# Patient Record
Sex: FEMALE | Race: WHITE | NOT HISPANIC OR LATINO | Employment: OTHER | URBAN - METROPOLITAN AREA
[De-identification: names, ages, dates, MRNs, and addresses within clinical notes are randomized per-mention and may not be internally consistent; named-entity substitution may affect disease eponyms.]

---

## 2024-01-01 ENCOUNTER — APPOINTMENT (OUTPATIENT)
Dept: CARDIOLOGY | Facility: HOSPITAL | Age: 69
End: 2024-01-01
Payer: MEDICARE

## 2024-01-01 ENCOUNTER — APPOINTMENT (OUTPATIENT)
Dept: RADIOLOGY | Facility: HOSPITAL | Age: 69
End: 2024-01-01
Payer: MEDICARE

## 2024-01-01 ENCOUNTER — POST MORTEM DOCUMENTATION (OUTPATIENT)
Dept: CARDIOLOGY | Facility: HOSPITAL | Age: 69
End: 2024-01-01
Payer: MEDICARE

## 2024-01-01 ENCOUNTER — TELEPHONE (OUTPATIENT)
Dept: CARDIOLOGY | Facility: HOSPITAL | Age: 69
End: 2024-01-01

## 2024-01-01 ENCOUNTER — APPOINTMENT (OUTPATIENT)
Dept: NEUROLOGY | Facility: HOSPITAL | Age: 69
End: 2024-01-01
Payer: MEDICARE

## 2024-01-01 ENCOUNTER — TELEPHONE (OUTPATIENT)
Dept: CARDIOLOGY | Facility: CLINIC | Age: 69
End: 2024-01-01
Payer: MEDICARE

## 2024-01-01 PROCEDURE — 71045 X-RAY EXAM CHEST 1 VIEW: CPT

## 2024-01-01 PROCEDURE — 93308 TTE F-UP OR LMTD: CPT

## 2024-01-01 PROCEDURE — 70450 CT HEAD/BRAIN W/O DYE: CPT

## 2024-01-01 PROCEDURE — 93308 TTE F-UP OR LMTD: CPT | Performed by: INTERNAL MEDICINE

## 2024-01-01 PROCEDURE — 95822 EEG COMA OR SLEEP ONLY: CPT

## 2024-01-01 PROCEDURE — 74018 RADEX ABDOMEN 1 VIEW: CPT

## 2024-01-01 PROCEDURE — 95822 EEG COMA OR SLEEP ONLY: CPT | Performed by: PSYCHIATRY & NEUROLOGY

## 2024-01-01 PROCEDURE — 93005 ELECTROCARDIOGRAM TRACING: CPT

## 2024-09-09 ENCOUNTER — OFFICE VISIT (OUTPATIENT)
Dept: CARDIOLOGY | Facility: CLINIC | Age: 69
End: 2024-09-09
Payer: MEDICARE

## 2024-09-09 VITALS
WEIGHT: 145.4 LBS | DIASTOLIC BLOOD PRESSURE: 70 MMHG | BODY MASS INDEX: 29.31 KG/M2 | TEMPERATURE: 97.4 F | HEIGHT: 59 IN | HEART RATE: 86 BPM | SYSTOLIC BLOOD PRESSURE: 115 MMHG

## 2024-09-09 DIAGNOSIS — I50.21 ACUTE SYSTOLIC HEART FAILURE (MULTI): ICD-10-CM

## 2024-09-09 DIAGNOSIS — I42.9 CARDIOMYOPATHY, UNSPECIFIED TYPE (MULTI): ICD-10-CM

## 2024-09-09 DIAGNOSIS — I48.19 PERSISTENT ATRIAL FIBRILLATION (MULTI): Primary | ICD-10-CM

## 2024-09-09 PROCEDURE — 36415 COLL VENOUS BLD VENIPUNCTURE: CPT | Mod: PORLAB | Performed by: INTERNAL MEDICINE

## 2024-09-09 PROCEDURE — 99215 OFFICE O/P EST HI 40 MIN: CPT | Performed by: INTERNAL MEDICINE

## 2024-09-09 PROCEDURE — 1159F MED LIST DOCD IN RCRD: CPT | Performed by: INTERNAL MEDICINE

## 2024-09-09 PROCEDURE — 99205 OFFICE O/P NEW HI 60 MIN: CPT | Performed by: INTERNAL MEDICINE

## 2024-09-09 PROCEDURE — 3008F BODY MASS INDEX DOCD: CPT | Performed by: INTERNAL MEDICINE

## 2024-09-09 PROCEDURE — 85025 COMPLETE CBC W/AUTO DIFF WBC: CPT | Mod: PORLAB | Performed by: INTERNAL MEDICINE

## 2024-09-09 PROCEDURE — 1160F RVW MEDS BY RX/DR IN RCRD: CPT | Performed by: INTERNAL MEDICINE

## 2024-09-09 PROCEDURE — 1036F TOBACCO NON-USER: CPT | Performed by: INTERNAL MEDICINE

## 2024-09-09 PROCEDURE — 82374 ASSAY BLOOD CARBON DIOXIDE: CPT | Mod: PORLAB | Performed by: INTERNAL MEDICINE

## 2024-09-09 RX ORDER — FUROSEMIDE 40 MG/1
40 TABLET ORAL
COMMUNITY
Start: 2024-09-02 | End: 2025-09-02

## 2024-09-09 RX ORDER — MULTIVITAMIN
TABLET ORAL
COMMUNITY
Start: 2012-03-05

## 2024-09-09 RX ORDER — METOPROLOL TARTRATE 50 MG/1
50 TABLET ORAL 2 TIMES DAILY
COMMUNITY
Start: 2024-09-01 | End: 2025-09-01

## 2024-09-09 NOTE — PROGRESS NOTES
"Chief Complaint:   Atrial Fibrillation     History of Present Illness     Lolis Auguste is a 68 y.o. female presenting with for evaluation  of persistent atrial fibrillation dx 8/30.24 with dyspnea x last 2 weeks, EF found 22%.  The patient has been asymptomatic.  The patient has been rate-controlled in atrial fibrillation on medical treatment which they are tolerating well and are compliant.  The current treatment strategy is rate control.  The CHADS2-VASC2 score is 4  and the ACC/AHA guidelines recommend anticoagulation for stroke prophylaxis.  The patient is being treated with Eliquis and has tolerated treatment with no bleeding and is compliant.  Denies CP.  No edema.  RO FC II. Better than on 8/30/24.    Review of Systems  All pertinent systems have been reviewed and are negative except for what is stated in the history of present illness.    All other systems have been reviewed and are negative and noncontributory to this patient's current ailments.  .       Previous History     Past Medical History:  She has a past medical history of Acute systolic heart failure (Multi) (09/09/2024), Cardiomyopathy (Multi) (09/09/2024), and Persistent atrial fibrillation (Multi) (09/09/2024).    Past Surgical History:  She has no past surgical history on file.      Social History:  She reports that she has never smoked. She has never used smokeless tobacco. She reports current alcohol use. She reports that she does not use drugs.    Family History:  No family history on file.     Allergies:  Patient has no known allergies.    Outpatient Medications:  Current Outpatient Medications   Medication Instructions    apixaban (ELIQUIS) 5 mg, oral, 2 times daily    furosemide (LASIX) 40 mg, oral, Daily RT    metoprolol tartrate (LOPRESSOR) 50 mg, oral, 2 times daily    multivitamin tablet oral       Physical Examination   Vitals:  Visit Vitals  /70   Pulse 86   Temp 36.3 °C (97.4 °F)   Ht 1.499 m (4' 11\")   Wt 66 kg (145 lb " "6.4 oz)   BMI 29.37 kg/m²   Smoking Status Never   BSA 1.66 m²    Physical Exam  Vitals reviewed.   Constitutional:       General: She is not in acute distress.     Appearance: Normal appearance.   HENT:      Head: Normocephalic and atraumatic.      Nose: Nose normal.   Eyes:      Conjunctiva/sclera: Conjunctivae normal.   Cardiovascular:      Rate and Rhythm: Normal rate. Rhythm irregularly irregular.      Pulses: Normal pulses.      Heart sounds: No murmur heard.  Pulmonary:      Effort: Pulmonary effort is normal. No respiratory distress.      Breath sounds: Normal breath sounds. No wheezing, rhonchi or rales.   Abdominal:      General: Bowel sounds are normal. There is no distension.      Palpations: Abdomen is soft.      Tenderness: There is no abdominal tenderness.   Musculoskeletal:         General: No swelling.      Right lower leg: No edema.      Left lower leg: No edema.   Skin:     General: Skin is warm and dry.      Capillary Refill: Capillary refill takes less than 2 seconds.   Neurological:      General: No focal deficit present.      Mental Status: She is alert.   Psychiatric:         Mood and Affect: Mood normal.             Labs/Imaging/Cardiac Studies     Last Labs:  CBC -  No results found for: \"WBC\", \"HGB\", \"HCT\", \"MCV\", \"PLT\"    CMP -  No results found for: \"CALCIUM\", \"PHOS\", \"PROT\", \"ALBUMIN\", \"AST\", \"ALT\", \"ALKPHOS\", \"BILITOT\"    LIPID PANEL -   No results found for: \"CHOL\", \"HDL\", \"CHHDL\", \"LDL\", \"VLDL\", \"TRIG\", \"NHDL\"    RENAL FUNCTION PANEL -   No results found for: \"GLU\", \"K\", \"CHLOR\", \"PHOS\"    Lab Results   Component Value Date    HGBA1C 5.6 08/30/2024       ECG:    Echo:  No echocardiogram results found for the past 12 months       Assessment and Recommendations     Assessment/Plan       1. Persistent atrial fibrillation (Multi)  The patient has been clinically stable, asymptomatic with persistent, rate-controlled atrial fibrillation as detailed in the HPI.  They will continue treatment " with rate-controlling medications and anticoagulation for stroke prophylaxis based upon their present [id number] score, the risks and benefits of which were discussed with the patient/family/caregiver.   MIKE/DCC then amiodarone.    2. Acute systolic heart failure (Multi)  Stabilized. Euvolemic FC II now Lasix.  Will add Entresto after NSR.  Add spironolactone.  Check BMP.    3. Cardiomyopathy, unspecified type (Multi)  Likely due to AF with RVR (tachy myopathy).         Quan Garcia MD    Exclusive of any other services or procedures performed, I, Quan Garcia MD , spent 30 minutes in duration for this visit today.  This time consisted of chart review, obtaining history, and/or performing the exam as documented above as well as documenting the clinical information for the encounter in the electronic record, discussing treatment options, plans, and/or goals with patient, family, and/or caregiver, refilling medications, updating the electronic record, ordering medicines, lab work, imaging, referrals, and/or procedures as documented above and communicating [facility]. I have discussed the results of laboratory, radiology, and cardiology studies with the patient and their family/caregiver.

## 2024-09-10 ENCOUNTER — TELEPHONE (OUTPATIENT)
Dept: CARDIOLOGY | Facility: CLINIC | Age: 69
End: 2024-09-10
Payer: MEDICARE

## 2024-09-10 DIAGNOSIS — I48.19 PERSISTENT ATRIAL FIBRILLATION (MULTI): Primary | ICD-10-CM

## 2024-09-10 DIAGNOSIS — I42.9 CARDIOMYOPATHY, UNSPECIFIED TYPE (MULTI): Primary | ICD-10-CM

## 2024-09-10 LAB
ANION GAP SERPL CALC-SCNC: 16 MMOL/L (ref 10–20)
BASOPHILS # BLD AUTO: 0.06 X10*3/UL (ref 0–0.1)
BASOPHILS NFR BLD AUTO: 0.9 %
BUN SERPL-MCNC: 19 MG/DL (ref 6–23)
CALCIUM SERPL-MCNC: 9 MG/DL (ref 8.6–10.6)
CHLORIDE SERPL-SCNC: 106 MMOL/L (ref 98–107)
CO2 SERPL-SCNC: 22 MMOL/L (ref 21–32)
CREAT SERPL-MCNC: 0.82 MG/DL (ref 0.5–1.05)
EGFRCR SERPLBLD CKD-EPI 2021: 77 ML/MIN/1.73M*2
EOSINOPHIL # BLD AUTO: 0.14 X10*3/UL (ref 0–0.7)
EOSINOPHIL NFR BLD AUTO: 2.1 %
ERYTHROCYTE [DISTWIDTH] IN BLOOD BY AUTOMATED COUNT: 13.4 % (ref 11.5–14.5)
GLUCOSE SERPL-MCNC: 117 MG/DL (ref 74–99)
HCT VFR BLD AUTO: 41.7 % (ref 36–46)
HGB BLD-MCNC: 13.7 G/DL (ref 12–16)
IMM GRANULOCYTES # BLD AUTO: 0.02 X10*3/UL (ref 0–0.7)
IMM GRANULOCYTES NFR BLD AUTO: 0.3 % (ref 0–0.9)
LYMPHOCYTES # BLD AUTO: 1.77 X10*3/UL (ref 1.2–4.8)
LYMPHOCYTES NFR BLD AUTO: 27 %
MCH RBC QN AUTO: 29.5 PG (ref 26–34)
MCHC RBC AUTO-ENTMCNC: 32.9 G/DL (ref 32–36)
MCV RBC AUTO: 90 FL (ref 80–100)
MONOCYTES # BLD AUTO: 0.59 X10*3/UL (ref 0.1–1)
MONOCYTES NFR BLD AUTO: 9 %
NEUTROPHILS # BLD AUTO: 3.97 X10*3/UL (ref 1.2–7.7)
NEUTROPHILS NFR BLD AUTO: 60.7 %
NRBC BLD-RTO: 0 /100 WBCS (ref 0–0)
PLATELET # BLD AUTO: 313 X10*3/UL (ref 150–450)
POTASSIUM SERPL-SCNC: 3.6 MMOL/L (ref 3.5–5.3)
RBC # BLD AUTO: 4.64 X10*6/UL (ref 4–5.2)
SODIUM SERPL-SCNC: 140 MMOL/L (ref 136–145)
WBC # BLD AUTO: 6.6 X10*3/UL (ref 4.4–11.3)

## 2024-09-10 RX ORDER — SPIRONOLACTONE 25 MG/1
12.5 TABLET ORAL DAILY
Qty: 15 TABLET | Refills: 1 | Status: SHIPPED | OUTPATIENT
Start: 2024-09-10

## 2024-09-10 NOTE — TELEPHONE ENCOUNTER
----- Message from Quan Garcia sent at 9/10/2024  7:54 AM EDT -----  Start spironolactone 12.5 mg every day and stop any KCL if taking.  BMP 1 week  ----- Message -----  From: Lab, Background User  Sent: 9/10/2024   5:06 AM EDT  To: Quan Garcia MD

## 2024-09-19 NOTE — H&P
History Of Present Illness  Lolis Auguste is a 68 y.o. female presenting with atrial fibrillation, here for MIKE with possible DCCV. PMH includes CHF, cardiomyopathy.    Past Medical History:  Past Medical History:   Diagnosis Date    Acute systolic heart failure (Multi) 09/09/2024    Cardiomyopathy (Multi) 09/09/2024    Persistent atrial fibrillation (Multi) 09/09/2024        Past Surgical History:  No past surgical history on file.       Social History:   reports that she has never smoked. She has never used smokeless tobacco. She reports current alcohol use. She reports that she does not use drugs.     Family History:  No family history on file.     Allergies:  No Known Allergies     Home Medications:  Current Outpatient Medications   Medication Instructions    apixaban (ELIQUIS) 5 mg, oral, 2 times daily    furosemide (LASIX) 40 mg, oral, Daily RT    metoprolol tartrate (LOPRESSOR) 50 mg, oral, 2 times daily    multivitamin tablet oral    spironolactone (ALDACTONE) 12.5 mg, oral, Daily       Inpatient Medications:            Review of Systems   Constitutional: Negative.    HENT: Negative.     Eyes: Negative.    Respiratory: Negative.     Cardiovascular: Negative.    Gastrointestinal: Negative.    Endocrine: Negative.    Genitourinary: Negative.    Musculoskeletal: Negative.    Skin: Negative.    Allergic/Immunologic: Negative.    Neurological: Negative.    Hematological: Negative.    Psychiatric/Behavioral: Negative.            Physical Exam  Constitutional:       General: She is awake. She is not in acute distress.     Appearance: She is not ill-appearing.   Cardiovascular:      Rate and Rhythm: Tachycardia present. Rhythm irregularly irregular.      Pulses: Normal pulses.           Radial pulses are 2+ on the right side and 2+ on the left side.        Dorsalis pedis pulses are 2+ on the right side and 2+ on the left side.      Heart sounds: Normal heart sounds. No murmur heard.  Pulmonary:      Effort:  "Pulmonary effort is normal.      Breath sounds: Normal breath sounds and air entry.   Abdominal:      General: Bowel sounds are normal.      Palpations: Abdomen is soft.      Tenderness: There is no abdominal tenderness.   Musculoskeletal:      Right lower leg: No edema.      Left lower leg: No edema.   Skin:     General: Skin is warm and dry.   Neurological:      General: No focal deficit present.      Mental Status: She is alert and oriented to person, place, and time.      GCS: GCS eye subscore is 4. GCS verbal subscore is 5. GCS motor subscore is 6.   Psychiatric:         Mood and Affect: Mood normal.         Behavior: Behavior is cooperative.        Sedation Plan    ASA 3     Mallampati class: II.           NPO since last night around 2200    Last Recorded Vitals  Blood pressure 108/78, pulse (!) 111, resp. rate 18, height 1.499 m (4' 11\"), weight 63.5 kg (140 lb), SpO2 93%.         Vitals from the Past 24 Hours  Heart Rate:  [111]   Resp:  [18]   BP: (108)/(78)   Height:  [149.9 cm (4' 11\")]   Weight:  [63.5 kg (140 lb)]   SpO2:  [93 %]          Relevant Results    Labs    CBC:   Recent Labs     09/09/24  1458   WBC 6.6   HGB 13.7   HCT 41.7      MCV 90     BMP/CMP:   Recent Labs     09/09/24  1458      K 3.6      BUN 19   CREATININE 0.82   CO2 22   CALCIUM 9.0   GLUCOSE 117*      Lipid Panel: No results for input(s): \"CHOL\", \"HDL\", \"CHHDL\", \"LDL\", \"VLDL\", \"TRIG\", \"NHDL\" in the last 96153 hours.  Cardiac       No lab exists for component: \"CK\", \"CKMBP\"   Hemoglobin A1C:   Recent Labs     08/30/24  1141   HGBA1C 5.6     TSH/ Free T4: No results for input(s): \"TSH\", \"FREET4\" in the last 37703 hours.  Iron: No results for input(s): \"FERRITIN\", \"TIBC\", \"IRONSAT\", \"BNP\" in the last 07106 hours.  Coag:     ABO: No results found for: \"ABO\"    Past Cardiology Tests (Last 3 Years):    EKG:  No results for input(s): \"ATRRATE\", \"VENTRATE\", \"PRINT\", \"QRSDUR\", \"QTCFRED\", \"QTCCALCB\" in the last 08543 " "hours.No results found for this or any previous visit (from the past 4464 hour(s)).  Echo:  Echocardiogram: No results found for this or any previous visit from the past 1800 days.    Ejection Fractions:  No results found for: \"EF\"  Cath:  Coronary Angiography: No results found for this or any previous visit from the past 1800 days.    Right Heart Cath: No results found for this or any previous visit from the past 1800 days.    Stress Test:  Nuclear:No results found for this or any previous visit from the past 1800 days.    Metabolic Stress: No results found for this or any previous visit from the past 1800 days.    Cardiac Imaging:  Cardiac Scoring: No results found for this or any previous visit from the past 1800 days.    Cardiac MRI: No results found for this or any previous visit from the past 1800 days.         Assessment/Plan  Assessment/Plan   Active Problems:  There are no active Hospital Problems.      atrial fibrillation  -MIKE with possible DCCV with Dr. Garcia on 9/20/24       NP discussed with Dr. Garcia regarding plan of care/ discharge plan      I spent 30 minutes in the professional and overall care of this patient.      Lasha Jonas, APRN-CNP, DNP    "

## 2024-09-20 ENCOUNTER — HOSPITAL ENCOUNTER (OUTPATIENT)
Dept: CARDIOLOGY | Facility: HOSPITAL | Age: 69
Discharge: HOME | End: 2024-09-20
Payer: MEDICARE

## 2024-09-20 ENCOUNTER — ANESTHESIA EVENT (OUTPATIENT)
Dept: CARDIOLOGY | Facility: HOSPITAL | Age: 69
End: 2024-09-20
Payer: MEDICARE

## 2024-09-20 ENCOUNTER — ANESTHESIA (OUTPATIENT)
Dept: CARDIOLOGY | Facility: HOSPITAL | Age: 69
End: 2024-09-20
Payer: MEDICARE

## 2024-09-20 VITALS
RESPIRATION RATE: 16 BRPM | HEART RATE: 114 BPM | WEIGHT: 140 LBS | TEMPERATURE: 95.4 F | HEIGHT: 59 IN | BODY MASS INDEX: 28.22 KG/M2 | DIASTOLIC BLOOD PRESSURE: 93 MMHG | OXYGEN SATURATION: 94 % | SYSTOLIC BLOOD PRESSURE: 111 MMHG

## 2024-09-20 DIAGNOSIS — I48.19 PERSISTENT ATRIAL FIBRILLATION (MULTI): ICD-10-CM

## 2024-09-20 PROBLEM — I50.9 CHF (CONGESTIVE HEART FAILURE): Status: ACTIVE | Noted: 2024-09-20

## 2024-09-20 PROBLEM — J18.9 PNEUMONIA: Status: ACTIVE | Noted: 2024-09-20

## 2024-09-20 LAB — BODY SURFACE AREA: 1.63 M2

## 2024-09-20 PROCEDURE — 2500000005 HC RX 250 GENERAL PHARMACY W/O HCPCS: Performed by: INTERNAL MEDICINE

## 2024-09-20 PROCEDURE — 93320 DOPPLER ECHO COMPLETE: CPT | Performed by: INTERNAL MEDICINE

## 2024-09-20 PROCEDURE — 2500000001 HC RX 250 WO HCPCS SELF ADMINISTERED DRUGS (ALT 637 FOR MEDICARE OP): Performed by: INTERNAL MEDICINE

## 2024-09-20 PROCEDURE — 93325 DOPPLER ECHO COLOR FLOW MAPG: CPT | Performed by: INTERNAL MEDICINE

## 2024-09-20 PROCEDURE — 99223 1ST HOSP IP/OBS HIGH 75: CPT | Performed by: NURSE PRACTITIONER

## 2024-09-20 PROCEDURE — 3700000001 HC GENERAL ANESTHESIA TIME - INITIAL BASE CHARGE

## 2024-09-20 PROCEDURE — 3700000002 HC GENERAL ANESTHESIA TIME - EACH INCREMENTAL 1 MINUTE

## 2024-09-20 PROCEDURE — 2500000004 HC RX 250 GENERAL PHARMACY W/ HCPCS (ALT 636 FOR OP/ED): Performed by: ANESTHESIOLOGIST ASSISTANT

## 2024-09-20 PROCEDURE — 93320 DOPPLER ECHO COMPLETE: CPT

## 2024-09-20 PROCEDURE — 93312 ECHO TRANSESOPHAGEAL: CPT | Performed by: INTERNAL MEDICINE

## 2024-09-20 RX ORDER — PROPOFOL 10 MG/ML
INJECTION, EMULSION INTRAVENOUS AS NEEDED
Status: DISCONTINUED | OUTPATIENT
Start: 2024-09-20 | End: 2024-09-20

## 2024-09-20 RX ORDER — SODIUM CHLORIDE 9 MG/ML
100 INJECTION, SOLUTION INTRAVENOUS CONTINUOUS
Status: DISCONTINUED | OUTPATIENT
Start: 2024-09-20 | End: 2024-09-21 | Stop reason: HOSPADM

## 2024-09-20 RX ORDER — LIDOCAINE HYDROCHLORIDE 20 MG/ML
SOLUTION OROPHARYNGEAL AS NEEDED
Status: DISCONTINUED | OUTPATIENT
Start: 2024-09-20 | End: 2024-09-20 | Stop reason: HOSPADM

## 2024-09-20 ASSESSMENT — PAIN - FUNCTIONAL ASSESSMENT
PAIN_FUNCTIONAL_ASSESSMENT: 0-10

## 2024-09-20 ASSESSMENT — ENCOUNTER SYMPTOMS
NEUROLOGICAL NEGATIVE: 1
ENDOCRINE NEGATIVE: 1
EYES NEGATIVE: 1
MUSCULOSKELETAL NEGATIVE: 1
ALLERGIC/IMMUNOLOGIC NEGATIVE: 1
CARDIOVASCULAR NEGATIVE: 1
RESPIRATORY NEGATIVE: 1
GASTROINTESTINAL NEGATIVE: 1
CONSTITUTIONAL NEGATIVE: 1
HEMATOLOGIC/LYMPHATIC NEGATIVE: 1
PSYCHIATRIC NEGATIVE: 1

## 2024-09-20 ASSESSMENT — COLUMBIA-SUICIDE SEVERITY RATING SCALE - C-SSRS
6. HAVE YOU EVER DONE ANYTHING, STARTED TO DO ANYTHING, OR PREPARED TO DO ANYTHING TO END YOUR LIFE?: NO
2. HAVE YOU ACTUALLY HAD ANY THOUGHTS OF KILLING YOURSELF?: NO
1. IN THE PAST MONTH, HAVE YOU WISHED YOU WERE DEAD OR WISHED YOU COULD GO TO SLEEP AND NOT WAKE UP?: NO

## 2024-09-20 ASSESSMENT — PAIN SCALES - GENERAL
PAINLEVEL_OUTOF10: 0 - NO PAIN
PAIN_LEVEL: 0
PAINLEVEL_OUTOF10: 0 - NO PAIN

## 2024-09-20 NOTE — DISCHARGE INSTRUCTIONS
Please do not miss any doses of your anticoagulant (Eliquis). This is very important to prevent the risk of stroke.                Cardioversion     Cardioversion is a non-surgical way to restore your heart's normal rhythm. Medication may be tried first to correct an irregular heartbeat, but if medicines do not work, electrical cardioversion may be needed. The electrical current should make your heartbeat normally again.      After the procedure-    Your heart rate, blood pressure and respirations will be checked frequently by the nurse until you are fully alert.      When the patches are removed form your chest, you may notice redness, irritation, or itching similar to a mild sunburn. You may You may use over the counter hydrocortisone 1% cream and apply up to three times a day for any irritation to your skin on your chest.      Discharge information-   Have an adult with you to get you home from the hospital; you will not be allowed to drive for 24 hours after the procedure.      You may resume your usual routine after discharge.      Make sure you and your family understands how you are to take your medications. The doctor will tell you what to do with your cardiac medicines and your anti-coagulation medicines.      There is a small chance your irregular heartbeat may return. If you feel skipped beats, rapid heart rate, or chest tightness, call your doctor.                Transesophageal Echocardiogram:  Post-Procedure and Homegoing Instructions    Please follow the instructions below after your transesophageal echocardiogram:  1) Do not drive or operate machinery for 24 hours after your procedure.  2) Do not eat or drink anything for two hours after your procedure, until ____11am________.  Start with a little bit of water to be sure you can swallow without coughing.  3) Avoid alcohol for at least 24 hours after the test.  5) You may have a mild sore throat for a day or two. Cough drops may help after the  two  hour wait. Tylenol may also be taken once you can swallow.  6) Although the symptoms below are rare, call your doctor at once, or seek emergency  care if you have:  - Bright red blood in your sputum - Sustained chest pain  - Severe shortness of breath - Severe abdominal pain  - Allergy symptoms (hives, rash, nausea, vomiting, difficulty breathing  7) If you are an out-patient, a nurse will have placed an IV during the study for sedation  and contrast injection. The nurse will remove the IV before she sends you home. Leave  the band-aid on for 24 hours and then check the IV site for signs of infection, such as:  ? Increasing soreness  ? Redness  ? Drainage from the puncture site    If you notice any of these conditions, you should contact your doctor immediately.

## 2024-09-20 NOTE — ANESTHESIA PREPROCEDURE EVALUATION
Patient: Lolis Auguste    Procedure Information       Date/Time: 09/20/24 0800    Scheduled providers: Quan Garcia MD; Man Lundy MD; MIMI Shah    Procedure: TRANSESOPHAGEAL ECHO (MIKE) W/ POSSIBLE CARDIOVERSION    [facility] [facility] [place]; [facility] [place]            Relevant Problems   Anesthesia (within normal limits)      Cardiac   (+) CHF (congestive heart failure) (Multi)   (+) Persistent atrial fibrillation (Multi)      Pulmonary  Env allergies   (+) Pneumonia (remote)      Neuro (within normal limits)      GI (within normal limits)      /Renal (within normal limits)      Liver (within normal limits)      Endocrine (within normal limits)      Hematology (within normal limits)       Clinical information reviewed:                   NPO Detail:  NPO/Void Status  Date of Last Liquid: 09/20/24  Time of Last Liquid: 0000  Date of Last Solid: 09/19/24  Time of Last Solid: 2200         Physical Exam    Airway  Mallampati: III     Cardiovascular    Dental    Pulmonary    Abdominal            Anesthesia Plan    History of general anesthesia?: yes  History of complications of general anesthesia?: no    ASA 3     general     intravenous induction   Anesthetic plan and risks discussed with patient.

## 2024-09-20 NOTE — ANESTHESIA POSTPROCEDURE EVALUATION
Patient: Lolis Auguste    Procedure Summary       Date: 09/20/24 Room / [place] [facility] [place] [facility] [place]    Anesthesia Start: 0750 Anesthesia Stop: 0817    Procedure: TRANSESOPHAGEAL ECHO (MIKE) W/ POSSIBLE CARDIOVERSION Diagnosis:       Persistent atrial fibrillation (Multi)      (AF)    Scheduled Providers: Quan Garcia MD; Man Lundy MD; MIMI Shah Responsible Provider: Man Lundy MD    Anesthesia Type: general ASA Status: 3            Anesthesia Type: general    Vitals Value Taken Time   /68 09/20/24 0826   Temp 35.2 °C (95.4 °F) 09/20/24 0826   Pulse 115 09/20/24 0826   Resp 16 09/20/24 0826   SpO2 95 % 09/20/24 0826       Anesthesia Post Evaluation    Patient location during evaluation: bedside  Patient participation: complete - patient cannot participate  Level of consciousness: awake  Pain score: 0  Pain management: adequate  Airway patency: patent  Cardiovascular status: acceptable and hemodynamically stable  Respiratory status: acceptable and nonlabored ventilation  Hydration status: acceptable  Postoperative Nausea and Vomiting: none        No notable events documented.

## 2024-09-20 NOTE — NURSING NOTE
Home going instructions specific to procedure given. Easily arousable; responding appropriately. Vs +/- 20% of pre procedure status. Significant complications are absent. Ambulates without dizziness/age appropriate activity, pulse ox above or equal to 92% on room air/ordered oxygen treatment. Care Plan Complete. Discharge to home accompanied by Eddie,    Discharged via wheelchair to front without issue. IV removed.

## 2024-09-23 LAB — EJECTION FRACTION: 25 %

## 2024-09-30 ENCOUNTER — OFFICE VISIT (OUTPATIENT)
Dept: CARDIOLOGY | Facility: CLINIC | Age: 69
End: 2024-09-30
Payer: MEDICARE

## 2024-09-30 VITALS
DIASTOLIC BLOOD PRESSURE: 74 MMHG | SYSTOLIC BLOOD PRESSURE: 104 MMHG | BODY MASS INDEX: 29.69 KG/M2 | OXYGEN SATURATION: 97 % | HEART RATE: 96 BPM | WEIGHT: 147 LBS

## 2024-09-30 DIAGNOSIS — I51.3 THROMBUS OF LEFT ATRIAL APPENDAGE: Primary | ICD-10-CM

## 2024-09-30 DIAGNOSIS — I50.21 ACUTE SYSTOLIC HEART FAILURE: ICD-10-CM

## 2024-09-30 DIAGNOSIS — I48.19 PERSISTENT ATRIAL FIBRILLATION (MULTI): ICD-10-CM

## 2024-09-30 DIAGNOSIS — I42.9 CARDIOMYOPATHY, UNSPECIFIED TYPE (MULTI): ICD-10-CM

## 2024-09-30 PROCEDURE — 99215 OFFICE O/P EST HI 40 MIN: CPT | Performed by: INTERNAL MEDICINE

## 2024-09-30 PROCEDURE — 93005 ELECTROCARDIOGRAM TRACING: CPT | Performed by: INTERNAL MEDICINE

## 2024-09-30 PROCEDURE — 1159F MED LIST DOCD IN RCRD: CPT | Performed by: INTERNAL MEDICINE

## 2024-09-30 PROCEDURE — 1160F RVW MEDS BY RX/DR IN RCRD: CPT | Performed by: INTERNAL MEDICINE

## 2024-09-30 PROCEDURE — 1036F TOBACCO NON-USER: CPT | Performed by: INTERNAL MEDICINE

## 2024-09-30 RX ORDER — METOPROLOL TARTRATE 25 MG/1
25 TABLET, FILM COATED ORAL 3 TIMES DAILY
Qty: 90 TABLET | Refills: 11 | Status: SHIPPED | OUTPATIENT
Start: 2024-09-30 | End: 2025-09-30

## 2024-09-30 NOTE — PROGRESS NOTES
Chief Complaint:   Atrial Fibrillation     History of Present Illness     Lolis Auguste is a 68 y.o. female presenting with for routine follow-up of persistent atrial fibrillation.  The patient has been asymptomatic.  The patient has been rate-controlled in atrial fibrillation on medical treatment which they are tolerating well and are compliant.  The current treatment strategy is rate control.  The CHADS2-VASC2 score is 3  and the ACC/AHA guidelines recommend anticoagulation for stroke prophylaxis.  The patient is being treated with Eliquis and has tolerated treatment with no bleeding and is compliant. MIKE 9/20/24 SOLOMON thrombus so no DCCV.  Still with dyspnea FC III. No edema.     Review of Systems  All pertinent systems have been reviewed and are negative except for what is stated in the history of present illness.    All other systems have been reviewed and are negative and noncontributory to this patient's current ailments.  .       Previous History     Past Medical History:  She has a past medical history of Acute systolic heart failure (Multi) (09/09/2024), Cardiomyopathy (Multi) (09/09/2024), Persistent atrial fibrillation (Multi) (09/09/2024), and Thrombus of left atrial appendage (09/30/2024).    Past Surgical History:  She has no past surgical history on file.      Social History:  She reports that she has never smoked. She has never used smokeless tobacco. She reports current alcohol use. She reports that she does not use drugs.    Family History:  No family history on file.     Allergies:  Patient has no known allergies.    Outpatient Medications:  Current Outpatient Medications   Medication Instructions    apixaban (ELIQUIS) 5 mg, oral, 2 times daily    furosemide (LASIX) 40 mg, oral, Daily RT    metoprolol tartrate (LOPRESSOR) 25 mg, oral, 3 times daily    multivitamin tablet oral       Physical Examination   Vitals:  Visit Vitals  /74 (BP Location: Right arm, Patient Position: Sitting, BP Cuff  "Size: Adult)   Pulse 96   Wt 66.7 kg (147 lb)   SpO2 97%   BMI 29.69 kg/m²   Smoking Status Never   BSA 1.67 m²    Physical Exam  Vitals reviewed.   Constitutional:       General: She is not in acute distress.     Appearance: Normal appearance.   HENT:      Head: Normocephalic and atraumatic.      Nose: Nose normal.   Eyes:      Conjunctiva/sclera: Conjunctivae normal.   Cardiovascular:      Rate and Rhythm: Normal rate. Rhythm irregularly irregular.      Pulses: Normal pulses.      Heart sounds: No murmur heard.  Pulmonary:      Effort: Pulmonary effort is normal. No respiratory distress.      Breath sounds: Normal breath sounds. No wheezing, rhonchi or rales.   Abdominal:      General: Bowel sounds are normal. There is no distension.      Palpations: Abdomen is soft.      Tenderness: There is no abdominal tenderness.   Musculoskeletal:         General: No swelling.      Right lower leg: No edema.      Left lower leg: No edema.   Skin:     General: Skin is warm and dry.      Capillary Refill: Capillary refill takes less than 2 seconds.   Neurological:      General: No focal deficit present.      Mental Status: She is alert.   Psychiatric:         Mood and Affect: Mood normal.             Labs/Imaging/Cardiac Studies     Last Labs:  CBC -  Lab Results   Component Value Date    WBC 6.6 09/09/2024    HGB 13.7 09/09/2024    HCT 41.7 09/09/2024    MCV 90 09/09/2024     09/09/2024       CMP -  Lab Results   Component Value Date    CALCIUM 9.0 09/09/2024       LIPID PANEL -   No results found for: \"CHOL\", \"HDL\", \"CHHDL\", \"LDL\", \"VLDL\", \"TRIG\", \"NHDL\"    RENAL FUNCTION PANEL -   Lab Results   Component Value Date    K 3.6 09/09/2024       Lab Results   Component Value Date    HGBA1C 5.6 08/30/2024       ECG:    Echo:  Transesophageal Echo (MIKE) With Possible Cardioversion    Result Date: 9/23/2024   [facility], [address] [place] [address]              Tel [phone] and Fax [phone] " TRANSESOPHAGEAL ECHOCARDIOGRAM REPORT  Patient Name:      ADOLPH TIMMONS      Reading Physician:    [id number] Karlie Garcia MD Study Date:        9/20/2024           Ordering Provider:    [id number] KARLIE GARCIA MRN/PID:           [id number]            Fellow: Accession#:        [id number]        Nurse:                Jeison Kay RN Date of Birth/Age: 1955 / 68     Sonographer:          Alonso Jim RDCS                    years Gender:            F                   Additional Staff:     Chris LIVINGSTON Height:            149.86 cm           Admit Date:           9/20/2024 Weight:            65.77 kg            Admission Status:     Outpatient BSA / BMI:         1.61 m2 / 29.29     Encounter#:           [id number]                    kg/m2 Blood Pressure:    108/78 mmHg         Department Location:  [facility]                                                              Invasive Study Type:    TRANSESOPHAGEAL ECHO (MIKE) W/ POSSIBLE CARDIOVERSION Diagnosis/ICD: Other persistent AFib-I48.19 Indication:    Persistent A-fib CPT Code:      MIKE Complete-12699; Doppler Limited-64851; Color Doppler-91946 Patient History: Drug Allergies:    None Smoker:            Never. Diabetes:          No Pertinent History: Dyspnea, A-Fib and CHF. 68 y.o. female presents for MIKE/CVN                    for perisistent A-fib. Study Detail: The following Echo studies were performed: 2D and Doppler.               Patient's heart rhythm is atrial fibrillation. A bubble study was               not performed. The patient was sedated.  PHYSICIAN INTERPRETATION: MIKE Details: The MIKE probe used was [id number]. Technically adequate omniplane transesophageal echocardiogram performed. Color flow Doppler echo was performed to assess for the presence of a patent foramen ovale. MIKE Medication: The pharynx was anesthetized  with Cetacaine spray and viscous lidocaine. The patient was sedated by Anesthesia; please refer to anesthesia flow sheet for medications used. MIKE Procedure: The probe was passed without difficulty. Complications encountered during procedure: Patient tolerated the procedure well without any apparent complications. Left Ventricle: The left ventricular systolic function is severely decreased, with a visually estimated ejection fraction of 25%. There are no regional left ventricular wall motion abnormalities. The left ventricular cavity size is normal. Left ventricular diastolic filling was indeterminate. Left Atrium: The left atrium is moderately dilated. There is no evidence of a patent foramen ovale. There is no shunt detected. A bubble study using agitated saline was not performed. There is a thrombus in the left atrial appendage. There is a severe degree of spontaneous echo contrast in the left atrium. SOLOMON thrombus identified. DCCV aborted. Right Ventricle: The right ventricle is normal in size. There is normal right ventricular global systolic function. Right Atrium: The right atrium is normal in size. Aortic Valve: The aortic valve is trileaflet. There is no evidence of aortic valve regurgitation. Mitral Valve: The mitral valve is mildly thickened. There is moderate mitral valve regurgitation. Tricuspid Valve: The tricuspid valve is structurally normal. There is mild tricuspid regurgitation. Pulmonic Valve: The pulmonic valve is structurally normal. There is no indication of pulmonic valve regurgitation. Pericardium: There is no pericardial effusion noted. Aorta: The aortic root is normal.  CONCLUSIONS:  1. The left ventricular systolic function is severely decreased, with a visually estimated ejection fraction of 25%.  2. Left ventricular diastolic filling was indeterminate.  3. There is normal right ventricular global systolic function.  4. The left atrium is moderately dilated.  5. SOLOMON thrombus identified.  DCCV aborted.  6. Moderate mitral valve regurgitation.  7. There is no evidence of a patent foramen ovale.  8. There is no shunt detected. QUANTITATIVE DATA SUMMARY:  LV SYSTOLIC FUNCTION BY 2D PLANIMETRY (MOD):                      Normal Ranges: EF-Visual:      25 % LV EF Reported: 25 %  [id number] Quan Garcia MD Electronically signed on 9/23/2024 at 11:16:53 AM  ** Final **         Assessment and Recommendations     Assessment/Plan       1. Persistent atrial fibrillation (Multi)  The patient has been clinically stable, with persistent, rapid atrial fibrillation as detailed in the HPI.  They will continue treatment with rate-controlling medications and anticoagulation for stroke prophylaxis based upon their present [id number] score, the risks and benefits of which were discussed with the patient/family/caregiver. Added metoprolol for rate control.  She did not tolerate 50 mg metoprolol due to dizziness.  - ECG 12 lead (Clinic Performed)  - Transesophageal Echo (MIKE) With Possible Cardioversion; Future  - metoprolol tartrate (Lopressor) 25 mg tablet; Take 1 tablet (25 mg) by mouth 3 times a day.  Dispense: 90 tablet; Refill: 11    2. Thrombus of left atrial appendage  Re-MIKE +/- DCCV in 3 weeks.  - Transesophageal Echo (MIKE) With Possible Cardioversion; Future  - metoprolol tartrate (Lopressor) 25 mg tablet; Take 1 tablet (25 mg) by mouth 3 times a day.  Dispense: 90 tablet; Refill: 11    3. 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  BP will not support GDMT.  - Transesophageal Echo (MIKE) With Possible Cardioversion; Future  - metoprolol tartrate (Lopressor) 25 mg tablet; Take 1 tablet (25 mg) by mouth 3 times a day.  Dispense: 90 tablet; Refill: 11    4. Acute systolic heart failure (Multi)  Unchanged and euvolemic.  - Transesophageal Echo (MIKE) With Possible Cardioversion; Future  - metoprolol tartrate (Lopressor) 25 mg tablet; Take 1 tablet (25 mg) by mouth 3 times a day.  Dispense: 90 tablet;  Refill: 11         Quan Garcia MD    Exclusive of any other services or procedures performed, I, Quan Garcia MD , spent 30 minutes in duration for this visit today.  This time consisted of chart review, obtaining history, and/or performing the exam as documented above as well as documenting the clinical information for the encounter in the electronic record, discussing treatment options, plans, and/or goals with patient, family, and/or caregiver, refilling medications, updating the electronic record, ordering medicines, lab work, imaging, referrals, and/or procedures as documented above and communicating with other [facility] professionals. I have discussed the results of laboratory, radiology, and cardiology studies with the patient and their family/caregiver.

## 2024-10-18 ENCOUNTER — ANESTHESIA (OUTPATIENT)
Dept: CARDIOLOGY | Facility: HOSPITAL | Age: 69
End: 2024-10-18
Payer: MEDICARE

## 2024-10-18 ENCOUNTER — HOSPITAL ENCOUNTER (OUTPATIENT)
Dept: CARDIOLOGY | Facility: HOSPITAL | Age: 69
Discharge: HOME | End: 2024-10-18
Payer: MEDICARE

## 2024-10-18 ENCOUNTER — ANESTHESIA EVENT (OUTPATIENT)
Dept: CARDIOLOGY | Facility: HOSPITAL | Age: 69
End: 2024-10-18
Payer: MEDICARE

## 2024-10-18 VITALS
SYSTOLIC BLOOD PRESSURE: 102 MMHG | OXYGEN SATURATION: 93 % | TEMPERATURE: 97.7 F | DIASTOLIC BLOOD PRESSURE: 72 MMHG | HEART RATE: 118 BPM | RESPIRATION RATE: 14 BRPM

## 2024-10-18 DIAGNOSIS — I48.19 PERSISTENT ATRIAL FIBRILLATION (MULTI): ICD-10-CM

## 2024-10-18 DIAGNOSIS — I51.3 THROMBUS OF LEFT ATRIAL APPENDAGE: ICD-10-CM

## 2024-10-18 DIAGNOSIS — I50.21 ACUTE SYSTOLIC HEART FAILURE: ICD-10-CM

## 2024-10-18 DIAGNOSIS — I42.9 CARDIOMYOPATHY, UNSPECIFIED TYPE (MULTI): ICD-10-CM

## 2024-10-18 PROCEDURE — 3700000001 HC GENERAL ANESTHESIA TIME - INITIAL BASE CHARGE

## 2024-10-18 PROCEDURE — 2500000001 HC RX 250 WO HCPCS SELF ADMINISTERED DRUGS (ALT 637 FOR MEDICARE OP): Performed by: INTERNAL MEDICINE

## 2024-10-18 PROCEDURE — 99222 1ST HOSP IP/OBS MODERATE 55: CPT | Performed by: NURSE PRACTITIONER

## 2024-10-18 PROCEDURE — 2500000004 HC RX 250 GENERAL PHARMACY W/ HCPCS (ALT 636 FOR OP/ED): Performed by: NURSE ANESTHETIST, CERTIFIED REGISTERED

## 2024-10-18 PROCEDURE — 2500000005 HC RX 250 GENERAL PHARMACY W/O HCPCS: Performed by: INTERNAL MEDICINE

## 2024-10-18 PROCEDURE — 3700000002 HC GENERAL ANESTHESIA TIME - EACH INCREMENTAL 1 MINUTE

## 2024-10-18 PROCEDURE — 92960 CARDIOVERSION ELECTRIC EXT: CPT | Performed by: INTERNAL MEDICINE

## 2024-10-18 PROCEDURE — 92960 CARDIOVERSION ELECTRIC EXT: CPT

## 2024-10-18 PROCEDURE — 93005 ELECTROCARDIOGRAM TRACING: CPT

## 2024-10-18 PROCEDURE — 93010 ELECTROCARDIOGRAM REPORT: CPT | Performed by: INTERNAL MEDICINE

## 2024-10-18 PROCEDURE — 93312 ECHO TRANSESOPHAGEAL: CPT | Performed by: INTERNAL MEDICINE

## 2024-10-18 RX ORDER — LIDOCAINE HYDROCHLORIDE 20 MG/ML
INJECTION, SOLUTION EPIDURAL; INFILTRATION; INTRACAUDAL; PERINEURAL AS NEEDED
Status: DISCONTINUED | OUTPATIENT
Start: 2024-10-18 | End: 2024-10-18

## 2024-10-18 RX ORDER — AMIODARONE HYDROCHLORIDE 400 MG/1
400 TABLET ORAL 2 TIMES DAILY
Qty: 60 TABLET | Refills: 0 | Status: SHIPPED | OUTPATIENT
Start: 2024-10-18

## 2024-10-18 RX ORDER — LIDOCAINE HYDROCHLORIDE 20 MG/ML
SOLUTION OROPHARYNGEAL AS NEEDED
Status: DISCONTINUED | OUTPATIENT
Start: 2024-10-18 | End: 2024-10-18 | Stop reason: HOSPADM

## 2024-10-18 RX ORDER — MIDAZOLAM HYDROCHLORIDE 1 MG/ML
INJECTION INTRAMUSCULAR; INTRAVENOUS AS NEEDED
Status: DISCONTINUED | OUTPATIENT
Start: 2024-10-18 | End: 2024-10-18

## 2024-10-18 RX ORDER — PROPOFOL 10 MG/ML
INJECTION, EMULSION INTRAVENOUS AS NEEDED
Status: DISCONTINUED | OUTPATIENT
Start: 2024-10-18 | End: 2024-10-18

## 2024-10-18 SDOH — HEALTH STABILITY: MENTAL HEALTH: CURRENT SMOKER: 0

## 2024-10-18 ASSESSMENT — PAIN SCALES - GENERAL
PAINLEVEL_OUTOF10: 0 - NO PAIN

## 2024-10-18 ASSESSMENT — PAIN - FUNCTIONAL ASSESSMENT
PAIN_FUNCTIONAL_ASSESSMENT: 0-10

## 2024-10-18 ASSESSMENT — ENCOUNTER SYMPTOMS
ALLERGIC/IMMUNOLOGIC NEGATIVE: 1
SHORTNESS OF BREATH: 1
GASTROINTESTINAL NEGATIVE: 1
ENDOCRINE NEGATIVE: 1
FATIGUE: 1
CARDIOVASCULAR NEGATIVE: 1
EYES NEGATIVE: 1
NEUROLOGICAL NEGATIVE: 1
PSYCHIATRIC NEGATIVE: 1
HEMATOLOGIC/LYMPHATIC NEGATIVE: 1
MUSCULOSKELETAL NEGATIVE: 1

## 2024-10-18 NOTE — DISCHARGE INSTRUCTIONS
Start Amiodarone 400 mg twice daily for your irregular heart rhythm.    Follow up with Dr. Garcia in 1 week. Appointment requested. Please call the office if you do not hear anything in 3 business days         Transesophageal Echocardiogram:  Post-Procedure and Homegoing Instructions    Please follow the instructions below after your transesophageal echocardiogram:  1) Do not drive or operate machinery for 24 hours after your procedure.  2) Do not eat or drink anything for two hours after your procedure, until __as directed by nurse_.  Start with a little bit of water to be sure you can swallow without coughing.  3) Avoid alcohol for at least 24 hours after the test.  5) You may have a mild sore throat for a day or two. Cough drops may help after the  two hour wait. Tylenol may also be taken once you can swallow.  6) Although the symptoms below are rare, call your doctor at once, or seek emergency  care if you have:  - Bright red blood in your sputum - Sustained chest pain  - Severe shortness of breath - Severe abdominal pain  - Allergy symptoms (hives, rash, nausea, vomiting, difficulty breathing  7) If you are an out-patient, a nurse will have placed an IV during the study for sedation  and contrast injection. The nurse will remove the IV before she sends you home. Leave  the band-aid on for 24 hours and then check the IV site for signs of infection, such as:  ? Increasing soreness  ? Redness  ? Drainage from the puncture site    If you notice any of these conditions, you should contact your doctor immediately.               Cardioversion     Cardioversion is a non-surgical way to restore your heart's normal rhythm. Medication may be tried first to correct an irregular heartbeat, but if medicines do not work, electrical cardioversion may be needed. The electrical current should make your heartbeat normally again.      After the procedure-    Your heart rate, blood pressure and respirations will be checked  frequently by the nurse until you are fully alert.      When the patches are removed form your chest, you may notice redness, irritation, or itching similar to a mild sunburn. You may You may use over the counter hydrocortisone 1% cream and apply up to three times a day for any irritation to your skin on your chest.      Discharge information-   Have an adult with you to get you home from the hospital; you will not be allowed to drive for 24 hours after the procedure.      You may resume your usual routine after discharge.      Make sure you and your family understands how you are to take your medications. The doctor will tell you what to do with your cardiac medicines and your anti-coagulation medicines.      There is a small chance your irregular heartbeat may return. If you feel skipped beats, rapid heart rate, or chest tightness, call your doctor.

## 2024-10-18 NOTE — ANESTHESIA POSTPROCEDURE EVALUATION
Patient: Lolis Auguste    Procedure Summary       Date: 10/18/24 [facility] / [facility]    Anesthesia Start: 0722 Anesthesia Stop: 0749    Procedure: TRANSESOPHAGEAL ECHO (MIKE) W/ POSSIBLE CARDIOVERSION Diagnosis:       Persistent atrial fibrillation (Multi)      Thrombus of left atrial appendage      Cardiomyopathy, unspecified type (Multi)      Acute systolic heart failure      (AF)    Scheduled Providers: Quan Garcia MD; Mark Dumont MD; KRAIG Hernandez-CRNA Responsible Provider: Mark Dumont MD    Anesthesia Type: MAC ASA Status: 2            Anesthesia Type: MAC    Vitals Value Taken Time   /72 10/18/24 0830   Temp 36.5 °C (97.7 °F) 10/18/24 0807   Pulse 118 10/18/24 0830   Resp 14 10/18/24 0830   SpO2 93 % 10/18/24 0830       Anesthesia Post Evaluation    Patient location during evaluation: PACU  Patient participation: complete - patient participated  Level of consciousness: awake  Pain management: adequate  Airway patency: patent  Cardiovascular status: acceptable  Respiratory status: acceptable  Hydration status: acceptable  Postoperative Nausea and Vomiting: none        No notable events documented.

## 2024-10-18 NOTE — ANESTHESIA PREPROCEDURE EVALUATION
"Patient: Lolis Auguste    Procedure Information       Date/Time: 10/18/24 0730    Scheduled providers: Quan Garcia MD; Mark Dumont MD; MARISA Hernandez    Procedure: TRANSESOPHAGEAL ECHO (MIKE) W/ POSSIBLE CARDIOVERSION    [facility]            Relevant Problems   Cardiac   (+) CHF (congestive heart failure)   (+) Persistent atrial fibrillation (Multi)      Pulmonary   (+) Pneumonia      ID   (+) Pneumonia       Clinical information reviewed:                    Past Medical History:   Diagnosis Date    Acute systolic heart failure (Multi) 09/09/2024    Cardiomyopathy (Multi) 09/09/2024    Persistent atrial fibrillation (Multi) 09/09/2024    Thrombus of left atrial appendage 09/30/2024    MIKE 9/20/24        No past surgical history on file.  Social History     Tobacco Use    Smoking status: Never    Smokeless tobacco: Never   Substance Use Topics    Alcohol use: Yes     Comment: occassionally    Drug use: Never      Current Outpatient Medications   Medication Instructions    apixaban (ELIQUIS) 5 mg, oral, 2 times daily    furosemide (LASIX) 40 mg, oral, Daily RT    metoprolol tartrate (LOPRESSOR) 25 mg, oral, 3 times daily    multivitamin tablet oral      No Known Allergies     Chemistry    Lab Results   Component Value Date/Time     09/09/2024 1458    K 3.6 09/09/2024 1458     09/09/2024 1458    CO2 22 09/09/2024 1458    BUN 19 09/09/2024 1458    CREATININE 0.82 09/09/2024 1458    Lab Results   Component Value Date/Time    CALCIUM 9.0 09/09/2024 1458          Lab Results   Component Value Date    HGBA1C 5.6 08/30/2024     Lab Results   Component Value Date/Time    WBC 6.6 09/09/2024 1458    HGB 13.7 09/09/2024 1458    HCT 41.7 09/09/2024 1458     09/09/2024 1458     No results found for: \"PROTIME\", \"PTT\", \"INR\"  No results found for: \"ABORH\"  No results found for this or any previous visit (from the past 4464 hours).  No results found " for this or any previous visit from the past 1095 days.       Visit Vitals  Smoking Status Never     No data recorded    Physical Exam    Airway  Mallampati: II  TM distance: >3 FB  Neck ROM: full     Cardiovascular - normal exam     Dental - normal exam     Pulmonary - normal exam     Abdominal - normal exam             Anesthesia Plan    History of general anesthesia?: no  History of complications of general anesthesia?: no    ASA 2     MAC     The patient is not a current smoker.    intravenous induction   Anesthetic plan and risks discussed with patient.    Plan discussed with CRNA.

## 2024-10-18 NOTE — H&P
History Of Present Illness  Lolis Auguste is a 68 y.o. female presenting with presenting with atrial fibrillation, here for MIKE with possible DCCV. PMH includes HFrEF (LVEF 25% on 9/2024 MIKE),  cardiomyopathy, SOLOMON thrombus 9/20/24 on Eliquis, Afib.     She denies any missed doses of Eliquis over the past 30 days.     Past Medical History:  Past Medical History:   Diagnosis Date    Acute systolic heart failure (Multi) 09/09/2024    Cardiomyopathy (Multi) 09/09/2024    Persistent atrial fibrillation (Multi) 09/09/2024    Thrombus of left atrial appendage 09/30/2024    MIKE 9/20/24          Past Surgical History:  No past surgical history on file.       Social History:   reports that she has never smoked. She has never used smokeless tobacco. She reports current alcohol use. She reports that she does not use drugs.     Family History:  No family history on file.     Allergies:  No Known Allergies     Home Medications:  Current Outpatient Medications   Medication Instructions    apixaban (ELIQUIS) 5 mg, 2 times daily    furosemide (LASIX) 40 mg, Daily RT    metoprolol tartrate (LOPRESSOR) 25 mg, oral, 3 times daily    multivitamin tablet Take by mouth.       Inpatient Medications:  Scheduled medications   Medication Dose Route Frequency     PRN medications   Medication    butamben-tetracaine-benzocaine    lidocaine     Continuous Medications   Medication Dose Last Rate         Review of Systems   Constitutional:  Positive for fatigue.   HENT: Negative.     Eyes: Negative.    Respiratory:  Positive for shortness of breath.    Cardiovascular: Negative.    Gastrointestinal: Negative.    Endocrine: Negative.    Genitourinary: Negative.    Musculoskeletal: Negative.    Skin: Negative.    Allergic/Immunologic: Negative.    Neurological: Negative.    Hematological: Negative.    Psychiatric/Behavioral: Negative.            Physical Exam   Sedation Preparation     NPO since 000    Last Recorded Vitals  Blood pressure 108/71,  "pulse (!) 121, temperature 36.2 °C (97.2 °F), temperature source Tympanic, resp. rate 16, SpO2 92%.         Vitals from the Past 24 Hours  Heart Rate:  [121]   Temp:  [36.2 °C (97.2 °F)]   Resp:  [16]   BP: (108)/(71)   SpO2:  [92 %]          Relevant Results    Labs    CBC:   Recent Labs     09/09/24  1458   WBC 6.6   HGB 13.7   HCT 41.7      MCV 90     BMP/CMP:   Recent Labs     09/09/24  1458      K 3.6      BUN 19   CREATININE 0.82   CO2 22   CALCIUM 9.0   GLUCOSE 117*      Lipid Panel: No results for input(s): \"CHOL\", \"HDL\", \"CHHDL\", \"LDL\", \"VLDL\", \"TRIG\", \"NHDL\" in the last 09461 hours.  Cardiac       No lab exists for component: \"CK\", \"CKMBP\"   Hemoglobin A1C:   Recent Labs     08/30/24  1141   HGBA1C 5.6     TSH/ Free T4: No results for input(s): \"TSH\", \"FREET4\" in the last 37216 hours.  Iron: No results for input(s): \"FERRITIN\", \"TIBC\", \"IRONSAT\", \"BNP\" in the last 23095 hours.  Coag:     ABO: No results found for: \"ABO\"    Past Cardiology Tests (Last 3 Years):    EKG:  No results for input(s): \"ATRRATE\", \"VENTRATE\", \"PRINT\", \"QRSDUR\", \"QTCFRED\", \"QTCCALCB\" in the last 36006 hours.No results found for this or any previous visit (from the past 4464 hours).  Echo:  Echocardiogram: No results found for this or any previous visit from the past 1800 days.    Ejection Fractions:  LV EF   Date/Time Value Ref Range Status   09/20/2024 08:21 AM 25 %      Cath:  Coronary Angiography: No results found for this or any previous visit from the past 1800 days.    Right Heart Cath: No results found for this or any previous visit from the past 1800 days.    Stress Test:  Nuclear:No results found for this or any previous visit from the past 1800 days.    Metabolic Stress: No results found for this or any previous visit from the past 1800 days.    Cardiac Imaging:  Cardiac Scoring: No results found for this or any previous visit from the past 1800 days.    Cardiac MRI: No results found for this or any " previous visit from the past 1800 days.         Assessment/Plan  Assessment/Plan   Active Problems:  There are no active Hospital Problems.        #Atrial Fibrillation  #Hx SOLOMON thrombus  -MIKE +/- cardioversion with Dr. Garcia 10/18/24    NP discussed with Dr. Garcia regarding plan of care/ discharge plan      I spent 30 minutes in the professional and overall care of this patient.      Claudia Ly, APRN-CNP

## 2024-10-21 ENCOUNTER — TELEPHONE (OUTPATIENT)
Dept: CARDIOLOGY | Facility: CLINIC | Age: 69
End: 2024-10-21
Payer: MEDICARE

## 2024-10-21 LAB — EJECTION FRACTION: 25 %

## 2024-10-21 NOTE — TELEPHONE ENCOUNTER
Pt contacted, Will increase lasix per order and has appointment scheduled 10/23 at 9:30. Will have EKG done at that time.

## 2024-10-23 ENCOUNTER — APPOINTMENT (OUTPATIENT)
Dept: CARDIOLOGY | Facility: HOSPITAL | Age: 69
DRG: 308 | End: 2024-10-23
Payer: MEDICARE

## 2024-10-23 ENCOUNTER — APPOINTMENT (OUTPATIENT)
Dept: RADIOLOGY | Facility: HOSPITAL | Age: 69
DRG: 308 | End: 2024-10-23
Payer: MEDICARE

## 2024-10-23 ENCOUNTER — OFFICE VISIT (OUTPATIENT)
Dept: CARDIOLOGY | Facility: CLINIC | Age: 69
End: 2024-10-23
Payer: MEDICARE

## 2024-10-23 ENCOUNTER — HOSPITAL ENCOUNTER (INPATIENT)
Facility: HOSPITAL | Age: 69
DRG: 308 | End: 2024-10-23
Attending: EMERGENCY MEDICINE | Admitting: INTERNAL MEDICINE
Payer: MEDICARE

## 2024-10-23 VITALS
BODY MASS INDEX: 28.88 KG/M2 | WEIGHT: 143 LBS | SYSTOLIC BLOOD PRESSURE: 105 MMHG | HEART RATE: 109 BPM | DIASTOLIC BLOOD PRESSURE: 69 MMHG

## 2024-10-23 DIAGNOSIS — I42.8 OTHER CARDIOMYOPATHY: Primary | ICD-10-CM

## 2024-10-23 DIAGNOSIS — I48.91 ATRIAL FIBRILLATION WITH RAPID VENTRICULAR RESPONSE (MULTI): ICD-10-CM

## 2024-10-23 DIAGNOSIS — I48.19 PERSISTENT ATRIAL FIBRILLATION (MULTI): Primary | ICD-10-CM

## 2024-10-23 DIAGNOSIS — I50.21 ACUTE SYSTOLIC HEART FAILURE: ICD-10-CM

## 2024-10-23 DIAGNOSIS — R06.02 SHORTNESS OF BREATH: ICD-10-CM

## 2024-10-23 DIAGNOSIS — I42.9 CARDIOMYOPATHY, UNSPECIFIED TYPE (MULTI): ICD-10-CM

## 2024-10-23 DIAGNOSIS — I48.19 PERSISTENT ATRIAL FIBRILLATION (MULTI): ICD-10-CM

## 2024-10-23 DIAGNOSIS — I51.3 THROMBUS OF LEFT ATRIAL APPENDAGE: ICD-10-CM

## 2024-10-23 LAB
ALBUMIN SERPL BCP-MCNC: 4.2 G/DL (ref 3.4–5)
ALP SERPL-CCNC: 73 U/L (ref 33–136)
ALT SERPL W P-5'-P-CCNC: 73 U/L (ref 7–45)
ANION GAP SERPL CALC-SCNC: 14 MMOL/L (ref 10–20)
APPEARANCE UR: CLEAR
AST SERPL W P-5'-P-CCNC: 35 U/L (ref 9–39)
BASOPHILS # BLD AUTO: 0.06 X10*3/UL (ref 0–0.1)
BASOPHILS NFR BLD AUTO: 0.9 %
BILIRUB SERPL-MCNC: 0.6 MG/DL (ref 0–1.2)
BILIRUB UR STRIP.AUTO-MCNC: NEGATIVE MG/DL
BNP SERPL-MCNC: 422 PG/ML (ref 0–99)
BUN SERPL-MCNC: 16 MG/DL (ref 6–23)
CALCIUM SERPL-MCNC: 8.6 MG/DL (ref 8.6–10.3)
CARDIAC TROPONIN I PNL SERPL HS: 12 NG/L (ref 0–13)
CARDIAC TROPONIN I PNL SERPL HS: 14 NG/L (ref 0–13)
CHLORIDE SERPL-SCNC: 103 MMOL/L (ref 98–107)
CO2 SERPL-SCNC: 25 MMOL/L (ref 21–32)
COLOR UR: NORMAL
CREAT SERPL-MCNC: 0.89 MG/DL (ref 0.5–1.05)
EGFRCR SERPLBLD CKD-EPI 2021: 69 ML/MIN/1.73M*2
EOSINOPHIL # BLD AUTO: 0.09 X10*3/UL (ref 0–0.4)
EOSINOPHIL NFR BLD AUTO: 1.3 %
ERYTHROCYTE [DISTWIDTH] IN BLOOD BY AUTOMATED COUNT: 13.4 % (ref 11.5–14.5)
GLUCOSE SERPL-MCNC: 132 MG/DL (ref 74–99)
GLUCOSE UR STRIP.AUTO-MCNC: NORMAL MG/DL
HCT VFR BLD AUTO: 43 % (ref 36–46)
HGB BLD-MCNC: 14.6 G/DL (ref 12–16)
IMM GRANULOCYTES # BLD AUTO: 0.03 X10*3/UL (ref 0–0.5)
IMM GRANULOCYTES NFR BLD AUTO: 0.4 % (ref 0–0.9)
KETONES UR STRIP.AUTO-MCNC: NEGATIVE MG/DL
LEUKOCYTE ESTERASE UR QL STRIP.AUTO: NEGATIVE
LYMPHOCYTES # BLD AUTO: 1.24 X10*3/UL (ref 0.8–3)
LYMPHOCYTES NFR BLD AUTO: 17.9 %
MAGNESIUM SERPL-MCNC: 2 MG/DL (ref 1.6–2.4)
MCH RBC QN AUTO: 30.2 PG (ref 26–34)
MCHC RBC AUTO-ENTMCNC: 34 G/DL (ref 32–36)
MCV RBC AUTO: 89 FL (ref 80–100)
MONOCYTES # BLD AUTO: 0.66 X10*3/UL (ref 0.05–0.8)
MONOCYTES NFR BLD AUTO: 9.6 %
NEUTROPHILS # BLD AUTO: 4.83 X10*3/UL (ref 1.6–5.5)
NEUTROPHILS NFR BLD AUTO: 69.9 %
NITRITE UR QL STRIP.AUTO: NEGATIVE
NRBC BLD-RTO: 0 /100 WBCS (ref 0–0)
PH UR STRIP.AUTO: 6.5 [PH]
PLATELET # BLD AUTO: 321 X10*3/UL (ref 150–450)
POTASSIUM SERPL-SCNC: 3.4 MMOL/L (ref 3.5–5.3)
PROT SERPL-MCNC: 6.8 G/DL (ref 6.4–8.2)
PROT UR STRIP.AUTO-MCNC: NEGATIVE MG/DL
RBC # BLD AUTO: 4.83 X10*6/UL (ref 4–5.2)
RBC # UR STRIP.AUTO: NEGATIVE /UL
SARS-COV-2 RNA RESP QL NAA+PROBE: NOT DETECTED
SODIUM SERPL-SCNC: 139 MMOL/L (ref 136–145)
SP GR UR STRIP.AUTO: 1.01
UROBILINOGEN UR STRIP.AUTO-MCNC: NORMAL MG/DL
WBC # BLD AUTO: 6.9 X10*3/UL (ref 4.4–11.3)

## 2024-10-23 PROCEDURE — 93005 ELECTROCARDIOGRAM TRACING: CPT

## 2024-10-23 PROCEDURE — 1036F TOBACCO NON-USER: CPT | Performed by: INTERNAL MEDICINE

## 2024-10-23 PROCEDURE — 2500000001 HC RX 250 WO HCPCS SELF ADMINISTERED DRUGS (ALT 637 FOR MEDICARE OP): Performed by: NURSE PRACTITIONER

## 2024-10-23 PROCEDURE — 96374 THER/PROPH/DIAG INJ IV PUSH: CPT | Mod: 59

## 2024-10-23 PROCEDURE — 85025 COMPLETE CBC W/AUTO DIFF WBC: CPT | Performed by: PHYSICIAN ASSISTANT

## 2024-10-23 PROCEDURE — 99285 EMERGENCY DEPT VISIT HI MDM: CPT

## 2024-10-23 PROCEDURE — 84484 ASSAY OF TROPONIN QUANT: CPT | Performed by: PHYSICIAN ASSISTANT

## 2024-10-23 PROCEDURE — 83735 ASSAY OF MAGNESIUM: CPT | Performed by: PHYSICIAN ASSISTANT

## 2024-10-23 PROCEDURE — 2500000001 HC RX 250 WO HCPCS SELF ADMINISTERED DRUGS (ALT 637 FOR MEDICARE OP): Performed by: EMERGENCY MEDICINE

## 2024-10-23 PROCEDURE — 99223 1ST HOSP IP/OBS HIGH 75: CPT | Performed by: INTERNAL MEDICINE

## 2024-10-23 PROCEDURE — 80053 COMPREHEN METABOLIC PANEL: CPT | Performed by: PHYSICIAN ASSISTANT

## 2024-10-23 PROCEDURE — 93010 ELECTROCARDIOGRAM REPORT: CPT | Performed by: INTERNAL MEDICINE

## 2024-10-23 PROCEDURE — 99291 CRITICAL CARE FIRST HOUR: CPT | Performed by: EMERGENCY MEDICINE

## 2024-10-23 PROCEDURE — 2500000002 HC RX 250 W HCPCS SELF ADMINISTERED DRUGS (ALT 637 FOR MEDICARE OP, ALT 636 FOR OP/ED): Performed by: EMERGENCY MEDICINE

## 2024-10-23 PROCEDURE — 1200000002 HC GENERAL ROOM WITH TELEMETRY DAILY

## 2024-10-23 PROCEDURE — 71045 X-RAY EXAM CHEST 1 VIEW: CPT

## 2024-10-23 PROCEDURE — 1159F MED LIST DOCD IN RCRD: CPT | Performed by: INTERNAL MEDICINE

## 2024-10-23 PROCEDURE — 36415 COLL VENOUS BLD VENIPUNCTURE: CPT | Performed by: PHYSICIAN ASSISTANT

## 2024-10-23 PROCEDURE — 71045 X-RAY EXAM CHEST 1 VIEW: CPT | Performed by: RADIOLOGY

## 2024-10-23 PROCEDURE — 83880 ASSAY OF NATRIURETIC PEPTIDE: CPT | Performed by: PHYSICIAN ASSISTANT

## 2024-10-23 PROCEDURE — 2500000004 HC RX 250 GENERAL PHARMACY W/ HCPCS (ALT 636 FOR OP/ED): Performed by: NURSE PRACTITIONER

## 2024-10-23 PROCEDURE — 99215 OFFICE O/P EST HI 40 MIN: CPT | Performed by: INTERNAL MEDICINE

## 2024-10-23 PROCEDURE — 81003 URINALYSIS AUTO W/O SCOPE: CPT | Performed by: PHYSICIAN ASSISTANT

## 2024-10-23 PROCEDURE — 2500000004 HC RX 250 GENERAL PHARMACY W/ HCPCS (ALT 636 FOR OP/ED): Performed by: EMERGENCY MEDICINE

## 2024-10-23 PROCEDURE — 1160F RVW MEDS BY RX/DR IN RCRD: CPT | Performed by: INTERNAL MEDICINE

## 2024-10-23 PROCEDURE — 99223 1ST HOSP IP/OBS HIGH 75: CPT | Performed by: NURSE PRACTITIONER

## 2024-10-23 PROCEDURE — 87635 SARS-COV-2 COVID-19 AMP PRB: CPT | Performed by: PHYSICIAN ASSISTANT

## 2024-10-23 PROCEDURE — 2500000002 HC RX 250 W HCPCS SELF ADMINISTERED DRUGS (ALT 637 FOR MEDICARE OP, ALT 636 FOR OP/ED): Performed by: NURSE PRACTITIONER

## 2024-10-23 RX ORDER — POTASSIUM CHLORIDE 20 MEQ/1
40 TABLET, EXTENDED RELEASE ORAL ONCE
Status: COMPLETED | OUTPATIENT
Start: 2024-10-23 | End: 2024-10-23

## 2024-10-23 RX ORDER — DOFETILIDE 0.25 MG/1
250 CAPSULE ORAL ONCE
Status: COMPLETED | OUTPATIENT
Start: 2024-10-23 | End: 2024-10-23

## 2024-10-23 RX ORDER — METOPROLOL TARTRATE 25 MG/1
25 TABLET, FILM COATED ORAL 2 TIMES DAILY
Status: DISCONTINUED | OUTPATIENT
Start: 2024-10-23 | End: 2024-10-26

## 2024-10-23 RX ORDER — ACETAMINOPHEN 325 MG/1
975 TABLET ORAL ONCE
Status: COMPLETED | OUTPATIENT
Start: 2024-10-23 | End: 2024-10-23

## 2024-10-23 RX ORDER — METOPROLOL TARTRATE 25 MG/1
25 TABLET, FILM COATED ORAL ONCE
Status: COMPLETED | OUTPATIENT
Start: 2024-10-23 | End: 2024-10-23

## 2024-10-23 RX ORDER — METOPROLOL TARTRATE 25 MG/1
25 TABLET, FILM COATED ORAL 3 TIMES DAILY
Status: DISCONTINUED | OUTPATIENT
Start: 2024-10-23 | End: 2024-10-23

## 2024-10-23 RX ORDER — FUROSEMIDE 10 MG/ML
40 INJECTION INTRAMUSCULAR; INTRAVENOUS 2 TIMES DAILY
Status: DISCONTINUED | OUTPATIENT
Start: 2024-10-23 | End: 2024-10-27

## 2024-10-23 RX ORDER — DOFETILIDE 0.12 MG/1
250 CAPSULE ORAL EVERY 12 HOURS
Status: DISCONTINUED | OUTPATIENT
Start: 2024-10-24 | End: 2024-10-30 | Stop reason: HOSPADM

## 2024-10-23 RX ORDER — ACETAMINOPHEN 160 MG/5ML
650 SUSPENSION ORAL EVERY 4 HOURS PRN
Status: DISCONTINUED | OUTPATIENT
Start: 2024-10-23 | End: 2024-10-30 | Stop reason: HOSPADM

## 2024-10-23 RX ORDER — ACETAMINOPHEN 650 MG/1
650 SUPPOSITORY RECTAL EVERY 4 HOURS PRN
Status: DISCONTINUED | OUTPATIENT
Start: 2024-10-23 | End: 2024-10-30 | Stop reason: HOSPADM

## 2024-10-23 RX ORDER — METOPROLOL TARTRATE 1 MG/ML
5 INJECTION, SOLUTION INTRAVENOUS ONCE
Status: COMPLETED | OUTPATIENT
Start: 2024-10-23 | End: 2024-10-23

## 2024-10-23 RX ORDER — ACETAMINOPHEN 325 MG/1
650 TABLET ORAL EVERY 4 HOURS PRN
Status: DISCONTINUED | OUTPATIENT
Start: 2024-10-23 | End: 2024-10-30 | Stop reason: HOSPADM

## 2024-10-23 ASSESSMENT — PAIN SCALES - GENERAL
PAINLEVEL_OUTOF10: 7
PAINLEVEL_OUTOF10: 10 - WORST POSSIBLE PAIN
PAINLEVEL_OUTOF10: 2
PAINLEVEL_OUTOF10: 0 - NO PAIN

## 2024-10-23 ASSESSMENT — PAIN - FUNCTIONAL ASSESSMENT: PAIN_FUNCTIONAL_ASSESSMENT: 0-10

## 2024-10-23 ASSESSMENT — COLUMBIA-SUICIDE SEVERITY RATING SCALE - C-SSRS
1. IN THE PAST MONTH, HAVE YOU WISHED YOU WERE DEAD OR WISHED YOU COULD GO TO SLEEP AND NOT WAKE UP?: NO
6. HAVE YOU EVER DONE ANYTHING, STARTED TO DO ANYTHING, OR PREPARED TO DO ANYTHING TO END YOUR LIFE?: NO
2. HAVE YOU ACTUALLY HAD ANY THOUGHTS OF KILLING YOURSELF?: NO

## 2024-10-23 ASSESSMENT — PAIN SCALES - WONG BAKER: WONGBAKER_NUMERICALRESPONSE: HURTS LITTLE BIT

## 2024-10-23 ASSESSMENT — PAIN DESCRIPTION - LOCATION: LOCATION: HEAD

## 2024-10-23 NOTE — PROGRESS NOTES
Chief Complaint:   Atrial Fibrillation     History of Present Illness     Lolis Auguste is a 69 y.o. female presenting with for routine follow-up of persistent atrial fibrillation.  The patient has been symptomatic with dyspnea.  The patient has not been rate-controlled in atrial fibrillation on medical treatment s/p DCCV 10/18/24.  The current treatment strategy is rhythm control.  The CHADS2-VASC2 score is  3 and the ACC/AHA guidelines recommend anticoagulation for stroke prophylaxis.  The patient is being treated with Eliquis and has tolerated treatment with no bleeding and is compliant.  She was cardioverted from AFIB to flutter with RVR on 10/18/24 and discharged on Amio load and could not tolerate due to extreme fatigue.  Had dyspnea at rest and needed increased Lasix x 2 days.  Now RO FC III.    Review of Systems  All pertinent systems have been reviewed and are negative except for what is stated in the history of present illness.    All other systems have been reviewed and are negative and noncontributory to this patient's current ailments.  .       Previous History     Past Medical History:  She has a past medical history of Acute systolic heart failure (09/09/2024), Cardiomyopathy (09/09/2024), Persistent atrial fibrillation (Multi) (09/09/2024), and Thrombus of left atrial appendage (09/30/2024).    Past Surgical History:  She has no past surgical history on file.      Social History:  She reports that she has never smoked. She has never used smokeless tobacco. She reports current alcohol use. She reports that she does not use drugs.    Family History:  No family history on file.     Allergies:  Patient has no known allergies.    Outpatient Medications:  Current Outpatient Medications   Medication Instructions    amiodarone (PACERONE) 400 mg, oral, 2 times daily    apixaban (ELIQUIS) 5 mg, 2 times daily    furosemide (LASIX) 40 mg, Daily RT    metoprolol tartrate (LOPRESSOR) 25 mg, oral, 3 times daily  "   multivitamin tablet Take by mouth.       Physical Examination   Vitals:  Visit Vitals  /69 (BP Location: Right arm, Patient Position: Sitting, BP Cuff Size: Adult)   Pulse 109   Wt 64.9 kg (143 lb)   BMI 28.88 kg/m²   Smoking Status Never   BSA 1.64 m²    Physical Exam  Vitals reviewed.   Constitutional:       General: She is not in acute distress.     Appearance: Normal appearance.   HENT:      Head: Normocephalic and atraumatic.      Nose: Nose normal.   Eyes:      Conjunctiva/sclera: Conjunctivae normal.   Cardiovascular:      Rate and Rhythm: Normal rate. Rhythm irregularly irregular.      Pulses: Normal pulses.      Heart sounds: No murmur heard.  Pulmonary:      Effort: Pulmonary effort is normal. No respiratory distress.      Breath sounds: Normal breath sounds. No wheezing, rhonchi or rales.   Abdominal:      General: Bowel sounds are normal. There is no distension.      Palpations: Abdomen is soft.      Tenderness: There is no abdominal tenderness.   Musculoskeletal:         General: No swelling.      Right lower leg: No edema.      Left lower leg: No edema.   Skin:     General: Skin is warm and dry.      Capillary Refill: Capillary refill takes less than 2 seconds.   Neurological:      General: No focal deficit present.      Mental Status: She is alert.   Psychiatric:         Mood and Affect: Mood normal.             Labs/Imaging/Cardiac Studies     Last Labs:  CBC -  Lab Results   Component Value Date    WBC 6.6 09/09/2024    HGB 13.7 09/09/2024    HCT 41.7 09/09/2024    MCV 90 09/09/2024     09/09/2024       CMP -  Lab Results   Component Value Date    CALCIUM 9.0 09/09/2024       LIPID PANEL -   No results found for: \"CHOL\", \"HDL\", \"CHHDL\", \"LDL\", \"VLDL\", \"TRIG\", \"NHDL\"    RENAL FUNCTION PANEL -   Lab Results   Component Value Date    K 3.6 09/09/2024       Lab Results   Component Value Date    HGBA1C 5.6 08/30/2024       ECG:    Echo:  Transesophageal Echo (MIKE) With Possible " Cardioversion    Result Date: 10/21/2024   [facility], [address] [place] [address]              Tel [phone] and Fax [phone] TRANSESOPHAGEAL ECHOCARDIOGRAM REPORT WITH DIRECT CURRENT CARDIOVERSION  Patient Name:      ADOLPH TIMMONS      Reading Physician:    [id number] Garcia MD Study Date:        10/18/2024          Ordering Provider:    [id number] KARLIE GARCIA MRN/PID:           [id number]            Fellow: Accession#:        [id number]        Nurse:                Jeison Kay RN Date of Birth/Age: 1955 / 68     Sonographer:          Alonso Jim RDCS                    years Gender:            F                   Additional Staff:     Tani Ribeiro CRNA Height:            149.86 cm           Admit Date:           10/18/2024 Weight:            66.68 kg            Admission Status:     Outpatient BSA / BMI:         1.62 m2 / 29.69     Encounter#:           [id number]                    kg/m2 Blood Pressure:    108/71 mmHg         Department Location:  [facility] Non                                                              Invasive Study Type:    TRANSESOPHAGEAL ECHO (MIKE) W/ POSSIBLE CARDIOVERSION Diagnosis/ICD: Other persistent AFib-I48.19; Intracardiac thrombosis, not                elsewhere classified-I51.3; Cardiomyopathy, unspecified-I42.9;                Acute systolic (congestive) heart failure (CHF)-I50.21 Indication:    Persistent atrial fibrillation, Thrombus of left atrial                appendage, Cardiomyopathy, and Acute systolic heart failure CPT Code:      MIKE Complete-30636; Doppler Limited-99449; Color Doppler-21472 Patient History: Drug Allergies:    None Smoker:            Never. Diabetes:          No Pertinent History: A-Fib, CHF and Cardiomyopathy. 68 y.o. female presents for                    MIKE/CVN for persistent atrial  fibrillation, thrombus of left                    atrial appendage, cardiomyopathy, and acute systolic heart                    failure. Study Detail: The following Echo studies were performed: 2D and Doppler.               Patient's heart rhythm is atrial fibrillation. A bubble study was               not performed. The patient was sedated.  PHYSICIAN INTERPRETATION: MIKE Details: The MIKE probe used was [id number]. Technically adequate omniplane transesophageal echocardiogram performed. Color flow Doppler echo was performed to assess for the presence of a patent foramen ovale. MIKE Medication: The pharynx was anesthetized with Cetacaine spray and viscous lidocaine. The patient was sedated by Anesthesia; please refer to anesthesia flow sheet for medications used. MIKE Procedure: The probe was passed without difficulty. Complications encountered during procedure: Patient tolerated the procedure well without any apparent complications. MIKE Cardioversion Procedure: The patient was placed in a supine position and anterior-posterior defibrillation patches were applied. A biphasic Zoll defibrillator was attached to the patient and set to synchronous mode. A total of 3 synchronized biphasic external shocks were delivered at 200 Joules in attempt to cardiovert the patient from atrial fibrillation. The procedure was unsuccessful, resulting in atrial tachycardia. The patient recovered uneventfully from the effects on conscious and deep sedation. The procedure was well tolerated. There were no complications. The patient was discharged from the Cardiac Cath Lab hemodynamically stable and with no neurological deficits. Left Ventricle: The left ventricular systolic function is severely decreased, with a visually estimated ejection fraction of 25%. There are no regional left ventricular wall motion abnormalities. The left ventricular cavity size is normal. Left ventricular diastolic filling was indeterminate. Left Atrium: The left atrium  is mildly dilated. There is no shunt detected. A bubble study using agitated saline was not performed. There is no thrombus visualized in the left atrial appendage. Right Ventricle: The right ventricle is normal in size. There is moderately reduced right ventricular systolic function. Right Atrium: The right atrium is normal in size. Aortic Valve: The aortic valve is trileaflet. There is mild aortic valve thickening. There is no evidence of aortic valve regurgitation. Mitral Valve: The mitral valve is normal in structure. There is moderate to severe mitral valve regurgitation which is centrally directed. Apical tethering. Tricuspid Valve: The tricuspid valve is structurally normal. There is mild tricuspid regurgitation. Pulmonic Valve: The pulmonic valve is structurally normal. There is physiologic pulmonic valve regurgitation. Pericardium: There is no pericardial effusion noted. Aorta: The aortic root is normal.  CONCLUSIONS:  1. The left ventricular systolic function is severely decreased, with a visually estimated ejection fraction of 25%.  2. Left ventricular diastolic filling was indeterminate.  3. Unsuccessful cardioversion for atrial fibrillation resulting in atrial tachycardia.  4. There is moderately reduced right ventricular systolic function.  5. The left atrium is mildly dilated.  6. Moderate to severe mitral valve regurgitation.  7. There is no shunt detected. QUANTITATIVE DATA SUMMARY:  LV SYSTOLIC FUNCTION BY 2D PLANIMETRY (MOD):                      Normal Ranges: EF-Visual:      25 % LV EF Reported: 25 %  [id number] Quan Garcia MD Electronically signed on 10/21/2024 at 7:31:17 AM  ** Final **     Transesophageal Echo (MIKE) With Possible Cardioversion    Result Date: 9/23/2024   [facility], [address] [place] [address]              Tel [phone] and Fax [phone] TRANSESOPHAGEAL ECHOCARDIOGRAM REPORT  Patient Name:      ADOLPH TIMMONS      Reading Physician:    [id number] Garcia                                                               MD Study Date:        9/20/2024           Ordering Provider:    [id number] KARLIE SUAREZ MRN/PID:           [id number]            Fellow: Accession#:        [id number]        Nurse:                Jeison Kay RN Date of Birth/Age: 1955 / 68     Sonographer:          Alonso Jim RDCS                    years Gender:            F                   Additional Staff:     Chris LIVINGSTON Height:            149.86 cm           Admit Date:           9/20/2024 Weight:            65.77 kg            Admission Status:     Outpatient BSA / BMI:         1.61 m2 / 29.29     Encounter#:           [id number]                    kg/m2 Blood Pressure:    108/78 mmHg         Department Location:  [facility] Non                                                              Invasive Study Type:    TRANSESOPHAGEAL ECHO (MIKE) W/ POSSIBLE CARDIOVERSION Diagnosis/ICD: Other persistent AFib-I48.19 Indication:    Persistent A-fib CPT Code:      MIKE Complete-73545; Doppler Limited-45034; Color Doppler-09994 Patient History: Drug Allergies:    None Smoker:            Never. Diabetes:          No Pertinent History: Dyspnea, A-Fib and CHF. 68 y.o. female presents for MIKE/CVN                    for perisistent A-fib. Study Detail: The following Echo studies were performed: 2D and Doppler.               Patient's heart rhythm is atrial fibrillation. A bubble study was               not performed. The patient was sedated.  PHYSICIAN INTERPRETATION: MIKE Details: The MIKE probe used was [id number]. Technically adequate omniplane transesophageal echocardiogram performed. Color flow Doppler echo was performed to assess for the presence of a patent foramen ovale. MIKE Medication: The pharynx was anesthetized with Cetacaine spray and viscous lidocaine. The patient was sedated by Anesthesia; please refer to anesthesia flow  sheet for medications used. MIKE Procedure: The probe was passed without difficulty. Complications encountered during procedure: Patient tolerated the procedure well without any apparent complications. Left Ventricle: The left ventricular systolic function is severely decreased, with a visually estimated ejection fraction of 25%. There are no regional left ventricular wall motion abnormalities. The left ventricular cavity size is normal. Left ventricular diastolic filling was indeterminate. Left Atrium: The left atrium is moderately dilated. There is no evidence of a patent foramen ovale. There is no shunt detected. A bubble study using agitated saline was not performed. There is a thrombus in the left atrial appendage. There is a severe degree of spontaneous echo contrast in the left atrium. SOLOMON thrombus identified. DCCV aborted. Right Ventricle: The right ventricle is normal in size. There is normal right ventricular global systolic function. Right Atrium: The right atrium is normal in size. Aortic Valve: The aortic valve is trileaflet. There is no evidence of aortic valve regurgitation. Mitral Valve: The mitral valve is mildly thickened. There is moderate mitral valve regurgitation. Tricuspid Valve: The tricuspid valve is structurally normal. There is mild tricuspid regurgitation. Pulmonic Valve: The pulmonic valve is structurally normal. There is no indication of pulmonic valve regurgitation. Pericardium: There is no pericardial effusion noted. Aorta: The aortic root is normal.  CONCLUSIONS:  1. The left ventricular systolic function is severely decreased, with a visually estimated ejection fraction of 25%.  2. Left ventricular diastolic filling was indeterminate.  3. There is normal right ventricular global systolic function.  4. The left atrium is moderately dilated.  5. SOLOMON thrombus identified. DCCV aborted.  6. Moderate mitral valve regurgitation.  7. There is no evidence of a patent foramen ovale.  8. There  is no shunt detected. QUANTITATIVE DATA SUMMARY:  LV SYSTOLIC FUNCTION BY 2D PLANIMETRY (MOD):                      Normal Ranges: EF-Visual:      25 % LV EF Reported: 25 %  [id number] Quan Garcia MD Electronically signed on 9/23/2024 at 11:16:53 AM  ** Final **         Assessment and Recommendations     Assessment/Plan       1. Persistent atrial fibrillation (Multi) (Primary)  She unfortunately has rapid AF and oculd not tolerate amiodarone and needs to be in SR to improve her HR and has no BP room for HF optimization.  Admit for Tikosyn loading to [facility] as bridge to ablation.  Disucssed with Dr. Ward this AM.  - ECG 12 lead (Clinic Performed)    2. Acute systolic heart failure  Stable FC III. No room for GDMT on BP    3. 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  Need to restore SR.         Quan Garcia MD    Exclusive of any other services or procedures performed, I, Quan Garcia MD , spent 30 minutes in duration for this visit today.  This time consisted of chart review, obtaining history, and/or performing the exam as documented above as well as documenting the clinical information for the encounter in the electronic record, discussing treatment options, plans, and/or goals with patient, family, and/or caregiver, refilling medications, updating the electronic record, ordering medicines, lab work, imaging, referrals, and/or procedures as documented above and communicating with other [facility] professionals. I have discussed the results of laboratory, radiology, and cardiology studies with the patient and their family/caregiver.

## 2024-10-23 NOTE — ED TRIAGE NOTES
Pt arrives via triage with complaint of SOB, fluid retention, and fatigue. Pt st hx of CHF and afib. Pt st symptoms onset of two months ago. Pt st was cardioverted one week ago but was unsuccessful. Pt denies any CP. Pt arrives ambulatory and a/o x4.

## 2024-10-23 NOTE — PROGRESS NOTES
Pharmacy Medication History Review    Per patient.     Lolis Auguste is a 69 y.o. female admitted for No Principal Problem: There is no principal problem currently on the Problem List. Please update the Problem List and refresh.. Pharmacy reviewed the patient's [id number] medications and allergies for accuracy.    The list below reflectives the updated PTA list. Please review each medication in order reconciliation for additional clarification and justification.       The list below reflectives the updated allergy list. Please review each documented allergy for additional clarification and justification.  Allergies  Reviewed by Tariq Velasco RN on 10/23/2024   No Known Allergies         Below are additional concerns with the patient's PTA list.  Prior to Admission Medications   Prescriptions Last Dose Informant   apixaban (Eliquis) 5 mg tablet 10/23/2024 Morning    Sig: Take 1 tablet (5 mg) by mouth twice a day.   furosemide (Lasix) 40 mg tablet 10/23/2024 Morning    Sig: Take 1 tablet (40 mg) by mouth 2 times daily (morning and late afternoon).   metoprolol tartrate (Lopressor) 25 mg tablet 10/22/2024 Evening    Sig: Take 1 tablet (25 mg) by mouth 3 times a day.   multivitamin tablet 10/23/2024 Morning    Sig: Take 1 tablet by mouth once daily.      Facility-Administered Medications: None        Lulu Mcneal

## 2024-10-23 NOTE — ED PROVIDER NOTES
History/Exam limitations: none.   Additional history was obtained from patient and past medical records.          HPI:    Lolis Auguste is a 69 y.o. female PMH A-fib on Eliquis status post attempted DCCV presenting for evaluation of worsening shortness of breath and for initiation of Tikosyn.  Patient was having increased volume overload over the last few weeks, is now on Lasix 40 mg twice daily and her swelling has improved.  She still feels quite short of breath.  No chest pain.  Reports orthopnea.  No leg swelling.  States that she was previously on amiodarone however did not tolerate.  Discussed other medications and she is compliant with her Eliquis but has not been taking her metoprolol over the last week.  No fevers.          Physical Exam:  ED Triage Vitals   Temperature Heart Rate Respirations BP   10/23/24 1222 10/23/24 1226 10/23/24 1222 10/23/24 1222   36.5 °C (97.7 °F) (!) 136 18 95/69      Pulse Ox Temp src Heart Rate Source Patient Position   10/23/24 1222 -- -- --   97 %         BP Location FiO2 (%)     -- --              GEN:      Alert, NAD  Eyes:       PERRL, EOMI  HENT:      NC/AT, OP clear, airway patent, MM  CV:      Tachycardic rate, IR, no MRG, no LE edema, 2+ radial and pedal pulses  PULM:     CTAB, no w/r/r, easy WOB, symmetric chest rise  ABD:      Soft, NT, ND, no masses, BS +  :       No CVA TTP  NEURO:   A&Ox3, no focal deficits    MSK:      FROM, no joint deformities or swelling, no e/o trauma  SKIN:       Warm and dry  PSYCH:    Appropriate mood and affect         MDM/ED Course:   Lolis Auguste is a 69 y.o. female PMH A-fib on Eliquis status post attempted DCCV presenting for evaluation of worsening shortness of breath and for initiation of Tikosyn.  Triage vitals markable for tachycardia to 130s, blood pressure 90s over 60s.  Exam as documented above.  IV placed and labs drawn.  Patient reports a baseline low blood pressure, 100s over 70s on my initial assessment.  Satting well  on room air.  Heart rate elevated to 120s.  Differential includes symptoms related to A-fib RVR and a component of volume overload.  Differential includes ACS.  Unlikely PE given no pleuritic symptoms, chest pain, compliance with anticoagulation.  Patient may have increased rate secondary to not taking metoprolol recently.  Labs obtained and CBC with no leukocytosis or significant anemia.  Magnesium reassuring.  Chemistry with mildly low potassium at 3.4, p.o. potassium repletion was given.  BNP elevated at 422 with no prior for comparison.  Troponin 12 with repeat 14, stable, less likely ACS.  COVID-negative.  Urinalysis reassuring.  Reached out to patient's electrophysiologist and confirmed plan for Tikosyn load and hospitalization.  Okay to give metoprolol.  Patient was given her home dose of p.o. metoprolol 25 mg and a single dose of IV metoprolol 5 mg given.  Patient did have a transient decrease in blood pressure to 80s over 60s however improved to 110s over 80s subsequently.  chest x-ray with cardiomegaly and signs of CHF per radiology.. Cardiology consulting.  Recommend diuresis.  Heart rate 110s.  Continue to monitor per cardiology.  Patient care signed out to admitting team for continued management stable condition.    EKG reviewed by me: 12:26 PM atrial fibrillation with RVR, rate 136, right axis, normal QRS and QTc intervals, no STEMI, similar to prior EKGs.    EKG reviewed by me: 3:44 PM atrial fibrillation with RVR, rate 112, right axis, normal QRS interval, QTc measured at 603 however appears normal suspect measuring correctly due to atrial fibrillation baseline, similar to prior EKG with improved rate.     Diagnoses as of 10/29/24 0904   Atrial fibrillation with rapid ventricular response (Multi)         Chronic medical conditions affecting care listed in MDM. I independently reviewed imaging studies and agreed with radiology reads. I reviewed recent and relevant outside records including PCP notes,  Prior discharge summaries, and prior radiology reports.    Procedure  Critical Care    Performed by: Gabino Ames MD  Authorized by: Gabino Ames MD    Critical care provider statement:     Critical care time (minutes):  32    Critical care time was exclusive of:  Separately billable procedures and treating other patients    Critical care was necessary to treat or prevent imminent or life-threatening deterioration of the following conditions: Atrial fibrillation with RVR.    Critical care was time spent personally by me on the following activities:  Development of treatment plan with patient or surrogate, discussions with consultants, evaluation of patient's response to treatment, examination of patient, obtaining history from patient or surrogate, ordering and performing treatments and interventions, ordering and review of laboratory studies, ordering and review of radiographic studies, pulse oximetry, review of old charts and re-evaluation of patient's condition    Care discussed with: admitting provider        Diagnosis:   1.  Atrial fibrillation with rapid ventricular response    Dispo:  Hospitalized in stable condition      Disclaimer: Portions of this note were dictated by speech recognition. An attempt at proof reading was made to minimize errors. Minor errors in transcription may be present.  Please call if questions.     Gabino Ames MD  10/29/24 0905

## 2024-10-23 NOTE — H&P
History Of Present Illness  Lolis Auguste is a 69 y.o. female with a past medical history of CHF, persistent A-fib status post cardioversion on 10/18/2024, thrombus in left atrial appendage, cardiomyopathy presenting with dyspnea on exertion.  Patient was seen by her cardiologist today and was instructed to come to the emergency department due to continued symptomatic A-fib.  On arrival patient had a rapid rate at 136.  She was given IV metoprolol as well as p.o. metoprolol.  Patient reports that metoprolol makes her feel dizzy.  Patient was also not able to tolerate amiodarone outpatient as it cause fatigue.  Patient was seen by cardiology with plans to initiate Tikosyn.  Patient will be admitted.     Past Medical History  She has a past medical history of Acute systolic heart failure (09/09/2024), Cardiomyopathy (09/09/2024), Persistent atrial fibrillation (Multi) (09/09/2024), and Thrombus of left atrial appendage (09/30/2024).    Surgical History  She has no past surgical history on file.     Social History  She reports that she has never smoked. She has never used smokeless tobacco. She reports current alcohol use. She reports that she does not use drugs.    Family History  No family history on file.     Allergies  Patient has no known allergies.    Review of Systems   HENT: Negative.     Respiratory: Negative.     Cardiovascular:         Intermittent chest tightness, improved from prior   Gastrointestinal: Negative.    Genitourinary: Negative.    Musculoskeletal: Negative.    Neurological: Negative.    Psychiatric/Behavioral: Negative.          Physical Exam  HENT:      Head: Normocephalic.      Mouth/Throat:      Mouth: Mucous membranes are moist.   Cardiovascular:      Rate and Rhythm: Tachycardia present. Rhythm irregular.   Pulmonary:      Effort: Pulmonary effort is normal.   Abdominal:      General: Bowel sounds are normal.   Musculoskeletal:         General: Normal range of motion.      Cervical back:  Neck supple.   Skin:     General: Skin is warm.   Neurological:      Mental Status: She is alert and oriented to person, place, and time.   Psychiatric:         Mood and Affect: Mood normal.          Last Recorded Vitals  BP (!) 117/104   Pulse (!) 121   Temp 36.5 °C (97.7 °F)   Resp 18   SpO2 94%     Relevant Results        Results for orders placed or performed during the hospital encounter of 10/23/24 (from the past 24 hours)   ECG 12 lead   Result Value Ref Range    Ventricular Rate 136 BPM    QRS Duration 98 ms    QT Interval 270 ms    QTC Calculation(Bazett) 406 ms    R Axis 108 degrees    T Axis -16 degrees    QRS Count 23 beats    Q Onset 222 ms    T Offset 357 ms    QTC Fredericia 354 ms   CBC and Auto Differential   Result Value Ref Range    WBC 6.9 4.4 - 11.3 x10*3/uL    nRBC 0.0 0.0 - 0.0 /100 WBCs    RBC 4.83 4.00 - 5.20 x10*6/uL    Hemoglobin 14.6 12.0 - 16.0 g/dL    Hematocrit 43.0 36.0 - 46.0 %    MCV 89 80 - 100 fL    MCH 30.2 26.0 - 34.0 pg    MCHC 34.0 32.0 - 36.0 g/dL    RDW 13.4 11.5 - 14.5 %    Platelets 321 150 - 450 x10*3/uL    Neutrophils % 69.9 40.0 - 80.0 %    Immature Granulocytes %, Automated 0.4 0.0 - 0.9 %    Lymphocytes % 17.9 13.0 - 44.0 %    Monocytes % 9.6 2.0 - 10.0 %    Eosinophils % 1.3 0.0 - 6.0 %    Basophils % 0.9 0.0 - 2.0 %    Neutrophils Absolute 4.83 1.60 - 5.50 x10*3/uL    Immature Granulocytes Absolute, Automated 0.03 0.00 - 0.50 x10*3/uL    Lymphocytes Absolute 1.24 0.80 - 3.00 x10*3/uL    Monocytes Absolute 0.66 0.05 - 0.80 x10*3/uL    Eosinophils Absolute 0.09 0.00 - 0.40 x10*3/uL    Basophils Absolute 0.06 0.00 - 0.10 x10*3/uL   Magnesium   Result Value Ref Range    Magnesium 2.00 1.60 - 2.40 mg/dL   Comprehensive Metabolic Panel   Result Value Ref Range    Glucose 132 (H) 74 - 99 mg/dL    Sodium 139 136 - 145 mmol/L    Potassium 3.4 (L) 3.5 - 5.3 mmol/L    Chloride 103 98 - 107 mmol/L    Bicarbonate 25 21 - 32 mmol/L    Anion Gap 14 10 - 20 mmol/L    Urea  Nitrogen 16 6 - 23 mg/dL    Creatinine 0.89 0.50 - 1.05 mg/dL    eGFR 69 >60 mL/min/1.73m*2    Calcium 8.6 8.6 - 10.3 mg/dL    Albumin 4.2 3.4 - 5.0 g/dL    Alkaline Phosphatase 73 33 - 136 U/L    Total Protein 6.8 6.4 - 8.2 g/dL    AST 35 9 - 39 U/L    Bilirubin, Total 0.6 0.0 - 1.2 mg/dL    ALT 73 (H) 7 - 45 U/L   B-type natriuretic peptide   Result Value Ref Range     (H) 0 - 99 pg/mL   Troponin I, High Sensitivity, Initial   Result Value Ref Range    Troponin I, High Sensitivity 12 0 - 13 ng/L   Sars-CoV-2 PCR   Result Value Ref Range    Coronavirus 2019, PCR Not Detected Not Detected   Troponin, High Sensitivity, 1 Hour   Result Value Ref Range    Troponin I, High Sensitivity 14 (H) 0 - 13 ng/L   ECG 12 lead   Result Value Ref Range    Ventricular Rate 112 BPM    QRS Duration 88 ms    QT Interval 442 ms    QTC Calculation(Bazett) 603 ms    R Axis 121 degrees    T Axis 102 degrees    QRS Count 18 beats    Q Onset 226 ms    T Offset 447 ms    QTC Fredericia 544 ms   Urinalysis with Reflex Culture and Microscopic   Result Value Ref Range    Color, Urine Light-Yellow Light-Yellow, Yellow, Dark-Yellow    Appearance, Urine Clear Clear    Specific Gravity, Urine 1.013 1.005 - 1.035    pH, Urine 6.5 5.0, 5.5, 6.0, 6.5, 7.0, 7.5, 8.0    Protein, Urine NEGATIVE NEGATIVE, 10 (TRACE), 20 (TRACE) mg/dL    Glucose, Urine Normal Normal mg/dL    Blood, Urine NEGATIVE NEGATIVE    Ketones, Urine NEGATIVE NEGATIVE mg/dL    Bilirubin, Urine NEGATIVE NEGATIVE    Urobilinogen, Urine Normal Normal mg/dL    Nitrite, Urine NEGATIVE NEGATIVE    Leukocyte Esterase, Urine NEGATIVE NEGATIVE          Assessment/Plan   Assessment & Plan  Other cardiomyopathy  A-fib RVR  Dyspnea on exertion  Tikosyn initiation  CHF  Plan:  Cardiology consult  Tikosyn ordered per cardiology  Diuresis per cardiology  Telemetry  Check potassium and magnesium in a.m.  Continue p.o. metoprolol    DVT prophylaxis-patient on Jose Rodriguez,  APRN-CNP

## 2024-10-23 NOTE — CONSULTS
"Inpatient consult to Cardiology  Consult performed by: Dayami Iglesias, APRN-CNP  Consult ordered by: Gabino Ames MD  Reason for consult: afib RVR, potential Tikosyn load        HPI:  Lolis Auguste is a 69 y.o. female, with a PMH of persistent AF on Eliquis s/p failed DCCV 10/18/24 and acute systolic HF [potentially tachy-mediated], who presented to [facility] on 10/23/2024 for AF RVR. Patient was seen by Dr. Hernández today for routine follow-up, noted to be in persistent AF RVR. She was recently cardioverted 10/18 and discharged on Amiodarone but was unable to tolerated d/t extreme fatigue. Patient reports she has also not be She now has developed dyspnea at rest and leg edema, needing to increase her home Lasix x2 days, which improved her symptoms. Unfortunately, her BP is suboptimal for HF optimization d/t her arrhythmia. Dr. Hernández discussed patient with Dr. Ward and plan was for patient to be admitted for Tikosyn loading as bridge to ablation.    ED course notable for AF RVR with HR in the 130s, labs w/ mild hypokalemia, renal fxn at baseline, troponin negative, and . CXR with \"mild congestive heart failure\"    Cardiology is consulted for \"afib RVR, potential Tikosyn load\".     Home CV Medications: Lasix 40mg oral BID, Eliquis 5mg BID, Metoprolol Tartrate 25mg TID      Patient History   Past Medical History:  She has a past medical history of Acute systolic heart failure (09/09/2024), Cardiomyopathy (09/09/2024), Persistent atrial fibrillation (Multi) (09/09/2024), and Thrombus of left atrial appendage (09/30/2024).  Past Surgical History:  She has no past surgical history on file.  Social History:  She reports that she has never smoked. She has never used smokeless tobacco. She reports current alcohol use. She reports that she does not use drugs.  Family History: family history is not on file.     Allergies: Patient has no known allergies.    ROS: 10-point review of systems was " performed and is otherwise negative except as noted in HPI.     Outpatient Medications:  Current Outpatient Medications   Medication Instructions    apixaban (ELIQUIS) 5 mg, 2 times daily    furosemide (LASIX) 40 mg, Daily RT    metoprolol tartrate (LOPRESSOR) 25 mg, oral, 3 times daily    multivitamin tablet Take by mouth.       Cardiovascular Testing:  EKG:   ECG 12 Lead 10/23/24      Encounter Date: 10/23/24   ECG 12 lead   Result Value    Ventricular Rate 136    QRS Duration 98    QT Interval 270    QTC Calculation(Bazett) 406    R Axis 108    T Axis -16    QRS Count 23    Q Onset 222    T Offset 357    QTC Fredericia 354    Narrative    Atrial fibrillation with rapid ventricular response with premature ventricular or aberrantly conducted complexes  Rightward axis  Septal infarct (cited on or before 23-OCT-2024)  Abnormal ECG  When compared with ECG of 23-OCT-2024 09:41, (unconfirmed)  No significant change was found     ECG Lead 10/18/24  Atrial fibrillation with rapid ventricular response  Rightward axis  Nonspecific T wave abnormality, probably digitalis effect    Echo:  MIKE 10/18/24   1. The left ventricular systolic function is severely decreased, with a visually estimated ejection fraction of 25%.   2. Left ventricular diastolic filling was indeterminate.   3. Unsuccessful cardioversion for atrial fibrillation resulting in atrial tachycardia.   4. There is moderately reduced right ventricular systolic function.   5. The left atrium is mildly dilated.   6. Moderate to severe mitral valve regurgitation.   7. There is no shunt detected.    MIKE 9/20/24   1. The left ventricular systolic function is severely decreased, with a visually estimated ejection fraction of 25%.   2. Left ventricular diastolic filling was indeterminate.   3. There is normal right ventricular global systolic function.   4. The left atrium is moderately dilated.   5. SOLOMON thrombus identified. DCCV aborted.   6. Moderate mitral valve  regurgitation.   7. There is no evidence of a patent foramen ovale.   8. There is no shunt detected.    Diagnostic Results   Labs  CBC- 10/23/2024: 12:54 PM  6.9 14.6 321    43.0      BMP- 10/23/2024: 12:54 PM  139 16 103 132   3.4 0.89 25    Estimated Creatinine Clearance: 45.2 mL/min (by C-G formula based on SCr of 0.89 mg/dL).     CA: 8.6 PROTIEN: 6.8 ALT: 73 Total Bili: 0.6 M.00   PHOS: _ ALBUMIN: 4.2 AST: 35   Alk Phos: 73      COAGS- No results in last year.  _   _ _     CV Labs  Troponin I, High Sensitivity   Date/Time Value Ref Range Status   10/23/2024 02:52 PM 14 (H) 0 - 13 ng/L Final   10/23/2024 12:54 PM 12 0 - 13 ng/L Final     BNP   Date/Time Value Ref Range Status   10/23/2024 12:54  (H) 0 - 99 pg/mL Final     Hemoglobin A1C   Date/Time Value Ref Range Status   2024 11:41 AM 5.6 <5.7 % Final     Comment:     Normal less than 5.7%  Prediabetes 5.7% to 6.4%  Diabetes 6.5% or higher      --HgbA1C levels may not be accurate in patients who have renal disease, received recent blood transfusions, are anemic, or who have dyshemoglobinemia.     Pertinent Imaging  XR chest 1 view 10/23/2024  Allowing for the aforementioned limitation, cardiomegaly and mild congestive heart failure.         Last Recorded Vitals:  Vitals:    10/23/24 1430 10/23/24 1500 10/23/24 1515 10/23/24 1530   BP: (!) 131/112 98/81 96/69 89/63   Pulse: (!) 117 (!) 119 (!) 106 (!) 111   Resp:  17 18    Temp:       SpO2: 95% 97% 97% 97%     Temperature:  [36.5 °C (97.7 °F)] 36.5 °C (97.7 °F)  Heart Rate:  [106-136] 111  Respirations:  [17-21] 18  BP: ()/() 89/63     Last I/O:  No intake/output data recorded.    Physical Exam:     Vitals and nursing notes reviewed.  BP 89/63   Pulse (!) 111   Temp 36.5 °C (97.7 °F)   Resp 18   SpO2 97%   GENERAL: Alert and awake, cooperative; in no acute distress  SKIN: Warm and dry, cap refill <2  HEENT: Normocephalic, PEERL, mucous membranes pink and moist  NECK: No JVD or  "hepatojugular reflex  CARDIAC: Regular rate and rhythm, S1S2, no murmurs or abnormal heart sounds  CHEST: Normal respiratory effort, no abnormal breath sounds  ABDOMEN: soft, non-distended, non-tender with palpation  EXTREMITIES: No lower extremity edema, normal pulses all 4 extremities  NEURO: Alert and oriented, mental status at baseline, no focal deficits  PSYCH: Behavior and affect as expected     Tele: AF -130s  Code Status: Full Code    Assessment/Plan   Lolis Auguste is a 69 y.o. female, with a PMH of persistent AF on Eliquis s/p failed DCCV 10/18/24 and acute systolic HF [potentially tachy-mediated], who presented to [facility] on 10/23/2024 for AF RVR. Patient was seen by Dr. Hernández today for routine follow-up, noted to be in persistent AF RVR. She was recently cardioverted 10/18 and discharged on Amiodarone but was unable to tolerated d/t extreme fatigue. Patient reports she has also not be She now has developed dyspnea at rest and leg edema, needing to increase her home Lasix x2 days, which improved her symptoms. Unfortunately, her BP is suboptimal for HF optimization d/t her arrhythmia. Dr. Hernández discussed patient with Dr. Ward and plan was for patient to be admitted for Tikosyn loading as bridge to ablation. ED course notable for AF RVR with HR in the 130s, labs w/ mild hypokalemia, renal fxn at baseline, troponin negative, and . CXR with \"mild congestive heart failure.\" Cardiology is consulted for \"afib RVR, potential Tikosyn load\".     Home CV Medications: Lasix 40mg BID, Eliquis 5mg BID, Metoprolol Tartrate 25mg TID    ASSESSMENT:  #Persistent AF with RVR- Failed DCCV, unable to tolerate Amiodarone  -[id number] score for Atrial Fibrillation Stroke Risk is 3, moderate-high for thromboembolic event  -c/w Eliquis 5mg BID - (Confirmed she has not missed any doses)  -Metoprolol 25mg BID ( she has not consistently taking at home as it makes her feel like her head is " "spinning\"  -Plan to Tikosyn load and keep in house for 5-6 doses    #Acute Systolic HF- Recent MIKE show an EF of 25% but GDMT and optimization currently limited by hypotension, may be tachy-mediated in the setting of uncontrolled AF  -, CXR with mild pulmonary edema, mildly volume up on exam  -Home diuretic: Lasix 40mg daily, recently increased to BID for volume overload.      RECOMMENDATIONS:  -Plan to initiate Tikosyn load tonight, ECG 2-3 hours after each dose with close monitoring of QTc > Dose to be determined by Dr. Moss.   -Keep magnesium greater than 2 and potassium greater than 4  -c/w Eliquis 5mg BID for anticoagulation  -Continue BB at 25mg BID   -Diuresis with furosemide 40mg IV BID.  Strict I/O.  -Will consider starting GDMT if BP allows  -Continuous telemetry monitoring  -Monitor electrolytes, replete for K <4 and Mg <2    -Patient to FU with Dr. Hernández and Dr. Ward as outpatient post-discharge for plans for ablation      KRAIG Pang-CNP  Advanced Practice Provider   Cardiology  [facility]  10/23/24 3:45 PM     ============================================  Attending note   ============================================  Both the NARAYAN and I have had a face to face encounter with the patient today. I have examined the patient and edited the documented physical examination as necessary.  I personally reviewed the patient's recent labs, medications, orders, EKGs, and pertinent cardiac imaging.  I have reviewed the NARAYAN's encounter note, approve the NARAYAN's documentation and have edited the note to reflect the diagnostic and therapeutic plan.    Lolis Auguste is a 69 y.o. female, with a PMH of persistent AF on Eliquis s/p failed DCCV 10/18/24 and acute systolic HF [potentially tachy-mediated], who presented to [facility] on 10/23/2024 for AF RVR. Patient was seen by Dr. Hernández today for routine follow-up, noted to be in persistent AF RVR. She was recently " "cardioverted 10/18 and discharged on Amiodarone but was unable to tolerated d/t extreme fatigue. Patient reports she has also not be She now has developed dyspnea at rest and leg edema, needing to increase her home Lasix x2 days, which improved her symptoms. Unfortunately, her BP is suboptimal for HF optimization d/t her arrhythmia. Dr. Hernández discussed patient with Dr. Ward and plan was for patient to be admitted for Tikosyn loading as bridge to ablation.    ED course notable for AF RVR with HR in the 130s, labs w/ mild hypokalemia, renal fxn at baseline, troponin negative, and . CXR with \"mild congestive heart failure\"    Cardiology is consulted for \"afib RVR, potential Tikosyn load\".       No exacerbating or relieving factors.  Patient denies chest pain and angina.  Pt reports shortness of breath, dyspnea on exertion, orthopnea, and paroxysmal nocturnal dyspnea.  Pt denies worsening lower extremity edema but admits to abdominal distention and tightness .  Pt denies palpitations or syncope.  No recent falls.  No fever or chills.  No cough.  No change in bowel or bladder habits.  No sick contacts.  No recent travel.     12 point review of systems including (Constitutional, Eyes, ENMT, Respiratory, Cardiac, Gastrointestinal, Neurological, Psychiatric, and Hematologic) was performed and is otherwise negative.    Past medical history:  As above.    Medications were reviewed.    Allergies were reviewed.    Social history:  Patient denies smoking, alcohol abuse, or illicit drug use.    Family history:  No sudden cardiac death or premature coronary artery disease.     Patient seen and examined in ER-1.  General:  Patient is awake, alert, and oriented.  Patient is in no acute distress. She is anxious   HEENT:  Pupils equal and reactive.  Normocephalic.  Moist mucosa.    Neck:  No thyromegaly.  Normal Jugular Venous Pressure.  Cardiovascular: Tachycardic.  Irregular rate with a regular rhythm  Pulmonary:  Clear to " "auscultation bilaterally.  Abdomen:  Soft. Non-tender.   Non-distended.  Positive bowel sounds.  Lower Extremities:  2+ pedal pulses. No LE edema.  Neurologic:  Cranial nerves intact.  No focal deficit.   Skin: Skin warm and dry, normal skin turgor.   Psychiatric: Normal affect.    Vital signs, telemetry, medications, labs, and imaging were reviewed as well.    Lolis Auguste is a 69 y.o. female, with a PMH of persistent AF on Eliquis s/p failed DCCV 10/18/24 and acute systolic HF [potentially tachy-mediated], who presented to [facility] on 10/23/2024 for AF RVR. Patient was seen by Dr. Hernández today for routine follow-up, noted to be in persistent AF RVR. She was recently cardioverted 10/18 and discharged on Amiodarone but was unable to tolerated d/t extreme fatigue. Patient reports she has also not be She now has developed dyspnea at rest and leg edema, needing to increase her home Lasix x2 days, which improved her symptoms. Unfortunately, her BP is suboptimal for HF optimization d/t her arrhythmia. Dr. Hernández discussed patient with Dr. Ward and plan was for patient to be admitted for Tikosyn loading as bridge to ablation. ED course notable for AF RVR with HR in the 130s, labs w/ mild hypokalemia, renal fxn at baseline, troponin negative, and . CXR with \"mild congestive heart failure.\" Cardiology is consulted for \"afib RVR, potential Tikosyn load\".     Home CV Medications: Lasix 40mg BID, Eliquis 5mg BID, Metoprolol Tartrate 25mg TID    ASSESSMENT:  #Persistent AF with RVR- Failed DCCV, unable to tolerate Amiodarone  -[id number] score for Atrial Fibrillation Stroke Risk is 3, moderate-high for thromboembolic event  -c/w Eliquis 5mg BID - (Confirmed she has not missed any doses)  -Metoprolol 25mg BID ( she has not consistently taking at home as it makes her feel like her head is spinning\"  -Plan to Tikosyn load and keep in house for 5-6 doses    #Acute Systolic HF- Recent MIKE show an EF of 25% but " GDMT and optimization currently limited by hypotension, may be tachy-mediated in the setting of uncontrolled AF  -, CXR with mild pulmonary edema, mildly volume up on exam  -Home diuretic: Lasix 40mg daily, recently increased to BID for volume overload.      RECOMMENDATIONS:  -Plan to initiate Tikosyn load tonight, ECG 2-3 hours after each dose with close monitoring of QTc > Dose to be determined by Dr. Moss.   -Keep magnesium greater than 2 and potassium greater than 4  -c/w Eliquis 5mg BID for anticoagulation  -Continue BB at 25mg BID --> She does not like her metoprolol. May need to us eBisoprolol or Carvedilol to get GDMT Beta Blocker  -Diuresis with furosemide 40mg IV BID.  Strict I/O.  -Will consider starting GDMT if BP allows  -Continuous telemetry monitoring  -Monitor electrolytes, replete for K <4 and Mg <2    -Patient to FU with Dr. Hernández and Dr. Ward as outpatient post-discharge for plans for ablation        Lyndon Moss DO   Division of Cardiovascular Medicine  [facility] [place]

## 2024-10-24 ENCOUNTER — APPOINTMENT (OUTPATIENT)
Dept: CARDIOLOGY | Facility: HOSPITAL | Age: 69
DRG: 308 | End: 2024-10-24
Payer: MEDICARE

## 2024-10-24 LAB
ALBUMIN SERPL BCP-MCNC: 4.2 G/DL (ref 3.4–5)
ALP SERPL-CCNC: 72 U/L (ref 33–136)
ALT SERPL W P-5'-P-CCNC: 70 U/L (ref 7–45)
ANION GAP SERPL CALC-SCNC: 13 MMOL/L (ref 10–20)
ANION GAP SERPL CALC-SCNC: 14 MMOL/L (ref 10–20)
AORTIC VALVE MEAN GRADIENT: 4.4 MMHG
AORTIC VALVE PEAK VELOCITY: 1.37 M/S
AST SERPL W P-5'-P-CCNC: 29 U/L (ref 9–39)
ATRIAL RATE: 136 BPM
AV PEAK GRADIENT: 7.6 MMHG
AVA (PEAK VEL): 1.47 CM2
AVA (VTI): 1.38 CM2
BASOPHILS # BLD AUTO: 0.04 X10*3/UL (ref 0–0.1)
BASOPHILS NFR BLD AUTO: 0.3 %
BILIRUB SERPL-MCNC: 0.6 MG/DL (ref 0–1.2)
BUN SERPL-MCNC: 16 MG/DL (ref 6–23)
BUN SERPL-MCNC: 16 MG/DL (ref 6–23)
CALCIUM SERPL-MCNC: 9 MG/DL (ref 8.6–10.3)
CALCIUM SERPL-MCNC: 9.1 MG/DL (ref 8.6–10.3)
CHLORIDE SERPL-SCNC: 104 MMOL/L (ref 98–107)
CHLORIDE SERPL-SCNC: 105 MMOL/L (ref 98–107)
CO2 SERPL-SCNC: 21 MMOL/L (ref 21–32)
CO2 SERPL-SCNC: 23 MMOL/L (ref 21–32)
CREAT SERPL-MCNC: 0.78 MG/DL (ref 0.5–1.05)
CREAT SERPL-MCNC: 0.78 MG/DL (ref 0.5–1.05)
EGFRCR SERPLBLD CKD-EPI 2021: 81 ML/MIN/1.73M*2
EGFRCR SERPLBLD CKD-EPI 2021: 81 ML/MIN/1.73M*2
EJECTION FRACTION APICAL 4 CHAMBER: 30.7
EJECTION FRACTION: 25 %
EOSINOPHIL # BLD AUTO: 0 X10*3/UL (ref 0–0.4)
EOSINOPHIL NFR BLD AUTO: 0 %
ERYTHROCYTE [DISTWIDTH] IN BLOOD BY AUTOMATED COUNT: 13.6 % (ref 11.5–14.5)
GLUCOSE SERPL-MCNC: 144 MG/DL (ref 74–99)
GLUCOSE SERPL-MCNC: 148 MG/DL (ref 74–99)
HCT VFR BLD AUTO: 44.3 % (ref 36–46)
HGB BLD-MCNC: 14.8 G/DL (ref 12–16)
HOLD SPECIMEN: NORMAL
IMM GRANULOCYTES # BLD AUTO: 0.02 X10*3/UL (ref 0–0.5)
IMM GRANULOCYTES NFR BLD AUTO: 0.2 % (ref 0–0.9)
LEFT ATRIUM VOLUME AREA LENGTH INDEX BSA: 77.7 ML/M2
LEFT VENTRICLE INTERNAL DIMENSION DIASTOLE: 5.48 CM (ref 3.5–6)
LEFT VENTRICULAR OUTFLOW TRACT DIAMETER: 1.93 CM
LYMPHOCYTES # BLD AUTO: 1.08 X10*3/UL (ref 0.8–3)
LYMPHOCYTES NFR BLD AUTO: 9.1 %
MAGNESIUM SERPL-MCNC: 2.2 MG/DL (ref 1.6–2.4)
MCH RBC QN AUTO: 30 PG (ref 26–34)
MCHC RBC AUTO-ENTMCNC: 33.4 G/DL (ref 32–36)
MCV RBC AUTO: 90 FL (ref 80–100)
MONOCYTES # BLD AUTO: 0.88 X10*3/UL (ref 0.05–0.8)
MONOCYTES NFR BLD AUTO: 7.4 %
NEUTROPHILS # BLD AUTO: 9.9 X10*3/UL (ref 1.6–5.5)
NEUTROPHILS NFR BLD AUTO: 83 %
NRBC BLD-RTO: 0 /100 WBCS (ref 0–0)
PLATELET # BLD AUTO: 312 X10*3/UL (ref 150–450)
POTASSIUM SERPL-SCNC: 4.2 MMOL/L (ref 3.5–5.3)
POTASSIUM SERPL-SCNC: 4.2 MMOL/L (ref 3.5–5.3)
PROT SERPL-MCNC: 6.9 G/DL (ref 6.4–8.2)
Q ONSET: 208 MS
Q ONSET: 226 MS
QRS COUNT: 18 BEATS
QRS COUNT: 22 BEATS
QRS DURATION: 88 MS
QRS DURATION: 98 MS
QT INTERVAL: 388 MS
QT INTERVAL: 442 MS
QTC CALCULATION(BAZETT): 572 MS
QTC CALCULATION(BAZETT): 603 MS
QTC FREDERICIA: 503 MS
QTC FREDERICIA: 544 MS
R AXIS: 121 DEGREES
R AXIS: 127 DEGREES
RBC # BLD AUTO: 4.93 X10*6/UL (ref 4–5.2)
RIGHT VENTRICLE PEAK SYSTOLIC PRESSURE: 38.8 MMHG
SODIUM SERPL-SCNC: 136 MMOL/L (ref 136–145)
SODIUM SERPL-SCNC: 136 MMOL/L (ref 136–145)
T AXIS: -5 DEGREES
T AXIS: 102 DEGREES
T OFFSET: 402 MS
T OFFSET: 447 MS
TRICUSPID ANNULAR PLANE SYSTOLIC EXCURSION: 1.4 CM
VENTRICULAR RATE: 112 BPM
VENTRICULAR RATE: 131 BPM
WBC # BLD AUTO: 11.9 X10*3/UL (ref 4.4–11.3)

## 2024-10-24 PROCEDURE — 2060000001 HC INTERMEDIATE ICU ROOM DAILY

## 2024-10-24 PROCEDURE — 93306 TTE W/DOPPLER COMPLETE: CPT | Performed by: INTERNAL MEDICINE

## 2024-10-24 PROCEDURE — 93005 ELECTROCARDIOGRAM TRACING: CPT

## 2024-10-24 PROCEDURE — 36415 COLL VENOUS BLD VENIPUNCTURE: CPT | Performed by: INTERNAL MEDICINE

## 2024-10-24 PROCEDURE — 2500000001 HC RX 250 WO HCPCS SELF ADMINISTERED DRUGS (ALT 637 FOR MEDICARE OP): Performed by: INTERNAL MEDICINE

## 2024-10-24 PROCEDURE — 93010 ELECTROCARDIOGRAM REPORT: CPT | Performed by: INTERNAL MEDICINE

## 2024-10-24 PROCEDURE — 99233 SBSQ HOSP IP/OBS HIGH 50: CPT | Performed by: INTERNAL MEDICINE

## 2024-10-24 PROCEDURE — 82374 ASSAY BLOOD CARBON DIOXIDE: CPT | Performed by: INTERNAL MEDICINE

## 2024-10-24 PROCEDURE — 2500000004 HC RX 250 GENERAL PHARMACY W/ HCPCS (ALT 636 FOR OP/ED)

## 2024-10-24 PROCEDURE — 80048 BASIC METABOLIC PNL TOTAL CA: CPT | Mod: CCI | Performed by: INTERNAL MEDICINE

## 2024-10-24 PROCEDURE — 2500000004 HC RX 250 GENERAL PHARMACY W/ HCPCS (ALT 636 FOR OP/ED): Performed by: NURSE PRACTITIONER

## 2024-10-24 PROCEDURE — 93306 TTE W/DOPPLER COMPLETE: CPT

## 2024-10-24 PROCEDURE — 2500000001 HC RX 250 WO HCPCS SELF ADMINISTERED DRUGS (ALT 637 FOR MEDICARE OP): Performed by: NURSE PRACTITIONER

## 2024-10-24 PROCEDURE — 83735 ASSAY OF MAGNESIUM: CPT | Performed by: INTERNAL MEDICINE

## 2024-10-24 PROCEDURE — 85025 COMPLETE CBC W/AUTO DIFF WBC: CPT | Performed by: INTERNAL MEDICINE

## 2024-10-24 SDOH — ECONOMIC STABILITY: HOUSING INSECURITY: AT ANY TIME IN THE PAST 12 MONTHS, WERE YOU HOMELESS OR LIVING IN A SHELTER (INCLUDING NOW)?: NO

## 2024-10-24 SDOH — SOCIAL STABILITY: SOCIAL INSECURITY: WITHIN THE LAST YEAR, HAVE YOU BEEN HUMILIATED OR EMOTIONALLY ABUSED IN OTHER WAYS BY YOUR PARTNER OR EX-PARTNER?: NO

## 2024-10-24 SDOH — ECONOMIC STABILITY: INCOME INSECURITY: IN THE PAST 12 MONTHS HAS THE ELECTRIC, GAS, OIL, OR WATER COMPANY THREATENED TO SHUT OFF SERVICES IN YOUR HOME?: NO

## 2024-10-24 SDOH — ECONOMIC STABILITY: HOUSING INSECURITY: IN THE PAST 12 MONTHS, HOW MANY TIMES HAVE YOU MOVED WHERE YOU WERE LIVING?: 1

## 2024-10-24 SDOH — SOCIAL STABILITY: SOCIAL INSECURITY: WITHIN THE LAST YEAR, HAVE YOU BEEN AFRAID OF YOUR PARTNER OR EX-PARTNER?: NO

## 2024-10-24 SDOH — HEALTH STABILITY: MENTAL HEALTH: HOW MANY DRINKS CONTAINING ALCOHOL DO YOU HAVE ON A TYPICAL DAY WHEN YOU ARE DRINKING?: PATIENT DOES NOT DRINK

## 2024-10-24 SDOH — ECONOMIC STABILITY: FOOD INSECURITY: WITHIN THE PAST 12 MONTHS, YOU WORRIED THAT YOUR FOOD WOULD RUN OUT BEFORE YOU GOT THE MONEY TO BUY MORE.: NEVER TRUE

## 2024-10-24 SDOH — SOCIAL STABILITY: SOCIAL INSECURITY
WITHIN THE LAST YEAR, HAVE YOU BEEN RAPED OR FORCED TO HAVE ANY KIND OF SEXUAL ACTIVITY BY YOUR PARTNER OR EX-PARTNER?: NO

## 2024-10-24 SDOH — SOCIAL STABILITY: SOCIAL INSECURITY: DO YOU FEEL UNSAFE GOING BACK TO THE PLACE WHERE YOU ARE LIVING?: UNABLE TO ASSESS

## 2024-10-24 SDOH — SOCIAL STABILITY: SOCIAL INSECURITY
WITHIN THE LAST YEAR, HAVE YOU BEEN KICKED, HIT, SLAPPED, OR OTHERWISE PHYSICALLY HURT BY YOUR PARTNER OR EX-PARTNER?: NO

## 2024-10-24 SDOH — HEALTH STABILITY: MENTAL HEALTH: HOW OFTEN DO YOU HAVE A DRINK CONTAINING ALCOHOL?: NEVER

## 2024-10-24 SDOH — HEALTH STABILITY: MENTAL HEALTH: HOW OFTEN DO YOU HAVE SIX OR MORE DRINKS ON ONE OCCASION?: NEVER

## 2024-10-24 SDOH — SOCIAL STABILITY: SOCIAL INSECURITY: HAVE YOU HAD ANY THOUGHTS OF HARMING ANYONE ELSE?: UNABLE TO ASSESS

## 2024-10-24 SDOH — SOCIAL STABILITY: SOCIAL INSECURITY: WERE YOU ABLE TO COMPLETE ALL THE BEHAVIORAL HEALTH SCREENINGS?: NO

## 2024-10-24 SDOH — SOCIAL STABILITY: SOCIAL INSECURITY: ARE YOU OR HAVE YOU BEEN THREATENED OR ABUSED PHYSICALLY, EMOTIONALLY, OR SEXUALLY BY ANYONE?: UNABLE TO ASSESS

## 2024-10-24 SDOH — ECONOMIC STABILITY: FOOD INSECURITY: WITHIN THE PAST 12 MONTHS, THE FOOD YOU BOUGHT JUST DIDN'T LAST AND YOU DIDN'T HAVE MONEY TO GET MORE.: NEVER TRUE

## 2024-10-24 SDOH — ECONOMIC STABILITY: FOOD INSECURITY: HOW HARD IS IT FOR YOU TO PAY FOR THE VERY BASICS LIKE FOOD, HOUSING, MEDICAL CARE, AND HEATING?: NOT HARD AT ALL

## 2024-10-24 SDOH — ECONOMIC STABILITY: HOUSING INSECURITY: IN THE LAST 12 MONTHS, WAS THERE A TIME WHEN YOU WERE NOT ABLE TO PAY THE MORTGAGE OR RENT ON TIME?: NO

## 2024-10-24 SDOH — ECONOMIC STABILITY: TRANSPORTATION INSECURITY: IN THE PAST 12 MONTHS, HAS LACK OF TRANSPORTATION KEPT YOU FROM MEDICAL APPOINTMENTS OR FROM GETTING MEDICATIONS?: NO

## 2024-10-24 SDOH — SOCIAL STABILITY: SOCIAL INSECURITY: DOES ANYONE TRY TO KEEP YOU FROM HAVING/CONTACTING OTHER FRIENDS OR DOING THINGS OUTSIDE YOUR HOME?: UNABLE TO ASSESS

## 2024-10-24 SDOH — SOCIAL STABILITY: SOCIAL INSECURITY: ARE THERE ANY APPARENT SIGNS OF INJURIES/BEHAVIORS THAT COULD BE RELATED TO ABUSE/NEGLECT?: UNABLE TO ASSESS

## 2024-10-24 SDOH — SOCIAL STABILITY: SOCIAL INSECURITY: HAVE YOU HAD THOUGHTS OF HARMING ANYONE ELSE?: UNABLE TO ASSESS

## 2024-10-24 SDOH — SOCIAL STABILITY: SOCIAL INSECURITY: ABUSE: ADULT

## 2024-10-24 SDOH — SOCIAL STABILITY: SOCIAL INSECURITY: HAS ANYONE EVER THREATENED TO HURT YOUR FAMILY OR YOUR PETS?: UNABLE TO ASSESS

## 2024-10-24 SDOH — SOCIAL STABILITY: SOCIAL INSECURITY: DO YOU FEEL ANYONE HAS EXPLOITED OR TAKEN ADVANTAGE OF YOU FINANCIALLY OR OF YOUR PERSONAL PROPERTY?: UNABLE TO ASSESS

## 2024-10-24 ASSESSMENT — ACTIVITIES OF DAILY LIVING (ADL)
JUDGMENT_ADEQUATE_SAFELY_COMPLETE_DAILY_ACTIVITIES: YES
HEARING - LEFT EAR: FUNCTIONAL
ADEQUATE_TO_COMPLETE_ADL: YES
LACK_OF_TRANSPORTATION: NO
GROOMING: INDEPENDENT
BATHING: INDEPENDENT
PATIENT'S MEMORY ADEQUATE TO SAFELY COMPLETE DAILY ACTIVITIES?: YES
LACK_OF_TRANSPORTATION: NO
DRESSING YOURSELF: INDEPENDENT
LACK_OF_TRANSPORTATION: NO
HEARING - RIGHT EAR: FUNCTIONAL
WALKS IN HOME: INDEPENDENT
TOILETING: INDEPENDENT
FEEDING YOURSELF: INDEPENDENT

## 2024-10-24 ASSESSMENT — LIFESTYLE VARIABLES
SKIP TO QUESTIONS 9-10: 1
AUDIT-C TOTAL SCORE: -1
HOW OFTEN DO YOU HAVE 6 OR MORE DRINKS ON ONE OCCASION: PATIENT DECLINED
HOW OFTEN DO YOU HAVE A DRINK CONTAINING ALCOHOL: PATIENT DECLINED
AUDIT-C TOTAL SCORE: -1
SKIP TO QUESTIONS 9-10: 0
AUDIT-C TOTAL SCORE: 0
HOW MANY STANDARD DRINKS CONTAINING ALCOHOL DO YOU HAVE ON A TYPICAL DAY: PATIENT DECLINED

## 2024-10-24 ASSESSMENT — PAIN SCALES - GENERAL
PAINLEVEL_OUTOF10: 0 - NO PAIN
PAINLEVEL_OUTOF10: 0 - NO PAIN
PAINLEVEL_OUTOF10: 7

## 2024-10-24 ASSESSMENT — COGNITIVE AND FUNCTIONAL STATUS - GENERAL
DAILY ACTIVITIY SCORE: 24
PATIENT BASELINE BEDBOUND: NO
MOBILITY SCORE: 24

## 2024-10-24 ASSESSMENT — PAIN - FUNCTIONAL ASSESSMENT
PAIN_FUNCTIONAL_ASSESSMENT: 0-10
PAIN_FUNCTIONAL_ASSESSMENT: 0-10

## 2024-10-24 ASSESSMENT — PATIENT HEALTH QUESTIONNAIRE - PHQ9
1. LITTLE INTEREST OR PLEASURE IN DOING THINGS: NOT AT ALL
SUM OF ALL RESPONSES TO PHQ9 QUESTIONS 1 & 2: 0
2. FEELING DOWN, DEPRESSED OR HOPELESS: NOT AT ALL

## 2024-10-24 ASSESSMENT — PAIN DESCRIPTION - LOCATION: LOCATION: HEAD

## 2024-10-24 ASSESSMENT — ENCOUNTER SYMPTOMS
MUSCULOSKELETAL NEGATIVE: 1
NEUROLOGICAL NEGATIVE: 1
GASTROINTESTINAL NEGATIVE: 1
PSYCHIATRIC NEGATIVE: 1
RESPIRATORY NEGATIVE: 1

## 2024-10-24 NOTE — TREATMENT PLAN
Tikosyn Dosing and EKG Monitoring     Patient due for Dose 2 of Dofetilide 250 mcg   Her EKG Dated 10/23/2024 @ 22:59 was reviewed   -- Manually calculated Qtc by Bazette correction is 464 msec  -- Continue Current dose of Dofetilide and continue to obtain EKG 2-3 hours after dose for monitoring   -- AM Blood work is Not available, This MUST be obtained for electrolyte correction    Full Progress note to follow      Lyndon Moss DO   Division of Cardiovascular Medicine  [facility] [place]

## 2024-10-24 NOTE — PROGRESS NOTES
Subjective Data:  Patient seen and examined, chart reviewed   -- Transferred from [facility] to ICU as SDU Overflow.   -- She has had 2 doses of Dofetilide with stable Qtc  -- She remains in atrial firbilation   -- Rather anxious.      Overnight Events:    Rapid Response of Atrial Fibrillation with RVR ( Known iussue)      Objective Data:  Last Recorded Vitals:  Vitals:    10/24/24 1100 10/24/24 1200 10/24/24 1210 10/24/24 1300   BP: (!) 111/97  102/89    BP Location:       Patient Position:       Pulse: (!) 129 (!) 130 (!) 125 (!) 136   Resp: (!) 31 (!) 27 (!) 36 (!) 30   Temp:   36.6 °C (97.9 °F)    TempSrc:   Temporal    SpO2: 93% 94% 95% 94%   Weight:       Height:           Last Labs:  Results from last 7 days   Lab Units 10/24/24  0909 10/23/24  1254   WBC AUTO x10*3/uL 11.9* 6.9   HEMOGLOBIN g/dL 14.8 14.6   HEMATOCRIT % 44.3 43.0   PLATELETS AUTO x10*3/uL 312 321     Results from last 7 days   Lab Units 10/24/24  0909 10/24/24  0907 10/23/24  1254   SODIUM mmol/L 136 136 139   POTASSIUM mmol/L 4.2 4.2 3.4*   CHLORIDE mmol/L 105 104 103   CO2 mmol/L 21 23 25   BUN mg/dL 16 16 16   CREATININE mg/dL 0.78 0.78 0.89   CALCIUM mg/dL 9.0 9.1 8.6   PROTEIN TOTAL g/dL  --  6.9 6.8   BILIRUBIN TOTAL mg/dL  --  0.6 0.6   ALK PHOS U/L  --  72 73   ALT U/L  --  70* 73*   AST U/L  --  29 35   GLUCOSE mg/dL 148* 144* 132*             TROPHS   Date/Time Value Ref Range Status   10/23/2024 02:52 PM 14 0 - 13 ng/L Final   10/23/2024 12:54 PM 12 0 - 13 ng/L Final     BNP   Date/Time Value Ref Range Status   10/23/2024 12:54  0 - 99 pg/mL Final     HGBA1C   Date/Time Value Ref Range Status   08/30/2024 11:41 AM 5.6 <5.7 % Final     Comment:     Normal less than 5.7%  Prediabetes 5.7% to 6.4%  Diabetes 6.5% or higher      --HgbA1C levels may not be accurate in patients who have renal disease, received recent blood transfusions, are anemic, or who have dyshemoglobinemia.      Last I/O:  No intake/output data recorded.    Past  Cardiology Tests (Last 3 Years):  EKG:  ECG 12 lead 10/24/2024 10:44 ( Tikosyn Dose #2 )  Qtc 395 -- 250 mcg Dosing             ECG 12 lead 10/23/2024 @ 22:59 -- Tikosyn Dose #1 - Manual Qtc  464 msec  250 mcg Dosing       ECG 12 lead 10/23/2024 @ 15:33 ( Pre Tikosyn )         ECG 12 Lead 10/18/2024 (Preliminary)    Echo:  Transthoracic Echocardiogram 10/24/2024  CONCLUSIONS:   1. Left ventricular ejection fraction is severely decreased, by visual estimate at 25%.   2. There is global hypokinesis of the left ventricle with minor regional variations.   3. Spectral Doppler shows an abnormal pattern of left ventricular diastolic filling.   4. Left ventricular cavity size is mild to moderately dilated.   5. No left ventricular thrombus visualized.   6. There is mildly reduced right ventricular systolic function.   7. The left atrium is severely dilated.   8. Moderate pericardial effusion.   9. Absent A-wave on MV spectral Doppler tracing, consistent with atrial fibrillation.  10. Severe mitral valve regurgitation.  11. Mildly elevated right ventricular systolic pressure.  12. The inferior vena cava appears severely dilated, with IVC inspiratory collapse less than 50%.      Transesophageal Echo (MIKE) With Possible Cardioversion 10/18/2024  CONCLUSIONS:   1. The left ventricular systolic function is severely decreased, with a visually estimated ejection fraction of 25%.   2. Left ventricular diastolic filling was indeterminate.   3. Unsuccessful cardioversion for atrial fibrillation resulting in atrial tachycardia.   4. There is moderately reduced right ventricular systolic function.   5. The left atrium is mildly dilated.   6. Moderate to severe mitral valve regurgitation.   7. There is no shunt detected    Transesophageal Echo (MIKE) With Possible Cardioversion 09/20/2024     CONCLUSIONS:   1. The left ventricular systolic function is severely decreased, with a visually estimated ejection fraction of 25%.   2. Left  "ventricular diastolic filling was indeterminate.   3. There is normal right ventricular global systolic function.   4. The left atrium is moderately dilated.   5. SOLOMON thrombus identified. DCCV aborted.   6. Moderate mitral valve regurgitation.   7. There is no evidence of a patent foramen ovale.   8. There is no shunt detected.       Ejection Fractions:  EF   Date/Time Value Ref Range Status   10/18/2024 08:01 AM 25 %    09/20/2024 08:21 AM 25 %      Cath:  No results found for this or any previous visit from the past 1095 days.    Stress Test:  No results found for this or any previous visit from the past 1095 days.    Cardiac Imaging:  No results found for this or any previous visit from the past 1095 days.      Inpatient Medications:  Scheduled medications   Medication Dose Route Frequency    apixaban  5 mg oral BID    dofetilide  250 mcg oral q12h    furosemide  40 mg intravenous BID    metoprolol tartrate  25 mg oral BID     PRN medications   Medication    acetaminophen    Or    acetaminophen    Or    acetaminophen     Continuous Medications   Medication Dose Last Rate       Physical Exam:  Documented Vital Signs   Heart Rate:  [106-192]   Temp:  [36.3 °C (97.3 °F)-36.6 °C (97.9 °F)]   Resp:  [17-36]   BP: ()/()   Height:  [149.9 cm (4' 11\")]   Weight:  [63.5 kg (140 lb)]   SpO2:  [91 %-99 %]   Temp:  [36.3 °C (97.3 °F)-36.6 °C (97.9 °F)] 36.6 °C (97.9 °F)  Heart Rate:  [106-192] 136  Resp:  [17-36] 30  BP: ()/() 102/89     Documented Fluid Status   No intake or output data in the 24 hours ending 10/24/24 1439  Net IO Since Admission: No IO data has been entered for this period [10/24/24 1439]      /89   Pulse (!) 136   Temp 36.6 °C (97.9 °F) (Temporal)   Resp (!) 30   Ht 1.499 m (4' 11\")   Wt 63.5 kg (140 lb)   SpO2 94%   BMI 28.28 kg/m²   General:  Patient is awake, alert, and oriented.  She is highly anxious She is on supplemental Oxygen   HEENT:  Pupils equal and " "reactive.  Normocephalic.  Moist mucosa.    Neck:  No thyromegaly.  Normal Jugular Venous Pressure.  Cardiovascular:  Tachycardiac Irregularly Irregular   Pulmonary:  Diminished Anteriorly   Abdomen:  Soft. Non-tender.   Non-distended.  Positive bowel sounds.  Lower Extremities:  2+ pedal pulses. No LE edema.  Neurologic:  Cranial nerves intact.  No focal deficit.   Skin: Skin warm and dry, normal skin turgor.   Psychiatric: Normal affect.           Assessment/Plan   michael Auguste is a 69 y.o. female, with a PMH of persistent AF on Eliquis s/p failed DCCV 10/18/24 and acute systolic HF [potentially tachy-mediated], who presented to [facility] on 10/23/2024 for AF RVR. Patient was seen by Dr. Hernández today for routine follow-up, noted to be in persistent AF RVR. She was recently cardioverted 10/18 and discharged on Amiodarone but was unable to tolerated d/t extreme fatigue. Patient reports she has also not be She now has developed dyspnea at rest and leg edema, needing to increase her home Lasix x2 days, which improved her symptoms. Unfortunately, her BP is suboptimal for HF optimization d/t her arrhythmia. Dr. Hernández discussed patient with Dr. Ward and plan was for patient to be admitted for Tikosyn loading as bridge to ablation. ED course notable for AF RVR with HR in the 130s, labs w/ mild hypokalemia, renal fxn at baseline, troponin negative, and . CXR with \"mild congestive heart failure.\" Cardiology is consulted for \"afib RVR, potential Tikosyn load\".      Home CV Medications: Lasix 40mg BID, Eliquis 5mg BID, Metoprolol Tartrate 25mg TID     ASSESSMENT:  #Persistent AF with RVR- Failed DCCV, unable to tolerate Amiodarone  -[id number] score for Atrial Fibrillation Stroke Risk is 3, moderate-high for thromboembolic event  -c/w Eliquis 5mg BID - (Confirmed she has not missed any doses)  -Metoprolol 25mg BID ( she has not consistently taking at home as it makes her feel like her head is " "spinning\"  -Plan to Tikosyn load and keep in house for 5-6 doses  -- PLEASE NOTE __ HER RATES ARE ALSO COMPENSATORY IN THE SETTING OF DEPRESSED LVEF!!  -- DO NOT USE DILTIAZEM!!!     #Acute Systolic HF --> Acute on Chronic Systolic Heart Failure Stage C Class III/IV Symptoms   -- Warm and Perfused at this time   -- TTE confirms LVEF 25% with global Hypokinesia; evidence of Increase RAP and Volume Overload   -- , CXR with mild pulmonary edema, mildly volume up on exam  -- Guideline Directed Therapy has been limited by lower blood pressures and atrial fibrillation  -- I would plan to diurese with Lasix 40 mg IV twice daily for goal diuresis of 1.5-2 L per 24 hour period.  Electrolytes will need to be monitored closely and repleted.  Patient will need accurate I/O's and daily standing weights. Patient should be on a salt restricted diet. Free water should be restricted to 1500 cc per day.  -- Trial Jardiance to assist with diuresis as less Vasoactive                RECOMMENDATIONS:    - If she decompensates due to atrial fibrillation, CARDIOVERSION IS RECOMMENDED. SHE IS APPROPRIATELY ANTICOAGULATED.  DO NOT GIVE DILTIAZEM DUE TO SYSTOLIC HEART FAILURE. DO NOT GIVE AMIODARONE  or DIGOXIN DUE TO DOFETILIDE      -Continue with Dofetilide 250 mcg Q 12 hours with Mandated EKG 2-3 Hours post dose   -Keep magnesium greater than 2 and potassium greater than 4  -c/w Eliquis 5mg BID for anticoagulation  -Continue BB at 25mg BID ; if needed, increase to TID dosing for rate control   -Diuresis with furosemide 40mg IV BID.  Strict I/O.  - Add Jardiance to assist with Diuresis   -Will consider starting GDMT if BP allows  -Continuous telemetry monitoring  -Monitor electrolytes, replete for K <4 and Mg <2     -Patient to FU with Dr. Hernández and Dr. Ward as outpatient post-discharge for plans for ablation  Peripheral IV 10/23/24 20 G Left Antecubital (Active)   Site Assessment Clean;Dry;Intact 10/24/24 0900   Dressing Status " Dry;Clean 10/24/24 0900   Number of days: 1       Code Status:  Full Code      Lyndon Moss DO   Division of Cardiovascular Medicine  [facility] [place]

## 2024-10-24 NOTE — NURSING NOTE
Rapid Response Nurse Note:     Lolis Auguste is a 69 y.o. female on day 1 of admission presenting with Other cardiomyopathy.    Rounded on patient due to recent rapid response last night for Afib with RVR and recent transfer to SDU overflow room for Tikosyn loading. Reviewed patient chart and checked with bedside nurse for any questions or concerns; none voiced at this time. Patient remains in Afib with RVR; hemodynamically stable at this time.     Patient current RADAR score is 2    BP 92/80   Pulse (!) 124   Temp 36.6 °C (97.9 °F) (Temporal)   Resp 23   Wt 63.5 kg (140 lb)   SpO2 93%     No signs/symptoms of distress at this time. Bedside nurse notified to escalate concerns if patient condition changes      Rocio Yoder RN, CCRN

## 2024-10-24 NOTE — PROGRESS NOTES
"Lolis Auguste is a 69 y.o. female on day 1 of admission presenting with Other cardiomyopathy.    Subjective   Patient still in a fib with rvr, patient was started on Tikosyn loading, received her metoprolol, QTc within normal limits, does feel short of breath she is requiring 4 L of nasal cannula oxygen will wean as tolerated.       Objective     Physical Exam  Vitals reviewed.   Constitutional:       Appearance: Normal appearance.   HENT:      Head: Normocephalic.      Right Ear: Tympanic membrane normal.      Nose: Nose normal.      Mouth/Throat:      Mouth: Mucous membranes are moist.   Cardiovascular:      Pulses: Normal pulses.      Heart sounds: Normal heart sounds.      Comments: Irr irr  Pulmonary:      Comments: Bibasilar crackles  Abdominal:      General: Abdomen is flat. Bowel sounds are normal.      Palpations: Abdomen is soft.   Skin:     Capillary Refill: Capillary refill takes less than 2 seconds.   Neurological:      General: No focal deficit present.      Mental Status: She is alert.         Last Recorded Vitals  Blood pressure 108/87, pulse (!) 142, temperature 36.4 °C (97.5 °F), temperature source Temporal, resp. rate (!) 33, height 1.499 m (4' 11\"), weight 63.5 kg (140 lb), SpO2 95%.  Intake/Output last 3 Shifts:  No intake/output data recorded.    Relevant Results  Results for orders placed or performed during the hospital encounter of 10/23/24 (from the past 24 hours)   Sars-CoV-2 PCR   Result Value Ref Range    Coronavirus 2019, PCR Not Detected Not Detected   Troponin, High Sensitivity, 1 Hour   Result Value Ref Range    Troponin I, High Sensitivity 14 (H) 0 - 13 ng/L   ECG 12 lead   Result Value Ref Range    Ventricular Rate 112 BPM    QRS Duration 88 ms    QT Interval 442 ms    QTC Calculation(Bazett) 603 ms    R Axis 121 degrees    T Axis 102 degrees    QRS Count 18 beats    Q Onset 226 ms    T Offset 447 ms    QTC Fredericia 544 ms   Urinalysis with Reflex Culture and Microscopic "   Result Value Ref Range    Color, Urine Light-Yellow Light-Yellow, Yellow, Dark-Yellow    Appearance, Urine Clear Clear    Specific Gravity, Urine 1.013 1.005 - 1.035    pH, Urine 6.5 5.0, 5.5, 6.0, 6.5, 7.0, 7.5, 8.0    Protein, Urine NEGATIVE NEGATIVE, 10 (TRACE), 20 (TRACE) mg/dL    Glucose, Urine Normal Normal mg/dL    Blood, Urine NEGATIVE NEGATIVE    Ketones, Urine NEGATIVE NEGATIVE mg/dL    Bilirubin, Urine NEGATIVE NEGATIVE    Urobilinogen, Urine Normal Normal mg/dL    Nitrite, Urine NEGATIVE NEGATIVE    Leukocyte Esterase, Urine NEGATIVE NEGATIVE   Electrocardiogram 12 Lead   Result Value Ref Range    Ventricular Rate 131 BPM    Atrial Rate 136 BPM    QRS Duration 98 ms    QT Interval 388 ms    QTC Calculation(Bazett) 572 ms    R Axis 127 degrees    T Axis -5 degrees    QRS Count 22 beats    Q Onset 208 ms    T Offset 402 ms    QTC Fredericia 503 ms   ECG 12 lead   Result Value Ref Range    Ventricular Rate 138 BPM    Atrial Rate 67 BPM    QRS Duration 96 ms    QT Interval 372 ms    QTC Calculation(Bazett) 563 ms    R Axis 125 degrees    T Axis 2 degrees    QRS Count 22 beats    Q Onset 210 ms    T Offset 396 ms    QTC Fredericia 491 ms   Electrocardiogram 12 lead PRN ACS symptoms   Result Value Ref Range    Ventricular Rate 139 BPM    Atrial Rate 115 BPM    QRS Duration 92 ms    QT Interval 264 ms    QTC Calculation(Bazett) 401 ms    R Axis 126 degrees    T Axis -34 degrees    QRS Count 23 beats    Q Onset 224 ms    T Offset 356 ms    QTC Fredericia 349 ms   Light Blue Top   Result Value Ref Range    Extra Tube Hold for add-ons.    SST TOP   Result Value Ref Range    Extra Tube Hold for add-ons.    Gray Top   Result Value Ref Range    Extra Tube Hold for add-ons.    PST Top   Result Value Ref Range    Extra Tube Hold for add-ons.    Comprehensive metabolic panel   Result Value Ref Range    Glucose 144 (H) 74 - 99 mg/dL    Sodium 136 136 - 145 mmol/L    Potassium 4.2 3.5 - 5.3 mmol/L    Chloride 104 98 -  107 mmol/L    Bicarbonate 23 21 - 32 mmol/L    Anion Gap 13 10 - 20 mmol/L    Urea Nitrogen 16 6 - 23 mg/dL    Creatinine 0.78 0.50 - 1.05 mg/dL    eGFR 81 >60 mL/min/1.73m*2    Calcium 9.1 8.6 - 10.3 mg/dL    Albumin 4.2 3.4 - 5.0 g/dL    Alkaline Phosphatase 72 33 - 136 U/L    Total Protein 6.9 6.4 - 8.2 g/dL    AST 29 9 - 39 U/L    Bilirubin, Total 0.6 0.0 - 1.2 mg/dL    ALT 70 (H) 7 - 45 U/L   CBC and Auto Differential   Result Value Ref Range    WBC 11.9 (H) 4.4 - 11.3 x10*3/uL    nRBC 0.0 0.0 - 0.0 /100 WBCs    RBC 4.93 4.00 - 5.20 x10*6/uL    Hemoglobin 14.8 12.0 - 16.0 g/dL    Hematocrit 44.3 36.0 - 46.0 %    MCV 90 80 - 100 fL    MCH 30.0 26.0 - 34.0 pg    MCHC 33.4 32.0 - 36.0 g/dL    RDW 13.6 11.5 - 14.5 %    Platelets 312 150 - 450 x10*3/uL    Neutrophils % 83.0 40.0 - 80.0 %    Immature Granulocytes %, Automated 0.2 0.0 - 0.9 %    Lymphocytes % 9.1 13.0 - 44.0 %    Monocytes % 7.4 2.0 - 10.0 %    Eosinophils % 0.0 0.0 - 6.0 %    Basophils % 0.3 0.0 - 2.0 %    Neutrophils Absolute 9.90 (H) 1.60 - 5.50 x10*3/uL    Immature Granulocytes Absolute, Automated 0.02 0.00 - 0.50 x10*3/uL    Lymphocytes Absolute 1.08 0.80 - 3.00 x10*3/uL    Monocytes Absolute 0.88 (H) 0.05 - 0.80 x10*3/uL    Eosinophils Absolute 0.00 0.00 - 0.40 x10*3/uL    Basophils Absolute 0.04 0.00 - 0.10 x10*3/uL   Basic metabolic panel   Result Value Ref Range    Glucose 148 (H) 74 - 99 mg/dL    Sodium 136 136 - 145 mmol/L    Potassium 4.2 3.5 - 5.3 mmol/L    Chloride 105 98 - 107 mmol/L    Bicarbonate 21 21 - 32 mmol/L    Anion Gap 14 10 - 20 mmol/L    Urea Nitrogen 16 6 - 23 mg/dL    Creatinine 0.78 0.50 - 1.05 mg/dL    eGFR 81 >60 mL/min/1.73m*2    Calcium 9.0 8.6 - 10.3 mg/dL   Magnesium   Result Value Ref Range    Magnesium 2.20 1.60 - 2.40 mg/dL       Imaging Results  Cxr:  IMPRESSION:  Allowing for the aforementioned limitation, cardiomegaly and mild  congestive heart failure.      Medications:  apixaban, 5 mg, oral, BID  dofetilide,  250 mcg, oral, q12h  furosemide, 40 mg, intravenous, BID  metoprolol tartrate, 25 mg, oral, BID       PRN medications: acetaminophen **OR** acetaminophen **OR** acetaminophen     Assessment/Plan     A fib with rvr  -with cardioversion on 10/18  -On Tikosyn loading QTc normal,and on metoprolol    2. Acute chf with reduced EF of 25%  -requiring 4 L of nasal cannula oxygen continue with IV diuresis as per cardiology    3. Thrombus in SOLOMON  -continue eliquis      DVT Prophylaxis:  Jose Almeida MD  [facility]

## 2024-10-24 NOTE — PROGRESS NOTES
10/24/24 1329   ACS Disability Status   Are you deaf or do you have serious difficulty hearing? N   Are you blind or do you have serious difficulty seeing, even when wearing glasses? N   Because of a physical, mental, or emotional condition, do you have serious difficulty concentrating, remembering, or making decisions? (5 years old or older) N   Do you have serious difficulty walking or climbing stairs? N   Do you have serious difficulty dressing or bathing? N   Because of a physical, mental, or emotional condition, do you have serious difficulty doing errands alone such as visiting the doctor? N     Lolis Auguste is a 69 y.o. female on day 1 of admission presenting with Other cardiomyopathy.    Rosmery Cowart RN

## 2024-10-24 NOTE — NURSING NOTE
Pt just arrived to unit from ED. Pt in Afib RVR at this time. Metoprolol dose due at 9pm offered to pt and pt refused. Pt states metoprolol made her feel really sick in the ED and states she will not take this medication. Pt educated and rapid response called. Metoprolol offered by  and pt cont to refuse. Pt aware of risk associated with rhythm. Pt educated.

## 2024-10-24 NOTE — PROGRESS NOTES
10/24/24 1328   Discharge Planning   Living Arrangements Spouse/significant other   Support Systems Spouse/significant other   Assistance Needed met with patient in room. quite dyspneic at rest. plan is tikosynn loading with possible ablation. From home, independent with all care, no home 02, watch for this need at [place]. No needs at this time. no part d, will need tikosyn priced   Type of Residence Private residence   Home or Post Acute Services In home services   Expected Discharge Disposition Home   Housing Stability   In the last 12 months, was there a time when you were not able to pay the mortgage or rent on time? N   In the past 12 months, how many times have you moved where you were living? 1   At any time in the past 12 months, were you homeless or living in a shelter (including now)? N   Transportation Needs   In the past 12 months, has lack of transportation kept you from medical appointments or from getting medications? no   In the past 12 months, has lack of transportation kept you from meetings, work, or from getting things needed for daily living? No   Patient Choice   Patient / Family choosing to utilize agency / facility established prior to hospitalization [facility]     Lolis Auguste is a 69 y.o. female on day 1 of admission presenting with Other cardiomyopathy.    Rosmery Cowart RN

## 2024-10-24 NOTE — NURSING NOTE
Pt transferred to [facility] as SDU transfer for AF RVR and tikosyn loading. Pt placed on telemetry, .

## 2024-10-24 NOTE — NURSING NOTE
Pt has 2 sons at bedside and . Pt called this nurse into room to meet family. Pt  and family explains to pt that she should take metoprolol. Pt agrees to take a half dose of metoprolol. Dr Zhang notified.

## 2024-10-25 ENCOUNTER — APPOINTMENT (OUTPATIENT)
Dept: CARDIOLOGY | Facility: HOSPITAL | Age: 69
DRG: 308 | End: 2024-10-25
Payer: MEDICARE

## 2024-10-25 LAB
ANION GAP SERPL CALC-SCNC: 12 MMOL/L (ref 10–20)
ATRIAL RATE: 115 BPM
ATRIAL RATE: 120 BPM
ATRIAL RATE: 122 BPM
ATRIAL RATE: 125 BPM
ATRIAL RATE: 131 BPM
ATRIAL RATE: 67 BPM
BASOPHILS # BLD AUTO: 0.06 X10*3/UL (ref 0–0.1)
BASOPHILS NFR BLD AUTO: 0.6 %
BUN SERPL-MCNC: 12 MG/DL (ref 6–23)
CALCIUM SERPL-MCNC: 8.8 MG/DL (ref 8.6–10.3)
CHLORIDE SERPL-SCNC: 103 MMOL/L (ref 98–107)
CO2 SERPL-SCNC: 25 MMOL/L (ref 21–32)
CREAT SERPL-MCNC: 0.79 MG/DL (ref 0.5–1.05)
EGFRCR SERPLBLD CKD-EPI 2021: 80 ML/MIN/1.73M*2
EOSINOPHIL # BLD AUTO: 0.1 X10*3/UL (ref 0–0.4)
EOSINOPHIL NFR BLD AUTO: 1 %
ERYTHROCYTE [DISTWIDTH] IN BLOOD BY AUTOMATED COUNT: 13.7 % (ref 11.5–14.5)
GLUCOSE SERPL-MCNC: 110 MG/DL (ref 74–99)
HCT VFR BLD AUTO: 46.5 % (ref 36–46)
HGB BLD-MCNC: 14.8 G/DL (ref 12–16)
IMM GRANULOCYTES # BLD AUTO: 0.04 X10*3/UL (ref 0–0.5)
IMM GRANULOCYTES NFR BLD AUTO: 0.4 % (ref 0–0.9)
LYMPHOCYTES # BLD AUTO: 2.09 X10*3/UL (ref 0.8–3)
LYMPHOCYTES NFR BLD AUTO: 20.1 %
MAGNESIUM SERPL-MCNC: 2.4 MG/DL (ref 1.6–2.4)
MCH RBC QN AUTO: 29.5 PG (ref 26–34)
MCHC RBC AUTO-ENTMCNC: 31.8 G/DL (ref 32–36)
MCV RBC AUTO: 93 FL (ref 80–100)
MONOCYTES # BLD AUTO: 1.09 X10*3/UL (ref 0.05–0.8)
MONOCYTES NFR BLD AUTO: 10.5 %
NEUTROPHILS # BLD AUTO: 7.03 X10*3/UL (ref 1.6–5.5)
NEUTROPHILS NFR BLD AUTO: 67.4 %
NRBC BLD-RTO: 0 /100 WBCS (ref 0–0)
PLATELET # BLD AUTO: 279 X10*3/UL (ref 150–450)
POTASSIUM SERPL-SCNC: 4.1 MMOL/L (ref 3.5–5.3)
Q ONSET: 210 MS
Q ONSET: 223 MS
Q ONSET: 224 MS
Q ONSET: 224 MS
Q ONSET: 225 MS
Q ONSET: 227 MS
QRS COUNT: 20 BEATS
QRS COUNT: 21 BEATS
QRS COUNT: 21 BEATS
QRS COUNT: 22 BEATS
QRS COUNT: 22 BEATS
QRS COUNT: 23 BEATS
QRS DURATION: 100 MS
QRS DURATION: 90 MS
QRS DURATION: 92 MS
QRS DURATION: 92 MS
QRS DURATION: 96 MS
QRS DURATION: 96 MS
QT INTERVAL: 264 MS
QT INTERVAL: 266 MS
QT INTERVAL: 274 MS
QT INTERVAL: 282 MS
QT INTERVAL: 322 MS
QT INTERVAL: 372 MS
QTC CALCULATION(BAZETT): 395 MS
QTC CALCULATION(BAZETT): 401 MS
QTC CALCULATION(BAZETT): 404 MS
QTC CALCULATION(BAZETT): 407 MS
QTC CALCULATION(BAZETT): 471 MS
QTC CALCULATION(BAZETT): 563 MS
QTC FREDERICIA: 346 MS
QTC FREDERICIA: 349 MS
QTC FREDERICIA: 355 MS
QTC FREDERICIA: 360 MS
QTC FREDERICIA: 415 MS
QTC FREDERICIA: 491 MS
R AXIS: 104 DEGREES
R AXIS: 125 DEGREES
R AXIS: 126 DEGREES
R AXIS: 127 DEGREES
R AXIS: 128 DEGREES
R AXIS: 129 DEGREES
RBC # BLD AUTO: 5.02 X10*6/UL (ref 4–5.2)
SODIUM SERPL-SCNC: 136 MMOL/L (ref 136–145)
T AXIS: -18 DEGREES
T AXIS: -34 DEGREES
T AXIS: 2 DEGREES
T AXIS: 41 DEGREES
T AXIS: 58 DEGREES
T AXIS: 70 DEGREES
T OFFSET: 356 MS
T OFFSET: 356 MS
T OFFSET: 362 MS
T OFFSET: 365 MS
T OFFSET: 388 MS
T OFFSET: 396 MS
VENTRICULAR RATE: 125 BPM
VENTRICULAR RATE: 129 BPM
VENTRICULAR RATE: 131 BPM
VENTRICULAR RATE: 133 BPM
VENTRICULAR RATE: 138 BPM
VENTRICULAR RATE: 139 BPM
WBC # BLD AUTO: 10.4 X10*3/UL (ref 4.4–11.3)

## 2024-10-25 PROCEDURE — 99233 SBSQ HOSP IP/OBS HIGH 50: CPT | Performed by: INTERNAL MEDICINE

## 2024-10-25 PROCEDURE — 93010 ELECTROCARDIOGRAM REPORT: CPT | Performed by: INTERNAL MEDICINE

## 2024-10-25 PROCEDURE — 2500000004 HC RX 250 GENERAL PHARMACY W/ HCPCS (ALT 636 FOR OP/ED): Performed by: NURSE PRACTITIONER

## 2024-10-25 PROCEDURE — 36415 COLL VENOUS BLD VENIPUNCTURE: CPT | Performed by: INTERNAL MEDICINE

## 2024-10-25 PROCEDURE — 85025 COMPLETE CBC W/AUTO DIFF WBC: CPT | Performed by: INTERNAL MEDICINE

## 2024-10-25 PROCEDURE — 93005 ELECTROCARDIOGRAM TRACING: CPT

## 2024-10-25 PROCEDURE — 80048 BASIC METABOLIC PNL TOTAL CA: CPT | Performed by: NURSE PRACTITIONER

## 2024-10-25 PROCEDURE — 2500000001 HC RX 250 WO HCPCS SELF ADMINISTERED DRUGS (ALT 637 FOR MEDICARE OP): Performed by: INTERNAL MEDICINE

## 2024-10-25 PROCEDURE — 2060000001 HC INTERMEDIATE ICU ROOM DAILY

## 2024-10-25 PROCEDURE — 2500000001 HC RX 250 WO HCPCS SELF ADMINISTERED DRUGS (ALT 637 FOR MEDICARE OP): Performed by: NURSE PRACTITIONER

## 2024-10-25 PROCEDURE — 83735 ASSAY OF MAGNESIUM: CPT | Performed by: NURSE PRACTITIONER

## 2024-10-25 ASSESSMENT — COGNITIVE AND FUNCTIONAL STATUS - GENERAL: MOBILITY SCORE: 24

## 2024-10-25 ASSESSMENT — PAIN SCALES - GENERAL
PAINLEVEL_OUTOF10: 0 - NO PAIN

## 2024-10-25 ASSESSMENT — PAIN - FUNCTIONAL ASSESSMENT
PAIN_FUNCTIONAL_ASSESSMENT: 0-10

## 2024-10-25 NOTE — PROGRESS NOTES
Patient Name: Lolis Auguste  MRN: [id number]  Location: 431/431-A    This Tikosyn is being ordered for Initiation of therapy     Tikosyn Dose/Frequency 250 mcg every 12 hours    Initial    K+ (=4 mmol/L): 4.1 mmol/L  Mag (=2 mmol/L): 2.4 mmol/L    QTC: 407 msec      Height (cm): 149.9 cm    Actual Body Weight (kg): 64.3 kg  Creatinine (mg/dL): 0.79  CrCl: 50.7 mL/min  (Renally adjusted; contraindicated if CrCl <20mL/min)    I reviewed the patient chart for drug interactions including the following contraindicated medications:    Cimetidine, dolutegravir, hydrochlorothiazide (and thiazide diuretics including chlorthalidone, indapamide and metolazone), megestrol, prochlorperazine, trimethoprim, verapamil, azole antifungals (itraconazole, ketoconazole)    QTC Prolonging, NOT Absolute Contraindications: Phenothiazines, Cisapride, Bepridil, any TCA, fluoroquinolones, macrolides      All class I & III antiarrhythmics have been discontinued for at least 3 half lives  Class I: Quinidine, procainamide, disopyramide, lidocaine, mexiletine, flecainide, propafenone  Class III: Amiodarone, dronedarone, sotalol, ibutilide     Amiodarone has been stopped for at least 3 months before Tikosyn initiation or serum amiodarone level <0.3 mg/L

## 2024-10-25 NOTE — NURSING NOTE
Rapid Response Nurse Note:     Lolis Auguste is a 69 y.o. female on day 2 of admission presenting with Other cardiomyopathy.    Rounded on patient due to recent rapid response, reviewed patient chart and checked with bedside nurse for any questions or concerns none voiced at this time.    Patient current RADAR score is 2    BP (!) 97/30 (BP Location: Right arm, Patient Position: Lying)   Pulse (!) 119   Temp 36.3 °C (97.4 °F) (Temporal)   Resp 18   Wt 64.3 kg (141 lb 12.1 oz)   SpO2 94%     No signs/symptoms of distress at this time. Bedside nurse notified to escalate concerns if patient condition changes      Petra Murrell RN

## 2024-10-25 NOTE — PROGRESS NOTES
10/25/24 1700   Discharge Planning   Expected Discharge Disposition Home         Patient is on Tikosyn loading. She is also diuresing. Cardio is following. When patient is medically ready will go home, no needs identified.

## 2024-10-25 NOTE — PROGRESS NOTES
Subjective Data:  Patient reports improved sleep overnight  +nausea      Remains in AF, -130s per tele with suboptimal blood pressure     Objective Data:  Vitals:    10/25/24 0600 10/25/24 0700 10/25/24 0800 10/25/24 1210   BP:   (!) 97/30 (!) 143/116   BP Location:   Right arm Right arm   Patient Position:   Lying Sitting   Pulse:   (!) 119 (!) 122   Resp:   18 (!) 32   Temp:   36.3 °C (97.4 °F) 36.4 °C (97.6 °F)   TempSrc:   Temporal Temporal   SpO2:   94% 95%   Weight: 64.3 kg (141 lb 12.1 oz) 64.3 kg (141 lb 12.1 oz)     Height:          Last I/O:  I/O last 3 completed shifts:  In: 300 (4.7 mL/kg) [P.O.:300]  Out: 1850 (28.8 mL/kg) [Urine:1850 (0.8 mL/kg/hr)]  Weight: 64.3 kg     Past Cardiology Tests (Last 3 Years):  EKG:    ECG 12 Lead 10/25/24 1025 -- Tikosyn Dose #4 QTc 407 250mcg dosing      ECG 12 Lead 10/24/2024 2246 -- Tikosyn Dose #3 QTc 404 250mcg dosing      ECG 12 lead 10/24/2024 10:44 ( Tikosyn Dose #2 )  Qtc 395 -- 250 mcg Dosing       ECG 12 lead 10/23/2024 @ 22:59 -- Tikosyn Dose #1 - Manual Qtc  464 msec  250 mcg Dosing       ECG 12 lead 10/23/2024 @ 15:33 ( Pre Tikosyn )       Echo:  Transthoracic Echocardiogram 10/24/2024   1. Left ventricular ejection fraction is severely decreased, by visual estimate at 25%.   2. There is global hypokinesis of the left ventricle with minor regional variations.   3. Spectral Doppler shows an abnormal pattern of left ventricular diastolic filling.   4. Left ventricular cavity size is mild to moderately dilated.   5. No left ventricular thrombus visualized.   6. There is mildly reduced right ventricular systolic function.   7. The left atrium is severely dilated.   8. Moderate pericardial effusion.   9. Absent A-wave on MV spectral Doppler tracing, consistent with atrial fibrillation.  10. Severe mitral valve regurgitation.  11. Mildly elevated right ventricular systolic pressure.  12. The inferior vena cava appears severely dilated, with IVC inspiratory  collapse less than 50%.      Transesophageal Echo (MIKE) With Possible Cardioversion 10/18/2024   1. The left ventricular systolic function is severely decreased, with a visually estimated ejection fraction of 25%.   2. Left ventricular diastolic filling was indeterminate.   3. Unsuccessful cardioversion for atrial fibrillation resulting in atrial tachycardia.   4. There is moderately reduced right ventricular systolic function.   5. The left atrium is mildly dilated.   6. Moderate to severe mitral valve regurgitation.   7. There is no shunt detected    Transesophageal Echo (MIKE) With Possible Cardioversion 09/20/2024   1. The left ventricular systolic function is severely decreased, with a visually estimated ejection fraction of 25%.   2. Left ventricular diastolic filling was indeterminate.   3. There is normal right ventricular global systolic function.   4. The left atrium is moderately dilated.   5. SOLOMON thrombus identified. DCCV aborted.   6. Moderate mitral valve regurgitation.   7. There is no evidence of a patent foramen ovale.   8. There is no shunt detected.    Diagnostic Results   Labs\    Results from last 7 days   Lab Units 10/25/24  0433 10/24/24  0909 10/23/24  1254   WBC AUTO x10*3/uL 10.4 11.9* 6.9   HEMOGLOBIN g/dL 14.8 14.8 14.6   HEMATOCRIT % 46.5* 44.3 43.0   PLATELETS AUTO x10*3/uL 279 312 321     Results from last 7 days   Lab Units 10/25/24  0433 10/24/24  0909 10/24/24  0907 10/23/24  1254   SODIUM mmol/L 136 136 136 139   POTASSIUM mmol/L 4.1 4.2 4.2 3.4*   CHLORIDE mmol/L 103 105 104 103   CO2 mmol/L 25 21 23 25   BUN mg/dL 12 16 16 16   CREATININE mg/dL 0.79 0.78 0.78 0.89   CALCIUM mg/dL 8.8 9.0 9.1 8.6   PROTEIN TOTAL g/dL  --   --  6.9 6.8   BILIRUBIN TOTAL mg/dL  --   --  0.6 0.6   ALK PHOS U/L  --   --  72 73   ALT U/L  --   --  70* 73*   AST U/L  --   --  29 35   GLUCOSE mg/dL 110* 148* 144* 132*       CV Labs  Troponin I, High Sensitivity   Date/Time Value Ref Range Status  "  10/23/2024 02:52 PM 14 (H) 0 - 13 ng/L Final   10/23/2024 12:54 PM 12 0 - 13 ng/L Final     BNP   Date/Time Value Ref Range Status   10/23/2024 12:54  (H) 0 - 99 pg/mL Final     Hemoglobin A1C   Date/Time Value Ref Range Status   08/30/2024 11:41 AM 5.6 <5.7 % Final     Comment:     Normal less than 5.7%  Prediabetes 5.7% to 6.4%  Diabetes 6.5% or higher      --HgbA1C levels may not be accurate in patients who have renal disease, received recent blood transfusions, are anemic, or who have dyshemoglobinemia.     Pertinent Imaging  XR chest 1 view 10/23/2024  Allowing for the aforementioned limitation, cardiomegaly and mild congestive heart failure.            Inpatient Medications:  Scheduled medications   Medication Dose Route Frequency    apixaban  5 mg oral BID    dofetilide  250 mcg oral q12h    empagliflozin  10 mg oral Daily    furosemide  40 mg intravenous BID    metoprolol tartrate  25 mg oral BID     PRN medications   Medication    acetaminophen    Or    acetaminophen    Or    acetaminophen     Continuous Medications   Medication Dose Last Rate     Physical Exam:  BP (!) 143/116 (BP Location: Right arm, Patient Position: Sitting)   Pulse (!) 122   Temp 36.4 °C (97.6 °F) (Temporal)   Resp (!) 32   Ht 1.499 m (4' 11\")   Wt 64.3 kg (141 lb 12.1 oz)   SpO2 95%   BMI 28.63 kg/m²   Vitals and nursing notes reviewed.  GENERAL: Alert and awake, cooperative; in no acute distress; elderly female  SKIN: Warm and dry, cap refill <2  HEENT: Normocephalic, PEERL, mucous membranes pink and moist  NECK: No JVD or hepatojugular reflex  CARDIAC: Irregular irregular rate and rhythm, tachycardic, S1S2, no murmurs or abnormal heart sounds  CHEST: Normal respiratory effort, no abnormal breath sounds, diminished AE throughout, on supplemental oxygen  ABDOMEN: soft, non-distended, non-tender with palpation  EXTREMITIES: No lower extremity edema, normal pulses all 4 extremities  NEURO: Alert and oriented, mental " "status at baseline, no focal deficits  PSYCH: High anxiety     Assessment/Plan   Lolis Auguste is a 69 y.o. female, with a PMH of persistent AF on Eliquis s/p failed DCCV 10/18/24 and acute systolic HF [potentially tachy-mediated], who presented to [facility] on 10/23/2024 for AF RVR. Patient was seen by Dr. Hernández today for routine follow-up, noted to be in persistent AF RVR. She was recently cardioverted 10/18 and discharged on Amiodarone but was unable to tolerated d/t extreme fatigue. Patient reports she has also not be She now has developed dyspnea at rest and leg edema, needing to increase her home Lasix x2 days, which improved her symptoms. Unfortunately, her BP is suboptimal for HF optimization d/t her arrhythmia. Dr. Hernández discussed patient with Dr. Ward and plan was for patient to be admitted for Tikosyn loading as bridge to ablation. ED course notable for AF RVR with HR in the 130s, labs w/ mild hypokalemia, renal fxn at baseline, troponin negative, and . CXR with \"mild congestive heart failure.\" Cardiology is consulted for \"afib RVR, potential Tikosyn load\".      Home CV Medications: Lasix 40mg BID, Eliquis 5mg BID, Metoprolol Tartrate 25mg TID     ASSESSMENT:  #Persistent AF with RVR- Failed DCCV, unable to tolerate Amiodarone  -[id number] score for Atrial Fibrillation Stroke Risk is 3, moderate-high for thromboembolic event  -c/w Eliquis 5mg BID (Confirmed she has not missed any doses)  -Metoprolol 25mg BID (she has not consistently taking at home as it makes her feel like her head is spinning\")  -Plan to Tikosyn load and keep in house for 5-6 doses    -PLEASE NOTE: HER RATES ARE ALSO COMPENSATORY IN THE SETTING OF DEPRESSED LVEF!!  -DO NOT USE DILTIAZEM!!!     #Acute on Chronic Systolic Heart Failure Stage C Class III/IV Symptoms   -Warm and Perfused at this time   -TTE confirms LVEF 25% with global Hypokinesia; evidence of Increase RAP and Volume Overload   -, CXR with " mild pulmonary edema, mildly volume up on exam  -Guideline Directed Therapy has been limited by lower blood pressures and atrial fibrillation  -Tolerating diuresis with IV Lasix 40mg BID and Jardiance     RECOMMENDATIONS:  -Continue with Dofetilide 250mcg load q12hrs with mandated EKG 2-3 hrs post dose   -Keep magnesium greater than 2 and potassium greater than 4  -NPO at 0000 on Monday for DCCV if patient does not convert through the weekend  -c/w Eliquis 5mg BID for anticoagulation  -Continue BB at 25mg BID; if needed, increase to TID dosing for rate control   -Diuresis with furosemide 40mg IV BID.  Strict I/O.  -Added Jardiance to assist with Diuresis   -Will consider further GDMT if BP allows  -Continuous telemetry monitoring while admitted  -Patient to FU with Dr. Hernández and Dr. Ward as outpatient post-discharge for plans for ablation    If patient decompensates due to atrial fibrillation, CARDIOVERSION IS RECOMMENDED  SHE IS APPROPRIATELY ANTICOAGULATED  DO NOT GIVE DILTIAZEM DUE TO SYSTOLIC HEART FAILURE  DO NOT GIVE AMIODARONE or DIGOXIN DUE TO DOFETILIDE      KRAIG Soler-CNP   Advanced Practice Provider  Cardiology  [facility]  10/25/24 2:08 PM        =============================================  Attending Note   =============================================  Both the NARAYAN and I have had a face to face encounter with the patient today. I have examined the patient and edited the documented physical examination as necessary.  I personally reviewed the patient's vital signs, telemetry, recent labs, medications, orders, EKGs, and pertinent cardiac imaging/ echocardiography.  I have reviewed the NARAYAN's encounter note, approve the NARAYAN's documentation and have edited the note to reflect my diagnostic and therapeutic plan.    Patient seen and examined, chart reviewed  --Continues to be tolerant of dofetilide at this time.  --She unfortunately however remains in atrial  "fibrillation  --Ongoing diuresis underway to off load LV     Documented Vital Signs   Heart Rate:  [113-142]   Temp:  [36.1 °C (97 °F)-36.7 °C (98 °F)]   Resp:  [18-34]   BP: ()/()   Weight:  [64.3 kg (141 lb 12.1 oz)]   SpO2:  [88 %-97 %]   Temp:  [36.1 °C (97 °F)-36.7 °C (98 °F)] 36.4 °C (97.6 °F)  Heart Rate:  [113-142] 122  Resp:  [18-34] 32  BP: ()/() 143/116           Documented Fluid Status     Intake/Output Summary (Last 24 hours) at 10/25/2024 1459  Last data filed at 10/25/2024 1100  Gross per 24 hour   Intake 300 ml   Output 1850 ml   Net -1550 ml     Net IO Since Admission: -2,250 mL [10/25/24 1459]    Lolis Auguste is a 69 y.o. female, with a PMH of persistent AF on Eliquis s/p failed DCCV 10/18/24 and acute systolic HF [potentially tachy-mediated], who presented to [facility] on 10/23/2024 for AF RVR. Patient was seen by Dr. Hernández today for routine follow-up, noted to be in persistent AF RVR. She was recently cardioverted 10/18 and discharged on Amiodarone but was unable to tolerated d/t extreme fatigue. Patient reports she has also not be She now has developed dyspnea at rest and leg edema, needing to increase her home Lasix x2 days, which improved her symptoms. Unfortunately, her BP is suboptimal for HF optimization d/t her arrhythmia. Dr. Hernández discussed patient with Dr. Ward and plan was for patient to be admitted for Tikosyn loading as bridge to ablation. ED course notable for AF RVR with HR in the 130s, labs w/ mild hypokalemia, renal fxn at baseline, troponin negative, and . CXR with \"mild congestive heart failure.\" Cardiology is consulted for \"afib RVR, potential Tikosyn load\".      Home CV Medications: Lasix 40mg BID, Eliquis 5mg BID, Metoprolol Tartrate 25mg TID     ASSESSMENT:  #Persistent AF with RVR- Failed DCCV, unable to tolerate Amiodarone  -[id number]-TITt score for Atrial Fibrillation Stroke Risk is 3, moderate-high for thromboembolic " "event  -c/w Eliquis 5mg BID (Confirmed she has not missed any doses)  -Metoprolol 25mg BID (she has not consistently taking at home as it makes her feel like her head is spinning\")  -Plan to Tikosyn load and keep in house for 5-6 doses    -PLEASE NOTE: HER RATES ARE ALSO COMPENSATORY IN THE SETTING OF DEPRESSED LVEF!!  -DO NOT USE DILTIAZEM!!!     #Acute on Chronic Systolic Heart Failure Stage C Class III/IV Symptoms   -Warm and Perfused at this time   -TTE confirms LVEF 25% with global Hypokinesia; evidence of Increase RAP and Volume Overload   -, CXR with mild pulmonary edema, mildly volume up on exam  -Guideline Directed Therapy has been limited by lower blood pressures and atrial fibrillation  -Tolerating diuresis with IV Lasix 40mg BID and Jardiance     RECOMMENDATIONS:  -Continue with Dofetilide 250mcg load q12hrs with mandated EKG 2-3 hrs post dose   -Keep magnesium greater than 2 and potassium greater than 4  -NPO at 0000 on Monday for DCCV if patient does not convert through the weekend  -c/w Eliquis 5mg BID for anticoagulation  -Continue BB at 25mg BID; if needed, increase to TID dosing for rate control   -Diuresis with furosemide 40mg IV BID.  Strict I/O.  -Added Jardiance to assist with Diuresis   -Will consider further GDMT if BP allows  -Continuous telemetry monitoring while admitted  If she' does not convert, plan Cardioversion Monday 10/28  -Patient to FU with Dr. Hernández and Dr. Ward as outpatient post-discharge for plans for ablation    If patient decompensates due to atrial fibrillation, CARDIOVERSION IS RECOMMENDED  SHE IS APPROPRIATELY ANTICOAGULATED  DO NOT GIVE DILTIAZEM DUE TO SYSTOLIC HEART FAILURE  DO NOT GIVE AMIODARONE or DIGOXIN DUE TO DOFETILIDE            Lyndon Moss DO   Division of Cardiovascular Medicine  [facility] [place]          "

## 2024-10-25 NOTE — PROGRESS NOTES
"Lolis Auguste is a 69 y.o. female on day 2 of admission presenting with Other cardiomyopathy.    Subjective     A-fib with RVR on 4 L of nasal cannula oxygen tolerating Tikosyn and metoprolol QTc within normal limits.  To be seen by cardiology.visibly dyspneic        Objective     Physical Exam  Vitals reviewed.   Constitutional:       Appearance: Normal appearance.   HENT:      Head: Normocephalic.      Right Ear: Tympanic membrane normal.      Nose: Nose normal.      Mouth/Throat:      Mouth: Mucous membranes are moist.   Cardiovascular:      Pulses: Normal pulses.      Heart sounds: Normal heart sounds.      Comments: Irr irr  Pulmonary:      Comments: Bibasilar crackles  Abdominal:      General: Abdomen is flat. Bowel sounds are normal.      Palpations: Abdomen is soft.   Skin:     Capillary Refill: Capillary refill takes less than 2 seconds.   Neurological:      General: No focal deficit present.      Mental Status: She is alert.         Last Recorded Vitals  Blood pressure (!) 97/30, pulse (!) 119, temperature 36.3 °C (97.4 °F), temperature source Temporal, resp. rate 18, height 1.499 m (4' 11\"), weight 64.3 kg (141 lb 12.1 oz), SpO2 94%.  Intake/Output last 3 Shifts:  I/O last 3 completed shifts:  In: 300 (4.7 mL/kg) [P.O.:300]  Out: 1850 (28.8 mL/kg) [Urine:1850 (0.8 mL/kg/hr)]  Weight: 64.3 kg     Relevant Results  Results for orders placed or performed during the hospital encounter of 10/23/24 (from the past 24 hours)   Transthoracic Echo (TTE) Complete   Result Value Ref Range    AV pk otis 1.37 m/s    AV mn grad 4.4 mmHg    LVOT diam 1.93 cm    LA vol index A/L 77.7 ml/m2    Tricuspid annular plane systolic excursion 1.4 cm    LV EF 25 %    LVIDd 5.48 cm    RVSP 38.8 mmHg    Aortic Valve Area by Continuity of VTI 1.38 cm2    Aortic Valve Area by Continuity of Peak Velocity 1.47 cm2    AV pk grad 7.6 mmHg    LV A4C EF 30.7    Electrocardiogram, 12-lead PRN ACS symptoms   Result Value Ref Range    " Ventricular Rate 131 BPM    Atrial Rate 131 BPM    QRS Duration 90 ms    QT Interval 274 ms    QTC Calculation(Bazett) 404 ms    R Axis 127 degrees    T Axis 58 degrees    QRS Count 21 beats    Q Onset 225 ms    T Offset 362 ms    QTC Fredericia 355 ms   Basic metabolic panel   Result Value Ref Range    Glucose 110 (H) 74 - 99 mg/dL    Sodium 136 136 - 145 mmol/L    Potassium 4.1 3.5 - 5.3 mmol/L    Chloride 103 98 - 107 mmol/L    Bicarbonate 25 21 - 32 mmol/L    Anion Gap 12 10 - 20 mmol/L    Urea Nitrogen 12 6 - 23 mg/dL    Creatinine 0.79 0.50 - 1.05 mg/dL    eGFR 80 >60 mL/min/1.73m*2    Calcium 8.8 8.6 - 10.3 mg/dL   Magnesium   Result Value Ref Range    Magnesium 2.40 1.60 - 2.40 mg/dL   CBC and Auto Differential   Result Value Ref Range    WBC 10.4 4.4 - 11.3 x10*3/uL    nRBC 0.0 0.0 - 0.0 /100 WBCs    RBC 5.02 4.00 - 5.20 x10*6/uL    Hemoglobin 14.8 12.0 - 16.0 g/dL    Hematocrit 46.5 (H) 36.0 - 46.0 %    MCV 93 80 - 100 fL    MCH 29.5 26.0 - 34.0 pg    MCHC 31.8 (L) 32.0 - 36.0 g/dL    RDW 13.7 11.5 - 14.5 %    Platelets 279 150 - 450 x10*3/uL    Neutrophils % 67.4 40.0 - 80.0 %    Immature Granulocytes %, Automated 0.4 0.0 - 0.9 %    Lymphocytes % 20.1 13.0 - 44.0 %    Monocytes % 10.5 2.0 - 10.0 %    Eosinophils % 1.0 0.0 - 6.0 %    Basophils % 0.6 0.0 - 2.0 %    Neutrophils Absolute 7.03 (H) 1.60 - 5.50 x10*3/uL    Immature Granulocytes Absolute, Automated 0.04 0.00 - 0.50 x10*3/uL    Lymphocytes Absolute 2.09 0.80 - 3.00 x10*3/uL    Monocytes Absolute 1.09 (H) 0.05 - 0.80 x10*3/uL    Eosinophils Absolute 0.10 0.00 - 0.40 x10*3/uL    Basophils Absolute 0.06 0.00 - 0.10 x10*3/uL   Electrocardiogram 12 lead PRN ACS symptoms   Result Value Ref Range    Ventricular Rate 125 BPM    Atrial Rate 125 BPM    QRS Duration 100 ms    QT Interval 282 ms    QTC Calculation(Bazett) 407 ms    R Axis 129 degrees    T Axis 41 degrees    QRS Count 20 beats    Q Onset 224 ms    T Offset 365 ms    QTC Fredericia 360 ms        Imaging Results  Cxr:  IMPRESSION:  Allowing for the aforementioned limitation, cardiomegaly and mild  congestive heart failure.      Medications:  apixaban, 5 mg, oral, BID  dofetilide, 250 mcg, oral, q12h  empagliflozin, 10 mg, oral, Daily  furosemide, 40 mg, intravenous, BID  metoprolol tartrate, 25 mg, oral, BID       PRN medications: acetaminophen **OR** acetaminophen **OR** acetaminophen     Assessment/Plan     A fib with rvr  -with cardioversion on 10/18, failed  -On Tikosyn loading QTc normal,and on metoprolol    2. Acute chf with reduced EF of 25%  -requiring 4 L of nasal cannula oxygen continue with IV diuresis as per cardiology  -patient is net negative    3. Thrombus in SOLOMON  -continue eliquis      DVT Prophylaxis:  Jose Almeida MD  [facility]

## 2024-10-26 ENCOUNTER — APPOINTMENT (OUTPATIENT)
Dept: CARDIOLOGY | Facility: HOSPITAL | Age: 69
DRG: 308 | End: 2024-10-26
Payer: MEDICARE

## 2024-10-26 LAB
ANION GAP SERPL CALC-SCNC: 13 MMOL/L (ref 10–20)
BASOPHILS # BLD AUTO: 0.05 X10*3/UL (ref 0–0.1)
BASOPHILS NFR BLD AUTO: 0.7 %
BUN SERPL-MCNC: 15 MG/DL (ref 6–23)
CALCIUM SERPL-MCNC: 8.8 MG/DL (ref 8.6–10.3)
CHLORIDE SERPL-SCNC: 98 MMOL/L (ref 98–107)
CO2 SERPL-SCNC: 27 MMOL/L (ref 21–32)
CREAT SERPL-MCNC: 0.81 MG/DL (ref 0.5–1.05)
EGFRCR SERPLBLD CKD-EPI 2021: 78 ML/MIN/1.73M*2
EOSINOPHIL # BLD AUTO: 0.16 X10*3/UL (ref 0–0.4)
EOSINOPHIL NFR BLD AUTO: 2.1 %
ERYTHROCYTE [DISTWIDTH] IN BLOOD BY AUTOMATED COUNT: 13.5 % (ref 11.5–14.5)
GLUCOSE SERPL-MCNC: 86 MG/DL (ref 74–99)
HCT VFR BLD AUTO: 41.3 % (ref 36–46)
HGB BLD-MCNC: 13.6 G/DL (ref 12–16)
IMM GRANULOCYTES # BLD AUTO: 0.03 X10*3/UL (ref 0–0.5)
IMM GRANULOCYTES NFR BLD AUTO: 0.4 % (ref 0–0.9)
LYMPHOCYTES # BLD AUTO: 2.04 X10*3/UL (ref 0.8–3)
LYMPHOCYTES NFR BLD AUTO: 26.7 %
MAGNESIUM SERPL-MCNC: 2.3 MG/DL (ref 1.6–2.4)
MCH RBC QN AUTO: 30.2 PG (ref 26–34)
MCHC RBC AUTO-ENTMCNC: 32.9 G/DL (ref 32–36)
MCV RBC AUTO: 92 FL (ref 80–100)
MONOCYTES # BLD AUTO: 0.9 X10*3/UL (ref 0.05–0.8)
MONOCYTES NFR BLD AUTO: 11.8 %
NEUTROPHILS # BLD AUTO: 4.46 X10*3/UL (ref 1.6–5.5)
NEUTROPHILS NFR BLD AUTO: 58.3 %
NRBC BLD-RTO: 0 /100 WBCS (ref 0–0)
PLATELET # BLD AUTO: 282 X10*3/UL (ref 150–450)
POTASSIUM SERPL-SCNC: 3.5 MMOL/L (ref 3.5–5.3)
RBC # BLD AUTO: 4.5 X10*6/UL (ref 4–5.2)
SODIUM SERPL-SCNC: 134 MMOL/L (ref 136–145)
WBC # BLD AUTO: 7.6 X10*3/UL (ref 4.4–11.3)

## 2024-10-26 PROCEDURE — 2500000001 HC RX 250 WO HCPCS SELF ADMINISTERED DRUGS (ALT 637 FOR MEDICARE OP): Performed by: NURSE PRACTITIONER

## 2024-10-26 PROCEDURE — 2500000002 HC RX 250 W HCPCS SELF ADMINISTERED DRUGS (ALT 637 FOR MEDICARE OP, ALT 636 FOR OP/ED): Performed by: INTERNAL MEDICINE

## 2024-10-26 PROCEDURE — 93010 ELECTROCARDIOGRAM REPORT: CPT | Performed by: INTERNAL MEDICINE

## 2024-10-26 PROCEDURE — 2060000001 HC INTERMEDIATE ICU ROOM DAILY

## 2024-10-26 PROCEDURE — 99233 SBSQ HOSP IP/OBS HIGH 50: CPT | Performed by: INTERNAL MEDICINE

## 2024-10-26 PROCEDURE — 93005 ELECTROCARDIOGRAM TRACING: CPT

## 2024-10-26 PROCEDURE — 36415 COLL VENOUS BLD VENIPUNCTURE: CPT | Performed by: INTERNAL MEDICINE

## 2024-10-26 PROCEDURE — 80048 BASIC METABOLIC PNL TOTAL CA: CPT | Performed by: NURSE PRACTITIONER

## 2024-10-26 PROCEDURE — 85025 COMPLETE CBC W/AUTO DIFF WBC: CPT | Performed by: INTERNAL MEDICINE

## 2024-10-26 PROCEDURE — 2500000001 HC RX 250 WO HCPCS SELF ADMINISTERED DRUGS (ALT 637 FOR MEDICARE OP): Performed by: INTERNAL MEDICINE

## 2024-10-26 PROCEDURE — 99233 SBSQ HOSP IP/OBS HIGH 50: CPT | Performed by: NURSE PRACTITIONER

## 2024-10-26 PROCEDURE — 2500000004 HC RX 250 GENERAL PHARMACY W/ HCPCS (ALT 636 FOR OP/ED): Performed by: NURSE PRACTITIONER

## 2024-10-26 PROCEDURE — 83735 ASSAY OF MAGNESIUM: CPT | Performed by: NURSE PRACTITIONER

## 2024-10-26 RX ORDER — POTASSIUM CHLORIDE 20 MEQ/1
40 TABLET, EXTENDED RELEASE ORAL ONCE
Status: COMPLETED | OUTPATIENT
Start: 2024-10-26 | End: 2024-10-26

## 2024-10-26 RX ORDER — METOPROLOL TARTRATE 25 MG/1
25 TABLET, FILM COATED ORAL 3 TIMES DAILY
Status: DISCONTINUED | OUTPATIENT
Start: 2024-10-26 | End: 2024-10-30 | Stop reason: HOSPADM

## 2024-10-26 ASSESSMENT — COGNITIVE AND FUNCTIONAL STATUS - GENERAL
MOBILITY SCORE: 24
DAILY ACTIVITIY SCORE: 24
DAILY ACTIVITIY SCORE: 24
MOBILITY SCORE: 24

## 2024-10-26 ASSESSMENT — PAIN - FUNCTIONAL ASSESSMENT
PAIN_FUNCTIONAL_ASSESSMENT: 0-10

## 2024-10-26 ASSESSMENT — PAIN SCALES - GENERAL
PAINLEVEL_OUTOF10: 0 - NO PAIN

## 2024-10-26 NOTE — NURSING NOTE
Rapid Response Nurse Note:     Lolis Auguste is a 69 y.o. female on day 3 of admission presenting with Other cardiomyopathy.    Rounded on patient due to recent rapid response, reviewed patient's chart and checked with bedside nurse for any questions or concerns. There were none voiced at this time. Pt remains in Afib low 100's-110's.     The patient's current RADAR score is 4    BP 99/84   Pulse (!) 115   Temp 36.4 °C (97.5 °F) (Temporal)   Resp 18   Wt 64.3 kg (141 lb 12.1 oz)   SpO2 96%     No signs/symptoms of distress at this time. Bedside nurse notified to escalate concerns if patient condition changes      Elke Montilla RN

## 2024-10-26 NOTE — CARE PLAN
The patient's goals for the shift include      The clinical goals for the shift include Pt will have improved rate control by end of shift.      Problem: Skin  Goal: Promote/optimize nutrition  Outcome: Progressing  Flowsheets (Taken 10/26/2024 1388)  Promote/optimize nutrition:   Monitor/record intake including meals   Consume > 50% meals/supplements   Offer water/supplements/favorite foods

## 2024-10-26 NOTE — CARE PLAN
The patient's goals for the shift include      The clinical goals for the shift include patient will have inprove HR controlled end of shift      Problem: Skin  Goal: Promote/optimize nutrition  Outcome: Progressing

## 2024-10-26 NOTE — PROGRESS NOTES
"Lolis Auguste is a 69 y.o. female on day 3 of admission presenting with Other cardiomyopathy.    Subjective   Still on 120s, feels much better slept well overnight down to 3 L, plan is for DCCV on Monday if patient does not convert Jardiance was added yesterday to help with diuresis continue with IV Lasix intake and output is incorrect, potassium was given to keep potassium over 4.         Objective     Physical Exam  Vitals reviewed.   Constitutional:       Appearance: Normal appearance.   HENT:      Head: Normocephalic.      Right Ear: Tympanic membrane normal.      Nose: Nose normal.      Mouth/Throat:      Mouth: Mucous membranes are moist.   Cardiovascular:      Pulses: Normal pulses.      Heart sounds: Normal heart sounds.      Comments: Irr irr  Pulmonary:      Comments: Bibasilar crackles  Abdominal:      General: Abdomen is flat. Bowel sounds are normal.      Palpations: Abdomen is soft.   Skin:     Capillary Refill: Capillary refill takes less than 2 seconds.   Neurological:      General: No focal deficit present.      Mental Status: She is alert.         Last Recorded Vitals  Blood pressure 99/64, pulse (!) 118, temperature 36.6 °C (97.9 °F), temperature source Skin, resp. rate 18, height 1.499 m (4' 11\"), weight 64.3 kg (141 lb 12.1 oz), SpO2 96%.  Intake/Output last 3 Shifts:  I/O last 3 completed shifts:  In: 300 (4.7 mL/kg) [P.O.:300]  Out: 2350 (36.5 mL/kg) [Urine:2350 (1 mL/kg/hr)]  Weight: 64.3 kg     Relevant Results  Results for orders placed or performed during the hospital encounter of 10/23/24 (from the past 24 hours)   Basic metabolic panel   Result Value Ref Range    Glucose 86 74 - 99 mg/dL    Sodium 134 (L) 136 - 145 mmol/L    Potassium 3.5 3.5 - 5.3 mmol/L    Chloride 98 98 - 107 mmol/L    Bicarbonate 27 21 - 32 mmol/L    Anion Gap 13 10 - 20 mmol/L    Urea Nitrogen 15 6 - 23 mg/dL    Creatinine 0.81 0.50 - 1.05 mg/dL    eGFR 78 >60 mL/min/1.73m*2    Calcium 8.8 8.6 - 10.3 mg/dL "   Magnesium   Result Value Ref Range    Magnesium 2.30 1.60 - 2.40 mg/dL   CBC and Auto Differential   Result Value Ref Range    WBC 7.6 4.4 - 11.3 x10*3/uL    nRBC 0.0 0.0 - 0.0 /100 WBCs    RBC 4.50 4.00 - 5.20 x10*6/uL    Hemoglobin 13.6 12.0 - 16.0 g/dL    Hematocrit 41.3 36.0 - 46.0 %    MCV 92 80 - 100 fL    MCH 30.2 26.0 - 34.0 pg    MCHC 32.9 32.0 - 36.0 g/dL    RDW 13.5 11.5 - 14.5 %    Platelets 282 150 - 450 x10*3/uL    Neutrophils % 58.3 40.0 - 80.0 %    Immature Granulocytes %, Automated 0.4 0.0 - 0.9 %    Lymphocytes % 26.7 13.0 - 44.0 %    Monocytes % 11.8 2.0 - 10.0 %    Eosinophils % 2.1 0.0 - 6.0 %    Basophils % 0.7 0.0 - 2.0 %    Neutrophils Absolute 4.46 1.60 - 5.50 x10*3/uL    Immature Granulocytes Absolute, Automated 0.03 0.00 - 0.50 x10*3/uL    Lymphocytes Absolute 2.04 0.80 - 3.00 x10*3/uL    Monocytes Absolute 0.90 (H) 0.05 - 0.80 x10*3/uL    Eosinophils Absolute 0.16 0.00 - 0.40 x10*3/uL    Basophils Absolute 0.05 0.00 - 0.10 x10*3/uL       Imaging Results  Cxr:  IMPRESSION:  Allowing for the aforementioned limitation, cardiomegaly and mild  congestive heart failure.      Medications:  apixaban, 5 mg, oral, BID  dofetilide, 250 mcg, oral, q12h  empagliflozin, 10 mg, oral, Daily  furosemide, 40 mg, intravenous, BID  metoprolol tartrate, 25 mg, oral, BID       PRN medications: acetaminophen **OR** acetaminophen **OR** acetaminophen     Assessment/Plan     A fib with rvr  -with cardioversion on 10/18, failed  -On Tikosyn loading QTc normal,and on metoprolol  -Plan for cardioversion on Monday if patient does not convert    2. Acute chf with reduced EF of 25%  -requiring 3 L of nasal cannula oxygen continue with IV diuresis as per cardiology  -Taken output and accurate, Jardiance was added to help with diuresis    3. Thrombus in SOLOMON  -continue eliquis      DVT Prophylaxis:  Jose Almeida MD  [facility]

## 2024-10-27 ENCOUNTER — APPOINTMENT (OUTPATIENT)
Dept: CARDIOLOGY | Facility: HOSPITAL | Age: 69
DRG: 308 | End: 2024-10-27
Payer: MEDICARE

## 2024-10-27 VITALS
RESPIRATION RATE: 18 BRPM | HEART RATE: 113 BPM | WEIGHT: 141.76 LBS | SYSTOLIC BLOOD PRESSURE: 100 MMHG | OXYGEN SATURATION: 91 % | HEIGHT: 59 IN | TEMPERATURE: 96.6 F | DIASTOLIC BLOOD PRESSURE: 74 MMHG | BODY MASS INDEX: 28.58 KG/M2

## 2024-10-27 LAB
ANION GAP SERPL CALC-SCNC: 10 MMOL/L (ref 10–20)
BASOPHILS # BLD AUTO: 0.05 X10*3/UL (ref 0–0.1)
BASOPHILS NFR BLD AUTO: 0.7 %
BUN SERPL-MCNC: 19 MG/DL (ref 6–23)
CALCIUM SERPL-MCNC: 8.6 MG/DL (ref 8.6–10.3)
CHLORIDE SERPL-SCNC: 100 MMOL/L (ref 98–107)
CO2 SERPL-SCNC: 31 MMOL/L (ref 21–32)
CREAT SERPL-MCNC: 0.86 MG/DL (ref 0.5–1.05)
EGFRCR SERPLBLD CKD-EPI 2021: 72 ML/MIN/1.73M*2
EOSINOPHIL # BLD AUTO: 0.16 X10*3/UL (ref 0–0.4)
EOSINOPHIL NFR BLD AUTO: 2.1 %
ERYTHROCYTE [DISTWIDTH] IN BLOOD BY AUTOMATED COUNT: 13.4 % (ref 11.5–14.5)
GLUCOSE SERPL-MCNC: 105 MG/DL (ref 74–99)
HCT VFR BLD AUTO: 41.6 % (ref 36–46)
HGB BLD-MCNC: 13.8 G/DL (ref 12–16)
IMM GRANULOCYTES # BLD AUTO: 0.02 X10*3/UL (ref 0–0.5)
IMM GRANULOCYTES NFR BLD AUTO: 0.3 % (ref 0–0.9)
LYMPHOCYTES # BLD AUTO: 1.52 X10*3/UL (ref 0.8–3)
LYMPHOCYTES NFR BLD AUTO: 20.1 %
MAGNESIUM SERPL-MCNC: 2.2 MG/DL (ref 1.6–2.4)
MCH RBC QN AUTO: 29.9 PG (ref 26–34)
MCHC RBC AUTO-ENTMCNC: 33.2 G/DL (ref 32–36)
MCV RBC AUTO: 90 FL (ref 80–100)
MONOCYTES # BLD AUTO: 0.85 X10*3/UL (ref 0.05–0.8)
MONOCYTES NFR BLD AUTO: 11.2 %
NEUTROPHILS # BLD AUTO: 4.96 X10*3/UL (ref 1.6–5.5)
NEUTROPHILS NFR BLD AUTO: 65.6 %
NRBC BLD-RTO: 0 /100 WBCS (ref 0–0)
PLATELET # BLD AUTO: 276 X10*3/UL (ref 150–450)
POTASSIUM SERPL-SCNC: 3.7 MMOL/L (ref 3.5–5.3)
Q ONSET: 222 MS
QRS COUNT: 23 BEATS
QRS DURATION: 98 MS
QT INTERVAL: 270 MS
QTC CALCULATION(BAZETT): 406 MS
QTC FREDERICIA: 354 MS
R AXIS: 108 DEGREES
RBC # BLD AUTO: 4.62 X10*6/UL (ref 4–5.2)
SODIUM SERPL-SCNC: 137 MMOL/L (ref 136–145)
T AXIS: -16 DEGREES
T OFFSET: 357 MS
VENTRICULAR RATE: 136 BPM
WBC # BLD AUTO: 7.6 X10*3/UL (ref 4.4–11.3)

## 2024-10-27 PROCEDURE — 93005 ELECTROCARDIOGRAM TRACING: CPT

## 2024-10-27 PROCEDURE — 2500000004 HC RX 250 GENERAL PHARMACY W/ HCPCS (ALT 636 FOR OP/ED): Performed by: NURSE PRACTITIONER

## 2024-10-27 PROCEDURE — 83735 ASSAY OF MAGNESIUM: CPT | Performed by: INTERNAL MEDICINE

## 2024-10-27 PROCEDURE — 85025 COMPLETE CBC W/AUTO DIFF WBC: CPT | Performed by: INTERNAL MEDICINE

## 2024-10-27 PROCEDURE — 99232 SBSQ HOSP IP/OBS MODERATE 35: CPT | Performed by: INTERNAL MEDICINE

## 2024-10-27 PROCEDURE — 93010 ELECTROCARDIOGRAM REPORT: CPT | Performed by: INTERNAL MEDICINE

## 2024-10-27 PROCEDURE — 2500000001 HC RX 250 WO HCPCS SELF ADMINISTERED DRUGS (ALT 637 FOR MEDICARE OP): Performed by: NURSE PRACTITIONER

## 2024-10-27 PROCEDURE — 82374 ASSAY BLOOD CARBON DIOXIDE: CPT | Performed by: INTERNAL MEDICINE

## 2024-10-27 PROCEDURE — 2060000001 HC INTERMEDIATE ICU ROOM DAILY

## 2024-10-27 PROCEDURE — 2500000002 HC RX 250 W HCPCS SELF ADMINISTERED DRUGS (ALT 637 FOR MEDICARE OP, ALT 636 FOR OP/ED): Performed by: INTERNAL MEDICINE

## 2024-10-27 PROCEDURE — 99233 SBSQ HOSP IP/OBS HIGH 50: CPT | Performed by: NURSE PRACTITIONER

## 2024-10-27 PROCEDURE — 36415 COLL VENOUS BLD VENIPUNCTURE: CPT | Performed by: INTERNAL MEDICINE

## 2024-10-27 PROCEDURE — 2500000001 HC RX 250 WO HCPCS SELF ADMINISTERED DRUGS (ALT 637 FOR MEDICARE OP): Performed by: INTERNAL MEDICINE

## 2024-10-27 RX ORDER — POTASSIUM CHLORIDE 20 MEQ/1
40 TABLET, EXTENDED RELEASE ORAL ONCE
Status: COMPLETED | OUTPATIENT
Start: 2024-10-27 | End: 2024-10-27

## 2024-10-27 RX ORDER — FUROSEMIDE 10 MG/ML
60 INJECTION INTRAMUSCULAR; INTRAVENOUS 2 TIMES DAILY
Status: DISCONTINUED | OUTPATIENT
Start: 2024-10-27 | End: 2024-10-29

## 2024-10-27 ASSESSMENT — PAIN - FUNCTIONAL ASSESSMENT
PAIN_FUNCTIONAL_ASSESSMENT: 0-10
PAIN_FUNCTIONAL_ASSESSMENT: 0-10

## 2024-10-27 ASSESSMENT — COGNITIVE AND FUNCTIONAL STATUS - GENERAL
MOBILITY SCORE: 24
MOBILITY SCORE: 23
DAILY ACTIVITIY SCORE: 24
DAILY ACTIVITIY SCORE: 24
CLIMB 3 TO 5 STEPS WITH RAILING: A LITTLE

## 2024-10-27 ASSESSMENT — PAIN SCALES - GENERAL
PAINLEVEL_OUTOF10: 7
PAINLEVEL_OUTOF10: 0 - NO PAIN

## 2024-10-27 ASSESSMENT — PAIN SCALES - WONG BAKER: WONGBAKER_NUMERICALRESPONSE: NO HURT

## 2024-10-27 NOTE — CARE PLAN
The patient's goals for the shift include      The clinical goals for the shift include patient HR will be managed throughout shift      Problem: Skin  Goal: Promote/optimize nutrition  Outcome: Progressing     Problem: Pain - Adult  Goal: Verbalizes/displays adequate comfort level or baseline comfort level  Outcome: Progressing     Problem: Safety - Adult  Goal: Free from fall injury  Outcome: Progressing     Problem: Discharge Planning  Goal: Discharge to home or other facility with appropriate resources  Outcome: Progressing     Problem: Chronic Conditions and Co-morbidities  Goal: Patient's chronic conditions and co-morbidity symptoms are monitored and maintained or improved  Outcome: Progressing

## 2024-10-27 NOTE — PROGRESS NOTES
"Lolis Auguste is a 69 y.o. female on day 4 of admission presenting with Other cardiomyopathy.    Subjective   Planned for cardioversion in am, still a fib with rvr in 120's, metoprolol was increased to TID         Objective     Physical Exam  Vitals reviewed.   Constitutional:       Appearance: Normal appearance.   HENT:      Head: Normocephalic.      Right Ear: Tympanic membrane normal.      Nose: Nose normal.      Mouth/Throat:      Mouth: Mucous membranes are moist.   Cardiovascular:      Pulses: Normal pulses.      Heart sounds: Normal heart sounds.      Comments: Irr irr  Pulmonary:      Comments: Bibasilar crackles  Abdominal:      General: Abdomen is flat. Bowel sounds are normal.      Palpations: Abdomen is soft.   Skin:     Capillary Refill: Capillary refill takes less than 2 seconds.   Neurological:      General: No focal deficit present.      Mental Status: She is alert.         Last Recorded Vitals  Blood pressure 101/51, pulse (!) 113, temperature 36.7 °C (98 °F), temperature source Temporal, resp. rate 19, height 1.499 m (4' 11\"), weight 64.3 kg (141 lb 12.1 oz), SpO2 96%.  Intake/Output last 3 Shifts:  I/O last 3 completed shifts:  In: 240 (3.7 mL/kg) [P.O.:240]  Out: - (0 mL/kg)   Weight: 64.3 kg     Relevant Results  Results for orders placed or performed during the hospital encounter of 10/23/24 (from the past 24 hours)   Basic metabolic panel   Result Value Ref Range    Glucose 105 (H) 74 - 99 mg/dL    Sodium 137 136 - 145 mmol/L    Potassium 3.7 3.5 - 5.3 mmol/L    Chloride 100 98 - 107 mmol/L    Bicarbonate 31 21 - 32 mmol/L    Anion Gap 10 10 - 20 mmol/L    Urea Nitrogen 19 6 - 23 mg/dL    Creatinine 0.86 0.50 - 1.05 mg/dL    eGFR 72 >60 mL/min/1.73m*2    Calcium 8.6 8.6 - 10.3 mg/dL   CBC and Auto Differential   Result Value Ref Range    WBC 7.6 4.4 - 11.3 x10*3/uL    nRBC 0.0 0.0 - 0.0 /100 WBCs    RBC 4.62 4.00 - 5.20 x10*6/uL    Hemoglobin 13.8 12.0 - 16.0 g/dL    Hematocrit 41.6 36.0 - " 46.0 %    MCV 90 80 - 100 fL    MCH 29.9 26.0 - 34.0 pg    MCHC 33.2 32.0 - 36.0 g/dL    RDW 13.4 11.5 - 14.5 %    Platelets 276 150 - 450 x10*3/uL    Neutrophils % 65.6 40.0 - 80.0 %    Immature Granulocytes %, Automated 0.3 0.0 - 0.9 %    Lymphocytes % 20.1 13.0 - 44.0 %    Monocytes % 11.2 2.0 - 10.0 %    Eosinophils % 2.1 0.0 - 6.0 %    Basophils % 0.7 0.0 - 2.0 %    Neutrophils Absolute 4.96 1.60 - 5.50 x10*3/uL    Immature Granulocytes Absolute, Automated 0.02 0.00 - 0.50 x10*3/uL    Lymphocytes Absolute 1.52 0.80 - 3.00 x10*3/uL    Monocytes Absolute 0.85 (H) 0.05 - 0.80 x10*3/uL    Eosinophils Absolute 0.16 0.00 - 0.40 x10*3/uL    Basophils Absolute 0.05 0.00 - 0.10 x10*3/uL   Magnesium   Result Value Ref Range    Magnesium 2.20 1.60 - 2.40 mg/dL       Imaging Results  Cxr:  IMPRESSION:  Allowing for the aforementioned limitation, cardiomegaly and mild  congestive heart failure.      Medications:  apixaban, 5 mg, oral, BID  dofetilide, 250 mcg, oral, q12h  empagliflozin, 10 mg, oral, Daily  furosemide, 40 mg, intravenous, BID  metoprolol tartrate, 25 mg, oral, TID       PRN medications: acetaminophen **OR** acetaminophen **OR** acetaminophen     Assessment/Plan     A fib with rvr  -with cardioversion on 10/18, failed  -On Tikosyn loading QTc normal,and on metoprolol dose was increased to TID  -Plan for cardioversion tomorrow    2. Acute chf with reduced EF of 25%  -requiring 3 L of nasal cannula oxygen continue with IV diuresis as per cardiology  -Taken output inaccurate, Jardiance was added to help with diuresis    3. Thrombus in SOLOMON  -continue eliquis      DVT Prophylaxis:  Jose Almeida MD  [facility] Medicine

## 2024-10-28 ENCOUNTER — ANESTHESIA (OUTPATIENT)
Dept: CARDIOLOGY | Facility: HOSPITAL | Age: 69
DRG: 308 | End: 2024-10-28
Payer: MEDICARE

## 2024-10-28 ENCOUNTER — APPOINTMENT (OUTPATIENT)
Dept: CARDIOLOGY | Facility: HOSPITAL | Age: 69
DRG: 308 | End: 2024-10-28
Payer: MEDICARE

## 2024-10-28 ENCOUNTER — ANESTHESIA EVENT (OUTPATIENT)
Dept: CARDIOLOGY | Facility: HOSPITAL | Age: 69
DRG: 308 | End: 2024-10-28
Payer: MEDICARE

## 2024-10-28 LAB
ANION GAP SERPL CALC-SCNC: 14 MMOL/L (ref 10–20)
ATRIAL RATE: 123 BPM
ATRIAL RATE: 129 BPM
ATRIAL RATE: 267 BPM
ATRIAL RATE: 357 BPM
ATRIAL RATE: 67 BPM
ATRIAL RATE: 73 BPM
BASOPHILS # BLD AUTO: 0.07 X10*3/UL (ref 0–0.1)
BASOPHILS NFR BLD AUTO: 1.1 %
BODY SURFACE AREA: 1.63 M2
BUN SERPL-MCNC: 19 MG/DL (ref 6–23)
CALCIUM SERPL-MCNC: 8.7 MG/DL (ref 8.6–10.3)
CHLORIDE SERPL-SCNC: 100 MMOL/L (ref 98–107)
CO2 SERPL-SCNC: 26 MMOL/L (ref 21–32)
CREAT SERPL-MCNC: 0.8 MG/DL (ref 0.5–1.05)
EGFRCR SERPLBLD CKD-EPI 2021: 79 ML/MIN/1.73M*2
EOSINOPHIL # BLD AUTO: 0.16 X10*3/UL (ref 0–0.4)
EOSINOPHIL NFR BLD AUTO: 2.5 %
ERYTHROCYTE [DISTWIDTH] IN BLOOD BY AUTOMATED COUNT: 13.3 % (ref 11.5–14.5)
GLUCOSE SERPL-MCNC: 100 MG/DL (ref 74–99)
HCT VFR BLD AUTO: 40.4 % (ref 36–46)
HGB BLD-MCNC: 13.5 G/DL (ref 12–16)
IMM GRANULOCYTES # BLD AUTO: 0.02 X10*3/UL (ref 0–0.5)
IMM GRANULOCYTES NFR BLD AUTO: 0.3 % (ref 0–0.9)
LYMPHOCYTES # BLD AUTO: 1.94 X10*3/UL (ref 0.8–3)
LYMPHOCYTES NFR BLD AUTO: 30.2 %
MAGNESIUM SERPL-MCNC: 2.3 MG/DL (ref 1.6–2.4)
MCH RBC QN AUTO: 30.2 PG (ref 26–34)
MCHC RBC AUTO-ENTMCNC: 33.4 G/DL (ref 32–36)
MCV RBC AUTO: 90 FL (ref 80–100)
MONOCYTES # BLD AUTO: 0.65 X10*3/UL (ref 0.05–0.8)
MONOCYTES NFR BLD AUTO: 10.1 %
NEUTROPHILS # BLD AUTO: 3.58 X10*3/UL (ref 1.6–5.5)
NEUTROPHILS NFR BLD AUTO: 55.8 %
NRBC BLD-RTO: 0 /100 WBCS (ref 0–0)
P AXIS: 39 DEGREES
P OFFSET: 164 MS
P ONSET: 110 MS
PLATELET # BLD AUTO: 285 X10*3/UL (ref 150–450)
POTASSIUM SERPL-SCNC: 3.4 MMOL/L (ref 3.5–5.3)
PR INTERVAL: 194 MS
Q ONSET: 207 MS
Q ONSET: 207 MS
Q ONSET: 208 MS
Q ONSET: 209 MS
Q ONSET: 212 MS
Q ONSET: 222 MS
QRS COUNT: 12 BEATS
QRS COUNT: 19 BEATS
QRS COUNT: 20 BEATS
QRS COUNT: 21 BEATS
QRS DURATION: 100 MS
QRS DURATION: 100 MS
QRS DURATION: 104 MS
QRS DURATION: 114 MS
QRS DURATION: 92 MS
QRS DURATION: 98 MS
QT INTERVAL: 286 MS
QT INTERVAL: 352 MS
QT INTERVAL: 366 MS
QT INTERVAL: 398 MS
QT INTERVAL: 398 MS
QT INTERVAL: 428 MS
QTC CALCULATION(BAZETT): 407 MS
QTC CALCULATION(BAZETT): 471 MS
QTC CALCULATION(BAZETT): 482 MS
QTC CALCULATION(BAZETT): 523 MS
QTC CALCULATION(BAZETT): 555 MS
QTC CALCULATION(BAZETT): 562 MS
QTC FREDERICIA: 362 MS
QTC FREDERICIA: 434 MS
QTC FREDERICIA: 457 MS
QTC FREDERICIA: 464 MS
QTC FREDERICIA: 497 MS
QTC FREDERICIA: 501 MS
R AXIS: -32 DEGREES
R AXIS: 123 DEGREES
R AXIS: 125 DEGREES
R AXIS: 127 DEGREES
R AXIS: 133 DEGREES
R AXIS: 136 DEGREES
RBC # BLD AUTO: 4.47 X10*6/UL (ref 4–5.2)
SODIUM SERPL-SCNC: 137 MMOL/L (ref 136–145)
T AXIS: 26 DEGREES
T AXIS: 28 DEGREES
T AXIS: 44 DEGREES
T AXIS: 48 DEGREES
T AXIS: 69 DEGREES
T AXIS: 8 DEGREES
T OFFSET: 365 MS
T OFFSET: 384 MS
T OFFSET: 392 MS
T OFFSET: 406 MS
T OFFSET: 411 MS
T OFFSET: 421 MS
VENTRICULAR RATE: 113 BPM
VENTRICULAR RATE: 117 BPM
VENTRICULAR RATE: 120 BPM
VENTRICULAR RATE: 122 BPM
VENTRICULAR RATE: 123 BPM
VENTRICULAR RATE: 73 BPM
WBC # BLD AUTO: 6.4 X10*3/UL (ref 4.4–11.3)

## 2024-10-28 PROCEDURE — 2500000001 HC RX 250 WO HCPCS SELF ADMINISTERED DRUGS (ALT 637 FOR MEDICARE OP): Performed by: NURSE PRACTITIONER

## 2024-10-28 PROCEDURE — 2500000004 HC RX 250 GENERAL PHARMACY W/ HCPCS (ALT 636 FOR OP/ED): Performed by: NURSE PRACTITIONER

## 2024-10-28 PROCEDURE — 93005 ELECTROCARDIOGRAM TRACING: CPT

## 2024-10-28 PROCEDURE — 93010 ELECTROCARDIOGRAM REPORT: CPT | Performed by: INTERNAL MEDICINE

## 2024-10-28 PROCEDURE — A92960 PR CARDIOVERSION, ELECTIVE;EXTERN: Performed by: ANESTHESIOLOGY

## 2024-10-28 PROCEDURE — 99232 SBSQ HOSP IP/OBS MODERATE 35: CPT | Performed by: NURSE PRACTITIONER

## 2024-10-28 PROCEDURE — 2500000005 HC RX 250 GENERAL PHARMACY W/O HCPCS: Performed by: NURSE PRACTITIONER

## 2024-10-28 PROCEDURE — 36415 COLL VENOUS BLD VENIPUNCTURE: CPT | Performed by: INTERNAL MEDICINE

## 2024-10-28 PROCEDURE — 99232 SBSQ HOSP IP/OBS MODERATE 35: CPT | Performed by: INTERNAL MEDICINE

## 2024-10-28 PROCEDURE — 2500000002 HC RX 250 W HCPCS SELF ADMINISTERED DRUGS (ALT 637 FOR MEDICARE OP, ALT 636 FOR OP/ED): Performed by: INTERNAL MEDICINE

## 2024-10-28 PROCEDURE — 92960 CARDIOVERSION ELECTRIC EXT: CPT | Performed by: INTERNAL MEDICINE

## 2024-10-28 PROCEDURE — 2500000004 HC RX 250 GENERAL PHARMACY W/ HCPCS (ALT 636 FOR OP/ED): Performed by: ANESTHESIOLOGIST ASSISTANT

## 2024-10-28 PROCEDURE — 85025 COMPLETE CBC W/AUTO DIFF WBC: CPT | Performed by: INTERNAL MEDICINE

## 2024-10-28 PROCEDURE — 3700000001 HC GENERAL ANESTHESIA TIME - INITIAL BASE CHARGE

## 2024-10-28 PROCEDURE — 83735 ASSAY OF MAGNESIUM: CPT | Performed by: INTERNAL MEDICINE

## 2024-10-28 PROCEDURE — 99223 1ST HOSP IP/OBS HIGH 75: CPT | Performed by: NURSE PRACTITIONER

## 2024-10-28 PROCEDURE — 7100000009 HC PHASE TWO TIME - INITIAL BASE CHARGE

## 2024-10-28 PROCEDURE — 82374 ASSAY BLOOD CARBON DIOXIDE: CPT | Performed by: INTERNAL MEDICINE

## 2024-10-28 PROCEDURE — A92960 PR CARDIOVERSION, ELECTIVE;EXTERN: Performed by: ANESTHESIOLOGIST ASSISTANT

## 2024-10-28 PROCEDURE — 92960 CARDIOVERSION ELECTRIC EXT: CPT

## 2024-10-28 PROCEDURE — 3700000002 HC GENERAL ANESTHESIA TIME - EACH INCREMENTAL 1 MINUTE

## 2024-10-28 PROCEDURE — 2500000001 HC RX 250 WO HCPCS SELF ADMINISTERED DRUGS (ALT 637 FOR MEDICARE OP): Performed by: INTERNAL MEDICINE

## 2024-10-28 PROCEDURE — 2500000005 HC RX 250 GENERAL PHARMACY W/O HCPCS: Performed by: ANESTHESIOLOGIST ASSISTANT

## 2024-10-28 PROCEDURE — 2060000001 HC INTERMEDIATE ICU ROOM DAILY

## 2024-10-28 PROCEDURE — 5A2204Z RESTORATION OF CARDIAC RHYTHM, SINGLE: ICD-10-PCS | Performed by: INTERNAL MEDICINE

## 2024-10-28 PROCEDURE — 99100 ANES PT EXTEME AGE<1 YR&>70: CPT | Performed by: ANESTHESIOLOGY

## 2024-10-28 PROCEDURE — 7100000010 HC PHASE TWO TIME - EACH INCREMENTAL 1 MINUTE

## 2024-10-28 RX ORDER — POTASSIUM CHLORIDE 20 MEQ/1
40 TABLET, EXTENDED RELEASE ORAL ONCE
Status: COMPLETED | OUTPATIENT
Start: 2024-10-28 | End: 2024-10-28

## 2024-10-28 RX ORDER — ETOMIDATE 2 MG/ML
INJECTION INTRAVENOUS AS NEEDED
Status: DISCONTINUED | OUTPATIENT
Start: 2024-10-28 | End: 2024-10-28

## 2024-10-28 RX ORDER — PROPOFOL 10 MG/ML
INJECTION, EMULSION INTRAVENOUS AS NEEDED
Status: DISCONTINUED | OUTPATIENT
Start: 2024-10-28 | End: 2024-10-28

## 2024-10-28 RX ORDER — POTASSIUM CHLORIDE 20 MEQ/1
40 TABLET, EXTENDED RELEASE ORAL EVERY 4 HOURS
Status: DISCONTINUED | OUTPATIENT
Start: 2024-10-28 | End: 2024-10-28

## 2024-10-28 SDOH — HEALTH STABILITY: MENTAL HEALTH: CURRENT SMOKER: 0

## 2024-10-28 ASSESSMENT — COGNITIVE AND FUNCTIONAL STATUS - GENERAL
CLIMB 3 TO 5 STEPS WITH RAILING: A LITTLE
MOBILITY SCORE: 23
DAILY ACTIVITIY SCORE: 24

## 2024-10-28 ASSESSMENT — ENCOUNTER SYMPTOMS
NEUROLOGICAL NEGATIVE: 1
ENDOCRINE NEGATIVE: 1
EYES NEGATIVE: 1
SHORTNESS OF BREATH: 1
CARDIOVASCULAR NEGATIVE: 1
HEMATOLOGIC/LYMPHATIC NEGATIVE: 1
ALLERGIC/IMMUNOLOGIC NEGATIVE: 1
GASTROINTESTINAL NEGATIVE: 1
MUSCULOSKELETAL NEGATIVE: 1
CONSTITUTIONAL NEGATIVE: 1
PSYCHIATRIC NEGATIVE: 1

## 2024-10-28 ASSESSMENT — PAIN - FUNCTIONAL ASSESSMENT
PAIN_FUNCTIONAL_ASSESSMENT: 0-10

## 2024-10-28 ASSESSMENT — PAIN SCALES - GENERAL
PAINLEVEL_OUTOF10: 0 - NO PAIN

## 2024-10-28 ASSESSMENT — COLUMBIA-SUICIDE SEVERITY RATING SCALE - C-SSRS
2. HAVE YOU ACTUALLY HAD ANY THOUGHTS OF KILLING YOURSELF?: NO
1. IN THE PAST MONTH, HAVE YOU WISHED YOU WERE DEAD OR WISHED YOU COULD GO TO SLEEP AND NOT WAKE UP?: NO
6. HAVE YOU EVER DONE ANYTHING, STARTED TO DO ANYTHING, OR PREPARED TO DO ANYTHING TO END YOUR LIFE?: NO

## 2024-10-28 NOTE — PROGRESS NOTES
"Lolis Auguste is a 69 y.o. female on day 5 of admission presenting with Other cardiomyopathy.    Subjective   cardioversion planned for today, Lasix were increased yesterday         Objective     Physical Exam  Vitals reviewed.   Constitutional:       Appearance: Normal appearance.   HENT:      Head: Normocephalic.      Right Ear: Tympanic membrane normal.      Nose: Nose normal.      Mouth/Throat:      Mouth: Mucous membranes are moist.   Cardiovascular:      Pulses: Normal pulses.      Heart sounds: Normal heart sounds.      Comments: Irr irr  Pulmonary:      Comments: Bibasilar crackles  Abdominal:      General: Abdomen is flat. Bowel sounds are normal.      Palpations: Abdomen is soft.   Skin:     Capillary Refill: Capillary refill takes less than 2 seconds.   Neurological:      General: No focal deficit present.      Mental Status: She is alert.         Last Recorded Vitals  Blood pressure 109/78, pulse 95, temperature 36.9 °C (98.4 °F), temperature source Tympanic, resp. rate 16, height 1.499 m (4' 11\"), weight 63.5 kg (139 lb 15.9 oz), SpO2 96%.  Intake/Output last 3 Shifts:  No intake/output data recorded.    Relevant Results  Results for orders placed or performed during the hospital encounter of 10/23/24 (from the past 24 hours)   Basic metabolic panel   Result Value Ref Range    Glucose 100 (H) 74 - 99 mg/dL    Sodium 137 136 - 145 mmol/L    Potassium 3.4 (L) 3.5 - 5.3 mmol/L    Chloride 100 98 - 107 mmol/L    Bicarbonate 26 21 - 32 mmol/L    Anion Gap 14 10 - 20 mmol/L    Urea Nitrogen 19 6 - 23 mg/dL    Creatinine 0.80 0.50 - 1.05 mg/dL    eGFR 79 >60 mL/min/1.73m*2    Calcium 8.7 8.6 - 10.3 mg/dL   CBC and Auto Differential   Result Value Ref Range    WBC 6.4 4.4 - 11.3 x10*3/uL    nRBC 0.0 0.0 - 0.0 /100 WBCs    RBC 4.47 4.00 - 5.20 x10*6/uL    Hemoglobin 13.5 12.0 - 16.0 g/dL    Hematocrit 40.4 36.0 - 46.0 %    MCV 90 80 - 100 fL    MCH 30.2 26.0 - 34.0 pg    MCHC 33.4 32.0 - 36.0 g/dL    RDW 13.3 " 11.5 - 14.5 %    Platelets 285 150 - 450 x10*3/uL    Neutrophils % 55.8 40.0 - 80.0 %    Immature Granulocytes %, Automated 0.3 0.0 - 0.9 %    Lymphocytes % 30.2 13.0 - 44.0 %    Monocytes % 10.1 2.0 - 10.0 %    Eosinophils % 2.5 0.0 - 6.0 %    Basophils % 1.1 0.0 - 2.0 %    Neutrophils Absolute 3.58 1.60 - 5.50 x10*3/uL    Immature Granulocytes Absolute, Automated 0.02 0.00 - 0.50 x10*3/uL    Lymphocytes Absolute 1.94 0.80 - 3.00 x10*3/uL    Monocytes Absolute 0.65 0.05 - 0.80 x10*3/uL    Eosinophils Absolute 0.16 0.00 - 0.40 x10*3/uL    Basophils Absolute 0.07 0.00 - 0.10 x10*3/uL   Magnesium   Result Value Ref Range    Magnesium 2.30 1.60 - 2.40 mg/dL   Cardioversion External   Result Value Ref Range    BSA 1.63 m2       Imaging Results  Cxr:  IMPRESSION:  Allowing for the aforementioned limitation, cardiomegaly and mild  congestive heart failure.      Medications:  apixaban, 5 mg, oral, BID  dofetilide, 250 mcg, oral, q12h  empagliflozin, 10 mg, oral, Daily  furosemide, 60 mg, intravenous, BID  metoprolol tartrate, 25 mg, oral, TID  oxygen, , inhalation, Continuous - 02/gases  oxygen, , inhalation, Continuous - 02/gases       PRN medications: acetaminophen **OR** acetaminophen **OR** acetaminophen     Assessment/Plan     A fib with rvr  -with cardioversion on 10/18, failed  -On Tikosyn loading QTc normal,and on metoprolol dose was increased to TID  -cardioversion today  -K >4, Mag >2    2. Acute chf with reduced EF of 25%  -requiring 3 L of nasal cannula oxygen continue with IV diuresis as per cardiology was increased 60 mg iv BID 10/27  -Taken output inaccurate, Jardiance was added to help with diuresis    3. Thrombus in SOLOMON  -continue eliquis      DVT Prophylaxis:  Jose Almeida MD  [facility] Medicine

## 2024-10-28 NOTE — PROGRESS NOTES
Subjective/Objective Data:  Patient seen and examined this morning prior to cardioversion.  Ambulating in the room/bathroom off oxygen without RO  She reports continued improvement in breathing  She is concerned that the Metoprolol continues to make her feel nauseous    Tikosyn load is complete, has received 6+ doses  Planning on DCCV this morning  Diuresing well, no I&Os recorded, today's weight reportedly decreased from last    Telemetry reviewed, remains AF with HR typically in the 110s and occasional RVR 140s      Vitals:    10/28/24 0045 10/28/24 0445 10/28/24 0500 10/28/24 0700   BP: 101/65 107/71  112/75   BP Location: Left arm Left arm  Right arm   Patient Position: Lying Lying  Lying   Pulse:       Resp: 17 18  18   Temp: 36.5 °C (97.7 °F) 35.7 °C (96.3 °F)  36.8 °C (98.3 °F)   TempSrc: Temporal Temporal  Oral   SpO2: 98% 96%  96%   Weight:   63.5 kg (140 lb)    Height:          Last I/O:  No intake/output data recorded.    Past Cardiology Tests (Last 3 Years):  EKG:  ECG 10/27/24 1026 After 8th dose Manual: QT/Qtc 320/456 msec      ECG 10/26/24 2239 After 7th dose Manual: QT/Qtc 360 msec/507 msec :         ECG 10/26/24 1252 After 6th dose Manual: QT/Qtc 320 msec/485 msec :         ECG 12 Lead 10/25/24 1025 -- Tikosyn Dose #4 QTc 407 250mcg dosing      ECG 12 Lead 10/24/2024 2246 -- Tikosyn Dose #3 QTc 404 250mcg dosing      ECG 12 lead 10/24/2024 10:44 ( Tikosyn Dose #2 )  Qtc 395 -- 250 mcg Dosing       ECG 12 lead 10/23/2024 @ 22:59 -- Tikosyn Dose #1 - Manual Qtc  464 msec  250 mcg Dosing       ECG 12 lead 10/23/2024 @ 15:33 ( Pre Tikosyn )       Echo:  Transthoracic Echocardiogram 10/24/2024   1. Left ventricular ejection fraction is severely decreased, by visual estimate at 25%.   2. There is global hypokinesis of the left ventricle with minor regional variations.   3. Spectral Doppler shows an abnormal pattern of left ventricular diastolic filling.   4. Left ventricular cavity size is mild to  moderately dilated.   5. No left ventricular thrombus visualized.   6. There is mildly reduced right ventricular systolic function.   7. The left atrium is severely dilated.   8. Moderate pericardial effusion.   9. Absent A-wave on MV spectral Doppler tracing, consistent with atrial fibrillation.  10. Severe mitral valve regurgitation.  11. Mildly elevated right ventricular systolic pressure.  12. The inferior vena cava appears severely dilated, with IVC inspiratory collapse less than 50%.      Transesophageal Echo (MIKE) With Possible Cardioversion 10/18/2024   1. The left ventricular systolic function is severely decreased, with a visually estimated ejection fraction of 25%.   2. Left ventricular diastolic filling was indeterminate.   3. Unsuccessful cardioversion for atrial fibrillation resulting in atrial tachycardia.   4. There is moderately reduced right ventricular systolic function.   5. The left atrium is mildly dilated.   6. Moderate to severe mitral valve regurgitation.   7. There is no shunt detected    Transesophageal Echo (MIKE) With Possible Cardioversion 09/20/2024   1. The left ventricular systolic function is severely decreased, with a visually estimated ejection fraction of 25%.   2. Left ventricular diastolic filling was indeterminate.   3. There is normal right ventricular global systolic function.   4. The left atrium is moderately dilated.   5. SOLOMON thrombus identified. DCCV aborted.   6. Moderate mitral valve regurgitation.   7. There is no evidence of a patent foramen ovale.   8. There is no shunt detected.    Diagnostic Results   Labs\    Results from last 7 days   Lab Units 10/28/24  0526 10/27/24  0623 10/26/24  0553   WBC AUTO x10*3/uL 6.4 7.6 7.6   HEMOGLOBIN g/dL 13.5 13.8 13.6   HEMATOCRIT % 40.4 41.6 41.3   PLATELETS AUTO x10*3/uL 285 276 282     Results from last 7 days   Lab Units 10/28/24  0526 10/27/24  0623 10/26/24  0553 10/24/24  0909 10/24/24  0907 10/23/24  1254   SODIUM mmol/L  "137 137 134*   < > 136 139   POTASSIUM mmol/L 3.4* 3.7 3.5   < > 4.2 3.4*   CHLORIDE mmol/L 100 100 98   < > 104 103   CO2 mmol/L 26 31 27   < > 23 25   BUN mg/dL 19 19 15   < > 16 16   CREATININE mg/dL 0.80 0.86 0.81   < > 0.78 0.89   CALCIUM mg/dL 8.7 8.6 8.8   < > 9.1 8.6   PROTEIN TOTAL g/dL  --   --   --   --  6.9 6.8   BILIRUBIN TOTAL mg/dL  --   --   --   --  0.6 0.6   ALK PHOS U/L  --   --   --   --  72 73   ALT U/L  --   --   --   --  70* 73*   AST U/L  --   --   --   --  29 35   GLUCOSE mg/dL 100* 105* 86   < > 144* 132*    < > = values in this interval not displayed.       CV Labs  Troponin I, High Sensitivity   Date/Time Value Ref Range Status   10/23/2024 02:52 PM 14 (H) 0 - 13 ng/L Final   10/23/2024 12:54 PM 12 0 - 13 ng/L Final     BNP   Date/Time Value Ref Range Status   10/23/2024 12:54  (H) 0 - 99 pg/mL Final     Hemoglobin A1C   Date/Time Value Ref Range Status   08/30/2024 11:41 AM 5.6 <5.7 % Final     Comment:     Normal less than 5.7%  Prediabetes 5.7% to 6.4%  Diabetes 6.5% or higher      --HgbA1C levels may not be accurate in patients who have renal disease, received recent blood transfusions, are anemic, or who have dyshemoglobinemia.     Pertinent Imaging  XR chest 1 view 10/23/2024  Allowing for the aforementioned limitation, cardiomegaly and mild congestive heart failure.            Inpatient Medications:  Scheduled medications   Medication Dose Route Frequency    apixaban  5 mg oral BID    dofetilide  250 mcg oral q12h    empagliflozin  10 mg oral Daily    furosemide  60 mg intravenous BID    metoprolol tartrate  25 mg oral TID    potassium chloride CR  40 mEq oral q4h     PRN medications   Medication    acetaminophen    Or    acetaminophen    Or    acetaminophen     Continuous Medications   Medication Dose Last Rate     Physical Exam:  /75 (BP Location: Right arm, Patient Position: Lying)   Pulse (!) 113   Temp 36.8 °C (98.3 °F) (Oral)   Resp 18   Ht 1.499 m (4' 11\")   " "Wt 63.5 kg (140 lb)   SpO2 96%   BMI 28.28 kg/m²   Vitals and nursing notes reviewed.  GENERAL: Alert and awake, cooperative; in no acute distress; elderly female  SKIN: Warm and dry, cap refill <2  HEENT: Normocephalic, PEERL, mucous membranes pink and moist  NECK: +JVD, ~10 cm  CARDIAC: Irregular irregular rate and rhythm, tachycardic, S1S2, no murmurs or abnormal heart sounds  CHEST: Clear upper lobes, diminished in bases; diminished AE throughout, on supplemental oxygen  ABDOMEN: soft, mildly distended, non-tender with palpation  EXTREMITIES: No lower extremity edema, normal pulses all 4 extremities  NEURO: Alert and oriented, mental status at baseline, no focal deficits  PSYCH: High anxiety     Assessment/Plan   Lolis Auguste is a 69 y.o. female, with a PMH of persistent AF on Eliquis s/p failed DCCV 10/18/24 and acute systolic HF [potentially tachy-mediated], who presented to [facility] on 10/23/2024 for AF RVR. Patient was seen by Dr. Hernández today for routine follow-up, noted to be in persistent AF RVR. She was recently cardioverted 10/18 and discharged on Amiodarone but was unable to tolerated d/t extreme fatigue. Patient reports she has also not be She now has developed dyspnea at rest and leg edema, needing to increase her home Lasix x2 days, which improved her symptoms. Unfortunately, her BP is suboptimal for HF optimization d/t her arrhythmia. Dr. Hernández discussed patient with Dr. Ward and plan was for patient to be admitted for Tikosyn loading as bridge to ablation. ED course notable for AF RVR with HR in the 130s, labs w/ mild hypokalemia, renal fxn at baseline, troponin negative, and . CXR with \"mild congestive heart failure.\" Cardiology is consulted for \"afib RVR, potential Tikosyn load\".      Home CV Medications: Lasix 40mg BID, Eliquis 5mg BID, Metoprolol Tartrate 25mg TID     #Persistent AF with RVR- Failed previous DCCV, unable to tolerate Amiodarone  -[id number] score for " Atrial Fibrillation Stroke Risk is 3, moderate-high for thromboembolic event  -c/w Eliquis 5mg BID (Confirmed she has not missed any doses)  -Metoprolol 25mg increased to TID. Hold for SBP <95, HR <60.  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   -Warm and Perfused at this time, Remains hypervolemic   -TTE confirms LVEF 25% with global Hypokinesia; evidence of Increase RAP and Volume Overload   -, CXR with mild pulmonary edema, mildly volume up on exam  -Guideline Directed Therapy has been limited by lower blood pressures and atrial fibrillation  -Tolerating diuresis with IV Lasix 40mg BID and Jardiance     RECOMMENDATIONS:  -Continue current dose of Tikosyn, has completed full load  -NPO for for DCCV this morning  -Keep magnesium greater than 2 and potassium greater than 4  -c/w Eliquis 5mg BID for anticoagulation  -Continue Metoprolol Tartrate 25mg TID- Per patient this is causing her nausea, will reassess once in NSR  -c/w diuresis with IV Lasix 60mg BID  -Continue Jardiance 10 mg daily.   -Will consider further GDMT if BP allows, plan to likely initiate Entresto once maintaining SR  -Continuous telemetry monitoring while admitted  -Daily wts, strict I&Os, low sodium diet   -Patient to FU with Dr. Garcia and Dr. Ward as outpatient post-discharge for plans for ablation    If patient decompensates due to atrial fibrillation, CARDIOVERSION IS RECOMMENDED  SHE IS APPROPRIATELY ANTICOAGULATED  DO NOT GIVE DILTIAZEM DUE TO SYSTOLIC HEART FAILURE  DO NOT GIVE AMIODARONE or DIGOXIN DUE TO DOFETILIDE      KRAIG Soler-CNP   Advanced Practice Provider  Cardiology  [facility]  10/28/24 8:51 AM

## 2024-10-28 NOTE — DISCHARGE INSTRUCTIONS
Please do not miss any doses of your anticoagulant (Eliquis). This is very important to prevent the risk of stroke.                Cardioversion     Cardioversion is a non-surgical way to restore your heart's normal rhythm. Medication may be tried first to correct an irregular heartbeat, but if medicines do not work, electrical cardioversion may be needed. The electrical current should make your heartbeat normally again.      After the procedure-    Your heart rate, blood pressure and respirations will be checked frequently by the nurse until you are fully alert.      When the patches are removed form your chest, you may notice redness, irritation, or itching similar to a mild sunburn. You may You may use over the counter hydrocortisone 1% cream and apply up to three times a day for any irritation to your skin on your chest.      Discharge information-   Have an adult with you to get you home from the hospital; you will not be allowed to drive for 24 hours after the procedure.      You may resume your usual routine after discharge.      Make sure you and your family understands how you are to take your medications. The doctor will tell you what to do with your cardiac medicines and your anti-coagulation medicines.      There is a small chance your irregular heartbeat may return. If you feel skipped beats, rapid heart rate, or chest tightness, call your doctor.

## 2024-10-28 NOTE — H&P
History Of Present Illness  Lolis Auguste is a 69 y.o. female presenting with atrial fib with RVR on eliquis and dofetilde, here for DCCV. 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 Left ventricular ejection fraction is severely decreased, by visual estimate at 25%.  2. There is global hypokinesis of the left ventricle with minor regional variations.  3. Spectral Doppler shows an abnormal pattern of left ventricular diastolic filling.  4. Left ventricular cavity size is mild to moderately dilated.  5. No left ventricular thrombus visualized.  6. There is mil    Persistent atrial fibrillation (Multi) 09/09/2024    Thrombus of left atrial appendage 09/30/2024    MIKE 9/20/24          Past Surgical History:  History reviewed. No pertinent surgical history.       Social History:   reports that she has never smoked. She has never used smokeless tobacco. She reports current alcohol use. She reports that she does not use drugs.     Family History:  No family history on file.     Allergies:  No Known Allergies     Home Medications:  Current Outpatient Medications   Medication Instructions    apixaban (ELIQUIS) 5 mg, 2 times daily    furosemide (LASIX) 40 mg, 2 times daily (morning and late afternoon)    metoprolol tartrate (LOPRESSOR) 25 mg, oral, 3 times daily    multivitamin tablet 1 tablet, Daily       Inpatient Medications:  Scheduled medications   Medication Dose Route Frequency    apixaban  5 mg oral BID    dofetilide  250 mcg oral q12h    empagliflozin  10 mg oral Daily    furosemide  60 mg intravenous BID    metoprolol tartrate  25 mg oral TID    potassium chloride CR  40 mEq oral q4h     PRN medications   Medication    acetaminophen    Or     acetaminophen    Or    acetaminophen     Continuous Medications   Medication Dose Last Rate         Review of Systems   Constitutional: Negative.    HENT: Negative.     Eyes: Negative.    Respiratory:  Positive for shortness of breath.    Cardiovascular: Negative.    Gastrointestinal: Negative.    Endocrine: Negative.    Genitourinary: Negative.    Musculoskeletal: Negative.    Skin: Negative.    Allergic/Immunologic: Negative.    Neurological: Negative.    Hematological: Negative.    Psychiatric/Behavioral: Negative.            Physical Exam  Constitutional:       General: She is awake. She is not in acute distress.     Appearance: She is not ill-appearing.   Cardiovascular:      Rate and Rhythm: Tachycardia present. Rhythm irregularly irregular.      Pulses: Normal pulses.           Radial pulses are 2+ on the right side and 2+ on the left side.        Dorsalis pedis pulses are 2+ on the right side and 2+ on the left side.      Heart sounds: Normal heart sounds. No murmur heard.  Pulmonary:      Effort: Pulmonary effort is normal.      Breath sounds: Normal breath sounds and air entry.      Comments: On 2L NC  Abdominal:      General: Bowel sounds are normal.      Palpations: Abdomen is soft.      Tenderness: There is no abdominal tenderness.   Musculoskeletal:      Right lower leg: No edema.      Left lower leg: No edema.   Skin:     General: Skin is warm and dry.   Neurological:      General: No focal deficit present.      Mental Status: She is alert and oriented to person, place, and time.      GCS: GCS eye subscore is 4. GCS verbal subscore is 5. GCS motor subscore is 6.   Psychiatric:         Mood and Affect: Mood normal.         Behavior: Behavior is cooperative.        Sedation Plan    ASA 3     Mallampati class: III.           NPO since last night around 2300    Last Recorded Vitals  Blood pressure 112/75, pulse (!) 113, temperature 36.8 °C (98.3 °F), temperature source Oral, resp. rate 18, height 1.499 m  "(4' 11\"), weight 63.5 kg (140 lb), SpO2 96%.         Vitals from the Past 24 Hours  Temp:  [35.7 °C (96.3 °F)-36.8 °C (98.3 °F)]   Resp:  [16-18]   BP: (100-116)/(64-75)   Weight:  [63.5 kg (140 lb)]   SpO2:  [91 %-98 %]          Relevant Results    Labs    CBC:   Recent Labs     10/28/24  0526 10/27/24  0623 10/26/24  0553 10/25/24  0433 10/24/24  0909 10/23/24  1254   WBC 6.4 7.6 7.6 10.4 11.9* 6.9   HGB 13.5 13.8 13.6 14.8 14.8 14.6   HCT 40.4 41.6 41.3 46.5* 44.3 43.0    276 282 279 312 321   MCV 90 90 92 93 90 89     BMP/CMP:   Recent Labs     10/28/24  0526 10/27/24  0623 10/26/24  0553 10/25/24  0433 10/24/24  0909 10/24/24  0907 10/23/24  1254    137 134* 136 136 136 139   K 3.4* 3.7 3.5 4.1 4.2 4.2 3.4*    100 98 103 105 104 103   BUN 19 19 15 12 16 16 16   CREATININE 0.80 0.86 0.81 0.79 0.78 0.78 0.89   CO2 26 31 27 25 21 23 25   CALCIUM 8.7 8.6 8.8 8.8 9.0 9.1 8.6   PROT  --   --   --   --   --  6.9 6.8   BILITOT  --   --   --   --   --  0.6 0.6   ALKPHOS  --   --   --   --   --  72 73   ALT  --   --   --   --   --  70* 73*   AST  --   --   --   --   --  29 35   GLUCOSE 100* 105* 86 110* 148* 144* 132*      Lipid Panel: No results for input(s): \"CHOL\", \"HDL\", \"CHHDL\", \"LDL\", \"VLDL\", \"TRIG\", \"NHDL\" in the last 00417 hours.  Cardiac   Results from last 7 days   Lab Units 10/23/24  1452 10/23/24  1254   TROPHS ng/L 14* 12   BNP pg/mL  --  422*      Hemoglobin A1C:   Recent Labs     08/30/24  1141   HGBA1C 5.6     TSH/ Free T4: No results for input(s): \"TSH\", \"FREET4\" in the last 07337 hours.  Iron:   Recent Labs     10/23/24  1254   *     Coag:     ABO: No results found for: \"ABO\"    Past Cardiology Tests (Last 3 Years):    EKG:  Recent Labs     10/27/24  1030 10/27/24  0245 10/26/24  2245   ATRRATE 67 73 267   VENTRATE 122 73 117   PRINT  --  194  --    QRSDUR 104 114 100   QTCFRED 362 457 497   QTCCALCB 407 471 555     Encounter Date: 10/23/24   Electrocardiogram 12 lead PRN ACS " symptoms   Result Value    Ventricular Rate 120    Atrial Rate 129    QRS Duration 92    QT Interval 398    QTC Calculation(Bazett) 562    R Axis 123    T Axis 48    QRS Count 20    Q Onset 212    T Offset 411    QTC Fredericia 501    Narrative    Atrial fibrillation with rapid ventricular response with premature ventricular or aberrantly conducted complexes  Right axis deviation  Septal infarct , age undetermined  ST & T wave abnormality, consider anterior ischemia or digitalis effect  Abnormal ECG  When compared with ECG of 25-OCT-2024 10:25, (unconfirmed)  Previous ECG has undetermined rhythm, needs review  Septal infarct is now Present  ST now depressed in Anterior leads  Inverted T waves have replaced nonspecific T wave abnormality in Anterior leads     Echo:  Echocardiogram:   Transthoracic Echo (TTE) Complete 10/24/2024    PHYSICIAN INTERPRETATION:  Left Ventricle: Left ventricular ejection fraction is severely decreased, by visual estimate at 25%. There is global hypokinesis of the left ventricle with minor regional variations. The left ventricular cavity size is mild to moderately dilated. The left ventricular septal wall thickness is normal. There is normal left ventricular posterior wall thickness. Spectral Doppler shows an abnormal pattern of left ventricular diastolic filling. There is no definite left ventricular thrombus visualized.  Left Atrium: The left atrium is severely dilated.  Right Ventricle: The right ventricle is normal in size. There is mildly reduced right ventricular systolic function. TAPSE = 14 mm.  Right Atrium: The right atrium is mildly dilated.  Aortic Valve: The aortic valve is trileaflet. The aortic valve dimensionless index is 0.47. There is no evidence of aortic valve regurgitation. The peak instantaneous gradient of the aortic valve is 7.6 mmHg. The mean gradient of the aortic valve is 4.4 mmHg.  Mitral Valve: The mitral valve is mildly thickened. The mitral valve spectral  Doppler A-wave is absent, consistent with atrial fibrillation. There is severe mitral valve regurgitation which is eccentrically directed.  Tricuspid Valve: The tricuspid valve is structurally normal. There is mild tricuspid regurgitation. The Doppler estimated RVSP is mildly elevated at 38.8 mmHg.  Pulmonic Valve: The pulmonic valve is structurally normal. There is physiologic pulmonic valve regurgitation.  Pericardium: Moderate pericardial effusion.  Aorta: The aortic root is normal.  Systemic Veins: The inferior vena cava appears severely dilated, with IVC inspiratory collapse less than 50%.  In comparison to the previous echocardiogram(s): There are no prior studies on this patient for comparison purposes.      CONCLUSIONS:  1. Left ventricular ejection fraction is severely decreased, by visual estimate at 25%.  2. There is global hypokinesis of the left ventricle with minor regional variations.  3. Spectral Doppler shows an abnormal pattern of left ventricular diastolic filling.  4. Left ventricular cavity size is mild to moderately dilated.  5. No left ventricular thrombus visualized.  6. There is mildly reduced right ventricular systolic function.  7. The left atrium is severely dilated.  8. Moderate pericardial effusion.  9. Absent A-wave on MV spectral Doppler tracing, consistent with atrial fibrillation.  10. Severe mitral valve regurgitation.  11. Mildly elevated right ventricular systolic pressure.  12. The inferior vena cava appears severely dilated, with IVC inspiratory collapse less than 50%.    Ejection Fractions:  LV EF   Date/Time Value Ref Range Status   10/24/2024 03:16 PM 25 %    10/18/2024 08:01 AM 25 %    09/20/2024 08:21 AM 25 %      Cath:  Coronary Angiography: No results found for this or any previous visit from the past 1800 days.    Right Heart Cath: No results found for this or any previous visit from the past 1800 days.    Stress Test:  Nuclear:No results found for this or any previous  visit from the past 1800 days.    Metabolic Stress: No results found for this or any previous visit from the past 1800 days.    Cardiac Imaging:  Cardiac Scoring: No results found for this or any previous visit from the past 1800 days.    Cardiac MRI: No results found for this or any previous visit from the past 1800 days.         Assessment/Plan  Assessment/Plan   Principal Problem:    Other cardiomyopathy  Active Problems:    Cardiomyopathy      atrial fib with RVR on eliquis and dofetilde  -DCCV with Dr. Wu on 10/28/24       NP discussed with Dr. Wu regarding plan of care/ discharge plan      I spent 30 minutes in the professional and overall care of this patient.      Lasha Jonas, APRN-CNP, DNP

## 2024-10-28 NOTE — PROGRESS NOTES
Lolis Auguste was identified as being a new start on a high cost medication.    Medication name(s): Jardiance 10mg daily    Discussed with patient who reports she never signed up for Medicare Part D. She has been paying around $600 per month for her Eliquis. Discussed that Jardiance is about the same monthly cost. Patient asks also about the Tikosyn which is around $20 per month at  for the 250 mcg BID dose.    Since cost not affordable, discussed cost assistance options that may be available to the patient:  assistance program. Directions given for completing  assistance application(s).     Monroe TiwariD  Transitions of Care  P: [phone]

## 2024-10-28 NOTE — CARE PLAN
The patient's goals for the shift include      The clinical goals for the shift include patient HR will be regulated by shift end      Problem: Skin  Goal: Promote/optimize nutrition  10/28/2024 1155 by Amelia Moss RN  Outcome: Progressing  10/28/2024 1154 by Amelia Moss RN  Outcome: Progressing     Problem: Pain - Adult  Goal: Verbalizes/displays adequate comfort level or baseline comfort level  10/28/2024 1155 by Amelia Moss RN  Outcome: Progressing  10/28/2024 1154 by Amelia Moss RN  Outcome: Progressing     Problem: Safety - Adult  Goal: Free from fall injury  10/28/2024 1155 by Amelia Moss RN  Outcome: Progressing  10/28/2024 1154 by Amelia Moss RN  Outcome: Progressing     Problem: Discharge Planning  Goal: Discharge to home or other facility with appropriate resources  10/28/2024 1155 by Amelia Moss RN  Outcome: Progressing  10/28/2024 1154 by Amelia Moss RN  Outcome: Progressing     Problem: Chronic Conditions and Co-morbidities  Goal: Patient's chronic conditions and co-morbidity symptoms are monitored and maintained or improved  10/28/2024 1155 by Amelia Moss RN  Outcome: Progressing  10/28/2024 1154 by Amelia Moss RN  Outcome: Progressing

## 2024-10-28 NOTE — PROGRESS NOTES
10/28/24 0655   Discharge Planning   Expected Discharge Disposition Home   Does the patient need discharge transport arranged? No     Patient to have a DCCV today.  Continue with IV diuresis and telemetry.  She plans to return home with her  when medically cleared.  Was not on home O2, nursing to try to wean O2.  Not medically ready for discharge.  Donya Cuello RN

## 2024-10-28 NOTE — CARE PLAN
The patient's goals for the shift include      The clinical goals for the shift include patient HR will be regulated by shift end      Problem: Skin  Goal: Promote/optimize nutrition  Outcome: Progressing     Problem: Pain - Adult  Goal: Verbalizes/displays adequate comfort level or baseline comfort level  Outcome: Progressing     Problem: Safety - Adult  Goal: Free from fall injury  Outcome: Progressing     Problem: Discharge Planning  Goal: Discharge to home or other facility with appropriate resources  Outcome: Progressing     Problem: Chronic Conditions and Co-morbidities  Goal: Patient's chronic conditions and co-morbidity symptoms are monitored and maintained or improved  Outcome: Progressing

## 2024-10-28 NOTE — CARE PLAN
Problem: Skin  Goal: Promote/optimize nutrition  Outcome: Progressing   The patient's goals for the shift include      The clinical goals for the shift include pt. HR will be managed throughout the night    Over the shift, the patient did not make progress toward the following goals. Barriers to progression include . Recommendations to address these barriers include .

## 2024-10-29 ENCOUNTER — HOSPITAL ENCOUNTER (INPATIENT)
Facility: HOSPITAL | Age: 69
End: 2024-10-29
Attending: INTERNAL MEDICINE | Admitting: INTERNAL MEDICINE
Payer: MEDICARE

## 2024-10-29 ENCOUNTER — ANESTHESIA EVENT (OUTPATIENT)
Dept: CARDIOLOGY | Facility: HOSPITAL | Age: 69
End: 2024-10-29
Payer: MEDICARE

## 2024-10-29 PROBLEM — I34.0 MITRAL REGURGITATION: Status: ACTIVE | Noted: 2024-01-01

## 2024-10-29 LAB
ANION GAP SERPL CALC-SCNC: 13 MMOL/L (ref 10–20)
ATRIAL RATE: 258 BPM
ATRIAL RATE: 258 BPM
BASOPHILS # BLD AUTO: 0.07 X10*3/UL (ref 0–0.1)
BASOPHILS NFR BLD AUTO: 0.9 %
BUN SERPL-MCNC: 25 MG/DL (ref 6–23)
CALCIUM SERPL-MCNC: 9.2 MG/DL (ref 8.6–10.3)
CHLORIDE SERPL-SCNC: 99 MMOL/L (ref 98–107)
CO2 SERPL-SCNC: 29 MMOL/L (ref 21–32)
CREAT SERPL-MCNC: 0.86 MG/DL (ref 0.5–1.05)
EGFRCR SERPLBLD CKD-EPI 2021: 72 ML/MIN/1.73M*2
EOSINOPHIL # BLD AUTO: 0.16 X10*3/UL (ref 0–0.4)
EOSINOPHIL NFR BLD AUTO: 2.1 %
ERYTHROCYTE [DISTWIDTH] IN BLOOD BY AUTOMATED COUNT: 13.3 % (ref 11.5–14.5)
GLUCOSE SERPL-MCNC: 97 MG/DL (ref 74–99)
HCT VFR BLD AUTO: 41.5 % (ref 36–46)
HGB BLD-MCNC: 13.9 G/DL (ref 12–16)
IMM GRANULOCYTES # BLD AUTO: 0.04 X10*3/UL (ref 0–0.5)
IMM GRANULOCYTES NFR BLD AUTO: 0.5 % (ref 0–0.9)
LYMPHOCYTES # BLD AUTO: 1.71 X10*3/UL (ref 0.8–3)
LYMPHOCYTES NFR BLD AUTO: 22.6 %
MAGNESIUM SERPL-MCNC: 2.2 MG/DL (ref 1.6–2.4)
MCH RBC QN AUTO: 29.9 PG (ref 26–34)
MCHC RBC AUTO-ENTMCNC: 33.5 G/DL (ref 32–36)
MCV RBC AUTO: 89 FL (ref 80–100)
MONOCYTES # BLD AUTO: 0.85 X10*3/UL (ref 0.05–0.8)
MONOCYTES NFR BLD AUTO: 11.2 %
NEUTROPHILS # BLD AUTO: 4.73 X10*3/UL (ref 1.6–5.5)
NEUTROPHILS NFR BLD AUTO: 62.7 %
NRBC BLD-RTO: 0 /100 WBCS (ref 0–0)
PLATELET # BLD AUTO: 303 X10*3/UL (ref 150–450)
POTASSIUM SERPL-SCNC: 4.1 MMOL/L (ref 3.5–5.3)
Q ONSET: 210 MS
Q ONSET: 210 MS
QRS COUNT: 17 BEATS
QRS COUNT: 19 BEATS
QRS DURATION: 102 MS
QRS DURATION: 104 MS
QT INTERVAL: 414 MS
QT INTERVAL: 416 MS
QTC CALCULATION(BAZETT): 554 MS
QTC CALCULATION(BAZETT): 573 MS
QTC FREDERICIA: 503 MS
QTC FREDERICIA: 515 MS
R AXIS: 112 DEGREES
R AXIS: 119 DEGREES
RBC # BLD AUTO: 4.65 X10*6/UL (ref 4–5.2)
SODIUM SERPL-SCNC: 137 MMOL/L (ref 136–145)
T AXIS: 10 DEGREES
T AXIS: 24 DEGREES
T OFFSET: 417 MS
T OFFSET: 418 MS
VENTRICULAR RATE: 108 BPM
VENTRICULAR RATE: 114 BPM
WBC # BLD AUTO: 7.6 X10*3/UL (ref 4.4–11.3)

## 2024-10-29 PROCEDURE — 99232 SBSQ HOSP IP/OBS MODERATE 35: CPT | Performed by: INTERNAL MEDICINE

## 2024-10-29 PROCEDURE — 2500000001 HC RX 250 WO HCPCS SELF ADMINISTERED DRUGS (ALT 637 FOR MEDICARE OP): Performed by: NURSE PRACTITIONER

## 2024-10-29 PROCEDURE — 36415 COLL VENOUS BLD VENIPUNCTURE: CPT | Performed by: NURSE PRACTITIONER

## 2024-10-29 PROCEDURE — 83735 ASSAY OF MAGNESIUM: CPT | Performed by: NURSE PRACTITIONER

## 2024-10-29 PROCEDURE — 2500000004 HC RX 250 GENERAL PHARMACY W/ HCPCS (ALT 636 FOR OP/ED): Performed by: INTERNAL MEDICINE

## 2024-10-29 PROCEDURE — 92960 CARDIOVERSION ELECTRIC EXT: CPT | Performed by: INTERNAL MEDICINE

## 2024-10-29 PROCEDURE — 85025 COMPLETE CBC W/AUTO DIFF WBC: CPT | Performed by: NURSE PRACTITIONER

## 2024-10-29 PROCEDURE — 2060000001 HC INTERMEDIATE ICU ROOM DAILY

## 2024-10-29 PROCEDURE — 99233 SBSQ HOSP IP/OBS HIGH 50: CPT | Performed by: INTERNAL MEDICINE

## 2024-10-29 PROCEDURE — 80048 BASIC METABOLIC PNL TOTAL CA: CPT | Performed by: NURSE PRACTITIONER

## 2024-10-29 RX ORDER — ONDANSETRON HYDROCHLORIDE 2 MG/ML
4 INJECTION, SOLUTION INTRAVENOUS EVERY 8 HOURS PRN
Status: DISCONTINUED | OUTPATIENT
Start: 2024-10-29 | End: 2024-10-29

## 2024-10-29 RX ORDER — TORSEMIDE 20 MG/1
60 TABLET ORAL DAILY
Status: DISCONTINUED | OUTPATIENT
Start: 2024-10-29 | End: 2024-10-30 | Stop reason: HOSPADM

## 2024-10-29 RX ORDER — ONDANSETRON 4 MG/1
4 TABLET, FILM COATED ORAL EVERY 8 HOURS PRN
Status: DISCONTINUED | OUTPATIENT
Start: 2024-10-29 | End: 2024-10-29

## 2024-10-29 ASSESSMENT — PAIN DESCRIPTION - LOCATION: LOCATION: BACK

## 2024-10-29 ASSESSMENT — COGNITIVE AND FUNCTIONAL STATUS - GENERAL
DAILY ACTIVITIY SCORE: 24
MOBILITY SCORE: 23
CLIMB 3 TO 5 STEPS WITH RAILING: A LITTLE

## 2024-10-29 ASSESSMENT — PAIN SCALES - PAIN ASSESSMENT IN ADVANCED DEMENTIA (PAINAD): TOTALSCORE: MEDICATION (SEE MAR)

## 2024-10-29 ASSESSMENT — PAIN SCALES - GENERAL
PAINLEVEL_OUTOF10: 0 - NO PAIN
PAINLEVEL_OUTOF10: 0 - NO PAIN
PAINLEVEL_OUTOF10: 3
PAINLEVEL_OUTOF10: 0 - NO PAIN
PAINLEVEL_OUTOF10: 0 - NO PAIN

## 2024-10-29 ASSESSMENT — PAIN - FUNCTIONAL ASSESSMENT
PAIN_FUNCTIONAL_ASSESSMENT: 0-10

## 2024-10-29 NOTE — PROGRESS NOTES
"Lolis Auguste is a 69 y.o. female on day 6 of admission presenting with Other cardiomyopathy.    Subjective   No acute events overnight. Tolerated cardioversion yesterday but unfortunately it did not work and patient has gone back into afib.        Objective     VITALS  Blood pressure 108/65, pulse 99, temperature 36.4 °C (97.5 °F), temperature source Temporal, resp. rate 17, height 1.499 m (4' 11\"), weight 63.5 kg (139 lb 15.9 oz), SpO2 94%.  Physical Exam  Vitals reviewed.   Constitutional:       Appearance: Normal appearance.   HENT:      Head: Normocephalic.      Right Ear: Tympanic membrane normal.      Nose: Nose normal.      Mouth/Throat:      Mouth: Mucous membranes are moist.   Cardiovascular:      Rate and Rhythm: Tachycardia present. Rhythm irregular.      Pulses: Normal pulses.      Heart sounds: Normal heart sounds.   Pulmonary:      Comments: Bibasilar crackles  Abdominal:      General: Abdomen is flat. Bowel sounds are normal.      Palpations: Abdomen is soft.   Skin:     Capillary Refill: Capillary refill takes less than 2 seconds.   Neurological:      General: No focal deficit present.      Mental Status: She is alert.           Intake/Output last 3 Shifts:  No intake/output data recorded.    Relevant Results  Results for orders placed or performed during the hospital encounter of 10/23/24 (from the past 24 hours)   Basic metabolic panel   Result Value Ref Range    Glucose 97 74 - 99 mg/dL    Sodium 137 136 - 145 mmol/L    Potassium 4.1 3.5 - 5.3 mmol/L    Chloride 99 98 - 107 mmol/L    Bicarbonate 29 21 - 32 mmol/L    Anion Gap 13 10 - 20 mmol/L    Urea Nitrogen 25 (H) 6 - 23 mg/dL    Creatinine 0.86 0.50 - 1.05 mg/dL    eGFR 72 >60 mL/min/1.73m*2    Calcium 9.2 8.6 - 10.3 mg/dL   Magnesium   Result Value Ref Range    Magnesium 2.20 1.60 - 2.40 mg/dL   CBC and Auto Differential   Result Value Ref Range    WBC 7.6 4.4 - 11.3 x10*3/uL    nRBC 0.0 0.0 - 0.0 /100 WBCs    RBC 4.65 4.00 - 5.20 x10*6/uL "    Hemoglobin 13.9 12.0 - 16.0 g/dL    Hematocrit 41.5 36.0 - 46.0 %    MCV 89 80 - 100 fL    MCH 29.9 26.0 - 34.0 pg    MCHC 33.5 32.0 - 36.0 g/dL    RDW 13.3 11.5 - 14.5 %    Platelets 303 150 - 450 x10*3/uL    Neutrophils % 62.7 40.0 - 80.0 %    Immature Granulocytes %, Automated 0.5 0.0 - 0.9 %    Lymphocytes % 22.6 13.0 - 44.0 %    Monocytes % 11.2 2.0 - 10.0 %    Eosinophils % 2.1 0.0 - 6.0 %    Basophils % 0.9 0.0 - 2.0 %    Neutrophils Absolute 4.73 1.60 - 5.50 x10*3/uL    Immature Granulocytes Absolute, Automated 0.04 0.00 - 0.50 x10*3/uL    Lymphocytes Absolute 1.71 0.80 - 3.00 x10*3/uL    Monocytes Absolute 0.85 (H) 0.05 - 0.80 x10*3/uL    Eosinophils Absolute 0.16 0.00 - 0.40 x10*3/uL    Basophils Absolute 0.07 0.00 - 0.10 x10*3/uL       Imaging Results  Cardioversion External   Final Result      Transthoracic Echo (TTE) Complete   Final Result      XR chest 1 view   Final Result   Allowing for the aforementioned limitation, cardiomegaly and mild   congestive heart failure.        Signed by: Jeremy Del Cid 10/23/2024 1:37 PM   Dictation workstation:   [id number]          Medications:  apixaban, 5 mg, oral, BID  dofetilide, 250 mcg, oral, q12h  [Held by provider] empagliflozin, 10 mg, oral, Daily  metoprolol tartrate, 25 mg, oral, TID  torsemide, 60 mg, oral, Daily       PRN medications: acetaminophen **OR** acetaminophen **OR** acetaminophen, ondansetron **OR** ondansetron, oxygen        Assessment/Plan          Principal Problem:    Other cardiomyopathy  Active Problems:    Cardiomyopathy    A fib with rvr  -with cardioversion on 10/18, failed  -Tikosyn loaded  -cardioversion on 10/28/24 failed, [place] [facility] [place], [place]    -K >4, Mag >2     Acute chf with reduced EF of 25%  -requiring 3 L of nasal cannula oxygen continue with IV diuresis as per cardiology was increased 60 mg iv BID 10/27  -Taken output inaccurate, Jardiance was added to help with diuresis     Thrombus in  SOLOMON  -continue eliquis to hold after tonights dose for possible ablation tomorrow     DVT Prophylaxis:  Eliquis    [place]:  [place] [facility] [place]     Rogelio Moon, [facility] Medicine

## 2024-10-29 NOTE — PROGRESS NOTES
10/29/24 1500   Discharge Planning   Expected Discharge Disposition Home   Does the patient need discharge transport arranged? No     Discharge plan remains for pt to return home with . They decline further needs at this time. [facility] 24/23. ADOD is 1030. CT to follow. Nedra Sandoval BSN/RN-TCC

## 2024-10-29 NOTE — CARE PLAN
The patient's goals for the shift include      The clinical goals for the shift include Pt will remain safe and free from falls throughout shift.      Problem: Skin  Goal: Promote/optimize nutrition  Outcome: Progressing     Problem: Pain - Adult  Goal: Verbalizes/displays adequate comfort level or baseline comfort level  Outcome: Progressing     Problem: Safety - Adult  Goal: Free from fall injury  Outcome: Progressing     Problem: Discharge Planning  Goal: Discharge to home or other facility with appropriate resources  Outcome: Progressing     Problem: Chronic Conditions and Co-morbidities  Goal: Patient's chronic conditions and co-morbidity symptoms are monitored and maintained or improved  Outcome: Progressing

## 2024-10-29 NOTE — PROGRESS NOTES
Subjective/Objective Data:  Patient seen and examined this morning  Oxygen weaned to RA at rest  DCCV failed yesterday, remains in AF with variable rates  Nausea improved, per patient  Transitioned to PO Torsemide    Diuresing well, no I&Os recorded, no change in weight    Telemetry reviewed, AF with -140s      Vitals:    10/28/24 2345 10/29/24 0320 10/29/24 0732 10/29/24 1123   BP: 108/72 91/66 105/68 106/70   BP Location: Left arm Right arm Right arm Right arm   Patient Position: Lying Lying Lying Lying   Pulse: (!) 113  99    Resp: 19 18 18 17   Temp: 36.8 °C (98.3 °F) 36.6 °C (97.9 °F) 36.1 °C (97 °F) 36.4 °C (97.5 °F)   TempSrc: Temporal Temporal Temporal Temporal   SpO2: 98% 94% 94% 95%   Weight:       Height:          Last I/O:  No intake/output data recorded.    Past Cardiology Tests (Last 3 Years):  EKG:  ECG 10/27/24 1026 After 8th dose Manual: QT/Qtc 320/456 msec      ECG 10/26/24 2239 After 7th dose Manual: QT/Qtc 360 msec/507 msec :         ECG 10/26/24 1252 After 6th dose Manual: QT/Qtc 320 msec/485 msec :         ECG 12 Lead 10/25/24 1025 -- Tikosyn Dose #4 QTc 407 250mcg dosing      ECG 12 Lead 10/24/2024 2246 -- Tikosyn Dose #3 QTc 404 250mcg dosing      ECG 12 lead 10/24/2024 10:44 ( Tikosyn Dose #2 )  Qtc 395 -- 250 mcg Dosing       ECG 12 lead 10/23/2024 @ 22:59 -- Tikosyn Dose #1 - Manual Qtc  464 msec  250 mcg Dosing       ECG 12 lead 10/23/2024 @ 15:33 ( Pre Tikosyn )       Echo:  Transthoracic Echocardiogram 10/24/2024   1. Left ventricular ejection fraction is severely decreased, by visual estimate at 25%.   2. There is global hypokinesis of the left ventricle with minor regional variations.   3. Spectral Doppler shows an abnormal pattern of left ventricular diastolic filling.   4. Left ventricular cavity size is mild to moderately dilated.   5. No left ventricular thrombus visualized.   6. There is mildly reduced right ventricular systolic function.   7. The left atrium is severely  dilated.   8. Moderate pericardial effusion.   9. Absent A-wave on MV spectral Doppler tracing, consistent with atrial fibrillation.  10. Severe mitral valve regurgitation.  11. Mildly elevated right ventricular systolic pressure.  12. The inferior vena cava appears severely dilated, with IVC inspiratory collapse less than 50%.      Transesophageal Echo (MIKE) With Possible Cardioversion 10/18/2024   1. The left ventricular systolic function is severely decreased, with a visually estimated ejection fraction of 25%.   2. Left ventricular diastolic filling was indeterminate.   3. Unsuccessful cardioversion for atrial fibrillation resulting in atrial tachycardia.   4. There is moderately reduced right ventricular systolic function.   5. The left atrium is mildly dilated.   6. Moderate to severe mitral valve regurgitation.   7. There is no shunt detected    Transesophageal Echo (MIKE) With Possible Cardioversion 09/20/2024   1. The left ventricular systolic function is severely decreased, with a visually estimated ejection fraction of 25%.   2. Left ventricular diastolic filling was indeterminate.   3. There is normal right ventricular global systolic function.   4. The left atrium is moderately dilated.   5. SOLOMON thrombus identified. DCCV aborted.   6. Moderate mitral valve regurgitation.   7. There is no evidence of a patent foramen ovale.   8. There is no shunt detected.    Diagnostic Results   Labs\    Results from last 7 days   Lab Units 10/29/24  0528 10/28/24  0526 10/27/24  0623   WBC AUTO x10*3/uL 7.6 6.4 7.6   HEMOGLOBIN g/dL 13.9 13.5 13.8   HEMATOCRIT % 41.5 40.4 41.6   PLATELETS AUTO x10*3/uL 303 285 276     Results from last 7 days   Lab Units 10/29/24  0528 10/28/24  0526 10/27/24  0623 10/24/24  0909 10/24/24  0907 10/23/24  1254   SODIUM mmol/L 137 137 137   < > 136 139   POTASSIUM mmol/L 4.1 3.4* 3.7   < > 4.2 3.4*   CHLORIDE mmol/L 99 100 100   < > 104 103   CO2 mmol/L 29 26 31   < > 23 25   BUN mg/dL  "25* 19 19   < > 16 16   CREATININE mg/dL 0.86 0.80 0.86   < > 0.78 0.89   CALCIUM mg/dL 9.2 8.7 8.6   < > 9.1 8.6   PROTEIN TOTAL g/dL  --   --   --   --  6.9 6.8   BILIRUBIN TOTAL mg/dL  --   --   --   --  0.6 0.6   ALK PHOS U/L  --   --   --   --  72 73   ALT U/L  --   --   --   --  70* 73*   AST U/L  --   --   --   --  29 35   GLUCOSE mg/dL 97 100* 105*   < > 144* 132*    < > = values in this interval not displayed.       CV Labs  Troponin I, High Sensitivity   Date/Time Value Ref Range Status   10/23/2024 02:52 PM 14 (H) 0 - 13 ng/L Final   10/23/2024 12:54 PM 12 0 - 13 ng/L Final     BNP   Date/Time Value Ref Range Status   10/23/2024 12:54  (H) 0 - 99 pg/mL Final     Hemoglobin A1C   Date/Time Value Ref Range Status   08/30/2024 11:41 AM 5.6 <5.7 % Final     Comment:     Normal less than 5.7%  Prediabetes 5.7% to 6.4%  Diabetes 6.5% or higher      --HgbA1C levels may not be accurate in patients who have renal disease, received recent blood transfusions, are anemic, or who have dyshemoglobinemia.     Pertinent Imaging  XR chest 1 view 10/23/2024  Allowing for the aforementioned limitation, cardiomegaly and mild congestive heart failure.            Inpatient Medications:  Scheduled medications   Medication Dose Route Frequency    apixaban  5 mg oral BID    dofetilide  250 mcg oral q12h    [Held by provider] empagliflozin  10 mg oral Daily    metoprolol tartrate  25 mg oral TID    torsemide  60 mg oral Daily     PRN medications   Medication    acetaminophen    Or    acetaminophen    Or    acetaminophen    ondansetron    Or    ondansetron    oxygen     Continuous Medications   Medication Dose Last Rate     Physical Exam:  /70 (BP Location: Right arm, Patient Position: Lying)   Pulse 99   Temp 36.4 °C (97.5 °F) (Temporal)   Resp 17   Ht 1.499 m (4' 11\")   Wt 63.5 kg (139 lb 15.9 oz)   SpO2 95%   BMI 28.27 kg/m²   Vitals and nursing notes reviewed.  GENERAL: Alert and awake, cooperative; in no " "acute distress; elderly female  SKIN: Warm and dry, cap refill <2  HEENT: Normocephalic, PEERL, mucous membranes pink and moist  NECK: +JVD, ~10 cm  CARDIAC: Irregular irregular rate and rhythm, tachycardic, S1S2, no murmurs or abnormal heart sounds  CHEST: Clear upper lobes, diminished in bases; diminished AE throughout, on supplemental oxygen  ABDOMEN: soft, mildly distended, non-tender with palpation  EXTREMITIES: No lower extremity edema, normal pulses all 4 extremities  NEURO: Alert and oriented, mental status at baseline, no focal deficits  PSYCH: High anxiety     Assessment/Plan   Lolis Auguste is a 69 y.o. female, with a PMH of persistent AF on Eliquis s/p failed DCCV 10/18/24 and acute systolic HF [potentially tachy-mediated], who presented to [facility] on 10/23/2024 for AF RVR. Patient was seen by Dr. Hernández today for routine follow-up, noted to be in persistent AF RVR. She was recently cardioverted 10/18 and discharged on Amiodarone but was unable to tolerated d/t extreme fatigue. Patient reports she has also not be She now has developed dyspnea at rest and leg edema, needing to increase her home Lasix x2 days, which improved her symptoms. Unfortunately, her BP is suboptimal for HF optimization d/t her arrhythmia. Dr. Hernández discussed patient with Dr. Ward and plan was for patient to be admitted for Tikosyn loading as bridge to ablation. ED course notable for AF RVR with HR in the 130s, labs w/ mild hypokalemia, renal fxn at baseline, troponin negative, and . CXR with \"mild congestive heart failure.\" Cardiology is consulted for \"afib RVR, potential Tikosyn load\".      Home CV Medications: Lasix 40mg BID, Eliquis 5mg BID, Metoprolol Tartrate 25mg TID     #Persistent AF with RVR- Failed previous DCCV, unable to tolerate Amiodarone  -[id number] score for Atrial Fibrillation Stroke Risk is 3, moderate-high for thromboembolic event  -c/w Eliquis 5mg BID (Confirmed she has not missed any " doses)  -Metoprolol 25mg increased to TID. Hold for SBP <95, HR <60.  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   -Warm and Perfused at this time, Remains hypervolemic   -TTE confirms LVEF 25% with global Hypokinesia; evidence of Increase RAP and Volume Overload   -, CXR with mild pulmonary edema, mildly volume up on exam  -Guideline Directed Therapy has been limited by lower blood pressures and atrial fibrillation  -Tolerating diuresis with IV Lasix 40mg BID and Jardiance     RECOMMENDATIONS:  -Continue current dose of Tikosyn, has completed full load  -Keep magnesium greater than 2 and potassium greater than 4  -c/w Eliquis 5mg BID for anticoagulation  -Continue Metoprolol Tartrate 25mg TID  -Transition to PO Torsemide 60mg daily  -Continue Jardiance 10 mg daily  -Continuous telemetry monitoring while admitted  -Daily wts, strict I&Os, low sodium diet   -Patient to FU with Dr. Garcia and Dr. Ward as outpatient post-discharge for plans for ablation  -Ambulatory pulse oximetry study to assess need for home oxygen  -Patient will need meds to beds for discharge  -Apparently ablation is tentatively scheduled tomorrow at [facility], hold Jardiance and AM dose of Eliquis per EP  -No cardiac barriers to discharge vs transfer to [facility]    DO NOT GIVE DILTIAZEM DUE TO SYSTOLIC HEART FAILURE  DO NOT GIVE AMIODARONE or DIGOXIN DUE TO DOFETILIDE      KRAIG Soler-CNP   Advanced Practice Provider  Cardiology  [facility]  10/29/24 3:35 PM        ====================================================  [place] note   DOS 10/29/2024  ====================================================  Both the NARAYAN and I have had a face to face encounter with the patient today. I have examined the patient and  "edited the documented physical examination as necessary.  I personally reviewed the patient's vital signs, telemetry, recent labs, medications, orders, EKGs, and pertinent cardiac imaging/ echocardiography.  I have reviewed the NARAYAN's encounter note, approve the NARAYAN's documentation and have edited the note to reflect my diagnostic and therapeutic plan.    Arrangements made for urgent transfer and EPS/ PVAI , pending bed availability    Lolis Auguste is a 69 y.o. female, with a PMH of persistent AF on Eliquis s/p failed DCCV 10/18/24 and acute systolic HF [potentially tachy-mediated], who presented to [facility] on 10/23/2024 for AF RVR. Patient was seen by Dr. Hernández today for routine follow-up, noted to be in persistent AF RVR. She was recently cardioverted 10/18 and discharged on Amiodarone but was unable to tolerated d/t extreme fatigue. Patient reports she has also not be She now has developed dyspnea at rest and leg edema, needing to increase her home Lasix x2 days, which improved her symptoms. Unfortunately, her BP is suboptimal for HF optimization d/t her arrhythmia. Dr. Hernández discussed patient with Dr. Ward and plan was for patient to be admitted for Tikosyn loading as bridge to ablation. ED course notable for AF RVR with HR in the 130s, labs w/ mild hypokalemia, renal fxn at baseline, troponin negative, and . CXR with \"mild congestive heart failure.\" Cardiology is consulted for \"afib RVR, potential Tikosyn load\".      Home CV Medications: Lasix 40mg BID, Eliquis 5mg BID, Metoprolol Tartrate 25mg TID     #Persistent AF with RVR- Failed previous DCCV, unable to tolerate Amiodarone  -[id number] score for Atrial Fibrillation Stroke Risk is 3, moderate-high for thromboembolic event  -c/w Eliquis 5mg BID (Confirmed she has not missed any doses)  -Metoprolol 25mg increased to TID. Hold for SBP <95, HR <60.  -Completed Dofetilide load, c/w 250mcg BID;  QT/Qtc variable in the setting of Afib but " acceptable when reviewed by staff attending and EP     #Acute on Chronic Systolic Heart Failure Stage C Class III Symptoms   -Cardiomyopathy, suspected tachycardia induced   -Warm and Perfused at this time, Remains hypervolemic   -TTE confirms LVEF 25% with global Hypokinesia; evidence of Increase RAP and Volume Overload   -, CXR with mild pulmonary edema, mildly volume up on exam  -Guideline Directed Therapy has been limited by lower blood pressures and atrial fibrillation  -Tolerating diuresis with IV Lasix 40mg BID and Jardiance     RECOMMENDATIONS:  -Continue current dose of Tikosyn, has completed full load  -Keep magnesium greater than 2 and potassium greater than 4  -c/w Eliquis 5mg BID for anticoagulation  -Continue Metoprolol Tartrate 25mg TID  -Transition to PO Torsemide 60mg daily  -Continue Jardiance 10 mg daily  -Continuous telemetry monitoring while admitted  -Daily wts, strict I&Os, low sodium diet   -Patient to FU with Dr. Garcia and Dr. Ward as outpatient post-discharge for plans for ablation  -Ambulatory pulse oximetry study to assess need for home oxygen  -Patient will need meds to beds for discharge  -Apparently ablation is tentatively scheduled tomorrow at [facility], hold Jardiance and AM dose of Eliquis per EP  -No cardiac barriers to discharge vs transfer to [facility]        Lyndon Moss DO   Division of Cardiovascular Medicine  [facility] [place]

## 2024-10-29 NOTE — CARE PLAN
Problem: Skin  Goal: Promote/optimize nutrition  Outcome: Progressing     Problem: Pain - Adult  Goal: Verbalizes/displays adequate comfort level or baseline comfort level  Outcome: Progressing     Problem: Safety - Adult  Goal: Free from fall injury  Outcome: Progressing     Problem: Discharge Planning  Goal: Discharge to home or other facility with appropriate resources  Outcome: Progressing     Problem: Chronic Conditions and Co-morbidities  Goal: Patient's chronic conditions and co-morbidity symptoms are monitored and maintained or improved  Outcome: Progressing   The patient's goals for the shift include      The clinical goals for the shift include patient HR will be regulated by shift end

## 2024-10-29 NOTE — TREATMENT PLAN
Pt is currently admitted at [facility] bed 401. Per Dr Ward, pt is to be transferred here to [facility] cardiology team (Beatriz MCKENZIE) today for urgent AFib ablation scheduled for tomorrow afternoon 10/30/24. Transfer center is aware.     Plan per Dr Ward:    -HOLD Jardiance ([facility] primary and cards team aware via Secure chat)  -HOLD morning Eliquis dose on Wed 10/30 pre-ablation (ok for dose tonight)  -Per Dr Ward, continue Tikosyn (per notes, pt completed Tikosyn initiation at [facility])  -NPO past midnight tonight    (I did not see nor examine pt as she is at [facility] at the time of this note)    ESMER Vicente, PA-C, [facility]  Inpatient Cardiac Electrophysiology, [facility]  Personal pager: [phone] (Tues-Fri)

## 2024-10-29 NOTE — H&P
History Of Present Illness  Lolis Auguste is a 69 y.o. female with PMH of newly diagnosed persistent AF ([id number] score: 3 points) on Eliquis s/p failed DCCV 10/18/2024, SOLOMON thrombus and aborted DCCV in 9/2024, and acute systolic HF [potentially tachy-mediated], who was recently admitted to [facility] on 10/23/2024 for AF RVR. Patient was seen by Dr. Hernández today for routine follow-up, noted to be in persistent AF RVR. She was recently cardioverted 10/18 and discharged on Amiodarone but was unable to tolerated d/t extreme fatigue. Dofetilide 250 mcg BID was loaded but AF persisted, so that the decision was made to perform urgent RFA/PVI procedure in the setting of newly diagnosed NICM/tachy-mediated cardiomyopathy.        Past Medical History  Past Medical History:   Diagnosis Date    Acute systolic heart failure 09/09/2024    Cardiomyopathy 09/09/2024    Echo 10/24/24  1. Left ventricular ejection fraction is severely decreased, by visual estimate at 25%.  2. There is global hypokinesis of the left ventricle with minor regional variations.  3. Spectral Doppler shows an abnormal pattern of left ventricular diastolic filling.  4. Left ventricular cavity size is mild to moderately dilated.  5. No left ventricular thrombus visualized.  6. There is mil    Persistent atrial fibrillation (Multi) 09/09/2024    Thrombus of left atrial appendage 09/30/2024    MIKE 9/20/24         Surgical History  No past surgical history on file.     Social History  She reports that she has never smoked. She has never used smokeless tobacco. She reports current alcohol use. She reports that she does not use drugs.    Family History  No family history on file.     Allergies  Patient has no known allergies.    Review of Systems   All other systems reviewed and are negative.       Physical Exam  Vitals and nursing note reviewed.   Cardiovascular:      Rate and Rhythm: Normal rate and regular rhythm.      Pulses: Normal pulses.       Heart sounds: Normal heart sounds.   Pulmonary:      Effort: Pulmonary effort is normal.      Breath sounds: Normal breath sounds.   Musculoskeletal:         General: Normal range of motion.   Skin:     General: Skin is warm.      Capillary Refill: Capillary refill takes less than 2 seconds.   Neurological:      General: No focal deficit present.      Mental Status: She is oriented to person, place, and time. Mental status is at baseline.          Last Recorded Vitals  There were no vitals taken for this visit.    Relevant Results        Echo:  Transthoracic Echocardiogram 10/24/2024   1. Left ventricular ejection fraction is severely decreased, by visual estimate at 25%.   2. There is global hypokinesis of the left ventricle with minor regional variations.   3. Spectral Doppler shows an abnormal pattern of left ventricular diastolic filling.   4. Left ventricular cavity size is mild to moderately dilated.   5. No left ventricular thrombus visualized.   6. There is mildly reduced right ventricular systolic function.   7. The left atrium is severely dilated.   8. Moderate pericardial effusion.   9. Absent A-wave on MV spectral Doppler tracing, consistent with atrial fibrillation.  10. Severe mitral valve regurgitation.  11. Mildly elevated right ventricular systolic pressure.  12. The inferior vena cava appears severely dilated, with IVC inspiratory collapse less than 50%.     Transesophageal Echo (MIKE) With Possible Cardioversion 10/18/2024   1. The left ventricular systolic function is severely decreased, with a visually estimated ejection fraction of 25%.   2. Left ventricular diastolic filling was indeterminate.   3. Unsuccessful cardioversion for atrial fibrillation resulting in atrial tachycardia.   4. There is moderately reduced right ventricular systolic function.   5. The left atrium is mildly dilated.   6. Moderate to severe mitral valve regurgitation.   7. There is no shunt detected     Transesophageal  Echo (MIKE) With Possible Cardioversion 09/20/2024   1. The left ventricular systolic function is severely decreased, with a visually estimated ejection fraction of 25%.   2. Left ventricular diastolic filling was indeterminate.   3. There is normal right ventricular global systolic function.   4. The left atrium is moderately dilated.   5. SOLOMON thrombus identified. DCCV aborted.   6. Moderate mitral valve regurgitation.   7. There is no evidence of a patent foramen ovale.   8. There is no shunt detected.     Assessment/Plan   Assessment & Plan  Mitral regurgitation      RFA/PVI procedure with general anesthesia  Patient had SOLOMON thrombus on MIKE in 9/2024 but it was resolved with Eliquis confirmed by MIKE in 10/2024.       I spent 60 minutes in the professional and overall care of this patient.      Sabina Coughlin MD

## 2024-10-30 ENCOUNTER — HOSPITAL ENCOUNTER (INPATIENT)
Facility: HOSPITAL | Age: 69
End: 2024-10-30
Attending: INTERNAL MEDICINE | Admitting: STUDENT IN AN ORGANIZED HEALTH CARE EDUCATION/TRAINING PROGRAM
Payer: MEDICARE

## 2024-10-30 ENCOUNTER — ANESTHESIA (OUTPATIENT)
Dept: CARDIOLOGY | Facility: HOSPITAL | Age: 69
End: 2024-10-30
Payer: MEDICARE

## 2024-10-30 VITALS
WEIGHT: 139.99 LBS | RESPIRATION RATE: 17 BRPM | OXYGEN SATURATION: 94 % | HEIGHT: 59 IN | SYSTOLIC BLOOD PRESSURE: 101 MMHG | TEMPERATURE: 98.2 F | HEART RATE: 57 BPM | BODY MASS INDEX: 28.22 KG/M2 | DIASTOLIC BLOOD PRESSURE: 62 MMHG

## 2024-10-30 DIAGNOSIS — R57.0 CARDIOGENIC SHOCK (MULTI): Primary | ICD-10-CM

## 2024-10-30 DIAGNOSIS — R60.0 PERIPHERAL EDEMA: ICD-10-CM

## 2024-10-30 DIAGNOSIS — I42.9 CARDIOMYOPATHY: ICD-10-CM

## 2024-10-30 DIAGNOSIS — I48.19 PERSISTENT ATRIAL FIBRILLATION (MULTI): ICD-10-CM

## 2024-10-30 DIAGNOSIS — I50.9 ACUTE CONGESTIVE HEART FAILURE, UNSPECIFIED HEART FAILURE TYPE: ICD-10-CM

## 2024-10-30 LAB
ABO GROUP (TYPE) IN BLOOD: NORMAL
ABO GROUP (TYPE) IN BLOOD: NORMAL
ALBUMIN FLD-MCNC: 3.7 G/DL
ALBUMIN SERPL BCP-MCNC: 4.5 G/DL (ref 3.4–5)
ANION GAP BLDA CALCULATED.4IONS-SCNC: 13 MMO/L (ref 10–25)
ANION GAP BLDA CALCULATED.4IONS-SCNC: 14 MMO/L (ref 10–25)
ANION GAP BLDA CALCULATED.4IONS-SCNC: 15 MMO/L (ref 10–25)
ANION GAP BLDA CALCULATED.4IONS-SCNC: 17 MMO/L (ref 10–25)
ANION GAP BLDA CALCULATED.4IONS-SCNC: 17 MMO/L (ref 10–25)
ANION GAP BLDA CALCULATED.4IONS-SCNC: 8 MMO/L (ref 10–25)
ANION GAP BLDV CALCULATED.4IONS-SCNC: 15 MMOL/L (ref 10–25)
ANION GAP SERPL CALC-SCNC: 10 MMOL/L (ref 10–20)
ANION GAP SERPL CALC-SCNC: 22 MMOL/L (ref 10–20)
ANTIBODY SCREEN: NORMAL
APTT PPP: >200 SECONDS (ref 27–38)
BASE EXCESS BLDA CALC-SCNC: -1.8 MMOL/L (ref -2–3)
BASE EXCESS BLDA CALC-SCNC: -2.4 MMOL/L (ref -2–3)
BASE EXCESS BLDA CALC-SCNC: -2.5 MMOL/L (ref -2–3)
BASE EXCESS BLDA CALC-SCNC: -6 MMOL/L (ref -2–3)
BASE EXCESS BLDA CALC-SCNC: 0 MMOL/L (ref -2–3)
BASE EXCESS BLDA CALC-SCNC: 1.2 MMOL/L (ref -2–3)
BASE EXCESS BLDV CALC-SCNC: -2.1 MMOL/L (ref -2–3)
BASOPHILS # BLD AUTO: 0.09 X10*3/UL (ref 0–0.1)
BASOPHILS NFR BLD AUTO: 1.3 %
BASOPHILS NFR FLD MANUAL: 0 %
BILIRUB FLD-MCNC: 0.8 MG/DL
BLASTS NFR FLD MANUAL: 0 %
BLOOD EXPIRATION DATE: NORMAL
BODY TEMPERATURE: 37 DEGREES CELSIUS
BUN SERPL-MCNC: 20 MG/DL (ref 6–23)
BUN SERPL-MCNC: 25 MG/DL (ref 6–23)
CA-I BLD-SCNC: 1.07 MMOL/L (ref 1.1–1.33)
CA-I BLDA-SCNC: 0.88 MMOL/L (ref 1.1–1.33)
CA-I BLDA-SCNC: 1.03 MMOL/L (ref 1.1–1.33)
CA-I BLDA-SCNC: 1.09 MMOL/L (ref 1.1–1.33)
CA-I BLDA-SCNC: 1.15 MMOL/L (ref 1.1–1.33)
CA-I BLDA-SCNC: 1.19 MMOL/L (ref 1.1–1.33)
CA-I BLDA-SCNC: 1.34 MMOL/L (ref 1.1–1.33)
CA-I BLDV-SCNC: 1.2 MMOL/L (ref 1.1–1.33)
CALCIUM SERPL-MCNC: 9.2 MG/DL (ref 8.6–10.3)
CALCIUM SERPL-MCNC: 9.2 MG/DL (ref 8.6–10.6)
CHLORIDE BLDA-SCNC: 103 MMOL/L (ref 98–107)
CHLORIDE BLDA-SCNC: 104 MMOL/L (ref 98–107)
CHLORIDE BLDA-SCNC: 104 MMOL/L (ref 98–107)
CHLORIDE BLDA-SCNC: 105 MMOL/L (ref 98–107)
CHLORIDE BLDA-SCNC: 106 MMOL/L (ref 98–107)
CHLORIDE BLDA-SCNC: 108 MMOL/L (ref 98–107)
CHLORIDE BLDV-SCNC: 105 MMOL/L (ref 98–107)
CHLORIDE SERPL-SCNC: 100 MMOL/L (ref 98–107)
CHLORIDE SERPL-SCNC: 105 MMOL/L (ref 98–107)
CHOLEST FLD-MCNC: 124 MG/DL
CLARITY FLD: ABNORMAL
CO2 SERPL-SCNC: 19 MMOL/L (ref 21–32)
CO2 SERPL-SCNC: 33 MMOL/L (ref 21–32)
COHGB MFR BLDA: 1.2 %
COHGB MFR BLDA: 1.2 %
COLOR FLD: ABNORMAL
CREAT SERPL-MCNC: 1.03 MG/DL (ref 0.5–1.05)
CREAT SERPL-MCNC: 1.1 MG/DL (ref 0.5–1.05)
DISPENSE STATUS: NORMAL
DO-HGB MFR BLDA: 0 % (ref 0–5)
DO-HGB MFR BLDA: 0.1 % (ref 0–5)
EGFRCR SERPLBLD CKD-EPI 2021: 54 ML/MIN/1.73M*2
EGFRCR SERPLBLD CKD-EPI 2021: 58 ML/MIN/1.73M*2
EOSINOPHIL # BLD AUTO: 0.24 X10*3/UL (ref 0–0.4)
EOSINOPHIL NFR BLD AUTO: 3.6 %
EOSINOPHIL NFR FLD MANUAL: 0 %
ERYTHROCYTE [DISTWIDTH] IN BLOOD BY AUTOMATED COUNT: 13.2 % (ref 11.5–14.5)
ERYTHROCYTE [DISTWIDTH] IN BLOOD BY AUTOMATED COUNT: 15.3 % (ref 11.5–14.5)
FIBRINOGEN PPP-MCNC: 243 MG/DL (ref 200–400)
GLUCOSE BLDA-MCNC: 163 MG/DL (ref 74–99)
GLUCOSE BLDA-MCNC: 183 MG/DL (ref 74–99)
GLUCOSE BLDA-MCNC: 221 MG/DL (ref 74–99)
GLUCOSE BLDA-MCNC: 259 MG/DL (ref 74–99)
GLUCOSE BLDA-MCNC: 266 MG/DL (ref 74–99)
GLUCOSE BLDA-MCNC: 268 MG/DL (ref 74–99)
GLUCOSE BLDV-MCNC: 218 MG/DL (ref 74–99)
GLUCOSE FLD-MCNC: 154 MG/DL
GLUCOSE SERPL-MCNC: 218 MG/DL (ref 74–99)
GLUCOSE SERPL-MCNC: 97 MG/DL (ref 74–99)
HCO3 BLDA-SCNC: 18.6 MMOL/L (ref 22–26)
HCO3 BLDA-SCNC: 21.4 MMOL/L (ref 22–26)
HCO3 BLDA-SCNC: 22.3 MMOL/L (ref 22–26)
HCO3 BLDA-SCNC: 22.5 MMOL/L (ref 22–26)
HCO3 BLDA-SCNC: 23.1 MMOL/L (ref 22–26)
HCO3 BLDA-SCNC: 26 MMOL/L (ref 22–26)
HCO3 BLDV-SCNC: 23.7 MMOL/L (ref 22–26)
HCT VFR BLD AUTO: 34.5 % (ref 36–46)
HCT VFR BLD AUTO: 44.2 % (ref 36–46)
HCT VFR BLD EST: 31 % (ref 36–46)
HCT VFR BLD EST: 33 % (ref 36–46)
HCT VFR BLD EST: 34 % (ref 36–46)
HCT VFR BLD EST: 35 % (ref 36–46)
HCT VFR BLD EST: 43 % (ref 36–46)
HGB BLD-MCNC: 11.2 G/DL (ref 12–16)
HGB BLD-MCNC: 14.3 G/DL (ref 12–16)
HGB BLDA-MCNC: 10.2 G/DL (ref 12–16)
HGB BLDA-MCNC: 10.9 G/DL (ref 12–16)
HGB BLDA-MCNC: 10.9 G/DL (ref 12–16)
HGB BLDA-MCNC: 11.2 G/DL (ref 12–16)
HGB BLDA-MCNC: 11.3 G/DL (ref 12–16)
HGB BLDA-MCNC: 11.3 G/DL (ref 12–16)
HGB BLDA-MCNC: 11.7 G/DL (ref 12–16)
HGB BLDA-MCNC: 14.3 G/DL (ref 12–16)
HGB BLDV-MCNC: 11.2 G/DL (ref 12–16)
HOLD SPECIMEN: NORMAL
IMM GRANULOCYTES # BLD AUTO: 0.02 X10*3/UL (ref 0–0.5)
IMM GRANULOCYTES NFR BLD AUTO: 0.3 % (ref 0–0.9)
IMMATURE GRANULOCYTES IN FLUID: 0 %
INHALED O2 CONCENTRATION: 100 %
INHALED O2 CONCENTRATION: 100 %
INHALED O2 CONCENTRATION: 45 %
INR PPP: 1.7 (ref 0.9–1.1)
LACTATE BLDA-SCNC: 1.7 MMOL/L (ref 0.4–2)
LACTATE BLDA-SCNC: 2.9 MMOL/L (ref 0.4–2)
LACTATE BLDA-SCNC: 3.1 MMOL/L (ref 0.4–2)
LACTATE BLDA-SCNC: 3.2 MMOL/L (ref 0.4–2)
LACTATE BLDA-SCNC: 3.4 MMOL/L (ref 0.4–2)
LACTATE BLDA-SCNC: 3.5 MMOL/L (ref 0.4–2)
LACTATE BLDV-SCNC: 3.1 MMOL/L (ref 0.4–2)
LDH FLD L TO P-CCNC: 213 U/L
LDH SERPL L TO P-CCNC: 212 U/L (ref 84–246)
LYMPHOCYTES # BLD AUTO: 2.16 X10*3/UL (ref 0.8–3)
LYMPHOCYTES NFR BLD AUTO: 32 %
LYMPHOCYTES NFR FLD MANUAL: 59 %
MAGNESIUM SERPL-MCNC: 2.28 MG/DL (ref 1.6–2.4)
MAGNESIUM SERPL-MCNC: 2.31 MG/DL (ref 1.6–2.4)
MCH RBC QN AUTO: 28.7 PG (ref 26–34)
MCH RBC QN AUTO: 29.6 PG (ref 26–34)
MCHC RBC AUTO-ENTMCNC: 32.4 G/DL (ref 32–36)
MCHC RBC AUTO-ENTMCNC: 32.5 G/DL (ref 32–36)
MCV RBC AUTO: 89 FL (ref 80–100)
MCV RBC AUTO: 92 FL (ref 80–100)
METHGB MFR BLDA: 1.1 % (ref 0–1.5)
METHGB MFR BLDA: 1.6 % (ref 0–1.5)
MONOCYTES # BLD AUTO: 0.82 X10*3/UL (ref 0.05–0.8)
MONOCYTES NFR BLD AUTO: 12.1 %
MONOS+MACROS NFR FLD MANUAL: 0 %
NEUTROPHILS # BLD AUTO: 3.42 X10*3/UL (ref 1.6–5.5)
NEUTROPHILS NFR BLD AUTO: 50.7 %
NEUTROPHILS NFR FLD MANUAL: 41 %
NRBC BLD-RTO: 0 /100 WBCS (ref 0–0)
NRBC BLD-RTO: 0 /100 WBCS (ref 0–0)
OTHER CELLS NFR FLD MANUAL: 0 %
OXYHGB MFR BLDA: 96.8 % (ref 94–98)
OXYHGB MFR BLDA: 97.1 % (ref 94–98)
OXYHGB MFR BLDA: 97.2 % (ref 94–98)
OXYHGB MFR BLDA: 97.2 % (ref 94–98)
OXYHGB MFR BLDA: 97.5 % (ref 94–98)
OXYHGB MFR BLDA: 98.1 % (ref 94–98)
OXYHGB MFR BLDV: 87.6 % (ref 45–75)
PCO2 BLDA: 28 MM HG (ref 38–42)
PCO2 BLDA: 33 MM HG (ref 38–42)
PCO2 BLDA: 33 MM HG (ref 38–42)
PCO2 BLDA: 39 MM HG (ref 38–42)
PCO2 BLDA: 39 MM HG (ref 38–42)
PCO2 BLDA: 41 MM HG (ref 38–42)
PCO2 BLDV: 44 MM HG (ref 41–51)
PH BLDA: 7.36 PH (ref 7.38–7.42)
PH BLDA: 7.37 PH (ref 7.38–7.42)
PH BLDA: 7.38 PH (ref 7.38–7.42)
PH BLDA: 7.41 PH (ref 7.38–7.42)
PH BLDA: 7.42 PH (ref 7.38–7.42)
PH BLDA: 7.51 PH (ref 7.38–7.42)
PH BLDV: 7.34 PH (ref 7.33–7.43)
PHOSPHATE SERPL-MCNC: 4 MG/DL (ref 2.5–4.9)
PLASMA CELLS NFR FLD MANUAL: 0 %
PLATELET # BLD AUTO: 266 X10*3/UL (ref 150–450)
PLATELET # BLD AUTO: 318 X10*3/UL (ref 150–450)
PO2 BLDA: 107 MM HG (ref 85–95)
PO2 BLDA: 121 MM HG (ref 85–95)
PO2 BLDA: 225 MM HG (ref 85–95)
PO2 BLDA: 252 MM HG (ref 85–95)
PO2 BLDA: 374 MM HG (ref 85–95)
PO2 BLDA: 511 MM HG (ref 85–95)
PO2 BLDV: 60 MM HG (ref 35–45)
POTASSIUM BLDA-SCNC: 2.7 MMOL/L (ref 3.5–5.3)
POTASSIUM BLDA-SCNC: 2.8 MMOL/L (ref 3.5–5.3)
POTASSIUM BLDA-SCNC: 3.5 MMOL/L (ref 3.5–5.3)
POTASSIUM BLDA-SCNC: 3.8 MMOL/L (ref 3.5–5.3)
POTASSIUM BLDA-SCNC: 3.9 MMOL/L (ref 3.5–5.3)
POTASSIUM BLDA-SCNC: 3.9 MMOL/L (ref 3.5–5.3)
POTASSIUM BLDV-SCNC: 3.6 MMOL/L (ref 3.5–5.3)
POTASSIUM SERPL-SCNC: 3.8 MMOL/L (ref 3.5–5.3)
POTASSIUM SERPL-SCNC: 4.1 MMOL/L (ref 3.5–5.3)
PRODUCT BLOOD TYPE: 5100
PRODUCT BLOOD TYPE: 9500
PRODUCT CODE: NORMAL
PROT FLD-MCNC: 5.7 G/DL
PROTHROMBIN TIME: 19 SECONDS (ref 9.8–12.8)
RBC # BLD AUTO: 3.9 X10*6/UL (ref 4–5.2)
RBC # BLD AUTO: 4.83 X10*6/UL (ref 4–5.2)
RBC # FLD AUTO: ABNORMAL /UL
RH FACTOR (ANTIGEN D): NORMAL
RH FACTOR (ANTIGEN D): NORMAL
SAO2 % BLDA: 100 % (ref 94–100)
SAO2 % BLDA: 99 % (ref 94–100)
SAO2 % BLDV: 91 % (ref 45–75)
SODIUM BLDA-SCNC: 135 MMOL/L (ref 136–145)
SODIUM BLDA-SCNC: 136 MMOL/L (ref 136–145)
SODIUM BLDA-SCNC: 137 MMOL/L (ref 136–145)
SODIUM BLDA-SCNC: 139 MMOL/L (ref 136–145)
SODIUM BLDA-SCNC: 139 MMOL/L (ref 136–145)
SODIUM BLDA-SCNC: 141 MMOL/L (ref 136–145)
SODIUM BLDV-SCNC: 140 MMOL/L (ref 136–145)
SODIUM FLD-SCNC: 138 MMOL/L
SODIUM SERPL-SCNC: 139 MMOL/L (ref 136–145)
SODIUM SERPL-SCNC: 142 MMOL/L (ref 136–145)
SPECIMEN DRAWN FROM PATIENT: ABNORMAL
TOTAL CELLS COUNTED FLD: 100
UNIT ABO: NORMAL
UNIT NUMBER: NORMAL
UNIT RH: NORMAL
UNIT VOLUME: 314
UNIT VOLUME: 323
UNIT VOLUME: 323
UNIT VOLUME: 336
WBC # BLD AUTO: 16.2 X10*3/UL (ref 4.4–11.3)
WBC # BLD AUTO: 6.8 X10*3/UL (ref 4.4–11.3)
WBC # FLD AUTO: 2149 /UL

## 2024-10-30 PROCEDURE — 83615 LACTATE (LD) (LDH) ENZYME: CPT | Performed by: STUDENT IN AN ORGANIZED HEALTH CARE EDUCATION/TRAINING PROGRAM

## 2024-10-30 PROCEDURE — 5A1522G EXTRACORPOREAL OXYGENATION, MEMBRANE, PERIPHERAL VENO-ARTERIAL: ICD-10-PCS | Performed by: STUDENT IN AN ORGANIZED HEALTH CARE EDUCATION/TRAINING PROGRAM

## 2024-10-30 PROCEDURE — 84132 ASSAY OF SERUM POTASSIUM: CPT

## 2024-10-30 PROCEDURE — P9047 ALBUMIN (HUMAN), 25%, 50ML: HCPCS | Mod: JZ | Performed by: ANESTHESIOLOGIST ASSISTANT

## 2024-10-30 PROCEDURE — 2500000001 HC RX 250 WO HCPCS SELF ADMINISTERED DRUGS (ALT 637 FOR MEDICARE OP): Performed by: NURSE PRACTITIONER

## 2024-10-30 PROCEDURE — 2720000007 HC OR 272 NO HCPCS: Performed by: INTERNAL MEDICINE

## 2024-10-30 PROCEDURE — P9016 RBC LEUKOCYTES REDUCED: HCPCS

## 2024-10-30 PROCEDURE — 2020000001 HC ICU ROOM DAILY

## 2024-10-30 PROCEDURE — 83735 ASSAY OF MAGNESIUM: CPT | Performed by: NURSE PRACTITIONER

## 2024-10-30 PROCEDURE — 84302 ASSAY OF SWEAT SODIUM: CPT | Performed by: STUDENT IN AN ORGANIZED HEALTH CARE EDUCATION/TRAINING PROGRAM

## 2024-10-30 PROCEDURE — 71045 X-RAY EXAM CHEST 1 VIEW: CPT | Performed by: RADIOLOGY

## 2024-10-30 PROCEDURE — 94002 VENT MGMT INPAT INIT DAY: CPT

## 2024-10-30 PROCEDURE — 2500000004 HC RX 250 GENERAL PHARMACY W/ HCPCS (ALT 636 FOR OP/ED)

## 2024-10-30 PROCEDURE — 84132 ASSAY OF SERUM POTASSIUM: CPT | Performed by: ANESTHESIOLOGIST ASSISTANT

## 2024-10-30 PROCEDURE — 2500000004 HC RX 250 GENERAL PHARMACY W/ HCPCS (ALT 636 FOR OP/ED): Mod: JG | Performed by: INTERNAL MEDICINE

## 2024-10-30 PROCEDURE — 2500000005 HC RX 250 GENERAL PHARMACY W/O HCPCS: Performed by: ANESTHESIOLOGIST ASSISTANT

## 2024-10-30 PROCEDURE — 85025 COMPLETE CBC W/AUTO DIFF WBC: CPT | Performed by: NURSE PRACTITIONER

## 2024-10-30 PROCEDURE — 84157 ASSAY OF PROTEIN OTHER: CPT | Performed by: STUDENT IN AN ORGANIZED HEALTH CARE EDUCATION/TRAINING PROGRAM

## 2024-10-30 PROCEDURE — 80048 BASIC METABOLIC PNL TOTAL CA: CPT | Performed by: NURSE PRACTITIONER

## 2024-10-30 PROCEDURE — 82435 ASSAY OF BLOOD CHLORIDE: CPT

## 2024-10-30 PROCEDURE — 86900 BLOOD TYPING SEROLOGIC ABO: CPT | Performed by: ANESTHESIOLOGIST ASSISTANT

## 2024-10-30 PROCEDURE — 82042 OTHER SOURCE ALBUMIN QUAN EA: CPT | Performed by: STUDENT IN AN ORGANIZED HEALTH CARE EDUCATION/TRAINING PROGRAM

## 2024-10-30 PROCEDURE — 82330 ASSAY OF CALCIUM: CPT

## 2024-10-30 PROCEDURE — 2500000005 HC RX 250 GENERAL PHARMACY W/O HCPCS

## 2024-10-30 PROCEDURE — 0W9D3ZZ DRAINAGE OF PERICARDIAL CAVITY, PERCUTANEOUS APPROACH: ICD-10-PCS | Performed by: STUDENT IN AN ORGANIZED HEALTH CARE EDUCATION/TRAINING PROGRAM

## 2024-10-30 PROCEDURE — 82810 BLOOD GASES O2 SAT ONLY: CPT

## 2024-10-30 PROCEDURE — 87116 MYCOBACTERIA CULTURE: CPT | Performed by: STUDENT IN AN ORGANIZED HEALTH CARE EDUCATION/TRAINING PROGRAM

## 2024-10-30 PROCEDURE — 88305 TISSUE EXAM BY PATHOLOGIST: CPT | Performed by: STUDENT IN AN ORGANIZED HEALTH CARE EDUCATION/TRAINING PROGRAM

## 2024-10-30 PROCEDURE — C1731 CATH, EP, 20 OR MORE ELEC: HCPCS | Performed by: INTERNAL MEDICINE

## 2024-10-30 PROCEDURE — C1766 INTRO/SHEATH,STRBLE,NON-PEEL: HCPCS | Performed by: INTERNAL MEDICINE

## 2024-10-30 PROCEDURE — 82465 ASSAY BLD/SERUM CHOLESTEROL: CPT | Performed by: STUDENT IN AN ORGANIZED HEALTH CARE EDUCATION/TRAINING PROGRAM

## 2024-10-30 PROCEDURE — 89051 BODY FLUID CELL COUNT: CPT | Performed by: STUDENT IN AN ORGANIZED HEALTH CARE EDUCATION/TRAINING PROGRAM

## 2024-10-30 PROCEDURE — A93656 PR EPHYS EVL TRNSPTL TX ATRIAL FIB ISOLAT PULM VEIN: Performed by: ANESTHESIOLOGIST ASSISTANT

## 2024-10-30 PROCEDURE — C1732 CATH, EP, DIAG/ABL, 3D/VECT: HCPCS | Performed by: INTERNAL MEDICINE

## 2024-10-30 PROCEDURE — 36415 COLL VENOUS BLD VENIPUNCTURE: CPT | Performed by: NURSE PRACTITIONER

## 2024-10-30 PROCEDURE — 82247 BILIRUBIN TOTAL: CPT | Performed by: STUDENT IN AN ORGANIZED HEALTH CARE EDUCATION/TRAINING PROGRAM

## 2024-10-30 PROCEDURE — 85610 PROTHROMBIN TIME: CPT

## 2024-10-30 PROCEDURE — 82945 GLUCOSE OTHER FLUID: CPT | Performed by: STUDENT IN AN ORGANIZED HEALTH CARE EDUCATION/TRAINING PROGRAM

## 2024-10-30 PROCEDURE — 82375 ASSAY CARBOXYHB QUANT: CPT

## 2024-10-30 PROCEDURE — 85384 FIBRINOGEN ACTIVITY: CPT

## 2024-10-30 PROCEDURE — 33947 ECMO/ECLS INITIATION ARTERY: CPT | Performed by: INTERNAL MEDICINE

## 2024-10-30 PROCEDURE — 37799 UNLISTED PX VASCULAR SURGERY: CPT

## 2024-10-30 PROCEDURE — 2500000004 HC RX 250 GENERAL PHARMACY W/ HCPCS (ALT 636 FOR OP/ED): Performed by: NURSE ANESTHETIST, CERTIFIED REGISTERED

## 2024-10-30 PROCEDURE — 36620 INSERTION CATHETER ARTERY: CPT | Mod: GC | Performed by: EMERGENCY MEDICINE

## 2024-10-30 PROCEDURE — 87081 CULTURE SCREEN ONLY: CPT

## 2024-10-30 PROCEDURE — 93656 COMPRE EP EVAL ABLTJ ATR FIB: CPT | Mod: 74 | Performed by: INTERNAL MEDICINE

## 2024-10-30 PROCEDURE — 76937 US GUIDE VASCULAR ACCESS: CPT | Performed by: INTERNAL MEDICINE

## 2024-10-30 PROCEDURE — 33949 ECMO/ECLS DAILY MGMT ARTERY: CPT

## 2024-10-30 PROCEDURE — A93656 PR EPHYS EVL TRNSPTL TX ATRIAL FIB ISOLAT PULM VEIN: Performed by: STUDENT IN AN ORGANIZED HEALTH CARE EDUCATION/TRAINING PROGRAM

## 2024-10-30 PROCEDURE — 82435 ASSAY OF BLOOD CHLORIDE: CPT | Performed by: ANESTHESIOLOGIST ASSISTANT

## 2024-10-30 PROCEDURE — 87205 SMEAR GRAM STAIN: CPT | Performed by: STUDENT IN AN ORGANIZED HEALTH CARE EDUCATION/TRAINING PROGRAM

## 2024-10-30 PROCEDURE — 86923 COMPATIBILITY TEST ELECTRIC: CPT

## 2024-10-30 PROCEDURE — 87070 CULTURE OTHR SPECIMN AEROBIC: CPT | Performed by: STUDENT IN AN ORGANIZED HEALTH CARE EDUCATION/TRAINING PROGRAM

## 2024-10-30 PROCEDURE — 85027 COMPLETE CBC AUTOMATED: CPT

## 2024-10-30 PROCEDURE — C1759 CATH, INTRA ECHOCARDIOGRAPHY: HCPCS | Performed by: INTERNAL MEDICINE

## 2024-10-30 PROCEDURE — 3700000002 HC GENERAL ANESTHESIA TIME - EACH INCREMENTAL 1 MINUTE: Performed by: INTERNAL MEDICINE

## 2024-10-30 PROCEDURE — 86901 BLOOD TYPING SEROLOGIC RH(D): CPT | Performed by: ANESTHESIOLOGIST ASSISTANT

## 2024-10-30 PROCEDURE — 84295 ASSAY OF SERUM SODIUM: CPT

## 2024-10-30 PROCEDURE — 88112 CYTOPATH CELL ENHANCE TECH: CPT | Mod: TC,MCY | Performed by: STUDENT IN AN ORGANIZED HEALTH CARE EDUCATION/TRAINING PROGRAM

## 2024-10-30 PROCEDURE — 88112 CYTOPATH CELL ENHANCE TECH: CPT | Performed by: STUDENT IN AN ORGANIZED HEALTH CARE EDUCATION/TRAINING PROGRAM

## 2024-10-30 PROCEDURE — 3700000001 HC GENERAL ANESTHESIA TIME - INITIAL BASE CHARGE: Performed by: INTERNAL MEDICINE

## 2024-10-30 PROCEDURE — 99291 CRITICAL CARE FIRST HOUR: CPT

## 2024-10-30 PROCEDURE — 83615 LACTATE (LD) (LDH) ENZYME: CPT

## 2024-10-30 PROCEDURE — 33016 PERICARDIOCENTESIS W/IMAGING: CPT | Performed by: INTERNAL MEDICINE

## 2024-10-30 PROCEDURE — 2500000002 HC RX 250 W HCPCS SELF ADMINISTERED DRUGS (ALT 637 FOR MEDICARE OP, ALT 636 FOR OP/ED)

## 2024-10-30 PROCEDURE — 87206 SMEAR FLUORESCENT/ACID STAI: CPT | Performed by: STUDENT IN AN ORGANIZED HEALTH CARE EDUCATION/TRAINING PROGRAM

## 2024-10-30 PROCEDURE — 36430 TRANSFUSION BLD/BLD COMPNT: CPT | Performed by: NURSE ANESTHETIST, CERTIFIED REGISTERED

## 2024-10-30 PROCEDURE — 83735 ASSAY OF MAGNESIUM: CPT

## 2024-10-30 PROCEDURE — 99232 SBSQ HOSP IP/OBS MODERATE 35: CPT | Performed by: INTERNAL MEDICINE

## 2024-10-30 PROCEDURE — 2500000004 HC RX 250 GENERAL PHARMACY W/ HCPCS (ALT 636 FOR OP/ED): Performed by: ANESTHESIOLOGIST ASSISTANT

## 2024-10-30 PROCEDURE — C1769 GUIDE WIRE: HCPCS | Performed by: INTERNAL MEDICINE

## 2024-10-30 PROCEDURE — 85018 HEMOGLOBIN: CPT

## 2024-10-30 PROCEDURE — 99100 ANES PT EXTEME AGE<1 YR&>70: CPT | Performed by: STUDENT IN AN ORGANIZED HEALTH CARE EDUCATION/TRAINING PROGRAM

## 2024-10-30 PROCEDURE — 93656 COMPRE EP EVAL ABLTJ ATR FIB: CPT | Performed by: INTERNAL MEDICINE

## 2024-10-30 PROCEDURE — 99239 HOSP IP/OBS DSCHRG MGMT >30: CPT | Performed by: INTERNAL MEDICINE

## 2024-10-30 RX ORDER — POTASSIUM CHLORIDE 14.9 MG/ML
20 INJECTION INTRAVENOUS EVERY 6 HOURS PRN
Status: DISCONTINUED | OUTPATIENT
Start: 2024-10-30 | End: 2024-11-07 | Stop reason: HOSPADM

## 2024-10-30 RX ORDER — CALCIUM GLUCONATE 20 MG/ML
2 INJECTION, SOLUTION INTRAVENOUS EVERY 6 HOURS PRN
Status: DISCONTINUED | OUTPATIENT
Start: 2024-10-30 | End: 2024-11-06

## 2024-10-30 RX ORDER — OXYCODONE HYDROCHLORIDE 5 MG/1
5 TABLET ORAL EVERY 4 HOURS PRN
Status: DISCONTINUED | OUTPATIENT
Start: 2024-10-30 | End: 2024-11-05

## 2024-10-30 RX ORDER — ROCURONIUM BROMIDE 10 MG/ML
INJECTION, SOLUTION INTRAVENOUS AS NEEDED
Status: DISCONTINUED | OUTPATIENT
Start: 2024-10-30 | End: 2024-10-30

## 2024-10-30 RX ORDER — PROPOFOL 10 MG/ML
0-50 INJECTION, EMULSION INTRAVENOUS CONTINUOUS
Status: DISCONTINUED | OUTPATIENT
Start: 2024-10-30 | End: 2024-11-01

## 2024-10-30 RX ORDER — SUCCINYLCHOLINE CHLORIDE 20 MG/ML
INJECTION INTRAMUSCULAR; INTRAVENOUS AS NEEDED
Status: DISCONTINUED | OUTPATIENT
Start: 2024-10-30 | End: 2024-10-30

## 2024-10-30 RX ORDER — INSULIN LISPRO 100 [IU]/ML
0-15 INJECTION, SOLUTION INTRAVENOUS; SUBCUTANEOUS EVERY 4 HOURS
Status: DISCONTINUED | OUTPATIENT
Start: 2024-10-30 | End: 2024-11-06

## 2024-10-30 RX ORDER — CALCIUM CHLORIDE INJECTION 100 MG/ML
INJECTION, SOLUTION INTRAVENOUS AS NEEDED
Status: DISCONTINUED | OUTPATIENT
Start: 2024-10-30 | End: 2024-10-30

## 2024-10-30 RX ORDER — NALOXONE HYDROCHLORIDE 0.4 MG/ML
0.2 INJECTION, SOLUTION INTRAMUSCULAR; INTRAVENOUS; SUBCUTANEOUS EVERY 5 MIN PRN
Status: DISCONTINUED | OUTPATIENT
Start: 2024-10-30 | End: 2024-11-07 | Stop reason: HOSPADM

## 2024-10-30 RX ORDER — AMOXICILLIN 250 MG
2 CAPSULE ORAL 2 TIMES DAILY
Status: DISCONTINUED | OUTPATIENT
Start: 2024-10-30 | End: 2024-11-06

## 2024-10-30 RX ORDER — PANTOPRAZOLE SODIUM 40 MG/10ML
40 INJECTION, POWDER, LYOPHILIZED, FOR SOLUTION INTRAVENOUS
Status: DISCONTINUED | OUTPATIENT
Start: 2024-10-31 | End: 2024-11-02

## 2024-10-30 RX ORDER — LIDOCAINE HYDROCHLORIDE ANHYDROUS AND DEXTROSE MONOHYDRATE .8; 5 G/100ML; G/100ML
INJECTION, SOLUTION INTRAVENOUS
Status: COMPLETED
Start: 2024-10-30 | End: 2024-10-31

## 2024-10-30 RX ORDER — ALBUMIN HUMAN 50 G/1000ML
12.5 SOLUTION INTRAVENOUS ONCE
Status: DISCONTINUED | OUTPATIENT
Start: 2024-10-30 | End: 2024-10-31

## 2024-10-30 RX ORDER — MAGNESIUM SULFATE HEPTAHYDRATE 500 MG/ML
INJECTION, SOLUTION INTRAMUSCULAR; INTRAVENOUS AS NEEDED
Status: DISCONTINUED | OUTPATIENT
Start: 2024-10-30 | End: 2024-10-30

## 2024-10-30 RX ORDER — MIDAZOLAM HYDROCHLORIDE 1 MG/ML
INJECTION INTRAMUSCULAR; INTRAVENOUS AS NEEDED
Status: DISCONTINUED | OUTPATIENT
Start: 2024-10-30 | End: 2024-10-30

## 2024-10-30 RX ORDER — EPINEPHRINE IN 0.9 % SOD CHLOR 4MG/250ML
0-2 PLASTIC BAG, INJECTION (ML) INTRAVENOUS CONTINUOUS
Status: DISCONTINUED | OUTPATIENT
Start: 2024-10-30 | End: 2024-11-02

## 2024-10-30 RX ORDER — NOREPINEPHRINE BITARTRATE 0.03 MG/ML
INJECTION, SOLUTION INTRAVENOUS CONTINUOUS PRN
Status: DISCONTINUED | OUTPATIENT
Start: 2024-10-30 | End: 2024-10-30

## 2024-10-30 RX ORDER — SODIUM CHLORIDE, SODIUM LACTATE, POTASSIUM CHLORIDE, CALCIUM CHLORIDE 600; 310; 30; 20 MG/100ML; MG/100ML; MG/100ML; MG/100ML
30 INJECTION, SOLUTION INTRAVENOUS CONTINUOUS
Status: DISCONTINUED | OUTPATIENT
Start: 2024-10-30 | End: 2024-10-31

## 2024-10-30 RX ORDER — MAGNESIUM SULFATE HEPTAHYDRATE 40 MG/ML
4 INJECTION, SOLUTION INTRAVENOUS EVERY 6 HOURS PRN
Status: DISCONTINUED | OUTPATIENT
Start: 2024-10-30 | End: 2024-11-06

## 2024-10-30 RX ORDER — EPINEPHRINE IN 0.9 % SOD CHLOR 4MG/250ML
PLASTIC BAG, INJECTION (ML) INTRAVENOUS CONTINUOUS PRN
Status: DISCONTINUED | OUTPATIENT
Start: 2024-10-30 | End: 2024-10-30

## 2024-10-30 RX ORDER — DEXTROSE 50 % IN WATER (D50W) INTRAVENOUS SYRINGE
25
Status: DISCONTINUED | OUTPATIENT
Start: 2024-10-30 | End: 2024-11-07 | Stop reason: HOSPADM

## 2024-10-30 RX ORDER — POLYETHYLENE GLYCOL 3350 17 G/17G
17 POWDER, FOR SOLUTION ORAL DAILY
Status: DISCONTINUED | OUTPATIENT
Start: 2024-10-30 | End: 2024-11-04

## 2024-10-30 RX ORDER — POTASSIUM CHLORIDE 14.9 MG/ML
INJECTION INTRAVENOUS AS NEEDED
Status: DISCONTINUED | OUTPATIENT
Start: 2024-10-30 | End: 2024-10-30

## 2024-10-30 RX ORDER — NOREPINEPHRINE BITARTRATE/D5W 8 MG/250ML
0-1 PLASTIC BAG, INJECTION (ML) INTRAVENOUS CONTINUOUS
Status: DISCONTINUED | OUTPATIENT
Start: 2024-10-30 | End: 2024-11-01

## 2024-10-30 RX ORDER — ETOMIDATE 2 MG/ML
INJECTION INTRAVENOUS AS NEEDED
Status: DISCONTINUED | OUTPATIENT
Start: 2024-10-30 | End: 2024-10-30

## 2024-10-30 RX ORDER — ALBUMIN HUMAN 250 G/1000ML
SOLUTION INTRAVENOUS CONTINUOUS PRN
Status: DISCONTINUED | OUTPATIENT
Start: 2024-10-30 | End: 2024-10-30

## 2024-10-30 RX ORDER — ACETAMINOPHEN 650 MG/1
650 SUPPOSITORY RECTAL EVERY 4 HOURS
Status: DISCONTINUED | OUTPATIENT
Start: 2024-10-30 | End: 2024-11-01

## 2024-10-30 RX ORDER — PROPOFOL 10 MG/ML
INJECTION, EMULSION INTRAVENOUS AS NEEDED
Status: DISCONTINUED | OUTPATIENT
Start: 2024-10-30 | End: 2024-10-30

## 2024-10-30 RX ORDER — BUPIVACAINE HYDROCHLORIDE 5 MG/ML
INJECTION, SOLUTION EPIDURAL; INTRACAUDAL AS NEEDED
Status: DISCONTINUED | OUTPATIENT
Start: 2024-10-30 | End: 2024-10-30 | Stop reason: HOSPADM

## 2024-10-30 RX ORDER — CALCIUM GLUCONATE 20 MG/ML
1 INJECTION, SOLUTION INTRAVENOUS EVERY 6 HOURS PRN
Status: DISCONTINUED | OUTPATIENT
Start: 2024-10-30 | End: 2024-11-06

## 2024-10-30 RX ORDER — ACETAMINOPHEN 325 MG/1
650 TABLET ORAL EVERY 4 HOURS
Status: DISCONTINUED | OUTPATIENT
Start: 2024-10-30 | End: 2024-11-01

## 2024-10-30 RX ORDER — FENTANYL CITRATE 50 UG/ML
INJECTION, SOLUTION INTRAMUSCULAR; INTRAVENOUS AS NEEDED
Status: DISCONTINUED | OUTPATIENT
Start: 2024-10-30 | End: 2024-10-30

## 2024-10-30 RX ORDER — HYDROMORPHONE HYDROCHLORIDE 1 MG/ML
0.2 INJECTION, SOLUTION INTRAMUSCULAR; INTRAVENOUS; SUBCUTANEOUS
Status: DISCONTINUED | OUTPATIENT
Start: 2024-10-30 | End: 2024-10-31

## 2024-10-30 RX ORDER — INSULIN LISPRO 100 [IU]/ML
0-15 INJECTION, SOLUTION INTRAVENOUS; SUBCUTANEOUS EVERY 4 HOURS
Status: DISCONTINUED | OUTPATIENT
Start: 2024-10-30 | End: 2024-10-30

## 2024-10-30 RX ORDER — LIDOCAINE HCL/PF 100 MG/5ML
SYRINGE (ML) INTRAVENOUS
Status: COMPLETED
Start: 2024-10-30 | End: 2024-10-31

## 2024-10-30 RX ORDER — CEFAZOLIN 1 G/1
INJECTION, POWDER, FOR SOLUTION INTRAVENOUS AS NEEDED
Status: DISCONTINUED | OUTPATIENT
Start: 2024-10-30 | End: 2024-10-30

## 2024-10-30 RX ORDER — VASOPRESSIN 20 U/ML
INJECTION PARENTERAL AS NEEDED
Status: DISCONTINUED | OUTPATIENT
Start: 2024-10-30 | End: 2024-10-30

## 2024-10-30 RX ORDER — NITROGLYCERIN 40 MG/100ML
INJECTION INTRAVENOUS AS NEEDED
Status: DISCONTINUED | OUTPATIENT
Start: 2024-10-30 | End: 2024-10-30

## 2024-10-30 RX ORDER — PANTOPRAZOLE SODIUM 40 MG/1
40 TABLET, DELAYED RELEASE ORAL
Status: DISCONTINUED | OUTPATIENT
Start: 2024-10-31 | End: 2024-11-02

## 2024-10-30 RX ORDER — POTASSIUM CHLORIDE 29.8 MG/ML
40 INJECTION INTRAVENOUS EVERY 6 HOURS PRN
Status: DISCONTINUED | OUTPATIENT
Start: 2024-10-30 | End: 2024-11-07 | Stop reason: HOSPADM

## 2024-10-30 RX ORDER — HEPARIN SODIUM 1000 [USP'U]/ML
INJECTION, SOLUTION INTRAVENOUS; SUBCUTANEOUS AS NEEDED
Status: DISCONTINUED | OUTPATIENT
Start: 2024-10-30 | End: 2024-10-30

## 2024-10-30 RX ORDER — MIDAZOLAM HYDROCHLORIDE 1 MG/ML
INJECTION, SOLUTION INTRAMUSCULAR; INTRAVENOUS AS NEEDED
Status: DISCONTINUED | OUTPATIENT
Start: 2024-10-30 | End: 2024-10-30

## 2024-10-30 RX ORDER — MAGNESIUM SULFATE HEPTAHYDRATE 40 MG/ML
2 INJECTION, SOLUTION INTRAVENOUS EVERY 6 HOURS PRN
Status: DISCONTINUED | OUTPATIENT
Start: 2024-10-30 | End: 2024-11-06

## 2024-10-30 RX ADMIN — AMIODARONE HYDROCHLORIDE 150 MG: 1.5 INJECTION, SOLUTION INTRAVENOUS at 19:10

## 2024-10-30 RX ADMIN — POTASSIUM CHLORIDE 20 MEQ: 14.9 INJECTION, SOLUTION INTRAVENOUS at 22:14

## 2024-10-30 RX ADMIN — Medication 80 PERCENT: at 21:45

## 2024-10-30 RX ADMIN — AMIODARONE HYDROCHLORIDE 1 MG/MIN: 1.8 INJECTION, SOLUTION INTRAVENOUS at 19:11

## 2024-10-30 RX ADMIN — PROPOFOL 40 MCG/KG/MIN: 10 INJECTION, EMULSION INTRAVENOUS at 22:14

## 2024-10-30 RX ADMIN — SODIUM CHLORIDE, POTASSIUM CHLORIDE, SODIUM LACTATE AND CALCIUM CHLORIDE 30 ML/HR: 600; 310; 30; 20 INJECTION, SOLUTION INTRAVENOUS at 18:57

## 2024-10-30 RX ADMIN — CALCIUM GLUCONATE 1 G: 20 INJECTION, SOLUTION INTRAVENOUS at 23:30

## 2024-10-30 RX ADMIN — EPINEPHRINE IN SODIUM CHLORIDE 0.08 MCG/KG/MIN: 16 INJECTION INTRAVENOUS at 18:57

## 2024-10-30 RX ADMIN — NOREPINEPHRINE BITARTRATE 0.08 MCG/KG/MIN: 8 INJECTION, SOLUTION INTRAVENOUS at 18:30

## 2024-10-30 RX ADMIN — Medication 80 PERCENT: at 21:44

## 2024-10-30 RX ADMIN — INSULIN LISPRO 5 UNITS: 100 INJECTION, SOLUTION INTRAVENOUS; SUBCUTANEOUS at 23:35

## 2024-10-30 SDOH — HEALTH STABILITY: MENTAL HEALTH: CURRENT SMOKER: 0

## 2024-10-30 ASSESSMENT — COGNITIVE AND FUNCTIONAL STATUS - GENERAL
DAILY ACTIVITIY SCORE: 24
MOBILITY SCORE: 24

## 2024-10-30 ASSESSMENT — PAIN SCALES - GENERAL: PAINLEVEL_OUTOF10: 0 - NO PAIN

## 2024-10-30 NOTE — NURSING NOTE
Called report to EP Lab at [facility] , report given to Yin GOODMAN

## 2024-10-30 NOTE — Clinical Note
[facility] team at bedside  and prepping patient , 3 sheaths remain rfv note 8,8.5 and 9 fr and ecmo will replace per protocol

## 2024-10-30 NOTE — DISCHARGE INSTRUCTIONS
INSTRUCTIONS AFTER ABLATION PROCEDURE:    * You will need to continue blood thinner (Eliquis every 12 hours) until instructed otherwise. It is important not to interrupt blood thinner for any reason (other than an emergency) during the first 30 days after ablation.    * You will be on Pantoprazole (a heartburn medicine) for 4 weeks to protect the esophagus as it can become irritated with ablation. It is very important that you take this medication.    * In the first week post-ablation you should take it easy. No heavy lifting or heavy exercise, no treadmill. You can use the stairs if needed but go slowly and minimize the number of times up and down.    * Some minor bruising is common at each groin access site with minor soreness as if you had banged the area. Bruising may occasionally be seen to extend down the leg. This is normal as is an occasional small quarter sized bump in the area. If larger swelling or more significant pain occurs at the area, please contact the office or go the nearest Emergency Room.    * You may have some minor chest pain for the next week or so. The pain will often worsen with a deep breath and be better when leaning forward. This is pericardial chest pain from the ablation and is generally not of concern. It should resolve within a week although it might increase for a day or so after the ablation.    * If you develop unexplained fevers exceeding 100 degrees anytime within the first 3 weeks post-ablation, you need to contact the office. Low grade fevers of around 99 degrees are common in the first day or so post-ablation.    * Atrial fibrillation (AFib) can recur in all patients who undergo this ablation for up to 4-8 weeks post-ablation. The ablation itself can cause inflammation (pericarditis) in the atria and this can cause AFib. Some patients will actually experience an increased amount of atrial arrhythmia early after ablation. Approximately 1/3 of patients will have this early  recurrence of AFib. Medications should be continued and your heart rate controlled. Nothing else needs be done initially except waiting as in many cases these episodes of AFib will prove self limited.    * Continue to follow up with your primary care physician, primary cardiologist, and any other specialists you normally see.    * No driving for 2 days post procedure (IF you were driving prior to procedure)    *Diet: Heart healthy    Call Provider If:  Breathing faster than normal.     Fever of 100.4 F (38 C) or higher.     Chills.     Any new concerning symptoms.     Passing out.     Patient Instructions, Next 24 hours:  DO NOT drive a car, operate machinery or power tools.  It is recommended that a responsible adult be with you for the first 24 hours.     DO NOT drink any alcoholic drinks or take any non-prescriptive medications that contain alcohol for the first 24 hours.     DO NOT make any important decisions for the first 24 hours.    Activity:  You are advised to go directly home from the hospital.     DO NOT lift anything heavier than 10 pounds for one week, this allows for proper healing of the groin.     No excessive exercise or treadmill use for one week. You may walk and do stairs, slowly.     No sexual activities for 24 hours after you arrive home.    Wound Care:  If slight bleeding should occur at groin site, lie down and have someone apply firm pressure just above the puncture site for 5 minutes.  If it continues or is profuse, call 911. Always notify your doctor if bleeding occurs.     Keep site clean and dry. Let air dry or you may use a simple bandaid.     Gently cleanse the puncture site in your groin with soap and water only.     You may experience some tenderness, bruising or minimal inflammation.  If you have any concerns, you may contact the EP Lab or if any of these symptoms become excessive, contact your electrophysiologist or go to the emergency room.     No tub baths, soaking, hot tubs, or  swimming for one week.     May shower the next day after your procedure.    Other Instructions:  If you have any questions about the effects of the sedative drugs or groin care, please call the physician who performed your procedure.    FOLLOW UP:  1) Daylin Julian CNP ( Electrophysiology) 1 month after ablation as scheduled

## 2024-10-30 NOTE — Clinical Note
Patient Clipped and Prepped: subxiphoid process. Prepped with ChloraPrep, a minimum of 3 minute dry time, longer if needed, no pooling noted, patient draped in sterile fashion. For pericardiocentesis

## 2024-10-30 NOTE — DISCHARGE SUMMARY
"Discharge Diagnosis  Other cardiomyopathy    Issues Requiring Follow-Up  Cardiology follow[place] at [facility]    Discharge Meds     Your medication list        ASK your doctor about these medications        Instructions Last Dose Given Next Dose Due   apixaban 5 mg tablet  Commonly known as: Eliquis           furosemide 40 mg tablet  Commonly known as: Lasix           metoprolol tartrate 25 mg tablet  Commonly known as: Lopressor      Take 1 tablet (25 mg) by mouth 3 times a day.       multivitamin tablet                    Test Results Pending At Discharge  Pending Labs       Order Current Status    Extra Urine Gray Tube Collected (10/23/24 1720)    Urinalysis with Reflex Culture and Microscopic In process            Procedures       Hospital Course   Lolis was admitted to hospital with atrial fibrillation with RVR.  She failed cardioversion here.  She was started on Tikosyn but continues to have an accelerated rate.  Her primary cardiologist did discuss with the electrophysiologist Dr. Hahn at [facility].  They have decided to transfer patient for an ablation.  At this time she is otherwise hemodynamically stable and will be transferring to [facility].    Blood pressure 101/62, pulse 57, temperature 36.8 °C (98.2 °F), resp. rate 17, height 1.499 m (4' 11\"), weight 63.5 kg (139 lb 15.9 oz), SpO2 94%.  Pertinent Physical Exam At Time of Discharge  Physical Exam  Vitals reviewed.   Constitutional:       Appearance: Normal appearance.   HENT:      Head: Normocephalic.      Right Ear: Tympanic membrane normal.      Nose: Nose normal.      Mouth/Throat:      Mouth: Mucous membranes are moist.   Cardiovascular:      Rate and Rhythm: Normal rate. Rhythm irregular.      Pulses: Normal pulses.      Heart sounds: Normal heart sounds.   Pulmonary:      Comments: Bibasilar crackles  Abdominal:      General: Abdomen is flat. Bowel sounds are normal.      Palpations: Abdomen is soft.   Skin:     Capillary Refill: Capillary refill takes less than 2 " seconds.   Neurological:      General: No focal deficit present.      Mental Status: She is alert.         Outpatient Follow-Up  Future Appointments   Date Time Provider Department Center   12/3/2024  2:00 PM KRAIG Dash-DONTA Moon DO FACP     Time spent during this discharge: >30 min

## 2024-10-30 NOTE — H&P
"History Of Present Illness  Lolis Auguste is a 69 y.o. female with a PMH of persistent AF on Eliquis s/p failed DCCV x 2 10/18,10/28 and acute systolic HF [potentially tachy-mediated], transferred from [facility] to [facility] for urgent AF ablation for HF iso AF refractory to Tikosyn and DCCV.     Brief OSH course  Admitted at [facility] on 10/23 after her cardiologist, Dr Hernández saw her for routine follow up and found out she was in in persistent AF RVR and HF. She was found to be in Afib with RVR to 140s, labs w/ mild hypokalemia, renal fxn at baseline, troponin negative, and . CXR with \"mild congestive heart failure.\" Cardiology is consulted for \"afib RVR, potential Tikosyn load\". She completed her Tikosyn load and was diuresed with IV lasix and required O2 via NC at 3L. Jardiance was added to GDMT and patient was on eliquis for SOLOMON thrombus. After failing to convert after DCCV on 10/28, a decision was made to transfer to [facility] for urgent AF ablation.          Past Medical History  She has a past medical history of Acute systolic heart failure (09/09/2024), Arrhythmia, Cardiomyopathy (09/09/2024), CHF (congestive heart failure), Persistent atrial fibrillation (Multi) (09/09/2024), and Thrombus of left atrial appendage (09/30/2024).    Surgical History  She has no past surgical history on file.     Social History  She reports that she has never smoked. She has never used smokeless tobacco. She reports current alcohol use. She reports that she does not use drugs.    Family History  No family history on file.     Allergies  Patient has no known allergies.    Current Outpatient Medications   Medication Instructions    apixaban (ELIQUIS) 5 mg, 2 times daily    furosemide (LASIX) 40 mg, 2 times daily (morning and late afternoon)    metoprolol tartrate (LOPRESSOR) 25 mg, oral, 3 times daily    multivitamin tablet 1 tablet, Daily        Review of Systems     Physical Exam     Last Recorded Vitals  There were no vitals taken " for this visit.    Relevant Results  {If you would like to pull in Medications, type .meds     If you would like to pull in Lab results for the last 24 hours, type .[id number]    If you would like to pull in Imaging results, type .[id number] :99}    Echo:  Transthoracic Echocardiogram 10/24/2024   1. Left ventricular ejection fraction is severely decreased, by visual estimate at 25%.   2. There is global hypokinesis of the left ventricle with minor regional variations.   3. Spectral Doppler shows an abnormal pattern of left ventricular diastolic filling.   4. Left ventricular cavity size is mild to moderately dilated.   5. No left ventricular thrombus visualized.   6. There is mildly reduced right ventricular systolic function.   7. The left atrium is severely dilated.   8. Moderate pericardial effusion.   9. Absent A-wave on MV spectral Doppler tracing, consistent with atrial fibrillation.  10. Severe mitral valve regurgitation.  11. Mildly elevated right ventricular systolic pressure.  12. The inferior vena cava appears severely dilated, with IVC inspiratory collapse less than 50%.        Transesophageal Echo (MIKE) With Possible Cardioversion 10/18/2024   1. The left ventricular systolic function is severely decreased, with a visually estimated ejection fraction of 25%.   2. Left ventricular diastolic filling was indeterminate.   3. Unsuccessful cardioversion for atrial fibrillation resulting in atrial tachycardia.   4. There is moderately reduced right ventricular systolic function.   5. The left atrium is mildly dilated.   6. Moderate to severe mitral valve regurgitation.   7. There is no shunt detected     Transesophageal Echo (MIKE) With Possible Cardioversion 09/20/2024   1. The left ventricular systolic function is severely decreased, with a visually estimated ejection fraction of 25%.   2. Left ventricular diastolic filling was indeterminate.   3. There is normal right ventricular global systolic function.   4.  The left atrium is moderately dilated.   5. SOLOMON thrombus identified. DCCV aborted.   6. Moderate mitral valve regurgitation.   7. There is no evidence of a patent foramen ovale.   8. There is no shunt detected.    XR chest 1 view    Result Date: 10/23/2024  Allowing for the aforementioned limitation, cardiomegaly and mild congestive heart failure.   Signed by: Jeremy Del Cid 10/23/2024 1:37 PM Dictation workstation:   [id number]       Assessment/Plan   Assessment & Plan  Mitral regurgitation    Lolis Auguste is a 69 y.o. female with a PMH of persistent AF on Eliquis s/p failed DCCV x 2 10/18,10/28 and acute systolic HF [potentially tachy-mediated], transferred from [facility] to [facility] for urgent AF ablation for HF iso AF refractory to Tikosyn and DCCV.     #Persistent AF with RVR  #SOLOMON thrombus-resolved  -[id number] score for Atrial Fibrillation Stroke Risk is 3(Age,sex,HF)  -was on Eliquis 5mg BID, Metoprolol 25mg TID.   -Completed Dofetilide load  -s/p failed DCCV x 2 10/18,10/28  -unable to tolerate Amiodarone d/t extreme fatigue   -transferred to [facility] for urgent AF ablation   Plan-     #Acute on Chronic Systolic Heart Failure   -Cardiomyopathy, suspected tachycardia induced   -TTE confirms LVEF 25% with global Hypokinesia.  -, CXR with mild pulmonary edema  -Guideline Directed Therapy has been limited by lower blood pressures and atrial fibrillation  -Tolerated diuresis with IV Lasix and Jardiance at OSH  -home lasix 40 mg bid  Plan-      F-caution I/s/o HF  E-replete prn  N-cardiac diet  GI ppx  DVT ppx-on heparin drip  Bowel regimen  Code status-dull code  NOK-      Yet to be staffed with Attending    Kush Cho MD  PGY-1  Internal Medicine

## 2024-10-30 NOTE — H&P
CTICU History & Physical    Subjective   HPI:  Lolis Auguste is a 69 y.o. female with PMH of newly diagnosed persistent AF on Eliquis s/p failed DCCV 10/18/2024, SOLOMON thrombus and aborted DCCV in 9/2024, and acute systolic HF [potentially tachy-mediated], who was recently admitted to [facility] on 10/23/2024 for AF RVR. Patient was seen by cardiologist today for routine follow-up, noted to be in persistent AF RVR. She was recently cardioverted 10/18 and discharged on Amiodarone but was unable to tolerated d/t extreme fatigue. Dofetilide 250 mcg BID was loaded but AF persisted, so that the decision was made to perform urgent RFA/PVI procedure in the setting of newly diagnosed NICM/tachy-mediated cardiomyopathy. Patient was transferred to [facility] for cardiac ablation.     Patient became increasingly hypotensive requiring norepinephrine ggt, epinephrine ggt. Cardiac ablation course complicated by vfib arrest requiring defibrillation. Follow by cardioversion x3 for SVT. Pericardial effusion require pericardial fluid drainage. 1000ml hemorhagic fluid drained. Patient was admitted to the CTICU sp ecmo canula for hypotension refractory to vasopressors.     Cardiac Testing:     TTE:10/24/2024   1. Left ventricular ejection fraction is severely decreased, by visual estimate at 25%.   2. There is global hypokinesis of the left ventricle with minor regional variations.   3. Spectral Doppler shows an abnormal pattern of left ventricular diastolic filling.   4. Left ventricular cavity size is mild to moderately dilated.   5. No left ventricular thrombus visualized.   6. There is mildly reduced right ventricular systolic function.   7. The left atrium is severely dilated.   8. Moderate pericardial effusion.   9. Absent A-wave on MV spectral Doppler tracing, consistent with atrial fibrillation.  10. Severe mitral valve regurgitation.  11. Mildly elevated right ventricular systolic pressure.  12. The inferior vena cava  appears severely dilated, with IVC inspiratory collapse less than 50%.      Procedure/Surgeon: Amie Delgadillo   Out of OR Time (document on ventilator card): 625     OR Course/Issues: Vfib arrest requiring 1 mg epinephrine and defibrillation. Followed by cardioversion x3 for SVT       Chest Tubes/Drains: Pericardial drain         Fluids  Crystalloid: 2.1l Lactated ringer   Colloid: 350 Albumin 25%   Products: 1 PRBC  EBL: 1.3 L   UOP: 1000 ml      Anesthesia  Intubation: Grade IIB with direct laryngoscopy   Intravenous Access: PIV 18G, 16, L radial pollo, 8 Fr Femoral sheath x2   NMB dose/last administration: 20mg rocuronium total @ 16:21   TOF/ reversal given: not given   Temperature on admission to ICU: 36    Past Medical History:   Diagnosis Date    Acute systolic heart failure 09/09/2024    Arrhythmia     Cardiomyopathy 09/09/2024    Echo 10/24/24  1. Left ventricular ejection fraction is severely decreased, by visual estimate at 25%.  2. There is global hypokinesis of the left ventricle with minor regional variations.  3. Spectral Doppler shows an abnormal pattern of left ventricular diastolic filling.  4. Left ventricular cavity size is mild to moderately dilated.  5. No left ventricular thrombus visualized.  6. There is mil    CHF (congestive heart failure)     Persistent atrial fibrillation (Multi) 09/09/2024    Thrombus of left atrial appendage 09/30/2024    MIKE 9/20/24       History reviewed. No pertinent surgical history.  Medications Prior to Admission   Medication Sig Dispense Refill Last Dose/Taking    apixaban (Eliquis) 5 mg tablet Take 1 tablet (5 mg) by mouth twice a day.   10/30/2024    furosemide (Lasix) 40 mg tablet Take 1 tablet (40 mg) by mouth 2 times daily (morning and late afternoon).   10/29/2024    metoprolol tartrate (Lopressor) 25 mg tablet Take 1 tablet (25 mg) by mouth 3 times a day. 90 tablet 11 10/30/2024    multivitamin tablet Take 1 tablet by mouth once daily.   10/29/2024     Patient  has no known allergies.  Social History     Tobacco Use    Smoking status: Never    Smokeless tobacco: Never   Substance Use Topics    Alcohol use: Yes     Comment: occassionally    Drug use: Never     No family history on file.    Review of Systems:  Cannot perform ROS because patient intubated, ventilated, sedated.     Objective   Vitals:  Most Recent:  There were no vitals filed for this visit.    24hr Min/Max:  Temp  Min: 36.6 °C (97.9 °F)  Max: 37.1 °C (98.7 °F)  Pulse  Min: 44  Max: 116  BP  Min: 95/54  Max: 101/62  Resp  Min: 15  Max: 17  SpO2  Min: 92 %  Max: 94 %    I/O:  No intake/output data recorded.    LDA:  Arterial Line 10/30/24 Left Radial (Active)   Placement Date/Time: 10/30/24 (c) 1339   Size: 20 G  Orientation: Left  Location: Radial  Securement Method: Transparent dressing  Patient Tolerance: Tolerated well   Number of days: 0       Venous Sheath 10/30/24 1411 8 Fr. Right Femoral (Active)   Placement Date/Time: 10/30/24 1411   Sheath Size: 8 Fr.  Line Orientation: Right  Sheath Insertion Site: Femoral   Number of days: 0       Venous Sheath 10/30/24 1411 Other (Comment) Right Femoral (Active)   Placement Date/Time: 10/30/24 1411   Sheath Size: (c) Other (Comment)  Line Orientation: Right  Sheath Insertion Site: Femoral   Number of days: 0       Venous Sheath 10/30/24 1411 Other (Comment) Right Femoral (Active)   Placement Date/Time: 10/30/24 1411   Sheath Size: (c) Other (Comment)  Line Orientation: Right  Sheath Insertion Site: Femoral   Number of days: 0       ETT  7 mm (Active)   Placement Date/Time: 10/30/24 (c) 1329   Mask Ventilation: Vent by mask + OA or adjuvant +/- NMBA  Technique: Direct laryngoscopy  ETT Type: ETT - single  Single Lumen Tube Size: 7 mm  Cuffed: Yes  Laryngoscope: Parvin  Blade Size: 4  Location: Ora...   Number of days: 0       Physical Exam:   Constitutional: Intubated and sedated on mechanical ventilation in NAD   Neuro: Neuro intact without obvious focal  deficits, on sedation.  PERRL  CV: Tachycardia.  S1S2.  SR on monitor.    Pulm: CTAB on mechanical ventilation.   : clear yellow urine via urbina  GI: S/ND/NT  Extremities: NV exam intact x 4.  No edema. Femoral ecmo canulla in place.   Skin: WDI. Skin intact  Psych: JUAREZ, sedated     Lab Review:  Results from last 7 days   Lab Units 10/30/24  0535   WBC AUTO x10*3/uL 6.8   HEMOGLOBIN g/dL 14.3   HEMATOCRIT % 44.2   PLATELETS AUTO x10*3/uL 318     Results from last 7 days   Lab Units 10/30/24  0535 10/24/24  0909 10/24/24  0907   SODIUM mmol/L 139   < > 136   POTASSIUM mmol/L 4.1   < > 4.2   CHLORIDE mmol/L 100   < > 104   CO2 mmol/L 33*   < > 23   BUN mg/dL 25*   < > 16   CREATININE mg/dL 1.03   < > 0.78   CALCIUM mg/dL 9.2   < > 9.1   PROTEIN TOTAL g/dL  --   --  6.9   BILIRUBIN TOTAL mg/dL  --   --  0.6   ALK PHOS U/L  --   --  72   ALT U/L  --   --  70*   AST U/L  --   --  29   GLUCOSE mg/dL 97   < > 144*    < > = values in this interval not displayed.     Results from last 7 days   Lab Units 10/30/24  0535   MAGNESIUM mg/dL 2.31     Results from last 7 days   Lab Units 10/30/24  1559   POCT PH, ARTERIAL pH 7.37*   POCT PCO2, ARTERIAL mm Hg 39   POCT PO2, ARTERIAL mm Hg 252*   POCT HCO3 CALCULATED, ARTERIAL mmol/L 22.5   POCT BASE EXCESS, ARTERIAL mmol/L -2.5*       Most recent labs and imaging reviewed.      Assessment/Plan     Assessment:  Lolis Auguste is a 69 y.o. female with PMH of newly diagnosed persistent AF on Eliquis s/p failed DCCV 10/18/2024, SOLOMON thrombus and aborted DCCV in 9/2024, and acute systolic HF [potentially tachy-mediated], who was recently admitted to [facility] on 10/23/2024 for AF RVR. Patient was seen by cardiologist today for routine follow-up, noted to be in persistent AF RVR. She was recently cardioverted 10/18 and discharged on Amiodarone but was unable to tolerated d/t extreme fatigue. Dofetilide 250 mcg BID was loaded but AF persisted, so that the decision was made to  perform urgent RFA/PVI at [facility].     Patient became increasingly hypotensive requiring norepinephrine ggt, epinephrine ggt. Cardiac ablation course complicated by vfib arrest requiring defibrillation. Follow by cardioversion x3 for SVT. Pericardial effusion require pericardial fluid drainage. 1000ml hemorhagic fluid drained. Patient was admitted to the CTICU sp ecmo canula for hypotension refractory to vasopressors.      Plan:  NEURO: Patient is intubated and sedated on propofol infusion. Acute post operative pain.   -->  - Serial neuro and pain assessments   - Continue propofol until NMB reversal, then daily sedation vacation at minimum   - NMB reversal when normothermic and hemodynamically stable.    - Scheduled Tylenol   - PRN oxycodone  - PRN dilaudid for pain   - PT Consult, OOB to chair as tolerated, chair position if not tolerated   - CAM ICU score qshift  - Sleep/wake cycle hygiene     CV:  Patient has a history of atrial fibrillation, NICM. Is now status post ecmo cannula, pericardial effusion drainage, atrial fibrillation ablation. Pre/Post EF: EF 25%/  Arrived to CTICU on epinenephrine 0.08, norepinephrine 0.04. Patient on VA ECMO. Pericardial drain draining blood -->  - Maintain goal MAP > 65   - Mixed venous and CI Q4H  - Volume resuscitate as clinically indicated  - Monitor pericardial drain output.  - Hold home metoprolol     PULM:  No history of pulmonary disease.  Currently intubated on ventilator.-->  - F/u post op CXR  - Once reversed, wean ventilator settings towards CPAP & extubation   - Wean FiO2 maintaining SpO2 >92%.   - IS q1h and OOB to chair when extubated    GI:  OG in place.-->  - Continue PPI until extubated  - NPO, will perform bedside swallow eval post extubation   - Colace/senna BID and miralax BID       : No history of renal disease, baseline creatinine 0.8. Creatinine stable post-op. Urbina in place and making adequate UOP. -->  - Continue urbnia catheter for strict I/Os.  - Goal  UOP 0.5ml/kg/hr  - RFP as clinically indicated  - Replete electrolytes per CTICU protocol  - Holding home furosemide      ENDO:  No past medical history of DM. A1c: 5.6-->  - Maintain BG <180, insulin per CTICU protocol     HEME:  Acute blood loss anemia and thrombocytopenia.-->    - Monitor drain output volume and characteristics  - CBC, coags, and fibrinogen post op and as clinically indicated  - Start ASA 6hrs post-op for CABG  - If CABG is 2/2 STEMI/unstable angina, will receive Plavix POD2  - SQH tomorrow   - SCDs for DVT prophylaxis.  - Last type and screen: 10/30/2024  - Holding home apixaban      ID:  Afebrile, no current indications of infection. No past MRSA screen .-->  - Trend temp q4h  - Obtain MRSA swab   - Continue zosyn       Skin:  No active skin issues.  - preventative Mepilex dressings in place on sacrum and heels  - change preventative Mepilex weekly or more frequently as indicated (when moist/soiled)   - every shift skin assessment per nursing and weekly ICU skin rounds  - moisture barrier to be applied with earnestine care  - active skin problems addressed with nursing on daily rounds     Proph:  SCDs  PPI     G:  Line  Femoral 8F sheath x2   Left radial a-line placed 10/30/2024  PIV 18G, PIV 16G     F: Family: will update at bedside postoperatively.     A,B,C,D,E,F,G: reviewed    Dispo: CTICU care for now.    CTICU TEAM PHONE [id number]

## 2024-10-30 NOTE — PROGRESS NOTES
Subjective/Objective Data:  Patient seen and examined this morning  - [place] [facility] [place]   - Apixaban has been held as requested   - Telemetry shows Atrial Fibrillation rates steady 110-140. No rates close to the documented 57 this AM have been observed, suggesting an erroneous entry.    Diuresing well, no I&Os recorded, no change in weight    Telemetry reviewed, AF with -140s      Vitals:    10/30/24 0010 10/30/24 0417 10/30/24 0500 10/30/24 0700   BP: 95/54 97/64  97/62   BP Location: Right arm Right arm  Right arm   Patient Position: Lying      Pulse:   (!) 116 57   Resp: 15 16     Temp: 36.9 °C (98.4 °F) 37.1 °C (98.7 °F)  36.6 °C (97.9 °F)   TempSrc: Oral Oral  Oral   SpO2: 93% 94%     Weight:       Height:          Last I/O:  No intake/output data recorded.    Past Cardiology Tests (Last 3 Years):  EKG:  ECG 10/27/24 1026 After 8th dose Manual: QT/Qtc 320/456 msec      ECG 10/26/24 2239 After 7th dose Manual: QT/Qtc 360 msec/507 msec :         ECG 10/26/24 1252 After 6th dose Manual: QT/Qtc 320 msec/485 msec :         ECG 12 Lead 10/25/24 1025 -- Tikosyn Dose #4 QTc 407 250mcg dosing      ECG 12 Lead 10/24/2024 2246 -- Tikosyn Dose #3 QTc 404 250mcg dosing      ECG 12 lead 10/24/2024 10:44 ( Tikosyn Dose #2 )  Qtc 395 -- 250 mcg Dosing       ECG 12 lead 10/23/2024 @ 22:59 -- Tikosyn Dose #1 - Manual Qtc  464 msec  250 mcg Dosing       ECG 12 lead 10/23/2024 @ 15:33 ( Pre Tikosyn )       Echo:  Transthoracic Echocardiogram 10/24/2024   1. Left ventricular ejection fraction is severely decreased, by visual estimate at 25%.   2. There is global hypokinesis of the left ventricle with minor regional variations.   3. Spectral Doppler shows an abnormal pattern of left ventricular diastolic filling.   4. Left ventricular cavity size is mild to moderately dilated.   5. No left ventricular thrombus visualized.   6. There is mildly reduced right ventricular systolic function.   7. The left  atrium is severely dilated.   8. Moderate pericardial effusion.   9. Absent A-wave on MV spectral Doppler tracing, consistent with atrial fibrillation.  10. Severe mitral valve regurgitation.  11. Mildly elevated right ventricular systolic pressure.  12. The inferior vena cava appears severely dilated, with IVC inspiratory collapse less than 50%.      Transesophageal Echo (MIKE) With Possible Cardioversion 10/18/2024   1. The left ventricular systolic function is severely decreased, with a visually estimated ejection fraction of 25%.   2. Left ventricular diastolic filling was indeterminate.   3. Unsuccessful cardioversion for atrial fibrillation resulting in atrial tachycardia.   4. There is moderately reduced right ventricular systolic function.   5. The left atrium is mildly dilated.   6. Moderate to severe mitral valve regurgitation.   7. There is no shunt detected    Transesophageal Echo (MIKE) With Possible Cardioversion 09/20/2024   1. The left ventricular systolic function is severely decreased, with a visually estimated ejection fraction of 25%.   2. Left ventricular diastolic filling was indeterminate.   3. There is normal right ventricular global systolic function.   4. The left atrium is moderately dilated.   5. SOLOMON thrombus identified. DCCV aborted.   6. Moderate mitral valve regurgitation.   7. There is no evidence of a patent foramen ovale.   8. There is no shunt detected.    Diagnostic Results   Labs\    Results from last 7 days   Lab Units 10/30/24  0535 10/29/24  0528 10/28/24  0526   WBC AUTO x10*3/uL 6.8 7.6 6.4   HEMOGLOBIN g/dL 14.3 13.9 13.5   HEMATOCRIT % 44.2 41.5 40.4   PLATELETS AUTO x10*3/uL 318 303 285     Results from last 7 days   Lab Units 10/30/24  0535 10/29/24  0528 10/28/24  0526 10/24/24  0909 10/24/24  0907 10/23/24  1254   SODIUM mmol/L 139 137 137   < > 136 139   POTASSIUM mmol/L 4.1 4.1 3.4*   < > 4.2 3.4*   CHLORIDE mmol/L 100 99 100   < > 104 103   CO2 mmol/L 33* 29 26   < >  "23 25   BUN mg/dL 25* 25* 19   < > 16 16   CREATININE mg/dL 1.03 0.86 0.80   < > 0.78 0.89   CALCIUM mg/dL 9.2 9.2 8.7   < > 9.1 8.6   PROTEIN TOTAL g/dL  --   --   --   --  6.9 6.8   BILIRUBIN TOTAL mg/dL  --   --   --   --  0.6 0.6   ALK PHOS U/L  --   --   --   --  72 73   ALT U/L  --   --   --   --  70* 73*   AST U/L  --   --   --   --  29 35   GLUCOSE mg/dL 97 97 100*   < > 144* 132*    < > = values in this interval not displayed.       CV Labs  Troponin I, High Sensitivity   Date/Time Value Ref Range Status   10/23/2024 02:52 PM 14 (H) 0 - 13 ng/L Final   10/23/2024 12:54 PM 12 0 - 13 ng/L Final     BNP   Date/Time Value Ref Range Status   10/23/2024 12:54  (H) 0 - 99 pg/mL Final     Hemoglobin A1C   Date/Time Value Ref Range Status   08/30/2024 11:41 AM 5.6 <5.7 % Final     Comment:     Normal less than 5.7%  Prediabetes 5.7% to 6.4%  Diabetes 6.5% or higher      --HgbA1C levels may not be accurate in patients who have renal disease, received recent blood transfusions, are anemic, or who have dyshemoglobinemia.     Pertinent Imaging  XR chest 1 view 10/23/2024  Allowing for the aforementioned limitation, cardiomegaly and mild congestive heart failure.            Inpatient Medications:  Scheduled medications   Medication Dose Route Frequency    [Held by provider] apixaban  5 mg oral BID    dofetilide  250 mcg oral q12h    [Held by provider] empagliflozin  10 mg oral Daily    metoprolol tartrate  25 mg oral TID    torsemide  60 mg oral Daily     PRN medications   Medication    acetaminophen    Or    acetaminophen    Or    acetaminophen    oxygen     Continuous Medications   Medication Dose Last Rate     Physical Exam:  BP 97/62 (BP Location: Right arm)   Pulse 57   Temp 36.6 °C (97.9 °F) (Oral)   Resp 16   Ht 1.499 m (4' 11\")   Wt 63.5 kg (139 lb 15.9 oz)   SpO2 94%   BMI 28.27 kg/m²   Vitals and nursing notes reviewed.  GENERAL: Alert and awake, cooperative; in no acute distress; elderly " "female  SKIN: Warm and dry, cap refill <2  HEENT: Normocephalic, PEERL, mucous membranes pink and moist  NECK: +JVD, ~10 cm  CARDIAC: Irregular irregular rate and rhythm, tachycardic, S1S2, no murmurs or abnormal heart sounds  CHEST: Clear upper lobes, diminished in bases; diminished AE throughout, on supplemental oxygen  ABDOMEN: soft, mildly distended, non-tender with palpation  EXTREMITIES: No lower extremity edema, normal pulses all 4 extremities  NEURO: Alert and oriented, mental status at baseline, no focal deficits  PSYCH: High anxiety     Assessment/Plan   Lolis Auguste is a 69 y.o. female, with a PMH of persistent AF on Eliquis s/p failed DCCV 10/18/24 and acute systolic HF [potentially tachy-mediated], who presented to [facility] on 10/23/2024 for AF RVR. Patient was seen by Dr. Hernández today for routine follow-up, noted to be in persistent AF RVR. She was recently cardioverted 10/18 and discharged on Amiodarone but was unable to tolerated d/t extreme fatigue. Patient reports she has also not be She now has developed dyspnea at rest and leg edema, needing to increase her home Lasix x2 days, which improved her symptoms. Unfortunately, her BP is suboptimal for HF optimization d/t her arrhythmia. Dr. Hernández discussed patient with Dr. Ward and plan was for patient to be admitted for Tikosyn loading as bridge to ablation. ED course notable for AF RVR with HR in the 130s, labs w/ mild hypokalemia, renal fxn at baseline, troponin negative, and . CXR with \"mild congestive heart failure.\" Cardiology is consulted for \"afib RVR, potential Tikosyn load\".      Home CV Medications: Lasix 40mg BID, Eliquis 5mg BID, Metoprolol Tartrate 25mg TID     #Persistent AF with RVR- Failed previous DCCV, unable to tolerate Amiodarone  -XTA2TB2-ROEe score for Atrial Fibrillation Stroke Risk is 3, moderate-high for thromboembolic event  -c/w Eliquis 5mg BID (Confirmed she has not missed any doses)  -Metoprolol 25mg " increased to TID. Hold for SBP <95, HR <60.  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   -Warm and Perfused at this time, Remains hypervolemic   -TTE confirms LVEF 25% with global Hypokinesia; evidence of Increase RAP and Volume Overload   -, CXR with mild pulmonary edema, mildly volume up on exam  -Guideline Directed Therapy has been limited by lower blood pressures and atrial fibrillation  -Tolerating diuresis with IV Lasix 40mg BID and Jardiance     RECOMMENDATIONS:  -Continue current dose of Tikosyn, has completed full load  -Keep magnesium greater than 2 and potassium greater than 4  -c/w Eliquis 5mg BID for anticoagulation ( AM Dose held 10/30/2024 as requested by EP for EPS )   -Continue Metoprolol Tartrate 25mg TID  -PO Torsemide 60mg daily  -Jardiance 10 mg  ( Held 10/30/2024 as requested by EP)   -Continuous telemetry monitoring while admitted  -Daily wts, strict I&Os, low sodium diet   -[place]blation is tentatively scheduled [place]10/30/2024 [place] [facility] [place], hold Jardiance and AM dose of Eliquis per EP  -No cardiac barriers to discharge vs transfer to [facility] [place]    DO NOT GIVE DILTIAZEM DUE TO SYSTOLIC HEART FAILURE  DO NOT GIVE AMIODARONE or DIGOXIN DUE TO DOFETILIDE        Lyndon Moss DO   Division of Cardiovascular Medicine  [facility] [place]

## 2024-10-30 NOTE — Clinical Note
Rt femoral venous sheath exchanged for ECMO, other rt femoral venous sheaths x 2 remain in place for central line access for pressors

## 2024-10-30 NOTE — PROGRESS NOTES
10/30/24 0922   Discharge Planning   Expected Discharge Disposition [place]  [place]  [facility]        Patient is on Tikosyn loading. Still in A Fib. [place] [facility] [place], which is scheduled for today. When patient is medically ready will go home, no needs.

## 2024-10-30 NOTE — POST-PROCEDURE NOTE
Physician Transition of Care Summary  Invasive Cardiovascular Lab    Procedure Date: 10/30/2024  Attending:    * Stepan Ward - Primary  Resident/Fellow/Other Assistant: Surgeons and Role:     * Sabina Coughlin MD - Fellow    Indications:   Pre-op Diagnosis      * Persistent atrial fibrillation (Multi) [I48.19]     * Cardiomyopathy [I42.9]    Post-procedure diagnosis:   Post-op Diagnosis     * Persistent atrial fibrillation (Multi) [I48.19]     * Cardiomyopathy [I42.9]    Procedure(s):   Ablation A-Fib Persistant  44138 - CT COMPRE EP EVAL ABLTJ ATR FIB PULM VEIN ISOLATION        Summary:  Aborted urgent radiofrequency ablation for persistent Atrial fibrillation due to moderate-large size of pericardial effusion complicated with hemodynamic instability.  Urgent pericardiocenteses was successfully performed and it evacuated a total of 1000 mL of hemorrhagic pericardial fluid. After the drainage, the limited TTE demonstrated the resolution of the pericardial effusion but patient was still hemodynamic unstable in EP lab.  The moderate-large pericardial effusion was noticed before placing the pacing catheter, trans-septal catheterization, mapping, and radiofrequency ablation procedure.   Adult SHOCK Team was activated and the decision was made to perform emergent VA ECMO cannulation in EP lab.  Patient was transferred to CTICU for the further management.     Recommendation:  Because this case already had Dofetilide 250 mcg BID loading in [facility], to avoid excessive anti-arrhythmic effects, please avoid using Amiodarone and Digoxin this time.  Due to severe LV systolic dysfunction, please avoid using Diltiazem for AF rate control but low-dose beta-blockers can be considered.  This case will require further evaluations of acute anjali-pericarditis contributing to both new large pericardial effusion and persistent atrial fibrillation.    Complications:   None    Estimated Blood Loss:   1999  mL    Anesthesia: General Anesthesia Staff: Anesthesiologist: Rogelio Nina MD; Crista Cardoza MD  CRNA: KRAIG Beckman-CRNA  C-AA: MIMI Holguin    Any Specimen(s) Removed:   Order Name Source Comment Collection Info Order Time   STERILE FLUID CULTURE/SMEAR Pericardial  Collected By: Donya Jaeger RN 10/30/2024  4:53 PM     Release result to MyChart   Immediate        AFB CULTURE/SMEAR Pericardial  Collected By: Donya Jaeger RN 10/30/2024  4:53 PM     Release result to MyChart   Immediate        BLOOD GAS ARTERIAL FULL PANEL Blood, Arterial  Collected By: Donya Jaeger RN 10/30/2024  2:25 PM     Release result to MyChart   Immediate        TYPE AND SCREEN Blood, Venous  Collected By: Lab, Freetext Collection 10/30/2024  3:14 PM     Release result to MyChart   Immediate        VERAB/VERIFY ABORH Blood, Venous **This is for confirming/verifying history of ABORh on file for transfusion of blood products. If this is not for transfusion, please order an ABO/RH [[id number]]. If you have any questions or unsure what to order, please call the blood bank.** Collected By: Raimundo, Freetext Collection 10/30/2024  3:15 PM     Release result to MyChart   Immediate        VERAB/VERIFY ABORH Blood, Venous   10/30/2024  3:18 PM     Release result to MyChart   Immediate        ALBUMIN, FLUID Pericardial Cavity   10/30/2024  4:53 PM     Release result to MyChart   Immediate        BODY FLUID CELL COUNT Pericardium  Collected By: Donya Jaeger RN 10/30/2024  4:53 PM     Release result to MyChart   Immediate        BODY FLUID CELL COUNT WITH DIFFERENTIAL Pericardium   10/30/2024  4:53 PM     Release result to MyChart   Immediate        GLUCOSE, FLUID Pericardial Cavity   10/30/2024  4:53 PM     Release result to MyChart   Immediate        LACTATE DEHYDROGENASE, FLUID Pericardial Cavity   10/30/2024  4:53 PM     Release result to MyChart   Immediate        SODIUM, BODY FLUID Pericardium    10/30/2024  4:53 PM     Release result to MyChart   Immediate        PROTEIN, TOTAL FLUID Pericardial Cavity   10/30/2024  4:53 PM     Release result to MyChart   Immediate        CHOLESTEROL, BODY FLUID Pericardial Cavity   10/30/2024  4:53 PM     Release result to MyChart   Immediate        BILIRUBIN, TOTAL FLUID Pericardial Cavity   10/30/2024  4:53 PM     Release result to MyChart   Immediate        PREPARE RBC    10/30/2024  3:19 PM     Transfusion indications   Surgical Blood Order (for use in OR)          Has consent been obtained?   Yes          Does patient have a hemoglobinopathy?   No        PREPARE PLASMA Blood, Venous   10/30/2024  3:19 PM     Transfusion indications   Surgical Blood Order (for use in OR)          Has consent been obtained?   Yes        CYTOLOGY CONSULTATION (NON-GYNECOLOGIC) PERICARDIAL FLUID  Collected By: Donya Jaeger RN 10/30/2024  4:53 PM     How many specimens will you need? (If more than 10 start a new case)   1          Source of Specimen A:   PERICARDIAL FLUID          Fixative Specimen A:   Cytolyt          Clinical History/Special Request:   Unexpected hemorrhagic pericardial effusion concerning for malignancy          Release result to MyChart   Immediate            Disposition:   CTICU      Electronically signed by: Sabina Coughlin MD, 10/30/2024 7:02 PM

## 2024-10-30 NOTE — H&P
"History Of Present Illness  Lolis Auguste is a 69 y.o. female with a PMH of persistent AF on Eliquis s/p failed DCCV x 2 10/18,10/28 and acute systolic HF [potentially tachy-mediated], transferred from [facility] to [facility] for urgent AF ablation for HF iso AF refractory to Tikosyn and DCCV.     Brief OSH course  Admitted at [facility] on 10/23 after her cardiologist, Dr Hernández saw her for routine follow up and found out she was in in persistent AF RVR and HF. She was found to be in Afib with RVR to 140s, labs w/ mild hypokalemia, renal fxn at baseline, troponin negative, and . CXR with \"mild congestive heart failure.\" Cardiology is consulted for \"afib RVR, potential Tikosyn load\". She completed her Tikosyn load and was diuresed with IV lasix and required O2 via NC at 3L. Jardiance was added to GDMT and patient was on eliquis for SOLOMON thrombus. After failing to convert after DCCV on 10/28, a decision was made to transfer to [facility] for urgent AF ablation.          Past Medical History  She has a past medical history of Acute systolic heart failure (09/09/2024), Arrhythmia, Cardiomyopathy (09/09/2024), CHF (congestive heart failure), Persistent atrial fibrillation (Multi) (09/09/2024), and Thrombus of left atrial appendage (09/30/2024).    Surgical History  She has no past surgical history on file.     Social History  She reports that she has never smoked. She has never used smokeless tobacco. She reports current alcohol use. She reports that she does not use drugs.    Family History  No family history on file.     Allergies  Patient has no known allergies.    Current Outpatient Medications   Medication Instructions    apixaban (ELIQUIS) 5 mg, 2 times daily    furosemide (LASIX) 40 mg, 2 times daily (morning and late afternoon)    metoprolol tartrate (LOPRESSOR) 25 mg, oral, 3 times daily    multivitamin tablet 1 tablet, Daily        Review of Systems     Physical Exam     Last Recorded Vitals  /62   Pulse 57   " Temp 36.8 °C (98.2 °F)   Resp 17   Wt 63.5 kg (139 lb 15.9 oz)   SpO2 94%     Relevant Results  {If you would like to pull in Medications, type .meds     If you would like to pull in Lab results for the last 24 hours, type .[id number]    If you would like to pull in Imaging results, type .imgrslt :99}    Echo:  Transthoracic Echocardiogram 10/24/2024   1. Left ventricular ejection fraction is severely decreased, by visual estimate at 25%.   2. There is global hypokinesis of the left ventricle with minor regional variations.   3. Spectral Doppler shows an abnormal pattern of left ventricular diastolic filling.   4. Left ventricular cavity size is mild to moderately dilated.   5. No left ventricular thrombus visualized.   6. There is mildly reduced right ventricular systolic function.   7. The left atrium is severely dilated.   8. Moderate pericardial effusion.   9. Absent A-wave on MV spectral Doppler tracing, consistent with atrial fibrillation.  10. Severe mitral valve regurgitation.  11. Mildly elevated right ventricular systolic pressure.  12. The inferior vena cava appears severely dilated, with IVC inspiratory collapse less than 50%.        Transesophageal Echo (MIKE) With Possible Cardioversion 10/18/2024   1. The left ventricular systolic function is severely decreased, with a visually estimated ejection fraction of 25%.   2. Left ventricular diastolic filling was indeterminate.   3. Unsuccessful cardioversion for atrial fibrillation resulting in atrial tachycardia.   4. There is moderately reduced right ventricular systolic function.   5. The left atrium is mildly dilated.   6. Moderate to severe mitral valve regurgitation.   7. There is no shunt detected     Transesophageal Echo (MIKE) With Possible Cardioversion 09/20/2024   1. The left ventricular systolic function is severely decreased, with a visually estimated ejection fraction of 25%.   2. Left ventricular diastolic filling was indeterminate.   3.  There is normal right ventricular global systolic function.   4. The left atrium is moderately dilated.   5. SOLOMON thrombus identified. DCCV aborted.   6. Moderate mitral valve regurgitation.   7. There is no evidence of a patent foramen ovale.   8. There is no shunt detected.    XR chest 1 view    Result Date: 10/23/2024  Allowing for the aforementioned limitation, cardiomegaly and mild congestive heart failure.   Signed by: Jeremy Del Cid 10/23/2024 1:37 PM Dictation workstation:   [id number]       Assessment/Plan   Assessment & Plan  Other cardiomyopathy    Cardiomyopathy    Lolis Auguste is a 69 y.o. female with a PMH of persistent AF on Eliquis s/p failed DCCV x 2 10/18,10/28 and acute systolic HF [potentially tachy-mediated], transferred from [facility] to [facility] for urgent AF ablation for HF iso AF refractory to Tikosyn and DCCV.     #Persistent AF with RVR  #SOLOMON thrombus-resolved  -[id number]-LDZh score for Atrial Fibrillation Stroke Risk is 3(Age,sex,HF)  -was on Eliquis 5mg BID, Metoprolol 25mg TID.   -Completed Dofetilide load  -s/p failed DCCV x 2 10/18,10/28  -unable to tolerate Amiodarone d/t extreme fatigue   -transferred to [facility] for urgent AF ablation   Plan-     #Acute on Chronic Systolic Heart Failure   -Cardiomyopathy, suspected tachycardia induced   -TTE confirms LVEF 25% with global Hypokinesia.  -, CXR with mild pulmonary edema  -Guideline Directed Therapy has been limited by lower blood pressures and atrial fibrillation  -Tolerated diuresis with IV Lasix and Jardiance at OSH  -home lasix 40 mg bid  Plan-      F-caution I/s/o HF  E-replete prn  N-cardiac diet  GI ppx  DVT ppx-on heparin drip  Bowel regimen  Code status-dull code  NOK-      Yet to be staffed with Attending    Kush Cho MD  PGY-1  Internal Medicine

## 2024-10-31 ENCOUNTER — APPOINTMENT (OUTPATIENT)
Dept: CARDIOLOGY | Facility: HOSPITAL | Age: 69
End: 2024-10-31
Payer: MEDICARE

## 2024-10-31 LAB
ALBUMIN SERPL BCP-MCNC: 4.6 G/DL (ref 3.4–5)
ALBUMIN SERPL BCP-MCNC: 4.7 G/DL (ref 3.4–5)
ANION GAP BLDA CALCULATED.4IONS-SCNC: 11 MMO/L (ref 10–25)
ANION GAP BLDA CALCULATED.4IONS-SCNC: 12 MMO/L (ref 10–25)
ANION GAP BLDA CALCULATED.4IONS-SCNC: 14 MMO/L (ref 10–25)
ANION GAP BLDA CALCULATED.4IONS-SCNC: ABNORMAL MMOL/L
ANION GAP BLDA CALCULATED.4IONS-SCNC: ABNORMAL MMOL/L
ANION GAP SERPL CALC-SCNC: 16 MMOL/L (ref 10–20)
ANION GAP SERPL CALC-SCNC: 18 MMOL/L (ref 10–20)
APTT PPP: 28 SECONDS (ref 27–38)
BASE EXCESS BLDA CALC-SCNC: -0.7 MMOL/L (ref -2–3)
BASE EXCESS BLDA CALC-SCNC: -3.2 MMOL/L (ref -2–3)
BASE EXCESS BLDA CALC-SCNC: 0.1 MMOL/L (ref -2–3)
BASE EXCESS BLDA CALC-SCNC: 0.5 MMOL/L (ref -2–3)
BASE EXCESS BLDA CALC-SCNC: 1.4 MMOL/L (ref -2–3)
BODY TEMPERATURE: 37 DEGREES CELSIUS
BUN SERPL-MCNC: 15 MG/DL (ref 6–23)
BUN SERPL-MCNC: 17 MG/DL (ref 6–23)
CA-I BLD-SCNC: 1.15 MMOL/L (ref 1.1–1.33)
CA-I BLD-SCNC: 1.17 MMOL/L (ref 1.1–1.33)
CA-I BLDA-SCNC: 1.02 MMOL/L (ref 1.1–1.33)
CA-I BLDA-SCNC: 1.14 MMOL/L (ref 1.1–1.33)
CA-I BLDA-SCNC: 1.16 MMOL/L (ref 1.1–1.33)
CA-I BLDA-SCNC: 1.17 MMOL/L (ref 1.1–1.33)
CA-I BLDA-SCNC: 1.22 MMOL/L (ref 1.1–1.33)
CALCIUM SERPL-MCNC: 8.9 MG/DL (ref 8.6–10.6)
CALCIUM SERPL-MCNC: 9.4 MG/DL (ref 8.6–10.6)
CHLORIDE BLDA-SCNC: 109 MMOL/L (ref 98–107)
CHLORIDE BLDA-SCNC: 111 MMOL/L (ref 98–107)
CHLORIDE BLDA-SCNC: 114 MMOL/L (ref 98–107)
CHLORIDE BLDA-SCNC: ABNORMAL MMOL/L
CHLORIDE BLDA-SCNC: ABNORMAL MMOL/L
CHLORIDE SERPL-SCNC: 106 MMOL/L (ref 98–107)
CHLORIDE SERPL-SCNC: 107 MMOL/L (ref 98–107)
CO2 SERPL-SCNC: 23 MMOL/L (ref 21–32)
CO2 SERPL-SCNC: 27 MMOL/L (ref 21–32)
CREAT SERPL-MCNC: 0.81 MG/DL (ref 0.5–1.05)
CREAT SERPL-MCNC: 1.01 MG/DL (ref 0.5–1.05)
EGFRCR SERPLBLD CKD-EPI 2021: 60 ML/MIN/1.73M*2
EGFRCR SERPLBLD CKD-EPI 2021: 78 ML/MIN/1.73M*2
EJECTION FRACTION APICAL 4 CHAMBER: 18
EJECTION FRACTION: 18 %
ERYTHROCYTE [DISTWIDTH] IN BLOOD BY AUTOMATED COUNT: 15.8 % (ref 11.5–14.5)
ERYTHROCYTE [DISTWIDTH] IN BLOOD BY AUTOMATED COUNT: 16.1 % (ref 11.5–14.5)
GLUCOSE BLD MANUAL STRIP-MCNC: 171 MG/DL (ref 74–99)
GLUCOSE BLDA-MCNC: 151 MG/DL (ref 74–99)
GLUCOSE BLDA-MCNC: 152 MG/DL (ref 74–99)
GLUCOSE BLDA-MCNC: 152 MG/DL (ref 74–99)
GLUCOSE BLDA-MCNC: 155 MG/DL (ref 74–99)
GLUCOSE BLDA-MCNC: 166 MG/DL (ref 74–99)
GLUCOSE SERPL-MCNC: 148 MG/DL (ref 74–99)
GLUCOSE SERPL-MCNC: 178 MG/DL (ref 74–99)
HCO3 BLDA-SCNC: 20.2 MMOL/L (ref 22–26)
HCO3 BLDA-SCNC: 21.2 MMOL/L (ref 22–26)
HCO3 BLDA-SCNC: 22.9 MMOL/L (ref 22–26)
HCO3 BLDA-SCNC: 23.4 MMOL/L (ref 22–26)
HCO3 BLDA-SCNC: 27.3 MMOL/L (ref 22–26)
HCT VFR BLD AUTO: 30.7 % (ref 36–46)
HCT VFR BLD AUTO: 33.3 % (ref 36–46)
HCT VFR BLD EST: 26 % (ref 36–46)
HCT VFR BLD EST: 32 % (ref 36–46)
HCT VFR BLD EST: 35 % (ref 36–46)
HGB BLD-MCNC: 10.2 G/DL (ref 12–16)
HGB BLD-MCNC: 10.7 G/DL (ref 12–16)
HGB BLDA-MCNC: 10.6 G/DL (ref 12–16)
HGB BLDA-MCNC: 10.7 G/DL (ref 12–16)
HGB BLDA-MCNC: 10.8 G/DL (ref 12–16)
HGB BLDA-MCNC: 11.6 G/DL (ref 12–16)
HGB BLDA-MCNC: 8.7 G/DL (ref 12–16)
INHALED O2 CONCENTRATION: 100 %
INHALED O2 CONCENTRATION: 40 %
INR PPP: 1.3 (ref 0.9–1.1)
LABORATORY COMMENT REPORT: NORMAL
LABORATORY COMMENT REPORT: NORMAL
LACTATE BLDA-SCNC: 1.8 MMOL/L (ref 0.4–2)
LACTATE BLDA-SCNC: 1.8 MMOL/L (ref 0.4–2)
LACTATE BLDA-SCNC: 2.4 MMOL/L (ref 0.4–2)
LACTATE BLDA-SCNC: 2.7 MMOL/L (ref 0.4–2)
LACTATE BLDA-SCNC: 3.1 MMOL/L (ref 0.4–2)
LDH SERPL L TO P-CCNC: 142 U/L (ref 84–246)
LEFT VENTRICULAR OUTFLOW TRACT DIAMETER: 2.1 CM
LIDOCAIN SERPL-MCNC: 2.9 UG/ML (ref 1–5)
LIDOCAIN SERPL-MCNC: 2.9 UG/ML (ref 1–5)
MAGNESIUM SERPL-MCNC: 2.9 MG/DL (ref 1.6–2.4)
MAGNESIUM SERPL-MCNC: 2.93 MG/DL (ref 1.6–2.4)
MCH RBC QN AUTO: 29 PG (ref 26–34)
MCH RBC QN AUTO: 29.1 PG (ref 26–34)
MCHC RBC AUTO-ENTMCNC: 32.1 G/DL (ref 32–36)
MCHC RBC AUTO-ENTMCNC: 33.2 G/DL (ref 32–36)
MCV RBC AUTO: 88 FL (ref 80–100)
MCV RBC AUTO: 90 FL (ref 80–100)
MYCOBACTERIUM SPEC CULT: NORMAL
NRBC BLD-RTO: 0 /100 WBCS (ref 0–0)
NRBC BLD-RTO: 0 /100 WBCS (ref 0–0)
OXYHGB MFR BLDA: 94.9 % (ref 94–98)
OXYHGB MFR BLDA: 95.8 % (ref 94–98)
OXYHGB MFR BLDA: 96.6 % (ref 94–98)
OXYHGB MFR BLDA: 97 % (ref 94–98)
OXYHGB MFR BLDA: 97.4 % (ref 94–98)
PATH REPORT.FINAL DX SPEC: NORMAL
PATH REPORT.GROSS SPEC: NORMAL
PATH REPORT.RELEVANT HX SPEC: NORMAL
PATH REPORT.TOTAL CANCER: NORMAL
PCO2 BLDA: 26 MM HG (ref 38–42)
PCO2 BLDA: 28 MM HG (ref 38–42)
PCO2 BLDA: 29 MM HG (ref 38–42)
PCO2 BLDA: 30 MM HG (ref 38–42)
PCO2 BLDA: 53 MM HG (ref 38–42)
PH BLDA: 7.32 PH (ref 7.38–7.42)
PH BLDA: 7.45 PH (ref 7.38–7.42)
PH BLDA: 7.49 PH (ref 7.38–7.42)
PH BLDA: 7.52 PH (ref 7.38–7.42)
PH BLDA: 7.53 PH (ref 7.38–7.42)
PHOSPHATE SERPL-MCNC: 2.5 MG/DL (ref 2.5–4.9)
PHOSPHATE SERPL-MCNC: 4.7 MG/DL (ref 2.5–4.9)
PLATELET # BLD AUTO: 214 X10*3/UL (ref 150–450)
PLATELET # BLD AUTO: 221 X10*3/UL (ref 150–450)
PO2 BLDA: 363 MM HG (ref 85–95)
PO2 BLDA: 448 MM HG (ref 85–95)
PO2 BLDA: 71 MM HG (ref 85–95)
PO2 BLDA: 71 MM HG (ref 85–95)
PO2 BLDA: 96 MM HG (ref 85–95)
POTASSIUM BLDA-SCNC: 3.3 MMOL/L (ref 3.5–5.3)
POTASSIUM BLDA-SCNC: 3.6 MMOL/L (ref 3.5–5.3)
POTASSIUM BLDA-SCNC: 3.6 MMOL/L (ref 3.5–5.3)
POTASSIUM BLDA-SCNC: 3.7 MMOL/L (ref 3.5–5.3)
POTASSIUM BLDA-SCNC: 4.2 MMOL/L (ref 3.5–5.3)
POTASSIUM SERPL-SCNC: 3.8 MMOL/L (ref 3.5–5.3)
POTASSIUM SERPL-SCNC: 3.8 MMOL/L (ref 3.5–5.3)
PROTHROMBIN TIME: 15 SECONDS (ref 9.8–12.8)
RBC # BLD AUTO: 3.5 X10*6/UL (ref 4–5.2)
RBC # BLD AUTO: 3.69 X10*6/UL (ref 4–5.2)
RIGHT VENTRICLE FREE WALL PEAK S': 7.51 CM/S
RIGHT VENTRICLE PEAK SYSTOLIC PRESSURE: 30.7 MMHG
SAO2 % BLDA: 100 % (ref 94–100)
SAO2 % BLDA: 98 % (ref 94–100)
SAO2 % BLDA: 99 % (ref 94–100)
SODIUM BLDA-SCNC: 141 MMOL/L (ref 136–145)
SODIUM BLDA-SCNC: 142 MMOL/L (ref 136–145)
SODIUM BLDA-SCNC: 142 MMOL/L (ref 136–145)
SODIUM SERPL-SCNC: 143 MMOL/L (ref 136–145)
SODIUM SERPL-SCNC: 146 MMOL/L (ref 136–145)
TRICUSPID ANNULAR PLANE SYSTOLIC EXCURSION: 1 CM
WBC # BLD AUTO: 15.2 X10*3/UL (ref 4.4–11.3)
WBC # BLD AUTO: 16.9 X10*3/UL (ref 4.4–11.3)

## 2024-10-31 PROCEDURE — 85027 COMPLETE CBC AUTOMATED: CPT

## 2024-10-31 PROCEDURE — 82947 ASSAY GLUCOSE BLOOD QUANT: CPT

## 2024-10-31 PROCEDURE — 4A133B1 MONITORING OF ARTERIAL PRESSURE, PERIPHERAL, PERCUTANEOUS APPROACH: ICD-10-PCS | Performed by: EMERGENCY MEDICINE

## 2024-10-31 PROCEDURE — 82435 ASSAY OF BLOOD CHLORIDE: CPT

## 2024-10-31 PROCEDURE — 99291 CRITICAL CARE FIRST HOUR: CPT

## 2024-10-31 PROCEDURE — 36620 INSERTION CATHETER ARTERY: CPT | Performed by: EMERGENCY MEDICINE

## 2024-10-31 PROCEDURE — 30233K1 TRANSFUSION OF NONAUTOLOGOUS FROZEN PLASMA INTO PERIPHERAL VEIN, PERCUTANEOUS APPROACH: ICD-10-PCS | Performed by: EMERGENCY MEDICINE

## 2024-10-31 PROCEDURE — 2500000004 HC RX 250 GENERAL PHARMACY W/ HCPCS (ALT 636 FOR OP/ED)

## 2024-10-31 PROCEDURE — 71045 X-RAY EXAM CHEST 1 VIEW: CPT | Performed by: RADIOLOGY

## 2024-10-31 PROCEDURE — 99497 ADVNCD CARE PLAN 30 MIN: CPT

## 2024-10-31 PROCEDURE — 2500000005 HC RX 250 GENERAL PHARMACY W/O HCPCS

## 2024-10-31 PROCEDURE — 36620 INSERTION CATHETER ARTERY: CPT | Mod: GC | Performed by: EMERGENCY MEDICINE

## 2024-10-31 PROCEDURE — 2020000001 HC ICU ROOM DAILY

## 2024-10-31 PROCEDURE — 30233N1 TRANSFUSION OF NONAUTOLOGOUS RED BLOOD CELLS INTO PERIPHERAL VEIN, PERCUTANEOUS APPROACH: ICD-10-PCS | Performed by: EMERGENCY MEDICINE

## 2024-10-31 PROCEDURE — 37799 UNLISTED PX VASCULAR SURGERY: CPT

## 2024-10-31 PROCEDURE — 33949 ECMO/ECLS DAILY MGMT ARTERY: CPT

## 2024-10-31 PROCEDURE — 84132 ASSAY OF SERUM POTASSIUM: CPT

## 2024-10-31 PROCEDURE — 03HY32Z INSERTION OF MONITORING DEVICE INTO UPPER ARTERY, PERCUTANEOUS APPROACH: ICD-10-PCS | Performed by: EMERGENCY MEDICINE

## 2024-10-31 PROCEDURE — 94003 VENT MGMT INPAT SUBQ DAY: CPT

## 2024-10-31 PROCEDURE — 93325 DOPPLER ECHO COLOR FLOW MAPG: CPT

## 2024-10-31 PROCEDURE — 80069 RENAL FUNCTION PANEL: CPT

## 2024-10-31 PROCEDURE — 85730 THROMBOPLASTIN TIME PARTIAL: CPT

## 2024-10-31 PROCEDURE — 99223 1ST HOSP IP/OBS HIGH 75: CPT

## 2024-10-31 PROCEDURE — 83735 ASSAY OF MAGNESIUM: CPT

## 2024-10-31 PROCEDURE — 82330 ASSAY OF CALCIUM: CPT

## 2024-10-31 PROCEDURE — 93308 TTE F-UP OR LMTD: CPT | Performed by: INTERNAL MEDICINE

## 2024-10-31 PROCEDURE — 2500000001 HC RX 250 WO HCPCS SELF ADMINISTERED DRUGS (ALT 637 FOR MEDICARE OP)

## 2024-10-31 PROCEDURE — 74018 RADEX ABDOMEN 1 VIEW: CPT | Performed by: RADIOLOGY

## 2024-10-31 PROCEDURE — 93325 DOPPLER ECHO COLOR FLOW MAPG: CPT | Performed by: INTERNAL MEDICINE

## 2024-10-31 PROCEDURE — 2500000004 HC RX 250 GENERAL PHARMACY W/ HCPCS (ALT 636 FOR OP/ED): Performed by: STUDENT IN AN ORGANIZED HEALTH CARE EDUCATION/TRAINING PROGRAM

## 2024-10-31 PROCEDURE — 85610 PROTHROMBIN TIME: CPT

## 2024-10-31 PROCEDURE — 93321 DOPPLER ECHO F-UP/LMTD STD: CPT | Performed by: INTERNAL MEDICINE

## 2024-10-31 PROCEDURE — 99222 1ST HOSP IP/OBS MODERATE 55: CPT | Performed by: INTERNAL MEDICINE

## 2024-10-31 PROCEDURE — 80176 ASSAY OF LIDOCAINE: CPT

## 2024-10-31 PROCEDURE — 4A133J1 MONITORING OF ARTERIAL PULSE, PERIPHERAL, PERCUTANEOUS APPROACH: ICD-10-PCS | Performed by: EMERGENCY MEDICINE

## 2024-10-31 PROCEDURE — 93308 TTE F-UP OR LMTD: CPT

## 2024-10-31 PROCEDURE — 2500000002 HC RX 250 W HCPCS SELF ADMINISTERED DRUGS (ALT 637 FOR MEDICARE OP, ALT 636 FOR OP/ED)

## 2024-10-31 PROCEDURE — 99291 CRITICAL CARE FIRST HOUR: CPT | Performed by: INTERNAL MEDICINE

## 2024-10-31 PROCEDURE — 83615 LACTATE (LD) (LDH) ENZYME: CPT

## 2024-10-31 RX ORDER — MAGNESIUM SULFATE HEPTAHYDRATE 40 MG/ML
2 INJECTION, SOLUTION INTRAVENOUS ONCE
Status: DISCONTINUED | OUTPATIENT
Start: 2024-10-31 | End: 2024-10-31

## 2024-10-31 RX ORDER — POTASSIUM CHLORIDE 29.8 MG/ML
40 INJECTION INTRAVENOUS ONCE
Status: COMPLETED | OUTPATIENT
Start: 2024-10-31 | End: 2024-11-01

## 2024-10-31 RX ORDER — ACETAMINOPHEN 10 MG/ML
1000 INJECTION, SOLUTION INTRAVENOUS EVERY 6 HOURS SCHEDULED
Status: COMPLETED | OUTPATIENT
Start: 2024-10-31 | End: 2024-11-01

## 2024-10-31 RX ORDER — HYDROMORPHONE HYDROCHLORIDE 0.2 MG/ML
0.2 INJECTION INTRAMUSCULAR; INTRAVENOUS; SUBCUTANEOUS EVERY 2 HOUR PRN
Status: DISCONTINUED | OUTPATIENT
Start: 2024-10-31 | End: 2024-11-05

## 2024-10-31 RX ORDER — MAGNESIUM SULFATE HEPTAHYDRATE 40 MG/ML
2 INJECTION, SOLUTION INTRAVENOUS ONCE
Status: COMPLETED | OUTPATIENT
Start: 2024-10-31 | End: 2024-10-31

## 2024-10-31 RX ORDER — LIDOCAINE HYDROCHLORIDE ANHYDROUS AND DEXTROSE MONOHYDRATE .8; 5 G/100ML; G/100ML
0.5 INJECTION, SOLUTION INTRAVENOUS CONTINUOUS
Status: DISCONTINUED | OUTPATIENT
Start: 2024-10-31 | End: 2024-11-02

## 2024-10-31 RX ORDER — LIDOCAINE HCL/PF 100 MG/5ML
100 SYRINGE (ML) INTRAVENOUS ONCE
Status: COMPLETED | OUTPATIENT
Start: 2024-10-31 | End: 2024-10-31

## 2024-10-31 RX ORDER — SODIUM CHLORIDE, SODIUM LACTATE, POTASSIUM CHLORIDE, CALCIUM CHLORIDE 600; 310; 30; 20 MG/100ML; MG/100ML; MG/100ML; MG/100ML
5 INJECTION, SOLUTION INTRAVENOUS CONTINUOUS
Status: ACTIVE | OUTPATIENT
Start: 2024-10-31 | End: 2024-11-01

## 2024-10-31 RX ADMIN — SENNOSIDES AND DOCUSATE SODIUM 2 TABLET: 50; 8.6 TABLET ORAL at 11:10

## 2024-10-31 RX ADMIN — INSULIN LISPRO 5 UNITS: 100 INJECTION, SOLUTION INTRAVENOUS; SUBCUTANEOUS at 04:02

## 2024-10-31 RX ADMIN — LIDOCAINE HYDROCHLORIDE 1 MG/MIN: 8 INJECTION, SOLUTION INTRAVENOUS at 00:14

## 2024-10-31 RX ADMIN — ACETAMINOPHEN 1000 MG: 1000 INJECTION, SOLUTION INTRAVENOUS at 18:14

## 2024-10-31 RX ADMIN — HYDROCORTISONE SODIUM SUCCINATE 50 MG: 100 INJECTION, POWDER, FOR SOLUTION INTRAMUSCULAR; INTRAVENOUS at 15:54

## 2024-10-31 RX ADMIN — PROPOFOL 50 MCG/KG/MIN: 10 INJECTION, EMULSION INTRAVENOUS at 03:15

## 2024-10-31 RX ADMIN — NOREPINEPHRINE BITARTRATE 0.1 MCG/KG/MIN: 8 INJECTION, SOLUTION INTRAVENOUS at 09:00

## 2024-10-31 RX ADMIN — PIPERACILLIN SODIUM AND TAZOBACTAM SODIUM 3.38 G: 3; .375 INJECTION, SOLUTION INTRAVENOUS at 06:53

## 2024-10-31 RX ADMIN — EPINEPHRINE IN SODIUM CHLORIDE 0.08 MCG/KG/MIN: 16 INJECTION INTRAVENOUS at 13:28

## 2024-10-31 RX ADMIN — POTASSIUM CHLORIDE 40 MEQ: 29.8 INJECTION, SOLUTION INTRAVENOUS at 03:37

## 2024-10-31 RX ADMIN — PIPERACILLIN SODIUM AND TAZOBACTAM SODIUM 3.38 G: 3; .375 INJECTION, SOLUTION INTRAVENOUS at 13:28

## 2024-10-31 RX ADMIN — LIDOCAINE HYDROCHLORIDE 100 MG: 20 INJECTION INTRAVENOUS at 03:36

## 2024-10-31 RX ADMIN — INSULIN LISPRO 5 UNITS: 100 INJECTION, SOLUTION INTRAVENOUS; SUBCUTANEOUS at 16:19

## 2024-10-31 RX ADMIN — INSULIN LISPRO 5 UNITS: 100 INJECTION, SOLUTION INTRAVENOUS; SUBCUTANEOUS at 21:10

## 2024-10-31 RX ADMIN — PROPOFOL 50 MCG/KG/MIN: 10 INJECTION, EMULSION INTRAVENOUS at 21:34

## 2024-10-31 RX ADMIN — Medication 100 MG: at 00:15

## 2024-10-31 RX ADMIN — HYDROCORTISONE SODIUM SUCCINATE 100 MG: 100 INJECTION, POWDER, FOR SOLUTION INTRAMUSCULAR; INTRAVENOUS at 11:09

## 2024-10-31 RX ADMIN — Medication 40 PERCENT: at 21:08

## 2024-10-31 RX ADMIN — EPINEPHRINE IN SODIUM CHLORIDE 0.08 MCG/KG/MIN: 16 INJECTION INTRAVENOUS at 00:25

## 2024-10-31 RX ADMIN — MAGNESIUM SULFATE HEPTAHYDRATE 2 G: 2 INJECTION, SOLUTION INTRAVENOUS at 00:09

## 2024-10-31 RX ADMIN — POLYETHYLENE GLYCOL 3350 17 G: 17 POWDER, FOR SOLUTION ORAL at 11:11

## 2024-10-31 RX ADMIN — PROPOFOL 50 MCG/KG/MIN: 10 INJECTION, EMULSION INTRAVENOUS at 14:15

## 2024-10-31 RX ADMIN — POTASSIUM CHLORIDE 20 MEQ: 14.9 INJECTION, SOLUTION INTRAVENOUS at 15:54

## 2024-10-31 RX ADMIN — PIPERACILLIN SODIUM AND TAZOBACTAM SODIUM 3.38 G: 3; .375 INJECTION, SOLUTION INTRAVENOUS at 19:07

## 2024-10-31 RX ADMIN — HYDROCORTISONE SODIUM SUCCINATE 50 MG: 100 INJECTION, POWDER, FOR SOLUTION INTRAMUSCULAR; INTRAVENOUS at 21:34

## 2024-10-31 RX ADMIN — INSULIN LISPRO 5 UNITS: 100 INJECTION, SOLUTION INTRAVENOUS; SUBCUTANEOUS at 09:02

## 2024-10-31 RX ADMIN — PANTOPRAZOLE SODIUM 40 MG: 40 INJECTION, POWDER, FOR SOLUTION INTRAVENOUS at 06:11

## 2024-10-31 RX ADMIN — ACETAMINOPHEN 1000 MG: 1000 INJECTION, SOLUTION INTRAVENOUS at 11:10

## 2024-10-31 RX ADMIN — PIPERACILLIN SODIUM AND TAZOBACTAM SODIUM 3.38 G: 3; .375 INJECTION, SOLUTION INTRAVENOUS at 02:24

## 2024-10-31 RX ADMIN — PERFLUTREN 10 ML OF DILUTION: 6.52 INJECTION, SUSPENSION INTRAVENOUS at 11:07

## 2024-10-31 RX ADMIN — INSULIN LISPRO 5 UNITS: 100 INJECTION, SOLUTION INTRAVENOUS; SUBCUTANEOUS at 12:34

## 2024-10-31 RX ADMIN — Medication 50 PERCENT: at 04:06

## 2024-10-31 RX ADMIN — SENNOSIDES AND DOCUSATE SODIUM 2 TABLET: 50; 8.6 TABLET ORAL at 21:34

## 2024-10-31 RX ADMIN — MAGNESIUM SULFATE HEPTAHYDRATE 2 G: 40 INJECTION, SOLUTION INTRAVENOUS at 03:15

## 2024-10-31 RX ADMIN — PROPOFOL 50 MCG/KG/MIN: 10 INJECTION, EMULSION INTRAVENOUS at 09:01

## 2024-10-31 RX ADMIN — PROPOFOL 50 MCG/KG/MIN: 10 INJECTION, EMULSION INTRAVENOUS at 13:28

## 2024-10-31 RX ADMIN — LIDOCAINE HYDROCHLORIDE 100 MG: 20 INJECTION INTRAVENOUS at 00:15

## 2024-10-31 ASSESSMENT — PAIN - FUNCTIONAL ASSESSMENT

## 2024-10-31 ASSESSMENT — PAIN SCALES - GENERAL: PAIN_LEVEL: 0

## 2024-10-31 NOTE — PROCEDURES
Femoral VA ECMO cannulation    Date/Time: 10/30/2024 10:00 PM    Performed by: Amie Delgadillo MD  Authorized by: Amie Delgadillo MD    Consent:     Consent obtained:  Emergent situation  Universal protocol:     Patient identity confirmed:  Arm band, anonymous protocol, patient vented/unresponsive, provided demographic data and hospital-assigned identification number  Indications:     Indications:  Cardiogenic shock  Pre-procedure details:     Skin preparation:  Chlorhexidine    Preparation: Patient was prepped and draped in the usual sterile fashion    Anesthesia:     Anesthesia method: General anesthesia.  Procedure specific details:      Surgeon:  Amie Delgadillo MD     Assistants:  Carlos Alberto Patel, GLADYS Chowdary, GLADYS Gentile CSA     Procedure Summary  Anesthesia: Anesthesia was provided by Crista Cardoza MD  Estimated Blood Loss: Minimal     Procedure Description  The decision was made to proceed with emergent femoral VA ECMO cannulation in the EP lab. The patient was positioned supine. The bilateral groins were prepped and draped in sterile fashion. The left common femoral artery was accessed percutaneously under ultrasound guidance. A guidewire was advanced under fluoroscopic guidance. A 17 [place] [employer] arterial cannula was placed in the left common femoral artery under fluoroscopic guidance using Seldinger technique. The left superficial femoral artery was accessed percutaneously under ultrasound guidance. A 6 [place] distal perfusion catheter was placed in the left superficial femoral artery using Seldinger technique. A guidewire was advanced under fluoroscopic guidance through a right common femoral venous sheath previously placed by EP. A 25 [place] [employer] venous cannula was placed under fluoroscopic guidance using Seldinger technique. The circuit was deaired and VA ECMO flow was initiated at approximately 3L/min. The cannulae were secured and sterile dressings were applied. The patient  tolerated the procedure well and was transferred to the CTICU in stable condition.  Post-procedure details:     Procedure completion:  Tolerated well, no immediate complications    Amie Delgadillo MD  Cardiac Surgeon

## 2024-10-31 NOTE — CONSULTS
Inpatient consult to Electrophysiology  Consult performed by: Sabina Coughlin MD  Consult ordered by: Amie Delgadillo MD  Reason for consult: Multiple defibrillations for VF and TdP on vaso, levo, epi with [facility] ECMO support        History Of Present Illness:    Lolis Auguste is a 69 y.o. female presenting without significant PMH, who was newly diagnosed with persistent AF and new LV systolic dysfunction in 9/2024 in [facility]. Patient was found to have SOLOMON thrombus and aborted DCCV in 9/2024 ([id number] score: 3 points) and underwent/but failed DCCV 10/18/2024. She was re-admitted to [facility] on 10/23/2024 for AF RVR and ADHF. Patient completed Dofetilide 250 mcg BID loading but did not convert to NSR, then the redo DCCV was also unsuccessful. Therefore, the decision was made to perform urgent RFA/PVI procedure in the setting of newly diagnosed NICM/tachy-mediated cardiomyopathy.    Of note, this patient did not have moderate-large amount of pericardial effusion on MIKE in 9/2024 and on 10/18/2024. The moderate pericardial effusion was noticed on TTE 10/24/2024 for the first time. After the general anesthesia induction, patient developed profound hypotension and the ICD showed NEW moderate-large amount of circumferential pericardial effusion. Given the profound hypotension with anesthesia induction, moderate-large enlarged pericardial effusion (chronologically expanded), and requirement of high vasopressors (Levophed 0.12 and Epinephrine 0.10 mcg/kg/min), the decision was made to abort the planned AF ablation and perform emergent pericardiocentesis that successfully evacuated 1000 mL of hemorrhagic effusion, but her hemodynamics was not well stabilized in EP lab.     [facility] SHOCK TEAM was activated and the decision was made to undergo VA ECMO cannulation.     Last Recorded Vitals:  Vitals:    10/31/24 0824 10/31/24 0830 10/31/24 0845 10/31/24 0900   Pulse: 95 95 97 97   Resp: 23 24 23  23   Temp:  37.7 °C (99.9 °F) 37.7 °C (99.9 °F) 37.7 °C (99.9 °F)   TempSrc:    Esophageal   SpO2:   97% 99%     Past Medical History:  She has a past medical history of Acute systolic heart failure (09/09/2024), Arrhythmia, Cardiomyopathy (09/09/2024), CHF (congestive heart failure), Persistent atrial fibrillation (Multi) (09/09/2024), and Thrombus of left atrial appendage (09/30/2024).    Past Surgical History:  She has no past surgical history on file.      Social History:  She reports that she has never smoked. She has never used smokeless tobacco. She reports current alcohol use. She reports that she does not use drugs.    Family History:  No family history on file.     Allergies:  Patient has no known allergies.    Inpatient Medications:  Scheduled medications   Medication Dose Route Frequency    acetaminophen  650 mg oral q4h    Or    acetaminophen  650 mg rectal q4h    [Transfer Hold] albumin human  12.5 g intravenous Once    amiodarone  300 mg intravenous Once    insulin lispro  0-15 Units subcutaneous q4h    magnesium sulfate  2 g intravenous Once    pantoprazole  40 mg oral Daily before breakfast    Or    pantoprazole  40 mg intravenous Daily before breakfast    piperacillin-tazobactam  3.375 g intravenous q6h    polyethylene glycol  17 g oral Daily    sennosides-docusate sodium  2 tablet oral BID     PRN medications   Medication    calcium gluconate    calcium gluconate    dextrose    Or    glucagon    HYDROmorphone    magnesium sulfate    magnesium sulfate    naloxone    oxyCODONE    oxygen    potassium chloride    potassium chloride     Continuous Medications   Medication Dose Last Rate    angiotensin II (Giapreza) 2.5 mg in sodium chloride 0.9% 250 mL (0.01 mg/mL) infusion  1.25-40 ng/kg/min      EPINEPHrine  0-2 mcg/kg/min 0.08 mcg/kg/min (10/31/24 0900)    lactated Ringer's  30 mL/hr 30 mL/hr (10/31/24 0900)    lidocaine  1 mg/min 1 mg/min (10/31/24 0900)    norepinephrine  0-1 mcg/kg/min 0.1  mcg/kg/min (10/31/24 0900)    propofol  0-50 mcg/kg/min 50 mcg/kg/min (10/31/24 0901)    vasopressin  0-0.03 Units/min Stopped (10/30/24 2200)     Outpatient Medications:  Current Outpatient Medications   Medication Instructions    apixaban (ELIQUIS) 5 mg, 2 times daily    furosemide (LASIX) 40 mg, 2 times daily (morning and late afternoon)    metoprolol tartrate (LOPRESSOR) 25 mg, oral, 3 times daily    multivitamin tablet 1 tablet, Daily       Physical Exam:  Constitutional: Intubated and sedated on mechanical ventilation in NAD   Neuro: Neuro intact without obvious focal deficits, on sedation.  PERRL  CV: Tachycardia.  S1S2.  SR on monitor.    Pulm: CTAB on mechanical ventilation.   : clear yellow urine via urbina  GI: S/ND/NT  Extremities: NV exam intact x 4.  No edema. Femoral ecmo canulla in place.   Skin: WDI. Skin intact  Psych: JUAREZ, sedated        Assessment/Plan   Lolis Auguste is a 69 y.o. female presenting without significant PMH, who was newly diagnosed with persistent AF and new LV systolic dysfunction in 9/2024 in [facility]. Patient was found to have SOLOMON thrombus and aborted DCCV in 9/2024. After starting Eliquis, patient underwent multiple unsuccessful DCCV and her AF was also refractory to Dofetilide 250 mcg BID. During this course, patient newly developed moderate pericardial effusion on TTE 10/24/2024 for the first time. After the general anesthesia induction for urgent PVI/RFA procedure on 10/30/2024, patient developed profound hypotension and the ICE showed NEW moderate-large amount of circumferential pericardial effusion. Given the profound hypotension with anesthesia induction, moderate-large enlarged pericardial effusion, and requirement of high-dose vasopressors, the decision was made to abort the planned AF ablation and perform emergent pericardiocentesis that successfully evacuated 1000 mL of hemorrhagic effusion, but her hemodynamics was not well stabilized in EP lab.  Therefore, [place] SHOCK TEAM was activated and the decision was made to undergo VA ECMO cannulation.    Active Cardiac/EP Problems:  - Newly diagnosed persistent AF refractory to Dofetilide loading and multiple DCCV  - Aborted the planned radiofrequency catheter ablation for the refractory AF due to hemodynamic instability on 10/30/2024  - Newly diagnosed moderate-large size of pericardial effusion c/b tamponade physiology s/p emergent pericardiocentesis with 1000 mL of hemorrhagic fluid on 10/30/2024  - Hemodynamically non-tolerated AF RVR degenerating into VF s/p multiple DCCV/shock in EP lab on 10/30/2024  - s/p Emergent VA ECMO cannulation on 10/30/2024  - Suspected NICM (perhaps tachycardia-mediated) or new anjali-pericarditis contributing to new LV systolic dysfunction, new AF RVR, and large pericardial effusion  - TdP degenerating into VF in CTICU on 10/31/2024 overnight    Recommendations:  - Due to her recent Dofetilide 250 mcg BID loading (last dose: 10/30/2024 AM), to avoid excessive QT prolongation, we should not use Amiodarone in this particular circumstance. The overnight TdP will be related with the QT prolongation with concurrent use of both Amiodarone and Dofetilide in the setting of acute heart failure and multiple vasopressors.  - Please consider advanced HF consultation for the possibility of acute lymphocytic anjali-pericarditis requiring EMB and/or high-dose steroid treatment.  - Please try to wean off the current vasopressors as possible.  - Due to severe LV systolic dysfunction, please avoid using Diltiazem for AF rate control but low-dose beta-blockers can be considered in the setting of VA ECMO support.  - Because we underwent urgent RFA/PVI procedure without holding her SGLT-2 beforehand, please manage euglycemic ketoacidosis if happened accordingly.  - Will appreciate CTICU team comprehensive management    Sabina Coughlin MD  Clinical Cardiac Electrophysiology Fellow      CVC 10/30/24  Double lumen Right Femoral (Active)   Site Assessment Clean;Dry;Intact 10/30/24 2100   Dressing Status Clean;Dry 10/30/24 2100   Number of days: 1       ETT  7 mm (Active)   Secured at (cm) 22 cm 10/31/24 0824   Measured from Lips 10/31/24 0824   Secured Location Right 10/31/24 0824   Secured by Commercial tube rosas 10/31/24 0824   Cuff Pressure (cm H2O) 17 cm H2O 10/31/24 0824   Number of days: 1       Venous Sheath 10/30/24 1411 8 Fr. Right Femoral (Active)   Site Assessment Clean;Dry;Intact 10/30/24 2100   Number of days: 1       Venous Sheath 10/30/24 1411 Other (Comment) Right Femoral (Active)   Site Assessment Clean;Dry;Intact 10/30/24 2100   Number of days: 1       Venous Sheath 10/30/24 1411 Other (Comment) Right Femoral (Active)   Site Assessment Clean;Dry;Intact 10/30/24 2100   Number of days: 1       Urethral Catheter (Active)   Output (mL) 95 mL 10/31/24 0900   Number of days: 1       Code Status:  Full Code    I spent 60 minutes in the professional and overall care of this patient.        Sabina Coughlin MD

## 2024-10-31 NOTE — CONSULTS
Advanced Heart Failure Initial Consultation Note   Consulting Team: CTI-ICU  Primary Cardiologist: Dr. Garcia  Reason for Consult: HFrEF, shock, now on VA-ECMO    Subjective   Lolis Auguste is a 69 y.o. female with a PMHx of recently diagnosed persistent AF and HFrEF who was admitted on 10/30/2024 for afib ablation c/b shock during induction. Heart failure was consulted regarding management of HFrEF and work up for underlying etiology.    She was initially diagnosed with HFrEF and aflutter at an OSH in August 2024 in the setting of RO and SOB. During that admission, she was started on Lopressor BID, diuresed, and discharged with a plan for outpatient MIKE w/ cardioversion. He underwent planned MIKE on 9/20 which showed SOLOMON thrombus, so procedure was aborted. He underwent failed DCCV on 10/18/24 and was re-admitted to [facility] on 10/23/2024 for AF w/ RVR and ADHF. She was loaded with Tikosyn and reattempted DCCV on 10/28 which was also unsuccessful. She was transferred to [facility] on 10/30  for urgent RFA/PVI due to c/f tachycardia mediate cardiomyopathy.    The patient became profoundly hypotensive during induction of her planned Afib ablation on 10/30, requiring initiation of epinephrine, levophed, and vasopressin. A moderately sized pericardial effusion was demonstrated on MIKE and the ablation was aborted in favor of an urgent pericardiocentesis with 1 L of sanguinous fluid drained. Follow up TTE demonstrated resolution of the effusion, but she remained persistently unstable, at which point shock call was activated and she was transferred to the CTICU for emergent [facility] ECMO cannulation.    She remains intubated and was weaned off vasopressin overnight. She remains on epinephrine 0.08 and norepinephrine 0.04. Hydrocortisone 100 mg IV added this morning. On discussion with her family, she is a very active person prior to her presentation and enjoyed horseriding and exercise.     The following portions of the chart were  reviewed this encounter and updated as appropriate:  Tobacco  Allergies  Meds  Med Hx  Surg Hx  Fam Hx  Soc Hx        Review of Systems  Unable to perform given patient condition    Past Medical History:   Diagnosis Date    Acute systolic heart failure 09/09/2024    Arrhythmia     Cardiomyopathy 09/09/2024    Echo 10/24/24  1. Left ventricular ejection fraction is severely decreased, by visual estimate at 25%.  2. There is global hypokinesis of the left ventricle with minor regional variations.  3. Spectral Doppler shows an abnormal pattern of left ventricular diastolic filling.  4. Left ventricular cavity size is mild to moderately dilated.  5. No left ventricular thrombus visualized.  6. There is mil    CHF (congestive heart failure)     Persistent atrial fibrillation (Multi) 09/09/2024    Thrombus of left atrial appendage 09/30/2024    MIKE 9/20/24       History reviewed. No pertinent surgical history.  Social History     Socioeconomic History    Marital status:      Spouse name: Not on file    Number of children: Not on file    Years of education: Not on file    Highest education level: Not on file   Occupational History    Not on file   Tobacco Use    Smoking status: Never    Smokeless tobacco: Never   Substance and Sexual Activity    Alcohol use: Yes     Comment: occassionally    Drug use: Never    Sexual activity: Defer   Other Topics Concern    Not on file   Social History Narrative    Not on file     Social Drivers of Health     Financial Resource Strain: Low Risk  (10/24/2024)    Overall Financial Resource Strain (CARDIA)     Difficulty of Paying Living Expenses: Not hard at all   Food Insecurity: No Food Insecurity (10/24/2024)    Hunger Vital Sign     Worried About Running Out of Food in the Last Year: Never true     Ran Out of Food in the Last Year: Never true   Transportation Needs: No Transportation Needs (10/24/2024)    PRAPARE - Transportation     Lack of Transportation (Medical): No      Lack of Transportation (Non-Medical): No   Physical Activity: Not on file   Stress: Not on file   Social Connections: Not on file   Intimate Partner Violence: Not At Risk (10/24/2024)    Humiliation, Afraid, Rape, and Kick questionnaire     Fear of Current or Ex-Partner: No     Emotionally Abused: No     Physically Abused: No     Sexually Abused: No   Housing Stability: Low Risk  (10/24/2024)    Housing Stability Vital Sign     Unable to Pay for Housing in the Last Year: No     Number of Times Moved in the Last Year: 1     Homeless in the Last Year: No     No family history on file.    Current Outpatient Medications   Medication Instructions    apixaban (ELIQUIS) 5 mg, oral, 2 times daily    furosemide (LASIX) 40 mg, oral, 2 times daily (morning and late afternoon)    metoprolol tartrate (LOPRESSOR) 25 mg, oral, 3 times daily    multivitamin tablet 1 tablet, oral, Daily         Objective   Physical Exam  Vitals:    10/31/24 1100   Pulse: 89   Resp: 25   Temp: 36 °C (96.8 °F)   SpO2: 100%       GEN: Ill appearing, well-developed, sedated  CV: Irregular, tachycardic, III/VI systolic murmur present.  LUNGS: Mechanical breath sounds bilaterally.  ABD: Soft, NT/ND, NBS, no masses or organomegaly.  SKIN: Warm, well perfused. No skin rashes or abnormal lesions. 2+ edema in BLE  MSK: Normal gait. No deformities.  EXT: No clubbing or cyanosis  NEURO: Intubated and sedated.     I have personally reviewed the following images and laboratory findings:    ECG: Atrial fibrillation/flutter    Results for orders placed during the hospital encounter of 10/23/24    Transthoracic Echo (TTE) Complete    Narrative  [facility], [address] [place] [address]  Tel [phone] and Fax [phone]    TRANSTHORACIC ECHOCARDIOGRAM REPORT      Patient Name:      ADOLPH Nelson Physician:    [id number] Lyndon Moss DO  Study Date:        10/24/2024           Ordering Provider:    [id number] LYNDON CALABRESE  MRN/PID:            [id number]             Fellow:  Accession#:        [id number]         Nurse:  Date of Birth/Age: 1955 / 69      Sonographer:          Yanira morocho                                      RDCS  Gender:            F                    Additional Staff:  Height:            149.00 cm            Admit Date:           10/24/2024  Weight:            63.00 kg             Admission Status:     Inpatient -  Routine  BSA / BMI:         1.57 m2 / 28.38      Encounter#:           [id number]  kg/m2  Blood Pressure:    132/79 mmHg          Department Location:  [facility] Non  Invasive    Study Type:    TRANSTHORACIC ECHO (TTE) COMPLETE  Diagnosis/ICD: Shortness of breath-R06.02  Indication:    SOB, cardiomyopathy  CPT Code:      Echo Complete w Full Doppler-[id number]    Patient History:  Pertinent History: A-Fib, CHF, Cardiomyopathy and SOLOMON thrombus.    Study Detail: The following Echo studies were performed: 2D, M-Mode, Doppler and  color flow. Definity used as a contrast agent for endocardial  border definition. Total contrast used for this procedure was 2 mL  via IV push.      PHYSICIAN INTERPRETATION:  Left Ventricle: Left ventricular ejection fraction is severely decreased, by visual estimate at 25%. There is global hypokinesis of the left ventricle with minor regional variations. The left ventricular cavity size is mild to moderately dilated. The left ventricular septal wall thickness is normal. There is normal left ventricular posterior wall thickness. Spectral Doppler shows an abnormal pattern of left ventricular diastolic filling. There is no definite left ventricular thrombus visualized.  Left Atrium: The left atrium is severely dilated.  Right Ventricle: The right ventricle is normal in size. There is mildly reduced right ventricular systolic function. TAPSE = 14 mm.  Right Atrium: The right atrium is mildly dilated.  Aortic Valve: The aortic valve is trileaflet. The aortic valve dimensionless index  is 0.47. There is no evidence of aortic valve regurgitation. The peak instantaneous gradient of the aortic valve is 7.6 mmHg. The mean gradient of the aortic valve is 4.4 mmHg.  Mitral Valve: The mitral valve is mildly thickened. The mitral valve spectral Doppler A-wave is absent, consistent with atrial fibrillation. There is severe mitral valve regurgitation which is eccentrically directed.  Tricuspid Valve: The tricuspid valve is structurally normal. There is mild tricuspid regurgitation. The Doppler estimated RVSP is mildly elevated at 38.8 mmHg.  Pulmonic Valve: The pulmonic valve is structurally normal. There is physiologic pulmonic valve regurgitation.  Pericardium: Moderate pericardial effusion.  Aorta: The aortic root is normal.  Systemic Veins: The inferior vena cava appears severely dilated, with IVC inspiratory collapse less than 50%.  In comparison to the previous echocardiogram(s): There are no prior studies on this patient for comparison purposes.      CONCLUSIONS:  1. Left ventricular ejection fraction is severely decreased, by visual estimate at 25%.  2. There is global hypokinesis of the left ventricle with minor regional variations.  3. Spectral Doppler shows an abnormal pattern of left ventricular diastolic filling.  4. Left ventricular cavity size is mild to moderately dilated.  5. No left ventricular thrombus visualized.  6. There is mildly reduced right ventricular systolic function.  7. The left atrium is severely dilated.  8. Moderate pericardial effusion.  9. Absent A-wave on MV spectral Doppler tracing, consistent with atrial fibrillation.  10. Severe mitral valve regurgitation.  11. Mildly elevated right ventricular systolic pressure.  12. The inferior vena cava appears severely dilated, with IVC inspiratory collapse less than 50%.    QUANTITATIVE DATA SUMMARY:    2D MEASUREMENTS:          Normal Ranges:  LAs:             5.06 cm  (2.7-4.0cm)  IVSd:            0.83 cm  (0.6-1.1cm)  LVPWd:            0.89 cm  (0.6-1.1cm)  LVIDd:           5.48 cm  (3.9-5.9cm)  LVIDs:           4.87 cm  LV Mass Index:   111 g/m2  LVEDV Index:     74 ml/m2  LV % FS          11.3 %      LA VOLUME:                    Normal Ranges:  LA Vol A4C:        124.9 ml   (22+/-6mL/m2)  LA Vol A2C:        117.8 ml  LA Vol BP:         122.2 ml  LA Vol Index A4C:  79.4ml/m2  LA Vol Index A2C:  74.9 ml/m2  LA Vol Index BP:   77.7 ml/m2  LA Area A4C:       30.9 cm2  LA Area A2C:       30.2 cm2  LA Major Axis A4C: 6.5 cm  LA Major Axis A2C: 6.6 cm  LA Volume Index:   77.7 ml/m2  LA Vol A4C:        117.4 ml  LA Vol A2C:        105.2 ml  LA Vol Index BSA:  70.8 ml/m2      RA VOLUME BY A/L METHOD:            Normal Ranges:  RA Vol A4C:              60.8 ml    (8.3-19.5ml)  RA Vol Index A4C:        38.7 ml/m2  RA Area A4C:             20.2 cm2  RA Major Axis A4C:       5.7 cm      M-MODE MEASUREMENTS:         Normal Ranges:  Ao Root:             2.50 cm (2.0-3.7cm)  LAs:                 5.85 cm (2.7-4.0cm)      AORTA MEASUREMENTS:         Normal Ranges:  Ao Sinus, d:        3.10 cm (2.1-3.5cm)  Ao STJ, d:          2.60 cm (1.7-3.4cm)  Asc Ao, d:          3.10 cm (2.1-3.4cm)      LV SYSTOLIC FUNCTION BY 2D PLANIMETRY (MOD):  Normal Ranges:  EF-A4C View:    31 % (>=55%)  EF-A2C View:    24 %  EF-Biplane:     30 %  EF-Visual:      25 %  LV EF Reported: 25 %      AORTIC VALVE:                     Normal Ranges:  AoV Vmax:                1.37 m/s (<=1.7m/s)  AoV Peak P.6 mmHg (<20mmHg)  AoV Mean P.4 mmHg (1.7-11.5mmHg)  LVOT Max Jose Carlos:            0.69 m/s (<=1.1m/s)  AoV VTI:                 20.59 cm (18-25cm)  LVOT VTI:                9.75 cm  LVOT Diameter:           1.93 cm  (1.8-2.4cm)  AoV Area, VTI:           1.38 cm2 (2.5-5.5cm2)  AoV Area,Vmax:           1.47 cm2 (2.5-4.5cm2)  AoV Dimensionless Index: 0.47      RIGHT VENTRICLE:  RV Basal 3.10 cm  RV Mid   2.00 cm  RV Major 6.8 cm  TAPSE:   14.0  mm      TRICUSPID VALVE/RVSP:          Normal Ranges:  Peak TR Velocity:     2.99 m/s  Est. RA Pressure:     3 mmHg  RV Syst Pressure:     39 mmHg  (< 30mmHg)  IVC Diam:             3.50 cm      PULMONIC VALVE:          Normal Ranges:  PV Accel Time:  89 msec  (>120ms)  PV Max Jose Carlos:     0.7 m/s  (0.6-0.9m/s)  PV Max P.7 mmHg      AORTA:  Asc Ao Diam 3.05 cm      [id number] Lyndonelton Moss DO  Electronically signed on 10/24/2024 at 3:27:46 PM        ** Final **       Imaging  Chest x-ray:  Electrophysiology procedure         XR chest 1 view   Final Result   1. Interval worsening lung aeration with findings suggestive of   central vascular congestion and pulmonary interstitial edema.   Moderate left pleural effusion with associated   atelectasis/consolidation.   2. Medical devices as above.        I personally reviewed the images/study and I agree with the findings   as stated by Rosalind Garay MD. This study was interpreted at   [place], [place].        MACRO:   None        Signed by: Sawyer Miguel 10/31/2024 9:57 AM   Dictation workstation:   [id number]      XR chest 1 view    (Results Pending)   Transthoracic Echo (TTE) Limited    (Results Pending)   XR abdomen 1 view    (Results Pending)         Lab Review     Troponin I, High Sensitivity   Date/Time Value Ref Range Status   10/23/2024 02:52 PM 14 (H) 0 - 13 ng/L Final   10/23/2024 12:54 PM 12 0 - 13 ng/L Final     BNP   Date/Time Value Ref Range Status   10/23/2024 12:54  (H) 0 - 99 pg/mL Final        Assessment and Plan     Lolis Auguste is a 69 y.o. female with a PMHx of recently diagnosed Afib c/b SOLOMON thrombus and HFrEF (EF 25%) who was transferred from [facility] on 10/30/2024 for urgent RFA/PVI on 10/30 in the setting of persistent afib failing Tikosyn load and DCCV x 2 (last 10/28). Course was c/b multipressor shock requiring epi, levo, and vasopressin during induction for planned afib ablation.  She was found to have a moderate pericardial effusion and underwent urgent pericardiocentesis with 1L of bloody output. Her hemodynamics did not improve and she was cannulated for VA-ECMO on 10/30 after shock call. Heart failure was consulted regarding evaluation and management of HFrEF and shock.    The etiology of her HFrEF is not entirely clear at this point. Although it was assumed to be tachycardia induced cardiomyopathy, she has not had an ischemic evaluation. Her TTE also demonstrates biventricular failure and severe, wide-open MR which raises the possibility of a valvular etiology. In light of her MR, we recommend offloading her LV with diuresis and ECMO, along with consideration of an axillary Impella to further offload her LV. Her hospital course has been complicated by TdP and VF on 10/30 which required shock x5 and amiodarone boluses x 2 and lidocaine gtt.    #Acute Decompensated HF (EF 15%) w/ biventricular failure  #Cardiogenic Shock  #Severe MR  #TdP and Vfib w/ 5x cardioversion (10/30)  #Afib w/ RVR s/p multiple failed DCCV  - Diuresis: Continue diuresis to at least -1.5-2.0L over 24 hours.  - MCS: Cannulated for VA-ECMO on 10/30 at 3.0 LPM   - Consider placement of axillary Impella 5.5 to further offload LV given severe MR.  - Inotropes: None  - Pressors: epinephrine 0.08. Continue to wean as able.   - Levophed gtt weaned off today   - Weaned off vasopressin today  - GDMT: Hold in the setting of shock  - Will eventually need ischemic evaluation for new HFrEF once patient is stabilized  - We will assess her for advanced therapies on an ongoing basis.    For any questions or concerns, feel free to reach out via [employer] [facility] chat or page at [phone].         SIGNATURE: Brent Ledesma MD PATIENT NAME: Lolis Auguste   DATE/TIME: October 31, 2024 11:32 AM MRN: [id number]     This plan was discussed with Dr. Urbano, HF attending.

## 2024-10-31 NOTE — PROCEDURES
Insert arterial line    Date/Time: 10/30/2024 7:00 PM    Performed by: Clemente Noguera MD  Authorized by: Karen Wise MD    Consent:     Consent obtained:  Emergent situation  Universal protocol:     Procedure explained and questions answered to patient or proxy's satisfaction: yes      Relevant documents present and verified: yes      Test results available: yes      Imaging studies available: yes      Required blood products, implants, devices, and special equipment available: yes      Site/side marked: yes      Patient identity confirmed:  Arm band and hospital-assigned identification number  Indications:     Indications: hemodynamic monitoring    Pre-procedure details:     Skin preparation:  Chlorhexidine and alcohol    Preparation: Patient was prepped and draped in sterile fashion    Sedation:     Sedation type: sedated and intubated.  Procedure details:     Location:  R radial    Enoc's test performed: yes      Enoc's test abnormal: no      Needle gauge:  20 G    Placement technique:  Seldinger and ultrasound guided    Number of attempts:  1    Transducer: waveform confirmed    Post-procedure details:     Post-procedure:  Biopatch applied, secured with tape and sterile dressing applied    CMS:  Normal    Procedure completion:  Tolerated well, no immediate complications

## 2024-10-31 NOTE — PROGRESS NOTES
Physical Therapy                 Therapy Communication Note    Patient Name: Lolis Auguste  MRN: [id number]  [facility]  Room: 05/05-A  Today's Date: 10/31/2024     Discipline: Physical Therapy    Missed Visit Reason:  (Pt is intubated/sedated and on ECMO.  Will hold PT and re-attempt once medically appropriate.)    Missed Time: Attempt

## 2024-10-31 NOTE — CARE PLAN
Problem: Safety - Medical Restraint  Goal: Remains free of injury from restraints (Restraint for Interference with Medical Device)  Outcome: Progressing

## 2024-10-31 NOTE — PROGRESS NOTES
Lolis Auguste is a 69 y.o. female on day 1 of admission presenting with Persistent atrial fibrillation (Multi).    Subjective   Patient developed vfib and torsades over night. She was cardioverted five times. He received amiodarone bolus x2, magnesium and was started on a lidocaine drip. Bedside pocus showed Ef 5-10%.        Objective     Review of Systems   Unable to perform ROS: Intubated        Physical Exam  Constitutional: Intubated and sedated on mechanical ventilation in NAD   Neuro: Neuro intact without obvious focal deficits, on sedation.  PERRL  CV: S1S2.  Rate controlled afib    Pulm: CTAB on mechanical ventilation.   : clear yellow urine via urbina  GI: S/ND/NT  Extremities: NV exam intact x 4.  No edema. Femoral ecmo canulla in place.   Skin: WDI. Skin intact  Psych: JUAREZ, sedated     Last Recorded Vitals  Pulse 89, temperature 36 °C (96.8 °F), resp. rate 25, SpO2 100%.  Intake/Output last 3 Shifts:  I/O last 3 completed shifts:  In: 3830.6 [I.V.:1230.6; Blood:350; IV Piggyback:2250]  Out: 6049 [Urine:3685; Drains:365; Blood:1999]    Relevant Results  Scheduled medications  acetaminophen, 1,000 mg, intravenous, q6h SRINIVAS  [Held by provider] acetaminophen, 650 mg, oral, q4h   Or  [Held by provider] acetaminophen, 650 mg, rectal, q4h  [Held by provider] hydrocortisone sodium succinate, 100 mg, intravenous, Once  hydrocortisone sodium succinate, 50 mg, intravenous, q6h  insulin lispro, 0-15 Units, subcutaneous, q4h  pantoprazole, 40 mg, oral, Daily before breakfast   Or  pantoprazole, 40 mg, intravenous, Daily before breakfast  piperacillin-tazobactam, 3.375 g, intravenous, q6h  polyethylene glycol, 17 g, oral, Daily  sennosides-docusate sodium, 2 tablet, oral, BID      Continuous medications  EPINEPHrine, 0-2 mcg/kg/min, Last Rate: 0.08 mcg/kg/min (10/31/24 1100)  lactated Ringer's, 5 mL/hr, Last Rate: 5 mL/hr (10/31/24 1126)  lidocaine, 1 mg/min, Last Rate: 1 mg/min (10/31/24 1100)  norepinephrine, 0-1  mcg/kg/min, Last Rate: 0.04 mcg/kg/min (10/31/24 1130)  propofol, 0-50 mcg/kg/min, Last Rate: 50 mcg/kg/min (10/31/24 1100)  vasopressin, 0-0.03 Units/min, Last Rate: Stopped (10/30/24 2200)      PRN medications  PRN medications: calcium gluconate, calcium gluconate, dextrose **OR** glucagon, HYDROmorphone, magnesium sulfate, magnesium sulfate, naloxone, oxyCODONE, oxygen, potassium chloride, potassium chloride    Results for orders placed or performed during the hospital encounter of 10/30/24 (from the past 24 hours)   Blood Gas Arterial Full Panel   Result Value Ref Range    POCT pH, Arterial 7.41 7.38 - 7.42 pH    POCT pCO2, Arterial 41 38 - 42 mm Hg    POCT pO2, Arterial 121 (H) 85 - 95 mm Hg    POCT SO2, Arterial 100 94 - 100 %    POCT Oxy Hemoglobin, Arterial 97.1 94.0 - 98.0 %    POCT Hematocrit Calculated, Arterial 43.0 36.0 - 46.0 %    POCT Sodium, Arterial 135 (L) 136 - 145 mmol/L    POCT Potassium, Arterial 2.8 (LL) 3.5 - 5.3 mmol/L    POCT Chloride, Arterial 104 98 - 107 mmol/L    POCT Ionized Calcium, Arterial 1.03 (L) 1.10 - 1.33 mmol/L    POCT Glucose, Arterial 183 (H) 74 - 99 mg/dL    POCT Lactate, Arterial 1.7 0.4 - 2.0 mmol/L    POCT Base Excess, Arterial 1.2 -2.0 - 3.0 mmol/L    POCT HCO3 Calculated, Arterial 26.0 22.0 - 26.0 mmol/L    POCT Hemoglobin, Arterial 14.3 12.0 - 16.0 g/dL    POCT Anion Gap, Arterial 8 (L) 10 - 25 mmo/L    Patient Temperature 37.0 degrees Celsius    FiO2 45 %    Site of Arterial Puncture Arterial Line    VERIFY ABO/Rh Group Test   Result Value Ref Range    ABO TYPE O     Rh TYPE POS    Type and screen   Result Value Ref Range    ABO TYPE O     Rh TYPE POS     ANTIBODY SCREEN NEG    Prepare RBC: 4 Units   Result Value Ref Range    PRODUCT CODE V2174O36     Unit Number [id number]     Unit ABO O     Unit RH POS     XM INTEP COMP     Dispense Status XM     Blood Expiration Date 11/7/2024 11:59:00 PM EST     PRODUCT BLOOD TYPE 5100     UNIT VOLUME 350     PRODUCT CODE  K6019Q96     Unit Number [id number]     Unit ABO O     Unit RH POS     XM INTEP COMP     Dispense Status TR     Blood Expiration Date 11/9/2024 11:59:00 PM EST     PRODUCT BLOOD TYPE 5100     UNIT VOLUME 350     PRODUCT CODE L2341S40     Unit Number [id number]     Unit ABO O     Unit RH POS     XM INTEP COMP     Dispense Status IS     Blood Expiration Date 11/6/2024 11:59:00 PM EST     PRODUCT BLOOD TYPE 5100     UNIT VOLUME 287     PRODUCT CODE Z9110I08     Unit Number [id number]     Unit ABO O     Unit RH POS     XM INTEP COMP     Dispense Status IS     Blood Expiration Date 11/7/2024 11:59:00 PM EST     PRODUCT BLOOD TYPE 5100     UNIT VOLUME 280    Prepare Plasma: 4 Units   Result Value Ref Range    PRODUCT CODE P8625A05     Unit Number [id number]     Unit ABO O     Unit RH POS     Dispense Status RE     Blood Expiration Date 10/31/2024 11:11:00 AM EDT     PRODUCT BLOOD TYPE 5100     UNIT VOLUME 314     PRODUCT CODE F8045Z11     Unit Number [id number]     Unit ABO O     Unit RH POS     Dispense Status RE     Blood Expiration Date 10/31/2024 11:11:00 AM EDT     PRODUCT BLOOD TYPE 5100     UNIT VOLUME 323     PRODUCT CODE C3089E42     Unit Number [id number]     Unit ABO O     Unit RH POS     Dispense Status RE     Blood Expiration Date 11/4/2024  9:03:00 AM EST     PRODUCT BLOOD TYPE 5100     UNIT VOLUME 336     PRODUCT CODE X6594W40     Unit Number [id number]     Unit ABO O     Unit RH NEG     Dispense Status RE     Blood Expiration Date 10/31/2024 11:11:00 AM EDT     PRODUCT BLOOD TYPE 9500     UNIT VOLUME 323    Blood Gas Arterial Full Panel Unsolicited   Result Value Ref Range    POCT pH, Arterial 7.36 (L) 7.38 - 7.42 pH    POCT pCO2, Arterial 33 (L) 38 - 42 mm Hg    POCT pO2, Arterial 225 (H) 85 - 95 mm Hg    POCT SO2, Arterial 100 94 - 100 %    POCT Oxy Hemoglobin, Arterial 98.1 (H) 94.0 - 98.0 %    POCT Hematocrit Calculated, Arterial 34.0 (L) 36.0 - 46.0 %    POCT Sodium,  Arterial 137 136 - 145 mmol/L    POCT Potassium, Arterial 2.7 (LL) 3.5 - 5.3 mmol/L    POCT Chloride, Arterial 106 98 - 107 mmol/L    POCT Ionized Calcium, Arterial 0.88 (L) 1.10 - 1.33 mmol/L    POCT Glucose, Arterial 268 (H) 74 - 99 mg/dL    POCT Lactate, Arterial 3.4 (H) 0.4 - 2.0 mmol/L    POCT Base Excess, Arterial -6.0 (L) -2.0 - 3.0 mmol/L    POCT HCO3 Calculated, Arterial 18.6 (L) 22.0 - 26.0 mmol/L    POCT Hemoglobin, Arterial 11.2 (L) 12.0 - 16.0 g/dL    POCT Anion Gap, Arterial 15 10 - 25 mmo/L    Patient Temperature 37.0 degrees Celsius   Blood Gas Arterial Full Panel Unsolicited   Result Value Ref Range    POCT pH, Arterial 7.37 (L) 7.38 - 7.42 pH    POCT pCO2, Arterial 39 38 - 42 mm Hg    POCT pO2, Arterial 252 (H) 85 - 95 mm Hg    POCT SO2, Arterial 100 94 - 100 %    POCT Oxy Hemoglobin, Arterial 97.5 94.0 - 98.0 %    POCT Hematocrit Calculated, Arterial 34.0 (L) 36.0 - 46.0 %    POCT Sodium, Arterial 136 136 - 145 mmol/L    POCT Potassium, Arterial 3.8 3.5 - 5.3 mmol/L    POCT Chloride, Arterial 104 98 - 107 mmol/L    POCT Ionized Calcium, Arterial 1.34 (H) 1.10 - 1.33 mmol/L    POCT Glucose, Arterial 259 (H) 74 - 99 mg/dL    POCT Lactate, Arterial 3.2 (H) 0.4 - 2.0 mmol/L    POCT Base Excess, Arterial -2.5 (L) -2.0 - 3.0 mmol/L    POCT HCO3 Calculated, Arterial 22.5 22.0 - 26.0 mmol/L    POCT Hemoglobin, Arterial 11.3 (L) 12.0 - 16.0 g/dL    POCT Anion Gap, Arterial 13 10 - 25 mmo/L    Patient Temperature 37.0 degrees Celsius    FiO2 100 %   Coox Panel, Arterial Unsolicited   Result Value Ref Range    POCT Hemoglobin, Arterial 11.3 (L) 12.0 - 16.0 g/dL    POCT Oxy Hemoglobin, Arterial 97.5 94.0 - 98.0 %    POCT Carboxyhemoglobin, Arterial 1.2 %    POCT Methemoglobin, Arterial 1.1 0.0 - 1.5 %    POCT Deoxy Hemoglobin, Arterial 0.1 0.0 - 5.0 %   Body Fluid Cell Count   Result Value Ref Range    Color, Fluid Red (A) Colorless, Straw, Yellow    Clarity, Fluid Turbid (A) Clear    WBC, Fluid 2,149 See  Comment /uL    RBC, Fluid 4,443,000 0  /uL /uL   Body Fluid Differential   Result Value Ref Range    Neutrophils %, Manual, Fluid 41 <25 % %    Lymphocytes %, Manual, Fluid 59 <75 % %    Mono/Macrophages %, Manual, Fluid 0 <70 % %    Eosinophils %, Manual, Fluid 0 0 % %    Basophils %, Manual, Fluid 0 0 % %    Immature Granulocytes %, Manual, Fluid 0 0 % %    Blasts %, Manual, Fluid 0 0 % %    Unclassified Cells %, Manual, Fluid 0 0 % %    Plasma Cells %, Manual, Fluid 0 0 % %    Total Cells Counted, Fluid 100    Sterile Fluid Culture/Smear    Specimen: Pericardial; Fluid   Result Value Ref Range    Sterile Fluid Culture/Smear No growth to date     Gram Stain (1+) Rare Polymorphonuclear leukocytes     Gram Stain No organisms seen    AFB Culture/Smear    Specimen: Pericardial; Fluid   Result Value Ref Range    AFB Culture       Culture in progress and will be examined weekly. A result will be issued either when positive or after 8 weeks incubation.   Albumin, Fluid   Result Value Ref Range    Albumin, Fluid 3.7 Not established g/dL   Glucose, Fluid   Result Value Ref Range    Glucose, Fluid 154 Not established mg/dL   Lactate Dehydrogenase, Fluid   Result Value Ref Range    LD, Fluid 213 Not established. U/L   Sodium, Body Fluid   Result Value Ref Range    Sodium Fluid 138 Not established mmol/L   Protein, Total Fluid   Result Value Ref Range    Protein, Total Fluid 5.7 Not established g/dL   Cholesterol, Body Fluid   Result Value Ref Range    Cholesterol, Fluid 124 Not established mg/dL   Bilirubin, Total Fluid   Result Value Ref Range    Total Bili, Fluid 0.8 Not established mg/dL   AFB Processed   Result Value Ref Range    Extra Tube Hold for add-ons.    Calcium, Ionized   Result Value Ref Range    POCT Calcium, Ionized 1.07 (L) 1.1 - 1.33 mmol/L   Magnesium   Result Value Ref Range    Magnesium 2.28 1.60 - 2.40 mg/dL   Coagulation Screen   Result Value Ref Range    Protime 19.0 (H) 9.8 - 12.8 seconds    INR 1.7  (H) 0.9 - 1.1    aPTT >200 (HH) 27 - 38 seconds   Fibrinogen   Result Value Ref Range    Fibrinogen 243 200 - 400 mg/dL   CBC   Result Value Ref Range    WBC 16.2 (H) 4.4 - 11.3 x10*3/uL    nRBC 0.0 0.0 - 0.0 /100 WBCs    RBC 3.90 (L) 4.00 - 5.20 x10*6/uL    Hemoglobin 11.2 (L) 12.0 - 16.0 g/dL    Hematocrit 34.5 (L) 36.0 - 46.0 %    MCV 89 80 - 100 fL    MCH 28.7 26.0 - 34.0 pg    MCHC 32.5 32.0 - 36.0 g/dL    RDW 15.3 (H) 11.5 - 14.5 %    Platelets 266 150 - 450 x10*3/uL   Renal Function Panel   Result Value Ref Range    Glucose 218 (H) 74 - 99 mg/dL    Sodium 142 136 - 145 mmol/L    Potassium 3.8 3.5 - 5.3 mmol/L    Chloride 105 98 - 107 mmol/L    Bicarbonate 19 (L) 21 - 32 mmol/L    Anion Gap 22 (H) 10 - 20 mmol/L    Urea Nitrogen 20 6 - 23 mg/dL    Creatinine 1.10 (H) 0.50 - 1.05 mg/dL    eGFR 54 (L) >60 mL/min/1.73m*2    Calcium 9.2 8.6 - 10.6 mg/dL    Phosphorus 4.0 2.5 - 4.9 mg/dL    Albumin 4.5 3.4 - 5.0 g/dL   Lactate Dehydrogenase   Result Value Ref Range     84 - 246 U/L   Blood Gas Arterial Full Panel Unsolicited   Result Value Ref Range    POCT pH, Arterial 7.42 7.38 - 7.42 pH    POCT pCO2, Arterial 33 (L) 38 - 42 mm Hg    POCT pO2, Arterial 107 (H) 85 - 95 mm Hg    POCT SO2, Arterial 99 94 - 100 %    POCT Oxy Hemoglobin, Arterial 96.8 94.0 - 98.0 %    POCT Hematocrit Calculated, Arterial 35.0 (L) 36.0 - 46.0 %    POCT Sodium, Arterial 139 136 - 145 mmol/L    POCT Potassium, Arterial 3.9 3.5 - 5.3 mmol/L    POCT Chloride, Arterial 105 98 - 107 mmol/L    POCT Ionized Calcium, Arterial 1.19 1.10 - 1.33 mmol/L    POCT Glucose, Arterial 221 (H) 74 - 99 mg/dL    POCT Lactate, Arterial 2.9 (H) 0.4 - 2.0 mmol/L    POCT Base Excess, Arterial -2.4 (L) -2.0 - 3.0 mmol/L    POCT HCO3 Calculated, Arterial 21.4 (L) 22.0 - 26.0 mmol/L    POCT Hemoglobin, Arterial 11.7 (L) 12.0 - 16.0 g/dL    POCT Anion Gap, Arterial 17 10 - 25 mmo/L    Patient Temperature 37.0 degrees Celsius   Blood Gas Venous Full Panel  Unsolicited   Result Value Ref Range    POCT pH, Venous 7.34 7.33 - 7.43 pH    POCT pCO2, Venous 44 41 - 51 mm Hg    POCT pO2, Venous 60 (H) 35 - 45 mm Hg    POCT SO2, Venous 91 (H) 45 - 75 %    POCT Oxy Hemoglobin, Venous 87.6 (H) 45.0 - 75.0 %    POCT Hematocrit Calculated, Venous 34.0 (L) 36.0 - 46.0 %    POCT Sodium, Venous 140 136 - 145 mmol/L    POCT Potassium, Venous 3.6 3.5 - 5.3 mmol/L    POCT Chloride, Venous 105 98 - 107 mmol/L    POCT Ionized Calicum, Venous 1.20 1.10 - 1.33 mmol/L    POCT Glucose, Venous 218 (H) 74 - 99 mg/dL    POCT Lactate, Venous 3.1 (H) 0.4 - 2.0 mmol/L    POCT Base Excess, Venous -2.1 (L) -2.0 - 3.0 mmol/L    POCT HCO3 Calculated, Venous 23.7 22.0 - 26.0 mmol/L    POCT Hemoglobin, Venous 11.2 (L) 12.0 - 16.0 g/dL    POCT Anion Gap, Venous 15.0 10.0 - 25.0 mmol/L    Patient Temperature 37.0 degrees Celsius   Blood Gas Arterial Full Panel Unsolicited   Result Value Ref Range    POCT pH, Arterial 7.38 7.38 - 7.42 pH    POCT pCO2, Arterial 39 38 - 42 mm Hg    POCT pO2, Arterial 511 (H) 85 - 95 mm Hg    POCT SO2, Arterial 100 94 - 100 %    POCT Oxy Hemoglobin, Arterial 97.2 94.0 - 98.0 %    POCT Hematocrit Calculated, Arterial 33.0 (L) 36.0 - 46.0 %    POCT Sodium, Arterial 139 136 - 145 mmol/L    POCT Potassium, Arterial 3.9 3.5 - 5.3 mmol/L    POCT Chloride, Arterial 103 98 - 107 mmol/L    POCT Ionized Calcium, Arterial 1.15 1.10 - 1.33 mmol/L    POCT Glucose, Arterial 266 (H) 74 - 99 mg/dL    POCT Lactate, Arterial 3.5 (H) 0.4 - 2.0 mmol/L    POCT Base Excess, Arterial -1.8 -2.0 - 3.0 mmol/L    POCT HCO3 Calculated, Arterial 23.1 22.0 - 26.0 mmol/L    POCT Hemoglobin, Arterial 10.9 (L) 12.0 - 16.0 g/dL    POCT Anion Gap, Arterial 17 10 - 25 mmo/L    Patient Temperature 37.0 degrees Celsius   Coox Panel, Arterial Unsolicited   Result Value Ref Range    POCT Hemoglobin, Arterial 10.9 (L) 12.0 - 16.0 g/dL    POCT Oxy Hemoglobin, Arterial 97.2 94.0 - 98.0 %    POCT Carboxyhemoglobin,  Arterial 1.2 %    POCT Methemoglobin, Arterial 1.6 (H) 0.0 - 1.5 %    POCT Deoxy Hemoglobin, Arterial 0.0 0.0 - 5.0 %   Blood Gas Arterial Full Panel   Result Value Ref Range    POCT pH, Arterial 7.51 (H) 7.38 - 7.42 pH    POCT pCO2, Arterial 28 (L) 38 - 42 mm Hg    POCT pO2, Arterial 374 (H) 85 - 95 mm Hg    POCT SO2, Arterial 100 94 - 100 %    POCT Oxy Hemoglobin, Arterial 97.5 94.0 - 98.0 %    POCT Hematocrit Calculated, Arterial 31.0 (L) 36.0 - 46.0 %    POCT Sodium, Arterial 141 136 - 145 mmol/L    POCT Potassium, Arterial 3.5 3.5 - 5.3 mmol/L    POCT Chloride, Arterial 108 (H) 98 - 107 mmol/L    POCT Ionized Calcium, Arterial 1.09 (L) 1.10 - 1.33 mmol/L    POCT Glucose, Arterial 163 (H) 74 - 99 mg/dL    POCT Lactate, Arterial 3.1 (H) 0.4 - 2.0 mmol/L    POCT Base Excess, Arterial 0.0 -2.0 - 3.0 mmol/L    POCT HCO3 Calculated, Arterial 22.3 22.0 - 26.0 mmol/L    POCT Hemoglobin, Arterial 10.2 (L) 12.0 - 16.0 g/dL    POCT Anion Gap, Arterial 14 10 - 25 mmo/L    Patient Temperature 37.0 degrees Celsius    FiO2 100 %   Calcium, Ionized   Result Value Ref Range    POCT Calcium, Ionized 1.15 1.1 - 1.33 mmol/L   Magnesium   Result Value Ref Range    Magnesium 2.93 (H) 1.60 - 2.40 mg/dL   CBC   Result Value Ref Range    WBC 15.2 (H) 4.4 - 11.3 x10*3/uL    nRBC 0.0 0.0 - 0.0 /100 WBCs    RBC 3.50 (L) 4.00 - 5.20 x10*6/uL    Hemoglobin 10.2 (L) 12.0 - 16.0 g/dL    Hematocrit 30.7 (L) 36.0 - 46.0 %    MCV 88 80 - 100 fL    MCH 29.1 26.0 - 34.0 pg    MCHC 33.2 32.0 - 36.0 g/dL    RDW 15.8 (H) 11.5 - 14.5 %    Platelets 221 150 - 450 x10*3/uL   Renal Function Panel   Result Value Ref Range    Glucose 178 (H) 74 - 99 mg/dL    Sodium 143 136 - 145 mmol/L    Potassium 3.8 3.5 - 5.3 mmol/L    Chloride 106 98 - 107 mmol/L    Bicarbonate 23 21 - 32 mmol/L    Anion Gap 18 10 - 20 mmol/L    Urea Nitrogen 17 6 - 23 mg/dL    Creatinine 1.01 0.50 - 1.05 mg/dL    eGFR 60 (L) >60 mL/min/1.73m*2    Calcium 9.4 8.6 - 10.6 mg/dL     Phosphorus 2.5 2.5 - 4.9 mg/dL    Albumin 4.7 3.4 - 5.0 g/dL   Coagulation Screen   Result Value Ref Range    Protime 15.0 (H) 9.8 - 12.8 seconds    INR 1.3 (H) 0.9 - 1.1    aPTT 28 27 - 38 seconds   Lactate Dehydrogenase   Result Value Ref Range     84 - 246 U/L   Blood Gas Arterial Full Panel   Result Value Ref Range    POCT pH, Arterial 7.45 (H) 7.38 - 7.42 pH    POCT pCO2, Arterial 29 (L) 38 - 42 mm Hg    POCT pO2, Arterial 363 (H) 85 - 95 mm Hg    POCT SO2, Arterial 100 94 - 100 %    POCT Oxy Hemoglobin, Arterial 97.4 94.0 - 98.0 %    POCT Hematocrit Calculated, Arterial 26.0 (L) 36.0 - 46.0 %    POCT Sodium, Arterial 142 136 - 145 mmol/L    POCT Potassium, Arterial 3.3 (L) 3.5 - 5.3 mmol/L    POCT Chloride, Arterial 114 (H) 98 - 107 mmol/L    POCT Ionized Calcium, Arterial 1.02 (L) 1.10 - 1.33 mmol/L    POCT Glucose, Arterial 152 (H) 74 - 99 mg/dL    POCT Lactate, Arterial 1.8 0.4 - 2.0 mmol/L    POCT Base Excess, Arterial -3.2 (L) -2.0 - 3.0 mmol/L    POCT HCO3 Calculated, Arterial 20.2 (L) 22.0 - 26.0 mmol/L    POCT Hemoglobin, Arterial 8.7 (L) 12.0 - 16.0 g/dL    POCT Anion Gap, Arterial 11 10 - 25 mmo/L    Patient Temperature 37.0 degrees Celsius    FiO2 100 %   POCT GLUCOSE   Result Value Ref Range    POCT Glucose 171 (H) 74 - 99 mg/dL       This patient has a central line   Reason for the central line remaining today? Hemodynamic monitoring    This patient has a urinary catheter   Reason for the urinary catheter remaining today? critically ill patient who need accurate urinary output measurements    This patient is intubated   Reason for patient to remain intubated today? they have continued cardiopulmonary lability/instability           Assessment/Plan   Lolis Auguste is a 69 y.o. female with PMH of newly diagnosed persistent AF on Eliquis s/p failed DCCV 10/18/2024, SOLOMON thrombus and aborted DCCV in 9/2024, and acute systolic HF [potentially tachy-mediated], who [facility] for AF RVR. Patient was seen by cardiologist today for routine follow-up, noted to be in persistent AF RVR. She was recently cardioverted 10/18 and discharged on Amiodarone but was unable to tolerated d/t extreme fatigue. Dofetilide 250 mcg BID was loaded but AF persisted, so that the decision was made to perform urgent RFA/PVI at [facility].      Patient became increasingly hypotensive requiring norepinephrine ggt, epinephrine ggt. Cardiac ablation course complicated by vfib arrest requiring defibrillation. Follow by cardioversion x3 for SVT. Pericardial effusion require pericardial fluid drainage. 1000ml hemorhagic fluid drained. Patient was admitted to the CTICU sp ecmo cannulations for hypotension refractory to vasopressors. Patient developed vfib and torsades over night. She was cardioverted five times. He received amiodarone bolus x2, magnesium and was started on a lidocaine drip.      Plan:  NEURO: Patient is intubated and sedated on propofol infusion. Acute post operative pain.   -->  - Serial neuro and pain assessments   - Continue propofol until NMB reversal, then daily sedation vacation at minimum   - NMB reversal when normothermic and hemodynamically stable.    - Scheduled Tylenol   - PRN oxycodone  - PRN dilaudid for pain   - PT Consult, OOB to chair as tolerated, chair position if not tolerated   - CAM ICU score qshift  - Sleep/wake cycle hygiene     CV:  Patient has a history of atrial fibrillation, NICM. Is now status post ecmo cannula, pericardial effusion drainage, atrial fibrillation ablation. Pre/Post EF: EF 25%/  Arrived to CTICU on epinenephrine 0.08, norepinephrine 0.04. Patient on [facility] ECMO. Pericardial drain draining blood. Patient developed vfib and torsades over night. He was cardioverted five times. He received amiodarone bolus x2, magnesium and was started on a lidocaine drip. Bedside pocus showed Ef 5-10%-->  - Maintain goal MAP > 65   - Arrhythmia induced  cardiomyopathy vs anjali-pericarditis    - Avoid QT prolonging agents including amiodarone until 11/7 due to recent dofetilide load.  - Hydrocortisone 50 mg q8h started for possible myocarditis   - Formal echo   - Lidocaine drip with levels q6h   - Follow up with EP for arrhythmia management   - HF consult appreciate recs   - Mixed venous and CI Q4H  - Volume resuscitate as clinically indicated  - Monitor pericardial drain output.  - Continue aspirin   - Hold home metoprolol     PULM:  No history of pulmonary disease.  Currently intubated on ventilator.-->  - F/u post op CXR  - Once reversed, wean ventilator settings towards CPAP & extubation   - Wean FiO2 maintaining SpO2 >92%.   - IS q1h and OOB to chair when extubated     GI:  OG in place.-->  - Continue PPI until extubated  - NPO, will perform bedside swallow eval post extubation   - Colace/senna BID and miralax BID     : No history of renal disease, baseline creatinine 0.8. Creatinine stable post-op. Urbina in place and making adequate UOP. -->  - Continue urbina catheter for strict I/Os.  - Goal UOP 0.5ml/kg/hr  - RFP as clinically indicated  - Replete electrolytes per CTICU protocol  - Holding home furosemide      ENDO:  No past medical history of DM. A1c: 5.6-->  - Maintain BG <180, insulin per CTICU protocol     HEME:  Acute blood loss anemia and thrombocytopenia.-->    - Monitor drain output volume and characteristics  - CBC, coags, and fibrinogen post op and as clinically indicated  - Continue aspirin   - SQH   - SCDs for DVT prophylaxis.  - Last type and screen: 10/30/2024  - Holding home apixaban      ID:  Afebrile, no current indications of infection. No past MRSA screen .-->  - Trend temp q4h  - Obtain MRSA swab   - Follow pericardial fluid cultures   - Continue zosyn       Skin:  No active skin issues.  - preventative Mepilex dressings in place on sacrum and heels  - change preventative Mepilex weekly or more frequently as indicated (when moist/soiled)    - every shift skin assessment per nursing and weekly ICU skin rounds  - moisture barrier to be applied with earnestine care  - active skin problems addressed with nursing on daily rounds     Proph:  SCDs  PPI     G:  Line  Femoral 8F sheath x2   Left radial a-line placed 10/30/2024  PIV 18G, PIV 16G      F: Family: will update at bedside postoperatively.     A,B,C,D,E,F,G: reviewed     Dispo: CTICU care for now.    CTICU TEAM PHONE [phone]      Ira Mora MD

## 2024-10-31 NOTE — CONSULTS
Inpatient consult to Palliative Care  Consult performed by: KRAIG Mena-CNP  Consult ordered by: Stepan Ward MD          Palliative Medicine Consult  Complex medical decision making, symptom management, patient/family support    History obtained from chart review including ED note, H&P, patient's daily progress notes, review of lab/test results, and discussion with primary team and bedside RN.    Subjective    History of Present Illness  Lolis Auguste is a 69 y.o. female with PMH of newly diagnosed persistent AF on Eliquis s/p failed DCCV 10/18/2024, SOLOMON thrombus and aborted DCCV in 9/2024, and acute systolic HF [potentially tachy-mediated], who was recently admitted to [facility] on 10/23/2024 for AF RVR. Patient was seen by cardiologist today for routine follow-up, noted to be in persistent AF RVR. She was recently cardioverted 10/18 and discharged on Amiodarone but was unable to tolerated d/t extreme fatigue. Dofetilide 250 mcg BID was loaded but AF persisted, so that the decision was made to perform urgent RFA/PVI procedure in the setting of newly diagnosed NICM/tachy-mediated cardiomyopathy. Patient was transferred to [facility] for cardiac ablation.      Patient became increasingly hypotensive requiring norepinephrine ggt, epinephrine ggt. Cardiac ablation course complicated by vfib arrest requiring defibrillation. Follow by cardioversion x3 for SVT. Pericardial effusion require pericardial fluid drainage. 1000ml hemorhagic fluid drained. Patient was admitted to the CTICU sp ecmo canula for hypotension refractory to vasopressors.       Introduction to Palliative Medicine  Met with pt along with 2 sons and DIL at bedside.    Patient remains altered, does not have capacity to make their own medical decisions at this time. Unable to participate in ROS or goals of care discussion. Surrogate decision maker is spouse.    Staff present: Janine Frances CNP.    Palliative Medicine was introduced  as a specialty service for patients with serious illness to help with symptom management, improve quality of life, assist with goals of care conversations, navigate complex decision making, and provide support to patients and families. Support and empathy was provided throughout the encounter. Provided reflective listening and presence.     Symptoms- Unable to contribute at this time.      Palliative Medicine Social History:  The patient is  to spouse 45 years. They have 3 children and 5 grandchildren. 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 Prior to hospitalization, pt was very independent, loved tending to the barn and her horse. In her younger years, pt did horse riding competitions and often flew/traveled for work. More recently pt likes to travel and be with her friends and family. She is active with their Jewish. Coping methods are her family and her césar/prayer. Denies any safety concerns.    Objective    Last Recorded Vitals  Pulse 97   Temp 37.7 °C (99.9 °F) (Esophageal)   Resp 23   SpO2 99%      Physical Exam  Constitutional:       Appearance: She is ill-appearing.      Interventions: She is sedated and intubated.   HENT:      Head: Normocephalic.      Mouth/Throat:      Mouth: Mucous membranes are dry.   Cardiovascular:      Rate and Rhythm: Normal rate.   Pulmonary:      Effort: She is intubated.      Breath sounds: Normal breath sounds.   Abdominal:      Palpations: Abdomen is soft.   Musculoskeletal:         General: Swelling present.   Skin:     General: Skin is warm and dry.      Coloration: Skin is pale.          Relevant Results  Results for orders placed or performed during the hospital encounter of 10/30/24 (from the past 24 hours)   Blood Gas Arterial Full Panel   Result Value Ref  Range    POCT pH, Arterial 7.41 7.38 - 7.42 pH    POCT pCO2, Arterial 41 38 - 42 mm Hg    POCT pO2, Arterial 121 (H) 85 - 95 mm Hg    POCT SO2, Arterial 100 94 - 100 %    POCT Oxy Hemoglobin, Arterial 97.1 94.0 - 98.0 %    POCT Hematocrit Calculated, Arterial 43.0 36.0 - 46.0 %    POCT Sodium, Arterial 135 (L) 136 - 145 mmol/L    POCT Potassium, Arterial 2.8 (LL) 3.5 - 5.3 mmol/L    POCT Chloride, Arterial 104 98 - 107 mmol/L    POCT Ionized Calcium, Arterial 1.03 (L) 1.10 - 1.33 mmol/L    POCT Glucose, Arterial 183 (H) 74 - 99 mg/dL    POCT Lactate, Arterial 1.7 0.4 - 2.0 mmol/L    POCT Base Excess, Arterial 1.2 -2.0 - 3.0 mmol/L    POCT HCO3 Calculated, Arterial 26.0 22.0 - 26.0 mmol/L    POCT Hemoglobin, Arterial 14.3 12.0 - 16.0 g/dL    POCT Anion Gap, Arterial 8 (L) 10 - 25 mmo/L    Patient Temperature 37.0 degrees Celsius    FiO2 45 %    Site of Arterial Puncture Arterial Line    VERIFY ABO/Rh Group Test   Result Value Ref Range    ABO TYPE O     Rh TYPE POS    Type and screen   Result Value Ref Range    ABO TYPE O     Rh TYPE POS     ANTIBODY SCREEN NEG    Prepare RBC: 4 Units   Result Value Ref Range    PRODUCT CODE G5232V46     Unit Number [id number]     Unit ABO O     Unit RH POS     XM INTEP COMP     Dispense Status XM     Blood Expiration Date 11/7/2024 11:59:00 PM EST     PRODUCT BLOOD TYPE 5100     UNIT VOLUME 350     PRODUCT CODE T3181L12     Unit Number [id number]     Unit ABO O     Unit RH POS     XM INTEP COMP     Dispense Status TR     Blood Expiration Date 11/9/2024 11:59:00 PM EST     PRODUCT BLOOD TYPE 5100     UNIT VOLUME 350     PRODUCT CODE M1618T20     Unit Number [id number]     Unit ABO O     Unit RH POS     XM INTEP COMP     Dispense Status IS     Blood Expiration Date 11/6/2024 11:59:00 PM EST     PRODUCT BLOOD TYPE 5100     UNIT VOLUME 287     PRODUCT CODE E2630L48     Unit Number [id number]     Unit ABO O     Unit RH POS     XM INTEP COMP     Dispense Status IS      Blood Expiration Date 11/7/2024 11:59:00 PM EST     PRODUCT BLOOD TYPE 5100     UNIT VOLUME 280    Prepare Plasma: 4 Units   Result Value Ref Range    PRODUCT CODE G9652Y57     Unit Number [id number]     Unit ABO O     Unit RH POS     Dispense Status RE     Blood Expiration Date 10/31/2024 11:11:00 AM EDT     PRODUCT BLOOD TYPE 5100     UNIT VOLUME 314     PRODUCT CODE T9228L02     Unit Number [id number]     Unit ABO O     Unit RH POS     Dispense Status RE     Blood Expiration Date 10/31/2024 11:11:00 AM EDT     PRODUCT BLOOD TYPE 5100     UNIT VOLUME 323     PRODUCT CODE G6070B36     Unit Number [id number]     Unit ABO O     Unit RH POS     Dispense Status RE     Blood Expiration Date 11/4/2024  9:03:00 AM EST     PRODUCT BLOOD TYPE 5100     UNIT VOLUME 336     PRODUCT CODE T6462P52     Unit Number [id number]     Unit ABO O     Unit RH NEG     Dispense Status RE     Blood Expiration Date 10/31/2024 11:11:00 AM EDT     PRODUCT BLOOD TYPE 9500     UNIT VOLUME 323    Blood Gas Arterial Full Panel Unsolicited   Result Value Ref Range    POCT pH, Arterial 7.36 (L) 7.38 - 7.42 pH    POCT pCO2, Arterial 33 (L) 38 - 42 mm Hg    POCT pO2, Arterial 225 (H) 85 - 95 mm Hg    POCT SO2, Arterial 100 94 - 100 %    POCT Oxy Hemoglobin, Arterial 98.1 (H) 94.0 - 98.0 %    POCT Hematocrit Calculated, Arterial 34.0 (L) 36.0 - 46.0 %    POCT Sodium, Arterial 137 136 - 145 mmol/L    POCT Potassium, Arterial 2.7 (LL) 3.5 - 5.3 mmol/L    POCT Chloride, Arterial 106 98 - 107 mmol/L    POCT Ionized Calcium, Arterial 0.88 (L) 1.10 - 1.33 mmol/L    POCT Glucose, Arterial 268 (H) 74 - 99 mg/dL    POCT Lactate, Arterial 3.4 (H) 0.4 - 2.0 mmol/L    POCT Base Excess, Arterial -6.0 (L) -2.0 - 3.0 mmol/L    POCT HCO3 Calculated, Arterial 18.6 (L) 22.0 - 26.0 mmol/L    POCT Hemoglobin, Arterial 11.2 (L) 12.0 - 16.0 g/dL    POCT Anion Gap, Arterial 15 10 - 25 mmo/L    Patient Temperature 37.0 degrees Celsius   Blood Gas Arterial  Full Panel Unsolicited   Result Value Ref Range    POCT pH, Arterial 7.37 (L) 7.38 - 7.42 pH    POCT pCO2, Arterial 39 38 - 42 mm Hg    POCT pO2, Arterial 252 (H) 85 - 95 mm Hg    POCT SO2, Arterial 100 94 - 100 %    POCT Oxy Hemoglobin, Arterial 97.5 94.0 - 98.0 %    POCT Hematocrit Calculated, Arterial 34.0 (L) 36.0 - 46.0 %    POCT Sodium, Arterial 136 136 - 145 mmol/L    POCT Potassium, Arterial 3.8 3.5 - 5.3 mmol/L    POCT Chloride, Arterial 104 98 - 107 mmol/L    POCT Ionized Calcium, Arterial 1.34 (H) 1.10 - 1.33 mmol/L    POCT Glucose, Arterial 259 (H) 74 - 99 mg/dL    POCT Lactate, Arterial 3.2 (H) 0.4 - 2.0 mmol/L    POCT Base Excess, Arterial -2.5 (L) -2.0 - 3.0 mmol/L    POCT HCO3 Calculated, Arterial 22.5 22.0 - 26.0 mmol/L    POCT Hemoglobin, Arterial 11.3 (L) 12.0 - 16.0 g/dL    POCT Anion Gap, Arterial 13 10 - 25 mmo/L    Patient Temperature 37.0 degrees Celsius    FiO2 100 %   Coox Panel, Arterial Unsolicited   Result Value Ref Range    POCT Hemoglobin, Arterial 11.3 (L) 12.0 - 16.0 g/dL    POCT Oxy Hemoglobin, Arterial 97.5 94.0 - 98.0 %    POCT Carboxyhemoglobin, Arterial 1.2 %    POCT Methemoglobin, Arterial 1.1 0.0 - 1.5 %    POCT Deoxy Hemoglobin, Arterial 0.1 0.0 - 5.0 %   Body Fluid Cell Count   Result Value Ref Range    Color, Fluid Red (A) Colorless, Straw, Yellow    Clarity, Fluid Turbid (A) Clear    WBC, Fluid 2,149 See Comment /uL    RBC, Fluid 4,443,000 0  /uL /uL   Body Fluid Differential   Result Value Ref Range    Neutrophils %, Manual, Fluid 41 <25 % %    Lymphocytes %, Manual, Fluid 59 <75 % %    Mono/Macrophages %, Manual, Fluid 0 <70 % %    Eosinophils %, Manual, Fluid 0 0 % %    Basophils %, Manual, Fluid 0 0 % %    Immature Granulocytes %, Manual, Fluid 0 0 % %    Blasts %, Manual, Fluid 0 0 % %    Unclassified Cells %, Manual, Fluid 0 0 % %    Plasma Cells %, Manual, Fluid 0 0 % %    Total Cells Counted, Fluid 100    Sterile Fluid Culture/Smear    Specimen: Pericardial; Fluid    Result Value Ref Range    Sterile Fluid Culture/Smear No growth to date     Gram Stain (1+) Rare Polymorphonuclear leukocytes     Gram Stain No organisms seen    AFB Culture/Smear    Specimen: Pericardial; Fluid   Result Value Ref Range    AFB Culture       Culture in progress and will be examined weekly. A result will be issued either when positive or after 8 weeks incubation.   Albumin, Fluid   Result Value Ref Range    Albumin, Fluid 3.7 Not established g/dL   Glucose, Fluid   Result Value Ref Range    Glucose, Fluid 154 Not established mg/dL   Lactate Dehydrogenase, Fluid   Result Value Ref Range    LD, Fluid 213 Not established. U/L   Sodium, Body Fluid   Result Value Ref Range    Sodium Fluid 138 Not established mmol/L   Protein, Total Fluid   Result Value Ref Range    Protein, Total Fluid 5.7 Not established g/dL   Cholesterol, Body Fluid   Result Value Ref Range    Cholesterol, Fluid 124 Not established mg/dL   Bilirubin, Total Fluid   Result Value Ref Range    Total Bili, Fluid 0.8 Not established mg/dL   AFB Processed   Result Value Ref Range    Extra Tube Hold for add-ons.    Calcium, Ionized   Result Value Ref Range    POCT Calcium, Ionized 1.07 (L) 1.1 - 1.33 mmol/L   Magnesium   Result Value Ref Range    Magnesium 2.28 1.60 - 2.40 mg/dL   Coagulation Screen   Result Value Ref Range    Protime 19.0 (H) 9.8 - 12.8 seconds    INR 1.7 (H) 0.9 - 1.1    aPTT >200 (HH) 27 - 38 seconds   Fibrinogen   Result Value Ref Range    Fibrinogen 243 200 - 400 mg/dL   CBC   Result Value Ref Range    WBC 16.2 (H) 4.4 - 11.3 x10*3/uL    nRBC 0.0 0.0 - 0.0 /100 WBCs    RBC 3.90 (L) 4.00 - 5.20 x10*6/uL    Hemoglobin 11.2 (L) 12.0 - 16.0 g/dL    Hematocrit 34.5 (L) 36.0 - 46.0 %    MCV 89 80 - 100 fL    MCH 28.7 26.0 - 34.0 pg    MCHC 32.5 32.0 - 36.0 g/dL    RDW 15.3 (H) 11.5 - 14.5 %    Platelets 266 150 - 450 x10*3/uL   Renal Function Panel   Result Value Ref Range    Glucose 218 (H) 74 - 99 mg/dL    Sodium 142 136 -  145 mmol/L    Potassium 3.8 3.5 - 5.3 mmol/L    Chloride 105 98 - 107 mmol/L    Bicarbonate 19 (L) 21 - 32 mmol/L    Anion Gap 22 (H) 10 - 20 mmol/L    Urea Nitrogen 20 6 - 23 mg/dL    Creatinine 1.10 (H) 0.50 - 1.05 mg/dL    eGFR 54 (L) >60 mL/min/1.73m*2    Calcium 9.2 8.6 - 10.6 mg/dL    Phosphorus 4.0 2.5 - 4.9 mg/dL    Albumin 4.5 3.4 - 5.0 g/dL   Lactate Dehydrogenase   Result Value Ref Range     84 - 246 U/L   Blood Gas Arterial Full Panel Unsolicited   Result Value Ref Range    POCT pH, Arterial 7.42 7.38 - 7.42 pH    POCT pCO2, Arterial 33 (L) 38 - 42 mm Hg    POCT pO2, Arterial 107 (H) 85 - 95 mm Hg    POCT SO2, Arterial 99 94 - 100 %    POCT Oxy Hemoglobin, Arterial 96.8 94.0 - 98.0 %    POCT Hematocrit Calculated, Arterial 35.0 (L) 36.0 - 46.0 %    POCT Sodium, Arterial 139 136 - 145 mmol/L    POCT Potassium, Arterial 3.9 3.5 - 5.3 mmol/L    POCT Chloride, Arterial 105 98 - 107 mmol/L    POCT Ionized Calcium, Arterial 1.19 1.10 - 1.33 mmol/L    POCT Glucose, Arterial 221 (H) 74 - 99 mg/dL    POCT Lactate, Arterial 2.9 (H) 0.4 - 2.0 mmol/L    POCT Base Excess, Arterial -2.4 (L) -2.0 - 3.0 mmol/L    POCT HCO3 Calculated, Arterial 21.4 (L) 22.0 - 26.0 mmol/L    POCT Hemoglobin, Arterial 11.7 (L) 12.0 - 16.0 g/dL    POCT Anion Gap, Arterial 17 10 - 25 mmo/L    Patient Temperature 37.0 degrees Celsius   Blood Gas Venous Full Panel Unsolicited   Result Value Ref Range    POCT pH, Venous 7.34 7.33 - 7.43 pH    POCT pCO2, Venous 44 41 - 51 mm Hg    POCT pO2, Venous 60 (H) 35 - 45 mm Hg    POCT SO2, Venous 91 (H) 45 - 75 %    POCT Oxy Hemoglobin, Venous 87.6 (H) 45.0 - 75.0 %    POCT Hematocrit Calculated, Venous 34.0 (L) 36.0 - 46.0 %    POCT Sodium, Venous 140 136 - 145 mmol/L    POCT Potassium, Venous 3.6 3.5 - 5.3 mmol/L    POCT Chloride, Venous 105 98 - 107 mmol/L    POCT Ionized Calicum, Venous 1.20 1.10 - 1.33 mmol/L    POCT Glucose, Venous 218 (H) 74 - 99 mg/dL    POCT Lactate, Venous 3.1 (H) 0.4 -  2.0 mmol/L    POCT Base Excess, Venous -2.1 (L) -2.0 - 3.0 mmol/L    POCT HCO3 Calculated, Venous 23.7 22.0 - 26.0 mmol/L    POCT Hemoglobin, Venous 11.2 (L) 12.0 - 16.0 g/dL    POCT Anion Gap, Venous 15.0 10.0 - 25.0 mmol/L    Patient Temperature 37.0 degrees Celsius   Blood Gas Arterial Full Panel Unsolicited   Result Value Ref Range    POCT pH, Arterial 7.38 7.38 - 7.42 pH    POCT pCO2, Arterial 39 38 - 42 mm Hg    POCT pO2, Arterial 511 (H) 85 - 95 mm Hg    POCT SO2, Arterial 100 94 - 100 %    POCT Oxy Hemoglobin, Arterial 97.2 94.0 - 98.0 %    POCT Hematocrit Calculated, Arterial 33.0 (L) 36.0 - 46.0 %    POCT Sodium, Arterial 139 136 - 145 mmol/L    POCT Potassium, Arterial 3.9 3.5 - 5.3 mmol/L    POCT Chloride, Arterial 103 98 - 107 mmol/L    POCT Ionized Calcium, Arterial 1.15 1.10 - 1.33 mmol/L    POCT Glucose, Arterial 266 (H) 74 - 99 mg/dL    POCT Lactate, Arterial 3.5 (H) 0.4 - 2.0 mmol/L    POCT Base Excess, Arterial -1.8 -2.0 - 3.0 mmol/L    POCT HCO3 Calculated, Arterial 23.1 22.0 - 26.0 mmol/L    POCT Hemoglobin, Arterial 10.9 (L) 12.0 - 16.0 g/dL    POCT Anion Gap, Arterial 17 10 - 25 mmo/L    Patient Temperature 37.0 degrees Celsius   Coox Panel, Arterial Unsolicited   Result Value Ref Range    POCT Hemoglobin, Arterial 10.9 (L) 12.0 - 16.0 g/dL    POCT Oxy Hemoglobin, Arterial 97.2 94.0 - 98.0 %    POCT Carboxyhemoglobin, Arterial 1.2 %    POCT Methemoglobin, Arterial 1.6 (H) 0.0 - 1.5 %    POCT Deoxy Hemoglobin, Arterial 0.0 0.0 - 5.0 %   Blood Gas Arterial Full Panel   Result Value Ref Range    POCT pH, Arterial 7.51 (H) 7.38 - 7.42 pH    POCT pCO2, Arterial 28 (L) 38 - 42 mm Hg    POCT pO2, Arterial 374 (H) 85 - 95 mm Hg    POCT SO2, Arterial 100 94 - 100 %    POCT Oxy Hemoglobin, Arterial 97.5 94.0 - 98.0 %    POCT Hematocrit Calculated, Arterial 31.0 (L) 36.0 - 46.0 %    POCT Sodium, Arterial 141 136 - 145 mmol/L    POCT Potassium, Arterial 3.5 3.5 - 5.3 mmol/L    POCT Chloride, Arterial 108  (H) 98 - 107 mmol/L    POCT Ionized Calcium, Arterial 1.09 (L) 1.10 - 1.33 mmol/L    POCT Glucose, Arterial 163 (H) 74 - 99 mg/dL    POCT Lactate, Arterial 3.1 (H) 0.4 - 2.0 mmol/L    POCT Base Excess, Arterial 0.0 -2.0 - 3.0 mmol/L    POCT HCO3 Calculated, Arterial 22.3 22.0 - 26.0 mmol/L    POCT Hemoglobin, Arterial 10.2 (L) 12.0 - 16.0 g/dL    POCT Anion Gap, Arterial 14 10 - 25 mmo/L    Patient Temperature 37.0 degrees Celsius    FiO2 100 %   Calcium, Ionized   Result Value Ref Range    POCT Calcium, Ionized 1.15 1.1 - 1.33 mmol/L   Magnesium   Result Value Ref Range    Magnesium 2.93 (H) 1.60 - 2.40 mg/dL   CBC   Result Value Ref Range    WBC 15.2 (H) 4.4 - 11.3 x10*3/uL    nRBC 0.0 0.0 - 0.0 /100 WBCs    RBC 3.50 (L) 4.00 - 5.20 x10*6/uL    Hemoglobin 10.2 (L) 12.0 - 16.0 g/dL    Hematocrit 30.7 (L) 36.0 - 46.0 %    MCV 88 80 - 100 fL    MCH 29.1 26.0 - 34.0 pg    MCHC 33.2 32.0 - 36.0 g/dL    RDW 15.8 (H) 11.5 - 14.5 %    Platelets 221 150 - 450 x10*3/uL   Renal Function Panel   Result Value Ref Range    Glucose 178 (H) 74 - 99 mg/dL    Sodium 143 136 - 145 mmol/L    Potassium 3.8 3.5 - 5.3 mmol/L    Chloride 106 98 - 107 mmol/L    Bicarbonate 23 21 - 32 mmol/L    Anion Gap 18 10 - 20 mmol/L    Urea Nitrogen 17 6 - 23 mg/dL    Creatinine 1.01 0.50 - 1.05 mg/dL    eGFR 60 (L) >60 mL/min/1.73m*2    Calcium 9.4 8.6 - 10.6 mg/dL    Phosphorus 2.5 2.5 - 4.9 mg/dL    Albumin 4.7 3.4 - 5.0 g/dL   Coagulation Screen   Result Value Ref Range    Protime 15.0 (H) 9.8 - 12.8 seconds    INR 1.3 (H) 0.9 - 1.1    aPTT 28 27 - 38 seconds   Lactate Dehydrogenase   Result Value Ref Range     84 - 246 U/L   Blood Gas Arterial Full Panel   Result Value Ref Range    POCT pH, Arterial 7.45 (H) 7.38 - 7.42 pH    POCT pCO2, Arterial 29 (L) 38 - 42 mm Hg    POCT pO2, Arterial 363 (H) 85 - 95 mm Hg    POCT SO2, Arterial 100 94 - 100 %    POCT Oxy Hemoglobin, Arterial 97.4 94.0 - 98.0 %    POCT Hematocrit Calculated, Arterial  26.0 (L) 36.0 - 46.0 %    POCT Sodium, Arterial 142 136 - 145 mmol/L    POCT Potassium, Arterial 3.3 (L) 3.5 - 5.3 mmol/L    POCT Chloride, Arterial 114 (H) 98 - 107 mmol/L    POCT Ionized Calcium, Arterial 1.02 (L) 1.10 - 1.33 mmol/L    POCT Glucose, Arterial 152 (H) 74 - 99 mg/dL    POCT Lactate, Arterial 1.8 0.4 - 2.0 mmol/L    POCT Base Excess, Arterial -3.2 (L) -2.0 - 3.0 mmol/L    POCT HCO3 Calculated, Arterial 20.2 (L) 22.0 - 26.0 mmol/L    POCT Hemoglobin, Arterial 8.7 (L) 12.0 - 16.0 g/dL    POCT Anion Gap, Arterial 11 10 - 25 mmo/L    Patient Temperature 37.0 degrees Celsius    FiO2 100 %   POCT GLUCOSE   Result Value Ref Range    POCT Glucose 171 (H) 74 - 99 mg/dL      Electrophysiology procedure  Pericardiocenteses was performed - Urgent (preexisting pericardial   effusion)  Aborted Ablation for Atrial Fibrillation after Pericardiocenteses     Procedures  Atrial Fibrillation ablation (97253), Intracardiac Echocardiogram (40996),   Ultrasound Guided vascular access (79005)    Patient history:  Please refer to the detailed history and physical on the patient's medical   chart.     Procedure narrative:  The patient was in the fasting state. A baseline ECG was recorded and   showed Atrial fibrillation. The patient was set up for continuous   monitoring of surface 12 lead ECG and pulse oximetry. Blood pressure was   monitored with automatic cuff measurements. Bilateral groins were clipped,   prepped with chlorhexidine, and draped in the usual sterile fashion. The   procedure was performed under general anesthesia (administered and   monitored by anesthesia attending and CRNA). Anesthesia sedated and   intubated the patient without any acute complications.  Radial arterial   line was placed for continuous hemodynamic monitoring. Immediate after the   general anesthesia induction, patient developed profound and sustained   hypotension.  The right femoral vein was accessed x 3 using the modified Seldinger    technique. 3 sheaths were inserted.   The right common femoral vein was evaluated with ultrasound, it was normal   in size and anatomy.  The vein was accessed using ultrasound guidance and   a 18G Cook needle, with direct visualization of the needle at all times.   Images were saved for both vein accesses. Over the guidewires, an 8F and   8.5F x2 sheaths were inserted into the right femoral vein.    Heparin was not given during this procedure.    Under fluoroscopic guidance a intracardiac echocardiogram catheter was   introduced into the right atrium.  The right atrium, left atrium, left   atrial appendage, RV were evaluated.  No obvious structural abnormalities   were noted. Initial imaging revealed Moderate-Large pericardial effusion   predominantly located at LV postero-postero-lateral wall. The   moderate-large pericardial effusion was found BEFORE giving heparin and   placing the CS pacing catheter.  The reviewed EMR showed that patient did not have such moderate-large   pericardial effusion on her MIKE in 9/2024 and on 10/18/2024. The moderate   pericardial effusion was noticed on TTE 10/24/2024 for the first time.   Given the profound hypotension with anesthesia induction, moderate-large   enlarged pericardial effusion (chronologically expanded), and requirement   of high vasopressors (Levophed 0.12 and Epinephrine 0.10 mcg/kg/min), the   decision was made to abort the planned AF ablation and perform emergent   pericardiocentesis.  During the below-mentioned EP lab course, patient developed   hemodynamically unstable AF/AFL RVR and one AF/AFL rhythm degenerated into   VF. For these reasons, patient was emergently cardioverted 5 times.    Urgent pericardiocentesis:  The subxiphoid region was prepped and draped in the usual sterile fashion   and local anesthesia was given.  Pericardial space accessed using Micropuncture kit via subxiphoid   approach. Wire confirmed to be in pericardial space with a combination  of   fluoroscopy and contrast injection.   A pigtail catheter advanced over the wire into pericardial space but the   repeated angiogram demonstrated that the pigtail catheter was advanced   into main pulmonary artery.  Without removing the first pericardial drain, the second pericardial   access was successfully obtained, then the wire was confirmed to be in   pericardial space with a combination of fluoroscopy and contrast injection   again. Via long super-stiff Amplatz wire, the pigtail catheter was   successfully advanced over the wire into pericardial space.  A total of 1000 cc of david blood was aspirated with resolution of   pericardial effusion and prompt improvement in patient's blood pressure.   The pericardial fluid sample was sent to chemistry, cytology, and   microbiology for the further evaluation.    End-of-case:  Although the pericardiocentesis successfully evacuated a total of 1000 mL   of hemorrhagic fluid and the limited TTE showed the resolution of   pericardial effusion, patient still required high-dose of vasopressors and   the repeated ABG showed acute lactic acidosis : lactate 3.4.   The decision was made to activa  SHOCK call and the multidisciprinary   discussion concluded to proceeding to VA ECMO cannulation in EP lab.    Summary:  Aborted radiofrequency ablation for persistent Atrial fibrillation due to   moderate-large size of pericardial effusion complicated with hemodynamic   instability.  Urgent pericardiocenteses was successfully performed and it evacuated a   total of 1000 mL of hemorrhagic pericardial fluid. After the drainage, the   limited TTE demonstrated the resolution of the pericardial effusion but   patient was still hemodynamic unstable in EP lab.   adult SHOCK Team Call was activated and the decision was made to   perform emergent VA ECMO cannulation in EP lab.  During the EP lab course, patient developed hemodynamically unstable   AF/AFL RVR and one AF/AFL rhythm  degenerated into VF. For these reasons,   patient was emergently cardioverted 5 times.  Patient was transferred to CTICU for the further management.     Recommendation:  Because this case already had Dofetilide 250 mcg BID loading in [facility], to avoid excessive anti-arrhythmic effects, please avoid   using Amiodarone and Digoxin this time.  Due to severe LV systolic dysfunction, please avoid using Diltiazem for AF   rate control but low-dose beta-blockers can be considered.  This case will require further evaluations of acute anjali-pericarditis   contributing to both new large pericardial effusion and persistent atrial   fibrillation. Please consider advanced heart failure team consultation.    See complete procedural log and parameters.     Encounter Date: 10/23/24   ECG 12 lead   Result Value    Ventricular Rate 108    Atrial Rate 258    QRS Duration 104    QT Interval 414    QTC Calculation(Bazett) 554    R Axis 119    T Axis 10    QRS Count 17    Q Onset 210    T Offset 417    QTC Fredericia 503    Narrative    Atrial fibrillation with rapid ventricular response with premature ventricular or aberrantly conducted complexes  Right axis deviation  T wave abnormality, consider anterior ischemia or digitalis effect  Abnormal ECG  Confirmed by Jonathan Haro ([id number]) on 10/29/2024 9:00:53 AM        Allergies  Patient has no known allergies.    Scheduled medications  acetaminophen, 650 mg, oral, q4h   Or  acetaminophen, 650 mg, rectal, q4h  [Transfer Hold] albumin human, 12.5 g, intravenous, Once  amiodarone, 300 mg, intravenous, Once  insulin lispro, 0-15 Units, subcutaneous, q4h  magnesium sulfate, 2 g, intravenous, Once  pantoprazole, 40 mg, oral, Daily before breakfast   Or  pantoprazole, 40 mg, intravenous, Daily before breakfast  piperacillin-tazobactam, 3.375 g, intravenous, q6h  polyethylene glycol, 17 g, oral, Daily  sennosides-docusate sodium, 2 tablet, oral, BID      Continuous  medications  angiotensin II (Giapreza) 2.5 mg in sodium chloride 0.9% 250 mL (0.01 mg/mL) infusion, 1.25-40 ng/kg/min  EPINEPHrine, 0-2 mcg/kg/min, Last Rate: 0.08 mcg/kg/min (10/31/24 0900)  lactated Ringer's, 30 mL/hr, Last Rate: 30 mL/hr (10/31/24 0900)  lactated Ringer's, 5 mL/hr  lidocaine, 1 mg/min, Last Rate: 1 mg/min (10/31/24 0900)  norepinephrine, 0-1 mcg/kg/min, Last Rate: 0.1 mcg/kg/min (10/31/24 0900)  propofol, 0-50 mcg/kg/min, Last Rate: 50 mcg/kg/min (10/31/24 0901)  vasopressin, 0-0.03 Units/min, Last Rate: Stopped (10/30/24 2200)      PRN medications  PRN medications: calcium gluconate, calcium gluconate, dextrose **OR** glucagon, HYDROmorphone, magnesium sulfate, magnesium sulfate, naloxone, oxyCODONE, oxygen, potassium chloride, potassium chloride     Assessment/Plan    Lolis Auguste is a 69 y.o. female with PMH of newly diagnosed persistent AF on Eliquis s/p failed DCCV 10/18/2024, SOLOMON thrombus and aborted DCCV in 9/2024, and acute systolic HF [potentially tachy-mediated], who was recently admitted to [facility] on 10/23/2024 for AF RVR. Patient was seen by cardiologist for routine follow-up, noted to be in persistent AF RVR. She was recently cardioverted 10/18 and discharged on Amiodarone but was unable to tolerate d/t extreme fatigue. Dofetilide 250 mcg BID was loaded but AF persisted, so that the decision was made to perform urgent RFA/PVI procedure in the setting of newly diagnosed NICM/tachy-mediated cardiomyopathy. Patient was transferred to [facility] for cardiac ablation.      Patient became increasingly hypotensive requiring norepinephrine ggt, epinephrine ggt. Cardiac ablation course complicated by vfib arrest requiring defibrillation. Follow by cardioversion x3 for SVT. Pericardial effusion require pericardial fluid drainage. 1000ml hemorhagic fluid drained. Patient was admitted to the CTICU sp ecmo canula for hypotension refractory to vasopressors.     10/31: Palliative team  "consulted for pt/family support. Pt/family would enjoy spiritual support and music therapy.    Palliative Performance Scale (PPS): 10    ----------------------------------------------------------------------------------------------------------------------------------------------------------------------------------------------------------------------------------------------------------------------------------------------------------------------------------------------------------------------  Advanced Care Planning  Patient and/or family consented to a voluntary Advanced Care Planning meeting.   Serious Illness Assessment and Counseling:  Life Limiting Disease: HFrEF with arrest requiring VA ECMO and pressor support posing threat to life or function.     Disease Specific Information Provided/Prognosis Discussed: Patient's current clinical condition, including diagnosis, prognosis, and management plan were discussed.   Counseling provided on current management and day to day monitoring.    Understanding/Overall Impression: Family expressing fair understanding of overall health status and severity of illness.     Goals/Hopes: Discussion ensued about patient's goals for their medical care going forward. Allowed family time to talk about pt's current quality of life, disease course/progression, and symptom and treatment burden. Discussed goal is for pt \"to be up on her feet again\".     Fears/Worries/Concerns: none at this time, supported by césar.    Patient's Perception of Functional Status: n/a    Minimal Acceptable Quality of Life/Maximal [place] Tolerable for the Possibility of More Time: Counseling provided on the concept of MAO/Maximal [place]. Family expressing that pt would never want to be in a health state where they were dependent on other's for ADLs/toileting needs, bed bound, or live/exist in a vegetative state. Family deems that this would not be an acceptable quality of life for the patient.   Author asking " "family if pt was able to participate in this conversation and see herself, what would she say? Bill responded \"I wasn't expecting this...\"    Health Preferences and Priorities with Disease Progression:   Family is  aware and expressed that pt would not want to life in a persistent vegetative state.     Advanced Directives:  Surrogate Health Care Decision Maker: Eddie Moтатьяна \"Josh\" (Spouse)  [phone]    HPOA: not on file  Living will: not on file    Code Status: Decision to keep code status FULL CODE at this time.       I spent 29 minutes in providing separately identifiable ACP services with the patient and/or surrogate decision maker in a voluntary conversation discussing the patient's wishes and goals as detailed in the above note.   ----------------------------------------------------------------------------------------------------------------------------------------------------------------------------------------------------------------------------------------------------------------------------------------------------------------------------------------------------------------------    #Complex Medical Decision Making  #Goals of Care  #Advanced Care Planning  - Code status: FULL CODE   - Surrogate decision maker: Eddie Auguste \"Bill\" (Spouse)  [phone]    - Goals are mix of survival and time and improved quality of life  - 10/31: Met with pt and family at bedside. Able to glean more about pt, pt's hx, lifestyle, goals, and health preferences. See above/below for supportive documentation.       #Psychosocial Support  - Ongoing pt and family support  - Music Therapy- ordered  - Spiritual Care Support- Pall Rosa aware    Plan of Care discussed with: Updated primary and bedside RN on goals of care decision, medication adjustments, and code status     Medical Decision Making was high level due to high complexity of problems, extensive data review, and high risk of management/treatment.     - HFrEF " with arrest requiring VA ECMO and pressor support, ongoing arrhythmias,  posing threat to life or function.   - Reviewed external notes from   - Reviews results from  which were used in decision making for   - Recommended the following tests:   - Assessment required independent historian: spouse, primary.  - Independent interpretation of test: labs and imaging  - Discussion of management with: primary  - Drug therapy requiring intensive monitoring for toxicity: meds with renal impairment, insulin,   - Decision regarding elective major surgery with identified patient or procedure risk factors:   - Decision regarding emergency major surgery:   - Decision regarding hospitalization or escalation of hospital-level care:   - Decision not to resuscitate or to de-escalate care because of poor prognosis:   - Parenteral controlled substances: pressors, atb.    Thank you for allowing us to participate in the care of this patient. Palliative will continue to follow as needed. Palliative medicine is available Monday-Friday, 8a-6p. Please contact team with any questions or concerns.  Team pager [phone] (weekdays)  Kt Frances CNP (on EPIC secure chat)

## 2024-10-31 NOTE — PROGRESS NOTES
Pharmacy Medication History Review    Lolis Auguste is a 69 y.o. female admitted for Persistent atrial fibrillation (Multi). Pharmacy reviewed the patient's xalll-ep-ixngoxasd medications and allergies for accuracy.    The list below reflects the updated PTA list.   Prior to Admission Medications   Prescriptions Last Dose Informant   apixaban (Eliquis) 5 mg tablet 10/30/2024 Spouse/Significant Other, Other   Sig: Take 1 tablet (5 mg) by mouth twice a day.   furosemide (Lasix) 40 mg tablet 10/29/2024 Spouse/Significant Other, Other   Sig: Take 1 tablet (40 mg) by mouth 2 times daily (morning and late afternoon).   metoprolol tartrate (Lopressor) 25 mg tablet 10/30/2024 Spouse/Significant Other, Other   Sig: Take 1 tablet (25 mg) by mouth 3 times a day.   multivitamin tablet 10/29/2024 Spouse/Significant Other, Other   Sig: Take 1 tablet by mouth once daily.      Facility-Administered Medications: None        The list below reflects the updated allergy list. Please review each documented allergy for additional clarification and justification.  Allergies  Reviewed by Gillian Ibarra PharmD on 10/31/2024   No Known Allergies         Patient declines M2B at discharge. Pharmacy has been updated to [place] [facility].    Sources used to complete the med history include:   OARRS - no history  Pharmacy dispense history  Patient interview Unable to provide any details, intubated/sedated  Spouse, Moderate historian  Chart Review - Cardiology visit 10/23/24, [facility] transfer records    Gillian Ibarra PharmD  Transitions of Care Pharmacist  [facility]  Please reach out via Secure Chat for questions, or if no response call [employer] or [employer]

## 2024-10-31 NOTE — PROGRESS NOTES
Occupational Therapy                 Therapy Communication Note    Patient Name: Lolis Auguste  MRN: [id number]  Department[facility]  Room: 05/05-A  Today's Date: 10/31/2024     Discipline: Occupational Therapy    Missed Visit Reason: Missed Visit Reason:  (Pt is intubated and sedated and on ECMO. Hold OT at this time.)    Missed Time: Attempt

## 2024-10-31 NOTE — PROGRESS NOTES
"  TRANSFER  10/23 presented to [facility] (AF RVR)& 10/30 transfer to [facility] for urgent AF ablation for HF iso AF refractory to Tikosyn and DCCV.     OVERVIEW  10/30   urgent RFA/PVI procedure in the setting of newly diagnosed NICM/tachy-mediated cardiomyopathy.    Hypotensive, cardiac ablation, cardioversion, and pericardial effusion  Patient was admitted to the CTICU sp ecmo canula for hypotension refractory to vasopressors - intubated    DC PLAN  TBD - Care Transitions is following to develop a safe & supportive discharge plan in collaboration with multidisciplinary team (&) patient/family/significant others.      PAYOR   [employer]    PT/OT   10/31 - PT, \"Pt is intubated/sedated and on ECMO.  Will hold PT and re-attempt once medically appropriate.)\"    COMPLETED  (X) Daily ongoing review of patient via chart and/or (M-F) IDT rounds  (X) 10/30 - New admit to [facility] and CTICU - EPIC & prior Care Transitions' notes reviewed.     Rachel Bishop (LSW, MSW)         "

## 2024-11-01 ENCOUNTER — APPOINTMENT (OUTPATIENT)
Dept: CARDIOLOGY | Facility: HOSPITAL | Age: 69
End: 2024-11-01
Payer: MEDICARE

## 2024-11-01 LAB
ALBUMIN SERPL BCP-MCNC: 4 G/DL (ref 3.4–5)
ALBUMIN SERPL BCP-MCNC: 4.2 G/DL (ref 3.4–5)
ALBUMIN SERPL BCP-MCNC: 4.3 G/DL (ref 3.4–5)
ALP SERPL-CCNC: 50 U/L (ref 33–136)
ALT SERPL W P-5'-P-CCNC: 20 U/L (ref 7–45)
ANION GAP BLDA CALCULATED.4IONS-SCNC: 11 MMO/L (ref 10–25)
ANION GAP BLDA CALCULATED.4IONS-SCNC: 13 MMO/L (ref 10–25)
ANION GAP BLDA CALCULATED.4IONS-SCNC: 9 MMO/L (ref 10–25)
ANION GAP BLDA CALCULATED.4IONS-SCNC: ABNORMAL MMOL/L
ANION GAP BLDV CALCULATED.4IONS-SCNC: 6 MMO/L
ANION GAP SERPL CALC-SCNC: 15 MMOL/L (ref 10–20)
ANION GAP SERPL CALC-SCNC: 17 MMOL/L (ref 10–20)
APTT PPP: 29 SECONDS (ref 27–38)
AST SERPL W P-5'-P-CCNC: 19 U/L (ref 9–39)
ATRIAL RATE: 138 BPM
BASE EXCESS BLDA CALC-SCNC: -0.3 MMOL/L
BASE EXCESS BLDA CALC-SCNC: -0.6 MMOL/L (ref -2–3)
BASE EXCESS BLDA CALC-SCNC: 0 MMOL/L (ref -2–3)
BASE EXCESS BLDA CALC-SCNC: 0.6 MMOL/L (ref -2–3)
BASE EXCESS BLDA CALC-SCNC: 1.8 MMOL/L (ref -2–3)
BASE EXCESS BLDV CALC-SCNC: 1.5 MMOL/L
BILIRUB DIRECT SERPL-MCNC: 0.2 MG/DL (ref 0–0.3)
BILIRUB SERPL-MCNC: 0.8 MG/DL (ref 0–1.2)
BODY TEMPERATURE: 37 DEGREES CELSIUS
BUN SERPL-MCNC: 15 MG/DL (ref 6–23)
BUN SERPL-MCNC: 20 MG/DL (ref 6–23)
CA-I BLD-SCNC: 1.14 MMOL/L (ref 1.1–1.33)
CA-I BLD-SCNC: 1.16 MMOL/L (ref 1.1–1.33)
CA-I BLDA-SCNC: 1.21 MMOL/L (ref 1.1–1.33)
CA-I BLDA-SCNC: 1.23 MMOL/L (ref 1.1–1.33)
CA-I BLDA-SCNC: 1.23 MMOL/L (ref 1.1–1.33)
CA-I BLDA-SCNC: 1.25 MMOL/L (ref 1.1–1.33)
CA-I BLDV-SCNC: 1.19 MMOL/L (ref 1.1–1.33)
CALCIUM SERPL-MCNC: 9.1 MG/DL (ref 8.6–10.6)
CALCIUM SERPL-MCNC: 9.1 MG/DL (ref 8.6–10.6)
CHLORIDE BLDA-SCNC: 110 MMOL/L (ref 98–107)
CHLORIDE BLDA-SCNC: 115 MMOL/L (ref 98–107)
CHLORIDE BLDA-SCNC: 119 MMOL/L (ref 98–107)
CHLORIDE BLDA-SCNC: ABNORMAL MMOL/L
CHLORIDE BLDV-SCNC: 112 MMOL/L (ref 98–107)
CHLORIDE SERPL-SCNC: 111 MMOL/L (ref 98–107)
CHLORIDE SERPL-SCNC: 113 MMOL/L (ref 98–107)
CO2 SERPL-SCNC: 23 MMOL/L (ref 21–32)
CO2 SERPL-SCNC: 26 MMOL/L (ref 21–32)
CREAT SERPL-MCNC: 0.66 MG/DL (ref 0.5–1.05)
CREAT SERPL-MCNC: 0.66 MG/DL (ref 0.5–1.05)
EGFRCR SERPLBLD CKD-EPI 2021: >90 ML/MIN/1.73M*2
EGFRCR SERPLBLD CKD-EPI 2021: >90 ML/MIN/1.73M*2
EJECTION FRACTION APICAL 4 CHAMBER: 15
EJECTION FRACTION: 18 %
ERYTHROCYTE [DISTWIDTH] IN BLOOD BY AUTOMATED COUNT: 16.1 % (ref 11.5–14.5)
ERYTHROCYTE [DISTWIDTH] IN BLOOD BY AUTOMATED COUNT: 16.3 % (ref 11.5–14.5)
GLUCOSE BLD MANUAL STRIP-MCNC: 102 MG/DL (ref 74–99)
GLUCOSE BLD MANUAL STRIP-MCNC: 118 MG/DL (ref 74–99)
GLUCOSE BLD MANUAL STRIP-MCNC: 122 MG/DL (ref 74–99)
GLUCOSE BLD MANUAL STRIP-MCNC: 138 MG/DL (ref 74–99)
GLUCOSE BLDA-MCNC: 110 MG/DL (ref 74–99)
GLUCOSE BLDA-MCNC: 132 MG/DL (ref 74–99)
GLUCOSE BLDA-MCNC: 151 MG/DL (ref 74–99)
GLUCOSE BLDA-MCNC: 159 MG/DL (ref 74–99)
GLUCOSE BLDV-MCNC: 130 MG/DL (ref 74–99)
GLUCOSE SERPL-MCNC: 104 MG/DL (ref 74–99)
GLUCOSE SERPL-MCNC: 146 MG/DL (ref 74–99)
HCO3 BLDA-SCNC: 22.3 MMOL/L (ref 22–26)
HCO3 BLDA-SCNC: 22.3 MMOL/L (ref 22–26)
HCO3 BLDA-SCNC: 24.1 MMOL/L
HCO3 BLDA-SCNC: 24.3 MMOL/L (ref 22–26)
HCO3 BLDA-SCNC: 25.7 MMOL/L (ref 22–26)
HCO3 BLDV-SCNC: 26.6 MMOL/L
HCT VFR BLD AUTO: 31.3 % (ref 36–46)
HCT VFR BLD AUTO: 31.7 % (ref 36–46)
HCT VFR BLD EST: 32 % (ref 36–46)
HCT VFR BLD EST: 32 % (ref 36–46)
HCT VFR BLD EST: 33 % (ref 36–46)
HCT VFR BLD EST: 34 % (ref 36–46)
HCT VFR BLD EST: 35 % (ref 36–46)
HGB BLD-MCNC: 10.2 G/DL (ref 12–16)
HGB BLD-MCNC: 10.5 G/DL (ref 12–16)
HGB BLDA-MCNC: 10.7 G/DL (ref 12–16)
HGB BLDA-MCNC: 10.8 G/DL (ref 12–16)
HGB BLDA-MCNC: 11 G/DL (ref 12–16)
HGB BLDA-MCNC: 11.3 G/DL (ref 12–16)
HGB BLDV-MCNC: 11.8 G/DL (ref 12–16)
INHALED O2 CONCENTRATION: 100 %
INHALED O2 CONCENTRATION: 100 %
INHALED O2 CONCENTRATION: 40 %
INR PPP: 1.4 (ref 0.9–1.1)
LACTATE BLDA-SCNC: 1 MMOL/L (ref 0.4–2)
LACTATE BLDA-SCNC: 1 MMOL/L (ref 0.4–2)
LACTATE BLDA-SCNC: 1.3 MMOL/L (ref 0.4–2)
LACTATE BLDA-SCNC: 1.5 MMOL/L (ref 0.4–2)
LACTATE BLDV-SCNC: 1.2 MMOL/L (ref 0.4–2)
LDH SERPL L TO P-CCNC: 187 U/L (ref 84–246)
LEFT ATRIUM VOLUME AREA LENGTH INDEX BSA: 81.2 ML/M2
LEFT VENTRICLE INTERNAL DIMENSION DIASTOLE: 5.9 CM (ref 3.5–6)
LEFT VENTRICULAR OUTFLOW TRACT DIAMETER: 2.1 CM
LIDOCAIN SERPL-MCNC: 3.7 UG/ML (ref 1–5)
LIDOCAIN SERPL-MCNC: 4 UG/ML (ref 1–5)
LIDOCAIN SERPL-MCNC: 4.1 UG/ML (ref 1–5)
MAGNESIUM SERPL-MCNC: 2.46 MG/DL (ref 1.6–2.4)
MAGNESIUM SERPL-MCNC: 2.62 MG/DL (ref 1.6–2.4)
MCH RBC QN AUTO: 29 PG (ref 26–34)
MCH RBC QN AUTO: 29.7 PG (ref 26–34)
MCHC RBC AUTO-ENTMCNC: 32.2 G/DL (ref 32–36)
MCHC RBC AUTO-ENTMCNC: 33.5 G/DL (ref 32–36)
MCV RBC AUTO: 88 FL (ref 80–100)
MCV RBC AUTO: 90 FL (ref 80–100)
NRBC BLD-RTO: 0 /100 WBCS (ref 0–0)
NRBC BLD-RTO: 0 /100 WBCS (ref 0–0)
OXYHGB MFR BLDA: 96.5 %
OXYHGB MFR BLDA: 97.1 % (ref 94–98)
OXYHGB MFR BLDA: 97.2 % (ref 94–98)
OXYHGB MFR BLDV: 81.2 %
PCO2 BLDA: 28 MM HG (ref 38–42)
PCO2 BLDA: 30 MM HG (ref 38–42)
PCO2 BLDA: 35 MM HG (ref 38–42)
PCO2 BLDA: 37 MM HG (ref 38–42)
PCO2 BLDA: 38 MM HG
PCO2 BLDV: 43 MM HG
PH BLDA: 7.41 PH
PH BLDA: 7.45 PH (ref 7.38–7.42)
PH BLDA: 7.45 PH (ref 7.38–7.42)
PH BLDA: 7.48 PH (ref 7.38–7.42)
PH BLDA: 7.51 PH (ref 7.38–7.42)
PH BLDV: 7.4 PH
PHOSPHATE SERPL-MCNC: 2.9 MG/DL (ref 2.5–4.9)
PHOSPHATE SERPL-MCNC: 3.3 MG/DL (ref 2.5–4.9)
PLATELET # BLD AUTO: 177 X10*3/UL (ref 150–450)
PLATELET # BLD AUTO: 185 X10*3/UL (ref 150–450)
PO2 BLDA: 121 MM HG (ref 85–95)
PO2 BLDA: 187 MM HG (ref 85–95)
PO2 BLDA: 204 MM HG (ref 85–95)
PO2 BLDA: 392 MM HG (ref 85–95)
PO2 BLDA: 446 MM HG
PO2 BLDV: 51 MM HG
POTASSIUM BLDA-SCNC: 3.7 MMOL/L (ref 3.5–5.3)
POTASSIUM BLDA-SCNC: 4.8 MMOL/L (ref 3.5–5.3)
POTASSIUM BLDA-SCNC: 4.9 MMOL/L (ref 3.5–5.3)
POTASSIUM BLDA-SCNC: 4.9 MMOL/L (ref 3.5–5.3)
POTASSIUM BLDV-SCNC: 3.7 MMOL/L (ref 3.5–5.3)
POTASSIUM SERPL-SCNC: 3.7 MMOL/L (ref 3.5–5.3)
POTASSIUM SERPL-SCNC: 5 MMOL/L (ref 3.5–5.3)
PROT SERPL-MCNC: 5.5 G/DL (ref 6.4–8.2)
PROTHROMBIN TIME: 15.5 SECONDS (ref 9.8–12.8)
Q ONSET: 221 MS
QRS COUNT: 20 BEATS
QRS DURATION: 98 MS
QT INTERVAL: 278 MS
QTC CALCULATION(BAZETT): 398 MS
QTC FREDERICIA: 353 MS
R AXIS: 125 DEGREES
RBC # BLD AUTO: 3.52 X10*6/UL (ref 4–5.2)
RBC # BLD AUTO: 3.54 X10*6/UL (ref 4–5.2)
SAO2 % BLDA: 100 % (ref 94–100)
SAO2 % BLDA: 97 %
SAO2 % BLDV: 84 %
SODIUM BLDA-SCNC: 141 MMOL/L (ref 136–145)
SODIUM BLDA-SCNC: 145 MMOL/L (ref 136–145)
SODIUM BLDA-SCNC: 145 MMOL/L (ref 136–145)
SODIUM BLDA-SCNC: 147 MMOL/L (ref 136–145)
SODIUM BLDV-SCNC: 141 MMOL/L (ref 136–145)
SODIUM SERPL-SCNC: 148 MMOL/L (ref 136–145)
SODIUM SERPL-SCNC: 148 MMOL/L (ref 136–145)
STAPHYLOCOCCUS SPEC CULT: ABNORMAL
T AXIS: -8 DEGREES
T OFFSET: 360 MS
VENTRICULAR RATE: 123 BPM
WBC # BLD AUTO: 15.9 X10*3/UL (ref 4.4–11.3)
WBC # BLD AUTO: 16.9 X10*3/UL (ref 4.4–11.3)

## 2024-11-01 PROCEDURE — 2500000004 HC RX 250 GENERAL PHARMACY W/ HCPCS (ALT 636 FOR OP/ED)

## 2024-11-01 PROCEDURE — 83605 ASSAY OF LACTIC ACID: CPT | Performed by: STUDENT IN AN ORGANIZED HEALTH CARE EDUCATION/TRAINING PROGRAM

## 2024-11-01 PROCEDURE — 94003 VENT MGMT INPAT SUBQ DAY: CPT

## 2024-11-01 PROCEDURE — 85730 THROMBOPLASTIN TIME PARTIAL: CPT

## 2024-11-01 PROCEDURE — 37799 UNLISTED PX VASCULAR SURGERY: CPT | Performed by: STUDENT IN AN ORGANIZED HEALTH CARE EDUCATION/TRAINING PROGRAM

## 2024-11-01 PROCEDURE — 82805 BLOOD GASES W/O2 SATURATION: CPT | Performed by: STUDENT IN AN ORGANIZED HEALTH CARE EDUCATION/TRAINING PROGRAM

## 2024-11-01 PROCEDURE — 99291 CRITICAL CARE FIRST HOUR: CPT | Performed by: INTERNAL MEDICINE

## 2024-11-01 PROCEDURE — 2720000007 HC OR 272 NO HCPCS

## 2024-11-01 PROCEDURE — 2500000001 HC RX 250 WO HCPCS SELF ADMINISTERED DRUGS (ALT 637 FOR MEDICARE OP)

## 2024-11-01 PROCEDURE — 82947 ASSAY GLUCOSE BLOOD QUANT: CPT

## 2024-11-01 PROCEDURE — 93325 DOPPLER ECHO COLOR FLOW MAPG: CPT | Performed by: INTERNAL MEDICINE

## 2024-11-01 PROCEDURE — 85027 COMPLETE CBC AUTOMATED: CPT

## 2024-11-01 PROCEDURE — 85610 PROTHROMBIN TIME: CPT

## 2024-11-01 PROCEDURE — 33949 ECMO/ECLS DAILY MGMT ARTERY: CPT | Performed by: ANESTHESIOLOGY

## 2024-11-01 PROCEDURE — 83615 LACTATE (LD) (LDH) ENZYME: CPT

## 2024-11-01 PROCEDURE — 2500000002 HC RX 250 W HCPCS SELF ADMINISTERED DRUGS (ALT 637 FOR MEDICARE OP, ALT 636 FOR OP/ED)

## 2024-11-01 PROCEDURE — 80176 ASSAY OF LIDOCAINE: CPT

## 2024-11-01 PROCEDURE — 83735 ASSAY OF MAGNESIUM: CPT

## 2024-11-01 PROCEDURE — 99291 CRITICAL CARE FIRST HOUR: CPT | Performed by: ANESTHESIOLOGY

## 2024-11-01 PROCEDURE — 99291 CRITICAL CARE FIRST HOUR: CPT | Performed by: NURSE PRACTITIONER

## 2024-11-01 PROCEDURE — 37799 UNLISTED PX VASCULAR SURGERY: CPT

## 2024-11-01 PROCEDURE — 84100 ASSAY OF PHOSPHORUS: CPT

## 2024-11-01 PROCEDURE — 93308 TTE F-UP OR LMTD: CPT

## 2024-11-01 PROCEDURE — 93325 DOPPLER ECHO COLOR FLOW MAPG: CPT

## 2024-11-01 PROCEDURE — 84295 ASSAY OF SERUM SODIUM: CPT

## 2024-11-01 PROCEDURE — 80069 RENAL FUNCTION PANEL: CPT | Mod: CCI

## 2024-11-01 PROCEDURE — 84132 ASSAY OF SERUM POTASSIUM: CPT

## 2024-11-01 PROCEDURE — 2500000005 HC RX 250 GENERAL PHARMACY W/O HCPCS: Performed by: NURSE PRACTITIONER

## 2024-11-01 PROCEDURE — 93308 TTE F-UP OR LMTD: CPT | Performed by: INTERNAL MEDICINE

## 2024-11-01 PROCEDURE — 82330 ASSAY OF CALCIUM: CPT

## 2024-11-01 PROCEDURE — 2500000004 HC RX 250 GENERAL PHARMACY W/ HCPCS (ALT 636 FOR OP/ED): Performed by: NURSE PRACTITIONER

## 2024-11-01 PROCEDURE — 2020000001 HC ICU ROOM DAILY

## 2024-11-01 PROCEDURE — 82435 ASSAY OF BLOOD CHLORIDE: CPT | Performed by: STUDENT IN AN ORGANIZED HEALTH CARE EDUCATION/TRAINING PROGRAM

## 2024-11-01 PROCEDURE — 71045 X-RAY EXAM CHEST 1 VIEW: CPT | Performed by: STUDENT IN AN ORGANIZED HEALTH CARE EDUCATION/TRAINING PROGRAM

## 2024-11-01 PROCEDURE — 2500000004 HC RX 250 GENERAL PHARMACY W/ HCPCS (ALT 636 FOR OP/ED): Performed by: STUDENT IN AN ORGANIZED HEALTH CARE EDUCATION/TRAINING PROGRAM

## 2024-11-01 PROCEDURE — 82435 ASSAY OF BLOOD CHLORIDE: CPT

## 2024-11-01 PROCEDURE — 2500000005 HC RX 250 GENERAL PHARMACY W/O HCPCS

## 2024-11-01 PROCEDURE — 84132 ASSAY OF SERUM POTASSIUM: CPT | Performed by: STUDENT IN AN ORGANIZED HEALTH CARE EDUCATION/TRAINING PROGRAM

## 2024-11-01 PROCEDURE — 82040 ASSAY OF SERUM ALBUMIN: CPT | Performed by: NURSE PRACTITIONER

## 2024-11-01 PROCEDURE — 93321 DOPPLER ECHO F-UP/LMTD STD: CPT | Performed by: INTERNAL MEDICINE

## 2024-11-01 RX ORDER — HEPARIN SODIUM 5000 [USP'U]/ML
5000 INJECTION, SOLUTION INTRAVENOUS; SUBCUTANEOUS EVERY 8 HOURS
Status: DISCONTINUED | OUTPATIENT
Start: 2024-11-01 | End: 2024-11-02

## 2024-11-01 RX ORDER — MUPIROCIN 20 MG/G
OINTMENT TOPICAL 2 TIMES DAILY
Status: DISPENSED | OUTPATIENT
Start: 2024-11-01 | End: 2024-11-06

## 2024-11-01 RX ORDER — ACETAMINOPHEN 160 MG/5ML
650 SOLUTION ORAL EVERY 4 HOURS PRN
Status: DISCONTINUED | OUTPATIENT
Start: 2024-11-01 | End: 2024-11-06

## 2024-11-01 RX ORDER — PROPOFOL 10 MG/ML
0-50 INJECTION, EMULSION INTRAVENOUS CONTINUOUS
Status: DISCONTINUED | OUTPATIENT
Start: 2024-11-02 | End: 2024-11-02

## 2024-11-01 RX ORDER — DEXMEDETOMIDINE HYDROCHLORIDE 4 UG/ML
0-1.5 INJECTION, SOLUTION INTRAVENOUS CONTINUOUS
Status: DISCONTINUED | OUTPATIENT
Start: 2024-11-01 | End: 2024-11-05

## 2024-11-01 RX ORDER — DEXMEDETOMIDINE HYDROCHLORIDE 4 UG/ML
INJECTION, SOLUTION INTRAVENOUS
Status: COMPLETED
Start: 2024-11-01 | End: 2024-11-01

## 2024-11-01 RX ADMIN — PANTOPRAZOLE SODIUM 40 MG: 40 INJECTION, POWDER, FOR SOLUTION INTRAVENOUS at 08:05

## 2024-11-01 RX ADMIN — METHYLPREDNISOLONE SODIUM SUCCINATE 1000 MG: 1 INJECTION INTRAMUSCULAR; INTRAVENOUS at 13:53

## 2024-11-01 RX ADMIN — HYDROCORTISONE SODIUM SUCCINATE 50 MG: 100 INJECTION, POWDER, FOR SOLUTION INTRAMUSCULAR; INTRAVENOUS at 09:44

## 2024-11-01 RX ADMIN — DEXMEDETOMIDINE HYDROCHLORIDE 0.2 MCG/KG/HR: 400 INJECTION INTRAVENOUS at 10:57

## 2024-11-01 RX ADMIN — PIPERACILLIN SODIUM AND TAZOBACTAM SODIUM 3.38 G: 3; .375 INJECTION, SOLUTION INTRAVENOUS at 08:05

## 2024-11-01 RX ADMIN — INSULIN LISPRO 5 UNITS: 100 INJECTION, SOLUTION INTRAVENOUS; SUBCUTANEOUS at 00:19

## 2024-11-01 RX ADMIN — LIDOCAINE HYDROCHLORIDE 1 MG/MIN: 8 INJECTION, SOLUTION INTRAVENOUS at 04:56

## 2024-11-01 RX ADMIN — POTASSIUM CHLORIDE 40 MEQ: 29.8 INJECTION, SOLUTION INTRAVENOUS at 02:44

## 2024-11-01 RX ADMIN — SENNOSIDES AND DOCUSATE SODIUM 2 TABLET: 50; 8.6 TABLET ORAL at 08:05

## 2024-11-01 RX ADMIN — EPINEPHRINE IN SODIUM CHLORIDE 0.04 MCG/KG/MIN: 16 INJECTION INTRAVENOUS at 21:31

## 2024-11-01 RX ADMIN — HYDROCORTISONE SODIUM SUCCINATE 50 MG: 100 INJECTION, POWDER, FOR SOLUTION INTRAMUSCULAR; INTRAVENOUS at 05:03

## 2024-11-01 RX ADMIN — MUPIROCIN: 20 OINTMENT TOPICAL at 21:34

## 2024-11-01 RX ADMIN — MUPIROCIN: 20 OINTMENT TOPICAL at 13:31

## 2024-11-01 RX ADMIN — Medication 40 PERCENT: at 04:34

## 2024-11-01 RX ADMIN — HEPARIN SODIUM 5000 UNITS: 5000 INJECTION, SOLUTION INTRAVENOUS; SUBCUTANEOUS at 21:31

## 2024-11-01 RX ADMIN — PROPOFOL 50 MCG/KG/MIN: 10 INJECTION, EMULSION INTRAVENOUS at 02:42

## 2024-11-01 RX ADMIN — HYDROMORPHONE HYDROCHLORIDE 0.2 MG: 0.2 INJECTION, SOLUTION INTRAMUSCULAR; INTRAVENOUS; SUBCUTANEOUS at 16:28

## 2024-11-01 RX ADMIN — Medication 40 PERCENT: at 00:38

## 2024-11-01 RX ADMIN — SENNOSIDES AND DOCUSATE SODIUM 2 TABLET: 50; 8.6 TABLET ORAL at 21:31

## 2024-11-01 RX ADMIN — EPINEPHRINE IN SODIUM CHLORIDE 0.08 MCG/KG/MIN: 16 INJECTION INTRAVENOUS at 02:42

## 2024-11-01 RX ADMIN — PROPOFOL 50 MCG/KG/MIN: 10 INJECTION, EMULSION INTRAVENOUS at 06:20

## 2024-11-01 RX ADMIN — ACETAMINOPHEN 650 MG: 650 SOLUTION ORAL at 21:38

## 2024-11-01 RX ADMIN — POLYETHYLENE GLYCOL 3350 17 G: 17 POWDER, FOR SOLUTION ORAL at 08:05

## 2024-11-01 RX ADMIN — ACETAMINOPHEN 1000 MG: 1000 INJECTION, SOLUTION INTRAVENOUS at 00:24

## 2024-11-01 RX ADMIN — HYDROMORPHONE HYDROCHLORIDE 0.2 MG: 0.2 INJECTION, SOLUTION INTRAMUSCULAR; INTRAVENOUS; SUBCUTANEOUS at 12:35

## 2024-11-01 RX ADMIN — DEXMEDETOMIDINE HYDROCHLORIDE 0.7 MCG/KG/HR: 400 INJECTION INTRAVENOUS at 21:31

## 2024-11-01 RX ADMIN — ACETAMINOPHEN 1000 MG: 1000 INJECTION, SOLUTION INTRAVENOUS at 06:04

## 2024-11-01 RX ADMIN — INSULIN LISPRO 5 UNITS: 100 INJECTION, SOLUTION INTRAVENOUS; SUBCUTANEOUS at 23:16

## 2024-11-01 RX ADMIN — Medication 40 PERCENT: at 19:28

## 2024-11-01 RX ADMIN — PIPERACILLIN SODIUM AND TAZOBACTAM SODIUM 3.38 G: 3; .375 INJECTION, SOLUTION INTRAVENOUS at 02:38

## 2024-11-01 RX ADMIN — PIPERACILLIN SODIUM AND TAZOBACTAM SODIUM 3.38 G: 3; .375 INJECTION, SOLUTION INTRAVENOUS at 13:31

## 2024-11-01 ASSESSMENT — PAIN - FUNCTIONAL ASSESSMENT
PAIN_FUNCTIONAL_ASSESSMENT: CPOT (CRITICAL CARE PAIN OBSERVATION TOOL)

## 2024-11-01 NOTE — NURSING NOTE
Multidisciplinary ECMO Rounds Note    Attendance:  CT Surgeon: Dr. Delgadillo   CTICU Attending: Dr. Wise  Palliative Care Attending: N  Physical Therapy Present (Y/N): N  Perfusion Present (Y/N): N    ECMO Indication: post procedure cardiogenic shock  ECMO Cannula Configuration: Left FA arterial, Left FV venous- VA    Changes made to Hemodynamic/Ventilator/ECMO Management: ECHO today with possible Impella placement after    Circuit Concerns: None    Bleeding Concerns: None    Clotting/Ischemia Concerns: Positive pulses in all extremities    Anticoagulation Plan/Monitoring: not at this time    Mobility Plan/Candidacy: not a candidate at this time  Nutrition: NPO at this time    Patient/Family Concerns: None    Nursing Concerns: None    Patients Minimally Acceptable Outcome: Counseling provided on the concept of MAO/Maximal [place]. Family expressing that pt would never want to be in a health state where they were dependent on other's for ADLs/toileting needs, bed bound, or live/exist in a vegetative state. Family deems that this would not be an acceptable quality of life for the patient.     Barriers to decannulation/anticipated decannulation date:Mitral valve is open, EP consulted--not a candidate for mitral valve clipping, will repeat ECHO today    ECMO:     Most Recent Range Past 24hrs   Flow 3.06 Patient Flow (L/min)  Min: 2.81  Max: 3.34   Speed 3100 Circuit Flow (RPM)  Min: 3100  Max: 3186   Sweep 1 Sweep Gas Flow Rate (L/min)  Min: 1  Max: 2       dexmedeTOMIDine, 0-1.5 mcg/kg/hr, Last Rate: 0.2 mcg/kg/hr (11/01/24 1057)  EPINEPHrine, 0-2 mcg/kg/min, Last Rate: 0.04 mcg/kg/min (11/01/24 1100)  lidocaine, 1 mg/min, Last Rate: 1 mg/min (11/01/24 1100)  propofol, 0-50 mcg/kg/min, Last Rate: Stopped (11/01/24 0900)          Vent Mode: Volume control/assist control  FiO2 (%):  [40 %] 40 %  S RR:  [16] 16  S VT:  [350 mL] 350 mL  PEEP/CPAP (cm H2O):  [8 cm H20] 8 cm H20  MAP (cm H2O):  [10-12] 12

## 2024-11-01 NOTE — CONSULTS
"Nutrition Initial Assessment:   Nutrition Assessment    Reason for Assessment: Tube feeding recommendations    Patient is a 69 y.o. female presenting to CTICU following ECMO cannulation, pericardial effusion drainage (1000 mls hemorrhagic fluid) and afib ablation. Course c/b vfib.     PMH of newly diagnosed persistent AF on Eliquis s/p failed DCCV 10/18/2024, SOLOMON thrombus and aborted DCCV in 9/2024, and acute systolic HF     Nutrition History:  Food and Nutrient History: Pt intubated -- sedation paused this morning, running @ 19.1 mls/hr overnight. Estimate ~500 kcals/d from prop, Unable to enter room, procedure in progress.  Food Allergies/Intolerances:  None  GI Symptoms: None  Oral Problems: None       Anthropometrics:  149.9 cm or 4'11\"  63.5 kgs 10/28 @ OSH   BMI = 28.27          IBW/kg (Dietitian Calculated): 44.5 kg  Percent of IBW: 143 %       Weight History:   Wt Readings from Last 10 Encounters:   10/28/24 63.5 kg (139 lb 15.9 oz)   10/23/24 64.9 kg (143 lb)   09/30/24 66.7 kg (147 lb)   09/20/24 63.5 kg (140 lb)   09/09/24 66 kg (145 lb 6.4 oz)      Weight Change %:  Significant Weight Loss: No    Nutrition Focused Physical Exam Findings:  Subcutaneous Fat Loss:   Orbital Fat Pads: Defer (unable to enter room)  Muscle Wasting:     Edema:  Edema: none  Physical Findings:  Skin:  (surgical sites, device access)    Nutrition Significant Labs:  CBC Trend:   Results from last 7 days   Lab Units 11/01/24  0005 10/31/24  1218 10/31/24  0043 10/30/24  1838   WBC AUTO x10*3/uL 15.9* 16.9* 15.2* 16.2*   RBC AUTO x10*6/uL 3.54* 3.69* 3.50* 3.90*   HEMOGLOBIN g/dL 10.5* 10.7* 10.2* 11.2*   HEMATOCRIT % 31.3* 33.3* 30.7* 34.5*   MCV fL 88 90 88 89   PLATELETS AUTO x10*3/uL 185 214 221 266    , A1C:  Lab Results   Component Value Date    HGBA1C 5.6 08/30/2024   , BG POCT trend:   Results from last 7 days   Lab Units 11/01/24  1204 11/01/24  0733 11/01/24  0500 10/31/24  0755   POCT GLUCOSE mg/dL 118* 122* 102* 171*    " ", Liver Function Trend:   Results from last 7 days   Lab Units 11/01/24  0757   ALK PHOS U/L 50   AST U/L 19   ALT U/L 20   BILIRUBIN TOTAL mg/dL 0.8    , Renal Lab Trend:   Results from last 7 days   Lab Units 11/01/24  0005 10/31/24  1218 10/31/24  0043 10/30/24  1838   POTASSIUM mmol/L 3.7 3.8 3.8 3.8   PHOSPHORUS mg/dL 2.9 4.7 2.5 4.0   SODIUM mmol/L 148* 146* 143 142   MAGNESIUM mg/dL 2.62* 2.90* 2.93* 2.28   EGFR mL/min/1.73m*2 >90 78 60* 54*   BUN mg/dL 15 15 17 20   CREATININE mg/dL 0.66 0.81 1.01 1.10*    , Lipid Panel: No results found for: \"CHOL\", \"HDL\", \"CHHDL\", \"LDLF\", \"VLDL\", \"TRIG\" , Vit D: No results found for: \"VITD25\"     Nutrition Specific Medications:  Scheduled medications  [Held by provider] acetaminophen, 650 mg, oral, q4h   Or  [Held by provider] acetaminophen, 650 mg, rectal, q4h  insulin lispro, 0-15 Units, subcutaneous, q4h  methylPREDNISolone sodium succinate (PF), 1,000 mg, intravenous, q24h  mupirocin, , Topical, BID  pantoprazole, 40 mg, oral, Daily before breakfast   Or  pantoprazole, 40 mg, intravenous, Daily before breakfast  piperacillin-tazobactam, 3.375 g, intravenous, q6h  polyethylene glycol, 17 g, oral, Daily  sennosides-docusate sodium, 2 tablet, oral, BID      Continuous medications  dexmedeTOMIDine, 0-1.5 mcg/kg/hr, Last Rate: 0.2 mcg/kg/hr (11/01/24 1057)  EPINEPHrine, 0-2 mcg/kg/min, Last Rate: 0.04 mcg/kg/min (11/01/24 1200)  lidocaine, 1 mg/min, Last Rate: 1 mg/min (11/01/24 1200)  propofol, 0-50 mcg/kg/min, Last Rate: Stopped (11/01/24 0900)      PRN medications  PRN medications: calcium gluconate, calcium gluconate, dextrose **OR** glucagon, HYDROmorphone, magnesium sulfate, magnesium sulfate, naloxone, oxyCODONE, oxygen, potassium chloride, potassium chloride      I/O:    ; Stool Appearance: Unable to assess (10/31/24 0800)    Dietary Orders (From admission, onward)       Start     Ordered    10/30/24 1834  NPO Diet; Effective now  Diet effective now         10/30/24 " 1833                     Estimated Needs:   Total Energy Estimated Needs (kCal):  (9954-9516)  Method for Estimating Needs: 25-30 kcals/kg using ideal BW ; REE using MSJ equation = 1061  Total Protein Estimated Needs (g): 65 g (+)  Method for Estimating Needs: ~1.5 gm/kg ideal BW  Total Fluid Estimated Needs (mL):  (per team)           Nutrition Diagnosis   Malnutrition Diagnosis  Patient has Malnutrition Diagnosis: No    Nutrition Diagnosis  Patient has Nutrition Diagnosis: Yes  Diagnosis Status (1): New  Nutrition Diagnosis 1: Increased nutrient needs  Related to (1): altered metabolism  As Evidenced by (1): critical illness on VA ECMO       Nutrition Interventions/Recommendations         Nutrition Prescription:  Individualized Nutrition Prescription Provided for : tube feeds while on vent        Nutrition Interventions:   Interventions: Enteral intake  Enteral Intake: Insert enteral feeding tube  Goal: 1) Pivot 1.5 goal @ 25 mls/hr while sedated on propofol   =900 kcals (up to~1600 kcals/d with prop) & 56gm protein  2) Off propofol, increase TF goal rate @ 35 mls/hr   =1260 kcals & 79gm protein       Nutrition Monitoring and Evaluation   Food/Nutrient Related History Monitoring  Monitoring and Evaluation Plan: Enteral and parenteral nutrition intake  Enteral and Parenteral Nutrition Intake: Enteral nutrition intake  Criteria: initiate & titrate EN to goal to meet >75% est needs    Body Composition/Growth/Weight History  Monitoring and Evaluation Plan: Weight  Criteria: minimize loss    Biochemical Data, Medical Tests and Procedures  Monitoring and Evaluation Plan: Electrolyte/renal panel, Glucose/endocrine profile  Criteria: lytes WNL  Criteria: ICU BG control WNL              Time Spent (min): 45 minutes

## 2024-11-01 NOTE — PROGRESS NOTES
CTICU Progress Note  Lolis Auguste/[id number]    Admit Date: 10/30/2024  Hospital Length of Stay: 2   ICU Length of Stay: 2d   CT SURGEON:     SUBJECTIVE:   No ventricular arrythmias   Negative 500ml    MEDICATIONS  Infusions:  dexmedeTOMIDine, Last Rate: 0.7 mcg/kg/hr (11/01/24 1900)  EPINEPHrine, Last Rate: 0.04 mcg/kg/min (11/01/24 1900)  lidocaine, Last Rate: 0.5 mg/min (11/01/24 1900)      Scheduled:  acetaminophen, 650 mg, q4h   Or  acetaminophen, 650 mg, q4h  heparin (porcine), 5,000 Units, q8h  insulin lispro, 0-15 Units, q4h  methylPREDNISolone sodium succinate (PF), 1,000 mg, q24h  mupirocin, , BID  pantoprazole, 40 mg, Daily before breakfast   Or  pantoprazole, 40 mg, Daily before breakfast  polyethylene glycol, 17 g, Daily  sennosides-docusate sodium, 2 tablet, BID      PRN:  calcium gluconate, 1 g, q6h PRN  calcium gluconate, 2 g, q6h PRN  dextrose, 25 g, q15 min PRN   Or  glucagon, 1 mg, q15 min PRN  HYDROmorphone, 0.2 mg, q2h PRN  magnesium sulfate, 2 g, q6h PRN  magnesium sulfate, 4 g, q6h PRN  naloxone, 0.2 mg, q5 min PRN  oxyCODONE, 5 mg, q4h PRN  oxygen, , Continuous PRN - O2/gases  potassium chloride, 20 mEq, q6h PRN  potassium chloride, 40 mEq, q6h PRN        PHYSICAL EXAM:   Visit Vitals  Pulse 98   Temp (!) 38.2 °C (100.8 °F)   Resp 16   SpO2 95%   OB Status Postmenopausal   Smoking Status Never     Wt Readings from Last 5 Encounters:   10/28/24 63.5 kg (139 lb 15.9 oz)   10/23/24 64.9 kg (143 lb)   09/30/24 66.7 kg (147 lb)   09/20/24 63.5 kg (140 lb)   09/09/24 66 kg (145 lb 6.4 oz)     INTAKE/OUTPUT:  I/O last 3 completed shifts:  In: 2095.4 [I.V.:1655.4; NG/GT:90; IV Piggyback:350]  Out: 3060 [Urine:2945; Emesis/NG output:60; Drains:55]         Vent settings:  Vent Mode: Volume control/assist control  FiO2 (%):  [40 %] 40 %  S RR:  [16] 16  S VT:  [350 mL] 350 mL  PEEP/CPAP (cm H2O):  [8 cm H20] 8 cm H20  MAP (cm H2O):  [9-12] 12    Physical Exam:   - CONSTITUTION: older appearing female in  ICU bed on VA ECMO  - NEUROLOGIC: CN grossly intact. Weakly followed in RUE. Followed in LE R >L.   - CARDIOVASCULAR: artrial tachyarrythmia vs NSR in 90s. VA ECMO fem/fem with distal perfusion cannula.   - RESPIRATORY: intubated. Clear bilaterally.   - GI: soft, hypoactive BS  - : urbina with yellow urine  - EXTREMITIES: warm, well perfused  - SKIN: intact  - PSYCHIATRIC: Teresa    Daily Risk Screen  Intubated: critically ill  Central line: vasoactives  Urbina: strict I/Os    Images: personally reviewed    Invasive Hemodynamics:    Most Recent Range Past 24hrs   BP (Art) 100/84 Arterial Line BP 1  Min: 78/67  Max: 118/84   MAP(Art) 88 mmHg Arterial Line MAP 1 (mmHg)   Min: 70 mmHg  Max: 111 mmHg   RA/CVP   No data recorded   PA   No data recorded   PA(mean)   No data recorded   CO   No data recorded   CI   No data recorded   Mixed Venous 78.9 % SVO2 (%)  Min: 67.9 %  Max: 95.5 %   SVR    No data recorded         Assessment/Plan   Lolis Auguste is a 69 y.o. female with PMH of newly diagnosed persistent AF on Eliquis s/p failed DCCV 10/18/2024, SOLOMON thrombus and aborted DCCV in 9/2024, and acute systolic HF [potentially tachy-mediated], who was recently admitted to [facility] on 10/23/2024 for AF RVR. Patient was seen by cardiologist today for routine follow-up, noted to be in persistent AF RVR. She was recently cardioverted 10/18 and discharged on Amiodarone but was unable to tolerated d/t extreme fatigue. Dofetilide 250 mcg BID was loaded but AF persisted, so that the decision was made to perform urgent RFA/PVI at [facility].      Patient became increasingly hypotensive requiring norepinephrine ggt, epinephrine ggt. Cardiac ablation course complicated by vfib arrest requiring defibrillation. Follow by cardioversion x3 for SVT. Pericardial effusion require pericardial fluid drainage. 1000ml hemorhagic fluid drained. Patient was admitted to the CTICU sp ecmo cannulations for hypotension refractory to vasopressors.  Patient developed vfib and torsades over night. She was cardioverted five times. He received amiodarone bolus x2, magnesium and was started on a lidocaine drip.      Plan:  NEURO: Patient is intubated and sedated on propofol infusion. Poor vent synchrony off sedation but very slow to arouse. Followed in RLE and weakly in LLE once. Very weak hand  on right.   -->  - Serial neuro and pain assessments   - precedex as needed to allow for RASS 0 to -2  - Scheduled Tylenol   - PRN oxycodone  - PRN dilaudid for pain   - PT Consult when appropriate  - CAM ICU score qshift  - Sleep/wake cycle hygiene     CV:  Patient has a history of atrial fibrillation, NICM possible r/t arrythmias. Is now status post ecmo cannula for acute on chronic systolic heart failure/cardiogenic shock, pericardial effusion s/p drain, and atrial fibrillation ablation was not performed. Developed torsades in setting of amio with recent dofetilide, now improved off amio and on lido with therapeutic level. Intermittent atrial tachyarrythmias that are rate control. EF 15-25% on POCUS with VA ECMO 3.1L/sweep 1/ Fio2 100% with epi now 0.04 mcg/kg/min. Significant variability in pulsatility, not associated with epi or rhythm changes. Decreased output from pericardial drain.  -->  - Maintain goal MAP > 65   - Starting methylpred 1GM IV q24hrs x 3 doses empirically for anjali-pericarditis    - f/u pericardial fluid studies  - Avoid QT prolonging agents including amiodarone until 11/7 at earliest due to recent dofetilide load.  - Formal echo, discussion underway regarding LV offloading with impella  - wean lidocaine gtt per discussion with EP. Level in AM  - HF consult appreciate recs  - Monitor pericardial drain output.  - Hold home metoprolol     PULM:  No history of pulmonary disease.  Currently intubated on ventilator with appropriate vent pressures. ECMO with sweep 1 and Fio2 100%. -->  - daily CXR  - wean vent to lung protective ventilation  - adjust  sweep to normal pH  - Wean FiO2 maintaining SpO2 >92%.      GI:  OG in place. Mildly elevated LFTs. -->  - Continue PPI until extubated  - NPO  - nutrition recs. Start TF. NPO at midnight for possible impella tomorrow  - Colace/senna BID and miralax BID     : No history of renal disease, baseline creatinine 0.8. Creatinine stable post-op. Urbina in place and making adequate UOP. Euvolemic on exam.  Hypernatremia. -->  - Continue urbina catheter for strict I/Os.  - Goal UOP 0.5ml/kg/hr  - RFP as clinically indicated  - Replete electrolytes per CTICU protocol  - goal neg 500-1L. Assess volume status on ECHO  - Holding home furosemide   - start FWF 150ml q6h     ENDO:  No past medical history of DM. A1c: 5.6. Acceptable glycemic control. -->  - Maintain BG <180, insulin per CTICU protocol     HEME:  Acute blood loss anemia with bloody output of unclear etiology from pericardial drain which is now decreased. Hgb stable.-->    - Monitor drain output volume and characteristics  - CBC BID  - start SQH today. Will discuss systemic AC for afib and ECMO if remains stable tomorrow.   - SCDs for DVT prophylaxis.  - Last type and screen: 10/30/2024, resend in AM  - Holding home apixaban      ID:  Afebrile, no current indications of infection but emergent lines placed. -->  - Trend temp q4h  - start mupirocin x 5 days for MSSA decolinization  - stop zosyn after 48hrs  - plan to cx and resume abx if evidence of active infection  - f/u pericardial drain cultures, NGTD          Proph:  SCDs  PPI     G:  Line  Femoral venous and arterial sheaths 10/30  Left radial a-line placed 10/30, dc   R radial pollo 10/30  PIVx3  Plan to reline tomorrow if arrythmias remain improved.      I have discussed the case with the resident/advanced practice provider. I have personally performed a history, physical exam, and my own medical decision making. I have reviewed the note and agree with the findings and plan with the following exceptions as  identified below. I have personally provided 49 minutes of critical care time exclusive of time spent on separately billable procedures. Time includes review of laboratory data, radiology results, discussion with consultants, family and monitoring for potential decompensation. Interventions were performed as documented above.      Family/Surrogate updated with plan of care.  Code status addressed/up to date.      Dispo: CTICU care for now.    CTICU TEAM PHONE [phone]

## 2024-11-01 NOTE — PROGRESS NOTES
Advanced Heart Failure Progress Note   Consulting Team:CTICU  Primary Cardiologist:Dr. Garcia  Reason for Consult: HFrEF, severe MR, afib w/ profound shock requiring VA-ECMO after induction for afib ablation    Subjective   NAEO. Remains intubated and weaned off sedation this morning but not following commands. Her pressor requirement has decreased and she is on epi 0.07 as of this morning. In sinus tach on tele.     Objective   Physical Exam  Vitals:    11/01/24 0700   Pulse: 103   Resp: 17   Temp: 36.9 °C (98.4 °F)   SpO2:        GEN: Ill appearing, well-developed, sedated  CV: Regular rhythm, tachycardic, Distant breath sounds  LUNGS: Mechanical breath sounds bilaterally.  ABD: Soft, NT/ND, NBS, no masses or organomegaly.  SKIN: Warm, well perfused. No skin rashes or abnormal lesions. 1+ edema in BLE  MSK: Normal gait. No deformities.  EXT: No clubbing or cyanosis  NEURO: Intubated, not following commands this morning.    I have personally reviewed the following images and laboratory findings:    ECG:Atrial fib/flutter    Results for orders placed during the hospital encounter of 10/30/24    Transthoracic Echo (TTE) Limited    [facility], [address] [place] [address]  Tel [phone] and Fax [phone]    TRANSTHORACIC ECHOCARDIOGRAM REPORT      Patient Name:      ADOLPH Nelson Physician:    [id number] Garett Roque MD  Study Date:        10/31/2024           Ordering Provider:    [id number] THOMAS CLANCY  MRN/PID:           [id number]             Fellow:  Accession#:        [id number]         Nurse:  Date of Birth/Age: 1955 / 69      Sonographer:          Suresh morocho                                      RDCS, RVT  Gender:            F                    Additional Staff:  Height:            149.86 cm            Admit Date:  Weight:            63.05 kg             Admission Status:     Inpatient - STAT  BSA / BMI:         1.58 m2 / 28.07       Encounter#:           [id number]  kg/m2  Blood Pressure:    160/90 mmHg          Department Location:  [facility]    Study Type:    TRANSTHORACIC ECHO (TTE) LIMITED  Diagnosis/ICD: Cardiogenic shock-R57.0  Indication:    s/p ECMO decannulation, tamponade, hemorrhage  CPT Code:      Echo Limited-36826; Doppler Limited-19043; Color Doppler-37410    Patient History:  Pertinent History: CHF, cardiomyopathy, a-fib, MR.    Study Detail: The following Echo studies were performed: 2D, Doppler and color  flow. Definity used as a contrast agent for endocardial border  definition. Total contrast used for this procedure was 4 mL via IV  push.      PHYSICIAN INTERPRETATION:  Left Ventricle: Left ventricular ejection fraction is severely decreased, calculated by Wong's biplane at 18%. There is global hypokinesis of the left ventricle with minor regional variations. The left ventricular cavity size is mildly dilated. Left ventricular diastolic filling was indeterminate.  Left Atrium: The left atrium is enlarged.  Right Ventricle: The right ventricle is mildly enlarged. There is moderately reduced right ventricular systolic function.  Right Atrium: The right atrium is mildly dilated. There is a device visualized in the right atrium.  Aortic Valve: The aortic valve is trileaflet. There is mild aortic valve cusp calcification. There is mild aortic valve thickening. There is reduced systolic aortic valve leaflet excursion. There is no evidence of aortic valve regurgitation.  Mitral Valve: The mitral valve is mildly thickened. There is severe mitral valve regurgitation. The mitral regurgitant orifice area is 21 mm2. The mitral regurgitant volume is 19.44 ml. Severe functional MR, extending to the pulmonary veins, leaflet tethering.  Tricuspid Valve: The tricuspid valve is structurally normal. There is mild tricuspid regurgitation. The Doppler estimated RVSP is within normal limits at 30.7 mmHg. Normal estimmated RVSP, likely  underestimate.  Pulmonic Valve: The pulmonic valve is structurally normal. There is mild pulmonic valve regurgitation.  Pericardium: Trivial to small pericardial effusion.  Aorta: The aortic root is normal.      CONCLUSIONS:  1. Left ventricular ejection fraction is severely decreased, calculated by Wong's biplane at 18%.  2. There is global hypokinesis of the left ventricle with minor regional variations.  3. Left ventricular diastolic filling was indeterminate.  4. Left ventricular cavity size is mildly dilated.  5. There is moderately reduced right ventricular systolic function.  6. Mildly enlarged right ventricle.  7. The left atrium is enlarged.  8. Severe functional MR, extending to the pulmonary veins, leaflet tethering.  9. Severe mitral valve regurgitation.  10. Right ventricular systolic pressure is within normal limits.    QUANTITATIVE DATA SUMMARY:    2D MEASUREMENTS:          Normal Ranges:  LVEDV Index:     99 ml/m2      LV SYSTOLIC FUNCTION BY 2D PLANIMETRY (MOD):  Normal Ranges:  EF-A4C View:    18 % (>=55%)  EF-A2C View:    17 %  EF-Biplane:     18 %  LV EF Reported: 18 %      LV DIASTOLIC FUNCTION:           Normal Ranges:  MV Peak E:             1.01 m/s  (0.7-1.2 m/s)  MV e'                  0.078 m/s (>8.0)  MV lateral e'          0.10 m/s  MV medial e'           0.06 m/s  E/e' Ratio:            12.91     (<8.0)      MITRAL VALVE:          Normal Ranges:  MV Vmax:      1.22 m/s (<=1.3m/s)  MV peak P.0 mmHg (<5mmHg)  MV mean P.0 mmHg (<2mmHg)  MV Annulus:   3.30 cm  (1.8-3.1cm)      MITRAL INSUFFICIENCY:             Normal Ranges:  PISA Radius:          0.6 cm  MR VTI:               90.90 cm  MR Vmax:              384.00 cm/s  MR Alias Jose Carlos:         36.3 cm/s  MR Volume:            19.44 ml  MR Flow Rt:           82.11 ml/s  MR EROA:              21 mm2  MR RF                 10.00 %      AORTIC VALVE:          Normal Ranges:  LVOT Diameter: 2.10 cm (1.8-2.4cm)      RIGHT  VENTRICLE:  TAPSE: 9.7 mm  RV s'  0.08 m/s      TRICUSPID VALVE/RVSP:          Normal Ranges:  Peak TR Velocity:     2.63 m/s  RV Syst Pressure:     31 mmHg  (< 30mmHg)      [id number] Garett Roque MD  Electronically signed on 10/31/2024 at 1:38:08 PM        ** Final **       Imaging  Electrophysiology procedure   Final Result      XR chest 1 view   Final Result   1. Overall slight improvement in diffuse pulmonary edema.   2. Possibility of left basilar pleural effusion and associated   atelectasis, not excluded.   3. Medical devices as above. No pneumothorax.        Signed by: Harris Huber 11/1/2024 11:37 AM   Dictation workstation:   [id number]      Transthoracic Echo (TTE) Limited   Final Result      XR abdomen 1 view   Final Result   1.  Interval placement of enteric tube with tip overlying the   expected location of the gastric body.   2. Paucity of bowel gas.   3. Unchanged left basilar opacity favored to represent   atelectasis/effusion.   4.  Medical devices as described above.        I personally reviewed the images/study and I agree with Brenda Marx DO's (radiology resident) findings as stated. This study   was interpreted at [place], [place].        MACRO:   None        Signed by: Sawyer Miguel 10/31/2024 1:57 PM   Dictation workstation:   [id number]      XR chest 1 view   Final Result   1.  No significant interval change in appearance of the chest with   persistent retrocardiac opacity favored to represent   atelectasis/pleural effusion. Superimposed infectious/inflammatory   process can not be fully excluded.   2. Similar degree of pulmonary interstitial edema.   3. Postsurgical changes and medical devices as described above.        I personally reviewed the images/study and I agree with Brenda Marx DO's (radiology resident) findings as stated. This study   was interpreted at [facility],  [place].        MACRO:   None        Signed by: Sawyer Miguel 10/31/2024 1:33 PM   Dictation workstation:   [id number]      XR chest 1 view   Final Result   1. Interval worsening lung aeration with findings suggestive of   central vascular congestion and pulmonary interstitial edema.   Moderate left pleural effusion with associated   atelectasis/consolidation.   2. Medical devices as above.        I personally reviewed the images/study and I agree with the findings   as stated by Rosalind Garay MD. This study was interpreted at   [place], [place].        MACRO:   None        Signed by: Sawyer Miguel 10/31/2024 9:57 AM   Dictation workstation:   [id number]      Transthoracic Echo (TTE) Limited    (Results Pending)         Lab Review     Troponin I, High Sensitivity   Date/Time Value Ref Range Status   10/23/2024 02:52 PM 14 (H) 0 - 13 ng/L Final   10/23/2024 12:54 PM 12 0 - 13 ng/L Final     BNP   Date/Time Value Ref Range Status   10/23/2024 12:54  (H) 0 - 99 pg/mL Final        Assessment and Plan     Lolis Auguste is a 69 y.o. female with a PMHx of recently diagnosed Afib c/b SOLOMON thrombus and HFrEF (EF 25%) who was transferred from [facility] on 10/30/2024 for urgent RFA/PVI on 10/30 in the setting of persistent afib failing Tikosyn load and DCCV x 2 (last 10/28). Course was c/b multipressor shock requiring epi, levo, and vasopressin during induction for planned afib ablation. She was found to have a moderate pericardial effusion and underwent urgent pericardiocentesis with 1L of bloody output. Her hemodynamics did not improve and she was cannulated for VA-ECMO on 10/30 after shock call. Heart failure was consulted regarding evaluation and management of HFrEF and shock.     The etiology of her HFrEF is not entirely clear at this point. Although it was assumed to be tachycardia induced cardiomyopathy, she has not had an ischemic evaluation. Her TTE also  demonstrates biventricular failure and severe, wide-open MR which raises the possibility of a valvular etiology. In light of her MR, we recommend offloading her LV with diuresis and ECMO, along with consideration of an axillary Impella to further offload her LV. Her hospital course has been complicated by TdP and VF on 10/30 which required shock x5 and amiodarone boluses x 2 and lidocaine gtt.     #Acute Decompensated HF (EF 15%) w/ biventricular failure  #Cardiogenic Shock  #Severe MR  #TdP and Vfib w/ 5x shock, now on lidocaine (10/30)  #Afib w/ RVR s/p multiple failed DCCV  - Diuresis: Continue diuresis to at least -1.5-2.0L over 24 hours.  - MCS: Cannulated for VA-ECMO on 10/30 at 3.0 LPM              - Repeat TTE today given improvement in hemodynamics prior to considering Impella  - Inotropes: None  - Pressors: epinephrine 0.07. Continue to wean as able.              - Levophed gtt weaned off 10/31              - Weaned off vasopressin 10/31  - GDMT: Hold in the setting of shock  - Will eventually need ischemic evaluation for new HFrEF once she is stabilized  - We will assess her for advanced therapies on an ongoing basis.    Thank you for the opportunity to involve us in the care of this fine individual. This plan was discussed with Dr. Brandon Biswas, HF attending. For any questions or concerns, feel free to reach out via [facility] chat or page at [phone].    Brent Ledesma MD   Heart Failure Fellow  PGY-4

## 2024-11-01 NOTE — PROGRESS NOTES
Spiritual Care Visit    Clinical Encounter Type  Visited With: Patient not available, Health care provider (Pt not able to engage at this time. Per RN, family temporarily away from bedside.)  Routine Visit: Introduction  Continue Visiting: Yes  Crisis Visit: Critical care

## 2024-11-01 NOTE — PROGRESS NOTES
CTICU Progress Note  Lolis Auguste/[id number]    Admit Date: 10/30/2024  Hospital Length of Stay: 2   ICU Length of Stay: 1d 13h   CT SURGEON:     SUBJECTIVE:   No ventricular arrythmias   Negative 500ml    MEDICATIONS  Infusions:  EPINEPHrine, Last Rate: 0.08 mcg/kg/min (11/01/24 0800)  lactated Ringer's, Last Rate: 5 mL/hr (11/01/24 0400)  lidocaine, Last Rate: 1 mg/min (11/01/24 0800)  norepinephrine, Last Rate: Stopped (10/31/24 1236)  propofol, Last Rate: 50 mcg/kg/min (11/01/24 0800)  vasopressin, Last Rate: Stopped (10/30/24 2200)      Scheduled:  [Held by provider] acetaminophen, 650 mg, q4h   Or  [Held by provider] acetaminophen, 650 mg, q4h  [Held by provider] hydrocortisone sodium succinate, 100 mg, Once  hydrocortisone sodium succinate, 50 mg, q6h  insulin lispro, 0-15 Units, q4h  pantoprazole, 40 mg, Daily before breakfast   Or  pantoprazole, 40 mg, Daily before breakfast  piperacillin-tazobactam, 3.375 g, q6h  polyethylene glycol, 17 g, Daily  sennosides-docusate sodium, 2 tablet, BID      PRN:  calcium gluconate, 1 g, q6h PRN  calcium gluconate, 2 g, q6h PRN  dextrose, 25 g, q15 min PRN   Or  glucagon, 1 mg, q15 min PRN  HYDROmorphone, 0.2 mg, q2h PRN  magnesium sulfate, 2 g, q6h PRN  magnesium sulfate, 4 g, q6h PRN  naloxone, 0.2 mg, q5 min PRN  oxyCODONE, 5 mg, q4h PRN  oxygen, , Continuous PRN - O2/gases  potassium chloride, 20 mEq, q6h PRN  potassium chloride, 40 mEq, q6h PRN        PHYSICAL EXAM:   Visit Vitals  Pulse 93   Temp 36.7 °C (98.1 °F)   Resp 16   SpO2 91%   OB Status Postmenopausal   Smoking Status Never     Wt Readings from Last 5 Encounters:   10/28/24 63.5 kg (139 lb 15.9 oz)   10/23/24 64.9 kg (143 lb)   09/30/24 66.7 kg (147 lb)   09/20/24 63.5 kg (140 lb)   09/09/24 66 kg (145 lb 6.4 oz)     INTAKE/OUTPUT:  I/O last 3 completed shifts:  In: 2874.5 [I.V.:2574.5; IV Piggyback:300]  Out: 4445 [Urine:4230; Drains:215]         Vent settings:  Vent Mode: Volume control/assist  control  FiO2 (%):  [40 %] 40 %  S RR:  [16] 16  S VT:  [350 mL] 350 mL  PEEP/CPAP (cm H2O):  [8 cm H20] 8 cm H20  MAP (cm H2O):  [9-13] 10    Physical Exam:   - CONSTITUTION: older appearing female in ICU bed on VA ECMO  - NEUROLOGIC: CN grossly intact. Weakly followed in RUE. Followed in LE R >L.   - CARDIOVASCULAR: artrial tachyarrythmia vs NSR in 90s. VA ECMO fem/fem with distal perfusion cannula.   - RESPIRATORY: intubated. Clear bilaterally.   - GI: soft, hypoactive BS  - : urbina with yellow urine  - EXTREMITIES: warm, well perfused  - SKIN: intact  - PSYCHIATRIC: Teresa    Daily Risk Screen  Intubated: critically ill  Central line: vasoactives  Urbina: strict I/Os    Images: personally reviewed    Invasive Hemodynamics:    Most Recent Range Past 24hrs   BP (Art) 90/76 Arterial Line BP 1  Min: 80/77  Max: 124/83   MAP(Art) 80 mmHg Arterial Line MAP 1 (mmHg)   Min: 75 mmHg  Max: 105 mmHg   RA/CVP   No data recorded   PA   No data recorded   PA(mean)   No data recorded   CO   No data recorded   CI   No data recorded   Mixed Venous 84.2 % SVO2 (%)  Min: 75.6 %  Max: 97.3 %   SVR    No data recorded         Assessment/Plan   Lolis Auguste is a 69 y.o. female with PMH of newly diagnosed persistent AF on Eliquis s/p failed DCCV 10/18/2024, SOLOMON thrombus and aborted DCCV in 9/2024, and acute systolic HF [potentially tachy-mediated], who was recently admitted to [facility] on 10/23/2024 for AF RVR. Patient was seen by cardiologist today for routine follow-up, noted to be in persistent AF RVR. She was recently cardioverted 10/18 and discharged on Amiodarone but was unable to tolerated d/t extreme fatigue. Dofetilide 250 mcg BID was loaded but AF persisted, so that the decision was made to perform urgent RFA/PVI at [facility].      Patient became increasingly hypotensive requiring norepinephrine ggt, epinephrine ggt. Cardiac ablation course complicated by vfib arrest requiring defibrillation. Follow by cardioversion  x3 for SVT. Pericardial effusion require pericardial fluid drainage. 1000ml hemorhagic fluid drained. Patient was admitted to the CTICU sp ecmo cannulations for hypotension refractory to vasopressors. Patient developed vfib and torsades over night. She was cardioverted five times. He received amiodarone bolus x2, magnesium and was started on a lidocaine drip.      Plan:  NEURO: Patient is intubated and sedated on propofol infusion. Poor vent synchrony off sedation but very slow to arouse. Followed in RLE and weakly in LLE once. Very weak hand  on right.   -->  - Serial neuro and pain assessments   - precedex as needed to allow for RASS 0 to -2  - Scheduled Tylenol   - PRN oxycodone  - PRN dilaudid for pain   - PT Consult when appropriate  - CAM ICU score qshift  - Sleep/wake cycle hygiene     CV:  Patient has a history of atrial fibrillation, NICM possible r/t arrythmias. Is now status post ecmo cannula for acute on chronic systolic heart failure/cardiogenic shock, pericardial effusion s/p drain, and atrial fibrillation ablation was not performed. Developed torsades in setting of amio with recent dofetilide, now improved off amio and on lido with therapeutic level. Intermittent atrial tachyarrythmias that are rate control. EF 15-25% on POCUS with VA ECMO 3.1L/sweep 1/ Fio2 100% with epi now 0.04 mcg/kg/min. Significant variability in pulsatility, not associated with epi or rhythm changes. Decreased output from pericardial drain.  -->  - Maintain goal MAP > 65   - Starting methylpred 1GM IV q24hrs x 3 doses empirically for anjali-pericarditis    - f/u pericardial fluid studies  - Avoid QT prolonging agents including amiodarone until 11/7 at earliest due to recent dofetilide load.  - Formal echo, discussion underway regarding LV offloading with impella  - wean lidocaine gtt per discussion with EP. Level in AM  - HF consult appreciate recs  - Monitor pericardial drain output.  - Hold home metoprolol     PULM:  No  history of pulmonary disease.  Currently intubated on ventilator with appropriate vent pressures. ECMO with sweep 1 and Fio2 100%. -->  - daily CXR  - wean vent to lung protective ventilation  - adjust sweep to normal pH  - Wean FiO2 maintaining SpO2 >92%.      GI:  OG in place. Mildly elevated LFTs. -->  - Continue PPI until extubated  - NPO  - nutrition recs. Start TF. NPO at midnight for possible impella tomorrow  - Colace/senna BID and miralax BID     : No history of renal disease, baseline creatinine 0.8. Creatinine stable post-op. Urbian in place and making adequate UOP. Euvolemic on exam.  Hypernatremia. -->  - Continue urbina catheter for strict I/Os.  - Goal UOP 0.5ml/kg/hr  - RFP as clinically indicated  - Replete electrolytes per CTICU protocol  - goal neg 500-1L. Assess volume status on ECHO  - Holding home furosemide   - start FWF 150ml q6h     ENDO:  No past medical history of DM. A1c: 5.6. Acceptable glycemic control. -->  - Maintain BG <180, insulin per CTICU protocol     HEME:  Acute blood loss anemia with bloody output of unclear etiology from pericardial drain which is now decreased. Hgb stable.-->    - Monitor drain output volume and characteristics  - CBC BID  - start SQH today. Will discuss systemic AC for afib and ECMO if remains stable tomorrow.   - SCDs for DVT prophylaxis.  - Last type and screen: 10/30/2024, resend in AM  - Holding home apixaban      ID:  Afebrile, no current indications of infection but emergent lines placed. -->  - Trend temp q4h  - start mupirocin x 5 days for MSSA decolinization  - stop zosyn after 48hrs  - plan to cx and resume abx if evidence of active infection  - f/u pericardial drain cultures, NGTD     Skin:  No active skin issues.  - preventative Mepilex dressings in place on sacrum and heels  - change preventative Mepilex weekly or more frequently as indicated (when moist/soiled)   - every shift skin assessment per nursing and weekly ICU skin rounds  - moisture  barrier to be applied with earnestine care  - active skin problems addressed with nursing on daily rounds     Proph:  SCDs  PPI     G:  Line  Femoral venous and arterial sheaths 10/30  Left radial a-line placed 10/30, dc   R radial pollo 10/30  PIVx3  Plan to reline tomorrow if arrythmias remain improved.      F: Family: daughter updated at beside.      A,B,C,D,E,F,G: reviewed     Dispo: CTICU care for now.    CTICU TEAM PHONE [phone]

## 2024-11-02 LAB
ABO GROUP (TYPE) IN BLOOD: NORMAL
ALBUMIN SERPL BCP-MCNC: 3.9 G/DL (ref 3.4–5)
ALBUMIN SERPL BCP-MCNC: 4.1 G/DL (ref 3.4–5)
ANION GAP BLDA CALCULATED.4IONS-SCNC: 12 MMO/L (ref 10–25)
ANION GAP BLDA CALCULATED.4IONS-SCNC: 9 MMO/L (ref 10–25)
ANION GAP SERPL CALC-SCNC: 15 MMOL/L (ref 10–20)
ANION GAP SERPL CALC-SCNC: 16 MMOL/L (ref 10–20)
ANTIBODY SCREEN: NORMAL
APTT PPP: 27 SECONDS (ref 27–38)
BACTERIA FLD CULT: NORMAL
BASE EXCESS BLDA CALC-SCNC: -2 MMOL/L
BASE EXCESS BLDA CALC-SCNC: 1.3 MMOL/L (ref -2–3)
BASE EXCESS BLDA CALC-SCNC: 2 MMOL/L (ref -2–3)
BASE EXCESS BLDV CALC-SCNC: -2.3 MMOL/L
BODY TEMPERATURE: 37 DEGREES CELSIUS
BUN SERPL-MCNC: 26 MG/DL (ref 6–23)
BUN SERPL-MCNC: 30 MG/DL (ref 6–23)
CA-I BLD-SCNC: 1.22 MMOL/L (ref 1.1–1.33)
CA-I BLDA-SCNC: 1.27 MMOL/L (ref 1.1–1.33)
CA-I BLDA-SCNC: 1.27 MMOL/L (ref 1.1–1.33)
CALCIUM SERPL-MCNC: 8.9 MG/DL (ref 8.6–10.6)
CALCIUM SERPL-MCNC: 9 MG/DL (ref 8.6–10.6)
CHLORIDE BLDA-SCNC: 119 MMOL/L (ref 98–107)
CHLORIDE BLDA-SCNC: 123 MMOL/L (ref 98–107)
CHLORIDE SERPL-SCNC: 116 MMOL/L (ref 98–107)
CHLORIDE SERPL-SCNC: 118 MMOL/L (ref 98–107)
CO2 SERPL-SCNC: 25 MMOL/L (ref 21–32)
CO2 SERPL-SCNC: 25 MMOL/L (ref 21–32)
CREAT SERPL-MCNC: 0.56 MG/DL (ref 0.5–1.05)
CREAT SERPL-MCNC: 0.73 MG/DL (ref 0.5–1.05)
EGFRCR SERPLBLD CKD-EPI 2021: 88 ML/MIN/1.73M*2
EGFRCR SERPLBLD CKD-EPI 2021: >90 ML/MIN/1.73M*2
ERYTHROCYTE [DISTWIDTH] IN BLOOD BY AUTOMATED COUNT: 15.9 % (ref 11.5–14.5)
ERYTHROCYTE [DISTWIDTH] IN BLOOD BY AUTOMATED COUNT: 16.2 % (ref 11.5–14.5)
GLUCOSE BLD MANUAL STRIP-MCNC: 136 MG/DL (ref 74–99)
GLUCOSE BLD MANUAL STRIP-MCNC: 142 MG/DL (ref 74–99)
GLUCOSE BLD MANUAL STRIP-MCNC: 144 MG/DL (ref 74–99)
GLUCOSE BLD MANUAL STRIP-MCNC: 146 MG/DL (ref 74–99)
GLUCOSE BLD MANUAL STRIP-MCNC: 159 MG/DL (ref 74–99)
GLUCOSE BLDA-MCNC: 159 MG/DL (ref 74–99)
GLUCOSE BLDA-MCNC: 167 MG/DL (ref 74–99)
GLUCOSE SERPL-MCNC: 143 MG/DL (ref 74–99)
GLUCOSE SERPL-MCNC: 157 MG/DL (ref 74–99)
GRAM STN SPEC: NORMAL
GRAM STN SPEC: NORMAL
HCO3 BLDA-SCNC: 24.7 MMOL/L (ref 22–26)
HCO3 BLDA-SCNC: 25.1 MMOL/L
HCO3 BLDA-SCNC: 25.1 MMOL/L (ref 22–26)
HCO3 BLDV-SCNC: 25 MMOL/L
HCT VFR BLD AUTO: 31.5 % (ref 36–46)
HCT VFR BLD AUTO: 31.9 % (ref 36–46)
HCT VFR BLD EST: 31 % (ref 36–46)
HCT VFR BLD EST: 32 % (ref 36–46)
HGB BLD-MCNC: 10 G/DL (ref 12–16)
HGB BLD-MCNC: 10.2 G/DL (ref 12–16)
HGB BLDA-MCNC: 10.4 G/DL (ref 12–16)
HGB BLDA-MCNC: 10.6 G/DL (ref 12–16)
INHALED O2 CONCENTRATION: 100 %
INHALED O2 CONCENTRATION: 100 %
INHALED O2 CONCENTRATION: 40 %
INHALED O2 CONCENTRATION: 40 %
INR PPP: 1.3 (ref 0.9–1.1)
LACTATE BLDA-SCNC: 1.1 MMOL/L (ref 0.4–2)
LACTATE BLDA-SCNC: 1.1 MMOL/L (ref 0.4–2)
LDH SERPL L TO P-CCNC: 199 U/L (ref 84–246)
LIDOCAIN SERPL-MCNC: 2.4 UG/ML (ref 1–5)
MAGNESIUM SERPL-MCNC: 2.32 MG/DL (ref 1.6–2.4)
MAGNESIUM SERPL-MCNC: 2.46 MG/DL (ref 1.6–2.4)
MCH RBC QN AUTO: 28.7 PG (ref 26–34)
MCH RBC QN AUTO: 29.7 PG (ref 26–34)
MCHC RBC AUTO-ENTMCNC: 31.3 G/DL (ref 32–36)
MCHC RBC AUTO-ENTMCNC: 32.4 G/DL (ref 32–36)
MCV RBC AUTO: 91 FL (ref 80–100)
MCV RBC AUTO: 92 FL (ref 80–100)
NRBC BLD-RTO: 0 /100 WBCS (ref 0–0)
NRBC BLD-RTO: 0 /100 WBCS (ref 0–0)
OXYHGB MFR BLDA: 97.2 %
OXYHGB MFR BLDA: 97.5 % (ref 94–98)
OXYHGB MFR BLDA: 98.1 % (ref 94–98)
OXYHGB MFR BLDV: 82.3 %
PCO2 BLDA: 33 MM HG (ref 38–42)
PCO2 BLDA: 34 MM HG (ref 38–42)
PCO2 BLDA: 51 MM HG
PCO2 BLDV: 52 MM HG
PH BLDA: 7.3 PH
PH BLDA: 7.47 PH (ref 7.38–7.42)
PH BLDA: 7.49 PH (ref 7.38–7.42)
PH BLDV: 7.29 PH
PHOSPHATE SERPL-MCNC: 2.6 MG/DL (ref 2.5–4.9)
PHOSPHATE SERPL-MCNC: 3.2 MG/DL (ref 2.5–4.9)
PLATELET # BLD AUTO: 163 X10*3/UL (ref 150–450)
PLATELET # BLD AUTO: 164 X10*3/UL (ref 150–450)
PO2 BLDA: 146 MM HG (ref 85–95)
PO2 BLDA: 278 MM HG (ref 85–95)
PO2 BLDA: 391 MM HG
PO2 BLDV: 53 MM HG
POTASSIUM BLDA-SCNC: 4.5 MMOL/L (ref 3.5–5.3)
POTASSIUM BLDA-SCNC: 4.5 MMOL/L (ref 3.5–5.3)
POTASSIUM SERPL-SCNC: 4.3 MMOL/L (ref 3.5–5.3)
POTASSIUM SERPL-SCNC: 4.4 MMOL/L (ref 3.5–5.3)
PROTHROMBIN TIME: 14.5 SECONDS (ref 9.8–12.8)
RBC # BLD AUTO: 3.44 X10*6/UL (ref 4–5.2)
RBC # BLD AUTO: 3.49 X10*6/UL (ref 4–5.2)
RH FACTOR (ANTIGEN D): NORMAL
SAO2 % BLDA: 100 %
SAO2 % BLDA: 100 % (ref 94–100)
SAO2 % BLDA: 100 % (ref 94–100)
SAO2 % BLDV: 84 %
SODIUM BLDA-SCNC: 151 MMOL/L (ref 136–145)
SODIUM BLDA-SCNC: 153 MMOL/L (ref 136–145)
SODIUM SERPL-SCNC: 152 MMOL/L (ref 136–145)
SODIUM SERPL-SCNC: 155 MMOL/L (ref 136–145)
SODIUM SERPL-SCNC: 155 MMOL/L (ref 136–145)
UFH PPP CHRO-ACNC: 0.4 IU/ML
UFH PPP CHRO-ACNC: 0.5 IU/ML
WBC # BLD AUTO: 10.7 X10*3/UL (ref 4.4–11.3)
WBC # BLD AUTO: 9.4 X10*3/UL (ref 4.4–11.3)

## 2024-11-02 PROCEDURE — 37799 UNLISTED PX VASCULAR SURGERY: CPT

## 2024-11-02 PROCEDURE — 71045 X-RAY EXAM CHEST 1 VIEW: CPT | Performed by: RADIOLOGY

## 2024-11-02 PROCEDURE — 83615 LACTATE (LD) (LDH) ENZYME: CPT | Performed by: NURSE PRACTITIONER

## 2024-11-02 PROCEDURE — 86923 COMPATIBILITY TEST ELECTRIC: CPT

## 2024-11-02 PROCEDURE — 99291 CRITICAL CARE FIRST HOUR: CPT

## 2024-11-02 PROCEDURE — 82810 BLOOD GASES O2 SAT ONLY: CPT

## 2024-11-02 PROCEDURE — 94003 VENT MGMT INPAT SUBQ DAY: CPT

## 2024-11-02 PROCEDURE — 85730 THROMBOPLASTIN TIME PARTIAL: CPT | Performed by: NURSE PRACTITIONER

## 2024-11-02 PROCEDURE — 85027 COMPLETE CBC AUTOMATED: CPT | Performed by: NURSE PRACTITIONER

## 2024-11-02 PROCEDURE — 84132 ASSAY OF SERUM POTASSIUM: CPT

## 2024-11-02 PROCEDURE — 82947 ASSAY GLUCOSE BLOOD QUANT: CPT

## 2024-11-02 PROCEDURE — 2020000001 HC ICU ROOM DAILY

## 2024-11-02 PROCEDURE — 2500000004 HC RX 250 GENERAL PHARMACY W/ HCPCS (ALT 636 FOR OP/ED): Performed by: STUDENT IN AN ORGANIZED HEALTH CARE EDUCATION/TRAINING PROGRAM

## 2024-11-02 PROCEDURE — 85520 HEPARIN ASSAY: CPT | Performed by: NURSE PRACTITIONER

## 2024-11-02 PROCEDURE — 93010 ELECTROCARDIOGRAM REPORT: CPT | Performed by: INTERNAL MEDICINE

## 2024-11-02 PROCEDURE — 2500000001 HC RX 250 WO HCPCS SELF ADMINISTERED DRUGS (ALT 637 FOR MEDICARE OP)

## 2024-11-02 PROCEDURE — 33949 ECMO/ECLS DAILY MGMT ARTERY: CPT

## 2024-11-02 PROCEDURE — 2500000004 HC RX 250 GENERAL PHARMACY W/ HCPCS (ALT 636 FOR OP/ED): Performed by: NURSE PRACTITIONER

## 2024-11-02 PROCEDURE — 2500000004 HC RX 250 GENERAL PHARMACY W/ HCPCS (ALT 636 FOR OP/ED)

## 2024-11-02 PROCEDURE — 85610 PROTHROMBIN TIME: CPT | Performed by: NURSE PRACTITIONER

## 2024-11-02 PROCEDURE — 2500000002 HC RX 250 W HCPCS SELF ADMINISTERED DRUGS (ALT 637 FOR MEDICARE OP, ALT 636 FOR OP/ED)

## 2024-11-02 PROCEDURE — 82435 ASSAY OF BLOOD CHLORIDE: CPT | Performed by: NURSE PRACTITIONER

## 2024-11-02 PROCEDURE — 83735 ASSAY OF MAGNESIUM: CPT | Performed by: NURSE PRACTITIONER

## 2024-11-02 PROCEDURE — 85027 COMPLETE CBC AUTOMATED: CPT

## 2024-11-02 PROCEDURE — 82805 BLOOD GASES W/O2 SATURATION: CPT

## 2024-11-02 PROCEDURE — 82330 ASSAY OF CALCIUM: CPT

## 2024-11-02 PROCEDURE — 82435 ASSAY OF BLOOD CHLORIDE: CPT

## 2024-11-02 PROCEDURE — 84295 ASSAY OF SERUM SODIUM: CPT

## 2024-11-02 PROCEDURE — 85018 HEMOGLOBIN: CPT

## 2024-11-02 PROCEDURE — 2500000005 HC RX 250 GENERAL PHARMACY W/O HCPCS

## 2024-11-02 PROCEDURE — 84132 ASSAY OF SERUM POTASSIUM: CPT | Performed by: NURSE PRACTITIONER

## 2024-11-02 PROCEDURE — 80176 ASSAY OF LIDOCAINE: CPT | Performed by: NURSE PRACTITIONER

## 2024-11-02 PROCEDURE — 86901 BLOOD TYPING SEROLOGIC RH(D): CPT | Performed by: NURSE PRACTITIONER

## 2024-11-02 PROCEDURE — 83735 ASSAY OF MAGNESIUM: CPT

## 2024-11-02 RX ORDER — HEPARIN SODIUM 10000 [USP'U]/100ML
0-4000 INJECTION, SOLUTION INTRAVENOUS CONTINUOUS
Status: DISCONTINUED | OUTPATIENT
Start: 2024-11-02 | End: 2024-11-06

## 2024-11-02 RX ORDER — ESOMEPRAZOLE MAGNESIUM 40 MG/1
20 GRANULE, DELAYED RELEASE ORAL
Status: DISCONTINUED | OUTPATIENT
Start: 2024-11-03 | End: 2024-11-06

## 2024-11-02 RX ORDER — EPINEPHRINE IN 0.9 % SOD CHLOR 4MG/250ML
0.02 PLASTIC BAG, INJECTION (ML) INTRAVENOUS CONTINUOUS
Status: DISCONTINUED | OUTPATIENT
Start: 2024-11-02 | End: 2024-11-05

## 2024-11-02 RX ORDER — HYDRALAZINE HYDROCHLORIDE 20 MG/ML
10 INJECTION INTRAMUSCULAR; INTRAVENOUS EVERY 4 HOURS PRN
Status: DISCONTINUED | OUTPATIENT
Start: 2024-11-02 | End: 2024-11-06

## 2024-11-02 RX ORDER — PROPOFOL 10 MG/ML
0-50 INJECTION, EMULSION INTRAVENOUS CONTINUOUS
Status: DISCONTINUED | OUTPATIENT
Start: 2024-11-02 | End: 2024-11-05

## 2024-11-02 RX ADMIN — Medication 40 PERCENT: at 04:54

## 2024-11-02 RX ADMIN — Medication 40 PERCENT: at 01:06

## 2024-11-02 RX ADMIN — DEXMEDETOMIDINE HYDROCHLORIDE 1 MCG/KG/HR: 400 INJECTION INTRAVENOUS at 03:10

## 2024-11-02 RX ADMIN — POLYETHYLENE GLYCOL 3350 17 G: 17 POWDER, FOR SOLUTION ORAL at 08:08

## 2024-11-02 RX ADMIN — HEPARIN SODIUM 800 UNITS/HR: 10000 INJECTION, SOLUTION INTRAVENOUS at 10:29

## 2024-11-02 RX ADMIN — SENNOSIDES AND DOCUSATE SODIUM 2 TABLET: 50; 8.6 TABLET ORAL at 23:26

## 2024-11-02 RX ADMIN — METHYLPREDNISOLONE SODIUM SUCCINATE 1000 MG: 1 INJECTION INTRAMUSCULAR; INTRAVENOUS at 12:30

## 2024-11-02 RX ADMIN — PROPOFOL 50 MCG/KG/MIN: 10 INJECTION, EMULSION INTRAVENOUS at 08:07

## 2024-11-02 RX ADMIN — PANTOPRAZOLE SODIUM 40 MG: 40 INJECTION, POWDER, FOR SOLUTION INTRAVENOUS at 08:07

## 2024-11-02 RX ADMIN — HYDROMORPHONE HYDROCHLORIDE 0.2 MG: 0.2 INJECTION, SOLUTION INTRAMUSCULAR; INTRAVENOUS; SUBCUTANEOUS at 16:48

## 2024-11-02 RX ADMIN — Medication 40 PERCENT: at 05:37

## 2024-11-02 RX ADMIN — HYDROMORPHONE HYDROCHLORIDE 0.2 MG: 0.2 INJECTION, SOLUTION INTRAMUSCULAR; INTRAVENOUS; SUBCUTANEOUS at 19:28

## 2024-11-02 RX ADMIN — SENNOSIDES AND DOCUSATE SODIUM 2 TABLET: 50; 8.6 TABLET ORAL at 08:08

## 2024-11-02 RX ADMIN — Medication 40 PERCENT: at 20:31

## 2024-11-02 RX ADMIN — DEXMEDETOMIDINE HYDROCHLORIDE 1 MCG/KG/HR: 400 INJECTION INTRAVENOUS at 08:07

## 2024-11-02 RX ADMIN — MUPIROCIN: 20 OINTMENT TOPICAL at 23:25

## 2024-11-02 RX ADMIN — PROPOFOL 40 MCG/KG/MIN: 10 INJECTION, EMULSION INTRAVENOUS at 03:10

## 2024-11-02 RX ADMIN — MUPIROCIN: 20 OINTMENT TOPICAL at 08:17

## 2024-11-02 RX ADMIN — INSULIN LISPRO 5 UNITS: 100 INJECTION, SOLUTION INTRAVENOUS; SUBCUTANEOUS at 20:12

## 2024-11-02 RX ADMIN — OXYCODONE HYDROCHLORIDE 5 MG: 5 TABLET ORAL at 05:23

## 2024-11-02 RX ADMIN — INSULIN LISPRO 5 UNITS: 100 INJECTION, SOLUTION INTRAVENOUS; SUBCUTANEOUS at 08:17

## 2024-11-02 RX ADMIN — HEPARIN SODIUM 5000 UNITS: 5000 INJECTION, SOLUTION INTRAVENOUS; SUBCUTANEOUS at 03:07

## 2024-11-02 ASSESSMENT — PAIN - FUNCTIONAL ASSESSMENT
PAIN_FUNCTIONAL_ASSESSMENT: CPOT (CRITICAL CARE PAIN OBSERVATION TOOL)

## 2024-11-02 NOTE — PROGRESS NOTES
CTICU Progress Note  Lolis Auguste/[id number]    Admit Date: 10/30/2024  Hospital Length of Stay: 3   ICU Length of Stay: 2d 13h   CT SURGEON:     SUBJECTIVE:   Improved vent synchrony on CPAP 5/5, 40%.    MEDICATIONS  Infusions:  dexmedeTOMIDine, Last Rate: 1 mcg/kg/hr (11/02/24 0700)  EPINEPHrine, Last Rate: 0.04 mcg/kg/min (11/02/24 0700)  lidocaine, Last Rate: 0.5 mg/min (11/02/24 0700)  propofol, Last Rate: 50 mcg/kg/min (11/02/24 0700)      Scheduled:  heparin (porcine), 5,000 Units, q8h  insulin lispro, 0-15 Units, q4h  methylPREDNISolone sodium succinate (PF), 1,000 mg, q24h  mupirocin, , BID  pantoprazole, 40 mg, Daily before breakfast   Or  pantoprazole, 40 mg, Daily before breakfast  polyethylene glycol, 17 g, Daily  sennosides-docusate sodium, 2 tablet, BID      PRN:  acetaminophen, 650 mg, q4h PRN  calcium gluconate, 1 g, q6h PRN  calcium gluconate, 2 g, q6h PRN  dextrose, 25 g, q15 min PRN   Or  glucagon, 1 mg, q15 min PRN  HYDROmorphone, 0.2 mg, q2h PRN  magnesium sulfate, 2 g, q6h PRN  magnesium sulfate, 4 g, q6h PRN  naloxone, 0.2 mg, q5 min PRN  oxyCODONE, 5 mg, q4h PRN  oxygen, , Continuous PRN - O2/gases  potassium chloride, 20 mEq, q6h PRN  potassium chloride, 40 mEq, q6h PRN        PHYSICAL EXAM:   Visit Vitals  Pulse 108   Temp 36.9 °C (98.4 °F)   Resp 15   SpO2 99%   OB Status Postmenopausal   Smoking Status Never     Wt Readings from Last 5 Encounters:   10/28/24 63.5 kg (139 lb 15.9 oz)   10/23/24 64.9 kg (143 lb)   09/30/24 66.7 kg (147 lb)   09/20/24 63.5 kg (140 lb)   09/09/24 66 kg (145 lb 6.4 oz)     INTAKE/OUTPUT:  I/O last 3 completed shifts:  In: 1578.8 [I.V.:1238.8; NG/GT:140; IV Piggyback:200]  Out: 3050 [Urine:2925; Emesis/NG output:60; Drains:65]         Vent settings:  Vent Mode: Pressure support  FiO2 (%):  [40 %] 40 %  S RR:  [12-16] 12  S VT:  [350 mL] 350 mL  PEEP/CPAP (cm H2O):  [8 cm H20] 8 cm H20  UT SUP:  [5 cm H20] 5 cm H20  MAP (cm H2O):  [8-12] 8    Physical Exam:   -  CONSTITUTION: older appearing female in ICU bed on VA ECMO  - NEUROLOGIC: CN grossly intact. Weakly followed in RUE. Followed in LE R >L.   - CARDIOVASCULAR: artrial tachyarrythmia vs NSR in 90s. VA ECMO fem/fem with distal perfusion cannula.   - RESPIRATORY: intubated. Clear bilaterally.   - GI: soft, hypoactive BS  - : urbina with yellow urine  - EXTREMITIES: warm, well perfused. Pulses present.   - SKIN: intact  - PSYCHIATRIC: Teresa    Daily Risk Screen  Intubated: critically ill  Central line: vasoactives  Urbina: strict I/Os    Images: personally reviewed    Invasive Hemodynamics:    Most Recent Range Past 24hrs   BP (Art) 120/97 Arterial Line BP 1  Min: 78/67  Max: 121/96   MAP(Art) 103 mmHg Arterial Line MAP 1 (mmHg)   Min: 70 mmHg  Max: 111 mmHg   RA/CVP   No data recorded   PA   No data recorded   PA(mean)   No data recorded   CO   No data recorded   CI   No data recorded   Mixed Venous 76.5 % SVO2 (%)  Min: 67.9 %  Max: 85 %   SVR    No data recorded         Assessment/Plan   Lolis Auguste is a 69 y.o. female with PMH of newly diagnosed persistent AF on Eliquis s/p failed DCCV 10/18/2024, SOLOMON thrombus and aborted DCCV in 9/2024, and acute systolic HF [potentially tachy-mediated], who was recently admitted to [facility] on 10/23/2024 for AF RVR. Patient was seen by cardiologist today for routine follow-up, noted to be in persistent AF RVR. She was recently cardioverted 10/18 and discharged on Amiodarone but was unable to tolerated d/t extreme fatigue. Dofetilide 250 mcg BID was loaded but AF persisted, so that the decision was made to perform urgent RFA/PVI at [facility].     Patient became increasingly hypotensive requiring norepinephrine ggt, epinephrine ggt. Cardiac ablation course complicated by vfib arrest requiring defibrillation. Follow by cardioversion x3 for SVT. Pericardial effusion require pericardial fluid drainage. 1000ml hemorhagic fluid drained. Patient was admitted to the CTICU sp ecmo  cannulations for hypotension refractory to vasopressors. Patient developed vfib and torsades over night. She was cardioverted five times. He received amiodarone bolus x2, magnesium and was started on a lidocaine drip.      Plan:  NEURO: Patient is intubated and sedated on propofol infusion. Poor vent synchrony off sedation but very slow to arouse. Followed in RLE and weakly in LLE once. Very weak hand  on right. No exam this morning on propofol and precedex.   -->  - Serial neuro and pain assessments   - Discontinue propofol  - precedex as needed to allow for RASS 0 to -2  - Scheduled Tylenol   - PRN oxycodone  - PRN dilaudid for pain   - PT Consult when appropriate  - CAM ICU score qshift  - Sleep/wake cycle hygiene     CV:  Patient has a history of atrial fibrillation, NICM possible r/t arrythmias. Is now status post ecmo cannula for acute on chronic systolic heart failure/cardiogenic shock, pericardial effusion s/p drain, and atrial fibrillation ablation was not performed. Developed torsades in setting of amio with recent dofetilide, now improved off amio and on lido with therapeutic level. Intermittent atrial tachyarrythmias that are rate control. Echo 11/1 with EF 15-20%, severe LVH, reduced RV function and severe MR. VA ECMO 3L/sweep 0.5/ Fio2 100% with epi now 0.04 mcg/kg/min. Significant variability in pulsatility, not associated with epi or rhythm changes. Pericardial drain with 60 ml/24 hrs.   -->  - Maintain goal MAP > 65, PRN hydral  - Continue methylpred 1GM IV q24hrs x 3 doses empirically for anjali-pericarditis    - f/u pericardial fluid studies, cytology negative  - Avoid QT prolonging agents including amiodarone until 11/7 at earliest due to recent dofetilide load.  - Possibility for impella placement with decannulation on Monday, turn-down trial on Monday   - discontinue lidocaine infusion  - HF consult appreciate recs  - Monitor pericardial drain output  - Hold home metoprolol     PULM:  No  history of pulmonary disease.  Currently intubated on ventilator with appropriate vent pressures. ECMO with sweep 0.5 and Fio2 100%. -->  - daily CXR  - wean vent to lung protective ventilation  - adjust sweep to normal pH  - Wean FiO2 maintaining SpO2 >92%.      GI:  OG in place. Mildly elevated LFTs, normalized -->  - Continue PPI until extubated  - nutrition recs, start TF and advance to goal  - Colace/senna BID and miralax BID     : No history of renal disease, baseline creatinine 0.8. Creatinine stable post-op. Urbina in place and making adequate UOP. Mildly hypervolemic to euvolemic on exam.  Worsening hypernatremia. -->  - Continue urbina catheter for strict I/Os.  - Goal UOP 0.5ml/kg/hr  - RFP as clinically indicated  - Replete electrolytes per CTICU protocol  - Holding home furosemide   - start FWF 300ml q4h     ENDO:  No past medical history of DM. A1c: 5.6. Acceptable glycemic control. -->  - Maintain BG <180, SS 3 q4hrs insulin per CTICU protocol     HEME:  Acute blood loss anemia with bloody output of unclear etiology from pericardial drain which is now decreased. Hgb stable. -->    - Monitor drain output volume and characteristics  - CBC BID  - Start systemic heparin for afib and ecmo, monitor pericardial drain output  - SCDs for DVT prophylaxis.  - Last type and screen: 11/2  - Holding home apixaban      ID:  Afebrile, no current indications of infection but emergent lines placed. Completed zosyn x48hrs. Pericardial fluid with no growth on culture, final. -->  - Trend temp q4h  - continue mupirocin x 5 days for MSSA decolinization     Skin:  No active skin issues.  - preventative Mepilex dressings in place on sacrum and heels  - change preventative Mepilex weekly or more frequently as indicated (when moist/soiled)   - every shift skin assessment per nursing and weekly ICU skin rounds  - moisture barrier to be applied with earnestine care  - active skin problems addressed with nursing on daily rounds      Proph:  SCDs  Systemic heparin  PPI     G:  Line  Femoral venous and arterial sheaths 10/30  R radial pollo 10/30  PIVx3     F: Family: daughter and  updated at beside.      A,B,C,D,E,F,G: reviewed     Dispo: CTICU care for now.    CTICU TEAM PHONE [phone]     Seen and discussed with Dr. Leigh.

## 2024-11-02 NOTE — PROGRESS NOTES
Physical Therapy                 Therapy Communication Note    Patient Name: Lolis Auguste  MRN: [id number]  [facility]  [facility]: 05/05-A  Today's Date: 11/2/2024     Discipline: Physical Therapy    Missed Visit Reason: Missed Visit Reason:  (Pt intubated/sedated & on ECMO; will continue to assess as pt is medically appropriate for PT)    Missed Time: Attempt    MAGALIS JOHNSON, PT

## 2024-11-02 NOTE — CARE PLAN
Problem: Safety - Medical Restraint  Goal: Free from restraint(s) (Restraint for Interference with Medical Device)  Outcome: Not Progressing

## 2024-11-02 NOTE — PROGRESS NOTES
Patient requires ECMO support.     ECMO:  Cannulation Date: Oct. 30, 2024  ECMO Indication: Cardiogenic Shock  Current Goals of Care: Full Code    ECMO Cannula Configuration: VA-ECMO, Fem-Fem    ECMO Interrogation:  Drainage cannula site, cannula, pump, oxygenator, return cannula and return cannula site clinically inspected. RPM and sweep reviewed and adjusted appropriate to clinical context.    ECMO circuit run from drainage cannula to pump to oxygenator to return cannula: Yes  Pre/post PaO2 adequate: Yes  LV Unloading/Pulse Pressure: variable pulsatility likely secondary to low EF and degree of MR  Distal Perfusion: DLPC in place, adequate pulses    ECMO Settings:     Most Recent Range Past 24hrs   Flow 3.04 Patient Flow (L/min)  Min: 2.81  Max: 3.27   Speed 3100 Circuit Flow (RPM)  Min: 3100  Max: 3100   Sweep 0.5 Sweep Gas Flow Rate (L/min)  Min: 0.5  Max: 1     Invasive Hemodynamics:    Most Recent Range Past 24hrs   BP (Art) 120/97 Arterial Line BP 1  Min: 78/67  Max: 121/96   MAP(Art) 101 mmHg Arterial Line MAP 1 (mmHg)   Min: 70 mmHg  Max: 111 mmHg   RA/CVP   No data recorded   PA   No data recorded   PA(mean)   No data recorded   CO   No data recorded   CI   No data recorded   Mixed Venous 76.6 % SVO2 (%)  Min: 67.9 %  Max: 85 %   SVR    No data recorded     Hemodynamic Management:  dexmedeTOMIDine, 0-1.5 mcg/kg/hr, Last Rate: 1 mcg/kg/hr (11/02/24 0807)  EPINEPHrine, 0-2 mcg/kg/min, Last Rate: 0.04 mcg/kg/min (11/02/24 0700)  lidocaine, 0.5 mg/min, Last Rate: 0.5 mg/min (11/02/24 0700)  propofol, 0-50 mcg/kg/min, Last Rate: 50 mcg/kg/min (11/02/24 0807)    Ventilator Management:  Vent Mode: Pressure support  FiO2 (%):  [40 %] 40 %  S RR:  [12-16] 12  S VT:  [350 mL] 350 mL  PEEP/CPAP (cm H2O):  [5 cm H20-8 cm H20] 5 cm H20  FL SUP:  [5 cm H20] 5 cm H20  MAP (cm H2O):  [8-12] 8     Bleeding/Clot Issues:   Anticoagulation/Monitoring: Anticoagulation status and intensity discussed  Plan: start systemic  heparin without a bolus and monitor Xa levels  Presence of cannula bleeding: none at present  Presence of oxygenator clot: minimal    Management Issues:  Road Trips planned for today? No  Mobility Planned today? No  Weaning Plan/date: Target ECMO turn-down on MOnday    Communication:  Discussed with Cardiothoracic surgeon and HF Cardiology today. Palliative care consulted entered.    Family Updates/Concerns? Family updated Plan for weaning, next clinical steps discussed with team and family at the bedside this morning.

## 2024-11-02 NOTE — PROGRESS NOTES
Lolis Auguste is a 69 y.o. female on day 3 of admission presenting with Persistent atrial fibrillation (Multi).    Subjective   Patient discussed in morning handover, patient examined and chart reviewed. Meds, labs and radiology reviewed during rapid and full interdisciplinary rounds this morning.    Surgeon: Leona  DOS: Oct. 30, 2024  Procedure: VA-ECMO    EF: 20-25%    FULL Code    HPI: Patient recently (Aug 2024) diagnosed with symptomatic HFrEF and aflutter. Goal directed OMT initiated with plan for outpatient MIKE w/ cardioversion.   9/20 - MIKE demonstrated SOLOMON thrombus and as such planned cardioversion aborted.   10/18 - Attempted DCCV unsuccessful  10/23 - Re-admitted to [facility] for AF w/ RVR and ADHF. She was loaded with Tikosyn  10/28 - Reattempted DCCV that was also unsuccessful.   10/30 - Transferred to [facility] on for an urgent EP lab based RFA/PVI for tachycardia mediate cardiomyopathy. Patient became shocky on induction. A moderate to large sized pericardial effusion was demonstrated on MIKE and the ablation was aborted in favor of an urgent pericardiocentesis with 1 L of sanguinous fluid drained. Progressive shock state prompted Shock Team call with decision to place patient on VA-ECMO. Pericardial drain still in situ.    PMH:  AF  HFrEF    Course in Hospital:  10/30-31: Multiple episodes of vfib and torsades arpita pointe (recent dofetilide load) requiring multiple DC shocks. Bedside POCUS demonstrated an LVEF 5-10% (on ECMO)    Overnight: Ventilator dyssynchrony requiring sedation. Remains on VA-ECMO.     Objective     Last Recorded Vitals  Pulse 103, temperature 36.8 °C (98.2 °F), resp. rate 14, SpO2 99%.    Invasive Hemodynamics:    Most Recent Range Past 24hrs   BP (Art) 120/97 Arterial Line BP 1  Min: 78/67  Max: 121/96   MAP(Art) 101 mmHg Arterial Line MAP 1 (mmHg)   Min: 70 mmHg  Max: 111 mmHg   RA/CVP   No data recorded   PA   No data recorded   PA(mean)   No data recorded   CO   No data recorded    CI   No data recorded   Mixed Venous 76.6 % SVO2 (%)  Min: 67.9 %  Max: 85 %   SVR    No data recorded       Intake/Output last 3 Shifts:  I/O last 3 completed shifts:  In: 1578.8 [I.V.:1238.8; NG/GT:140; IV Piggyback:200]  Out: 3050 [Urine:2925; Emesis/NG output:60; Drains:65]    Physical Exam  Constitutional:       Interventions: She is sedated and intubated.   Eyes:      Pupils: Pupils are equal, round, and reactive to light.   Cardiovascular:      Rate and Rhythm: Tachycardia present. Rhythm irregularly irregular.      Pulses:           Dorsalis pedis pulses are detected w/ Doppler on the right side and detected w/ Doppler on the left side.        Posterior tibial pulses are detected w/ Doppler on the right side and detected w/ Doppler on the left side.      Comments: See separate note for ECMO    Epi: 0.04  Lido: 0.5mg/min     Proph:  SCDs  PPI     Lines:  Femoral venous and arterial sheaths 10/30  R radial pollo 10/30  Pulmonary:      Effort: She is intubated.      Comments: Vent Mode: Pressure support  FiO2 (%):  [40 %] 40 %  S RR:  [12-16] 12  S VT:  [350 mL] 350 mL  PEEP/CPAP (cm H2O):  [5 cm H20-8 cm H20] 5 cm H20  PA SUP:  [5 cm H20] 5 cm H20  MAP (cm H2O):  [8-12] 8    Required sedation for ventilator dysynchrony   Abdominal:      General: Abdomen is flat.      Palpations: Abdomen is soft.      Tenderness: There is no abdominal tenderness (Patient sedated).      Comments: OG in situ   Genitourinary:     Comments: Mabry in situ: clear urine  Neurological:      Comments: Inconsistent exam with neurologic assessment. Moves all 4 limbs, but nil to instruction on SAT (but still on dexmedetomidine).    RASS: - 2- -3    On exam today, L side may be weaker on exam.        Assessment/Plan   Principal Problem:    Persistent atrial fibrillation (Multi)  Active Problems:    Mitral regurgitation    ICU Liberation Bundle:  A-Analgesia: Tylenol and opioids at lowest effective dose  B-SBT: DMC at present  C-RASS  Target: 0 - -1; will attempt SAT again today.  D-CAM: positive at present  E-Mobilization Plan: Stage 1  F - Family Last Update: Updated the family at the bedside this morning. All questions appeared to be answered adequately    Daily Checklist:  HOB > 30 degrees: Unable to achieve due to VA-ECMO  Feeding: NPO at present; will plan to re-start today  Thromboprophylaxis: Heparin - will plan to start systemic today and SCDs  Ulcer Prophylaxis: PPI  Glucose Control: Target 110-180  Line to removed: None to remove at present. The indications and risks/benefits of non-violent/non self-destructive restraints were discussed.   Bowel Care: Bowel regime ordered  De-escalation of Antibiotics: Pip/Tazo for 48 hours; now off. Mupirocin x 5 days for MSSA decolinization     Plan:  LCOS requiring tMCS - reviewed with HF Cardiology and Surgery to determine plan - plan for turn-down trial on Monday to determine need for conversion to an Impella  Hypervolemia - continue with diuresis  Dysrhythmias: Avoid QTc prolonging agents, including amiodarone until 11/7 in light of recent dofetilide load; get ECG today  Pericarditis: has been started on steroids (methylpred 1GM IV q24 hrs) for 3 days; keep drain in situ for now as we start anticoagulation  Hypernatremia: will start free-water flush via NG  Malnutrition: If not going to OR today, will start feeds  Delirium - will need to re-assess off sedation and continue with non-pharmacologic management; may require CT brain    DISPO: CTICU    I spent 64 minutes in the professional and overall care of this patient.    This critically ill patient continues to be at-risk for clinically significant deterioration / failure due to the above mentioned dysfunctional, unstable organ systems.  I have personally identified and managed all complex critical care issues to prevent aforementioned clinical deterioration.  Critical care time is spent at bedside and/or the immediate area and has included, but  is not limited to, the review of diagnostic tests, labs, radiographs, serial assessments of hemodynamics, respiratory status, ventilatory management, and family updates.  Time spent in procedures and teaching are reported separately.    Caio Leigh MD

## 2024-11-03 VITALS — HEART RATE: 129 BPM | RESPIRATION RATE: 13 BRPM | OXYGEN SATURATION: 99 % | TEMPERATURE: 96.6 F

## 2024-11-03 LAB
ALBUMIN SERPL BCP-MCNC: 3.8 G/DL (ref 3.4–5)
ANION GAP BLDA CALCULATED.4IONS-SCNC: 9 MMO/L (ref 10–25)
ANION GAP BLDV CALCULATED.4IONS-SCNC: 8 MMO/L
ANION GAP SERPL CALC-SCNC: 16 MMOL/L (ref 10–20)
APTT PPP: 54 SECONDS (ref 27–38)
BASE EXCESS BLDA CALC-SCNC: 1.2 MMOL/L
BASE EXCESS BLDA CALC-SCNC: 3 MMOL/L (ref -2–3)
BASE EXCESS BLDV CALC-SCNC: 1.2 MMOL/L
BLOOD EXPIRATION DATE: NORMAL
BODY TEMPERATURE: 37 DEGREES CELSIUS
BUN SERPL-MCNC: 29 MG/DL (ref 6–23)
CA-I BLD-SCNC: 1.26 MMOL/L (ref 1.1–1.33)
CA-I BLDA-SCNC: 1.3 MMOL/L (ref 1.1–1.33)
CA-I BLDV-SCNC: 1.27 MMOL/L (ref 1.1–1.33)
CALCIUM SERPL-MCNC: 8.9 MG/DL (ref 8.6–10.6)
CHLORIDE BLDA-SCNC: 122 MMOL/L (ref 98–107)
CHLORIDE BLDV-SCNC: 123 MMOL/L (ref 98–107)
CHLORIDE SERPL-SCNC: 118 MMOL/L (ref 98–107)
CO2 SERPL-SCNC: 27 MMOL/L (ref 21–32)
CREAT SERPL-MCNC: 0.55 MG/DL (ref 0.5–1.05)
DISPENSE STATUS: NORMAL
EGFRCR SERPLBLD CKD-EPI 2021: >90 ML/MIN/1.73M*2
ERYTHROCYTE [DISTWIDTH] IN BLOOD BY AUTOMATED COUNT: 15.8 % (ref 11.5–14.5)
GLUCOSE BLD MANUAL STRIP-MCNC: 162 MG/DL (ref 74–99)
GLUCOSE BLD MANUAL STRIP-MCNC: 163 MG/DL (ref 74–99)
GLUCOSE BLD MANUAL STRIP-MCNC: 165 MG/DL (ref 74–99)
GLUCOSE BLD MANUAL STRIP-MCNC: 166 MG/DL (ref 74–99)
GLUCOSE BLD MANUAL STRIP-MCNC: 167 MG/DL (ref 74–99)
GLUCOSE BLD MANUAL STRIP-MCNC: 88 MG/DL (ref 74–99)
GLUCOSE BLDA-MCNC: 166 MG/DL (ref 74–99)
GLUCOSE BLDV-MCNC: 156 MG/DL (ref 74–99)
GLUCOSE SERPL-MCNC: 156 MG/DL (ref 74–99)
HCO3 BLDA-SCNC: 27 MMOL/L (ref 22–26)
HCO3 BLDA-SCNC: 27.6 MMOL/L
HCO3 BLDV-SCNC: 27.5 MMOL/L
HCT VFR BLD AUTO: 30.6 % (ref 36–46)
HCT VFR BLD EST: 29 % (ref 36–46)
HCT VFR BLD EST: 31 % (ref 36–46)
HGB BLD-MCNC: 9.7 G/DL (ref 12–16)
HGB BLDA-MCNC: 10.2 G/DL (ref 12–16)
HGB BLDV-MCNC: 9.6 G/DL (ref 12–16)
INHALED O2 CONCENTRATION: 100 %
INHALED O2 CONCENTRATION: 100 %
INHALED O2 CONCENTRATION: 40 %
INR PPP: 1.2 (ref 0.9–1.1)
LACTATE BLDA-SCNC: 0.8 MMOL/L (ref 0.4–2)
LACTATE BLDV-SCNC: 0.6 MMOL/L (ref 0.4–2)
LDH SERPL L TO P-CCNC: 185 U/L (ref 84–246)
MAGNESIUM SERPL-MCNC: 2.45 MG/DL (ref 1.6–2.4)
MCH RBC QN AUTO: 29.2 PG (ref 26–34)
MCHC RBC AUTO-ENTMCNC: 31.7 G/DL (ref 32–36)
MCV RBC AUTO: 92 FL (ref 80–100)
NRBC BLD-RTO: 0 /100 WBCS (ref 0–0)
OXYHGB MFR BLDA: 97.6 %
OXYHGB MFR BLDA: 97.8 % (ref 94–98)
OXYHGB MFR BLDV: 80.3 %
PCO2 BLDA: 38 MM HG (ref 38–42)
PCO2 BLDA: 50 MM HG
PCO2 BLDV: 51 MM HG
PH BLDA: 7.35 PH
PH BLDA: 7.46 PH (ref 7.38–7.42)
PH BLDV: 7.34 PH
PHOSPHATE SERPL-MCNC: 2.7 MG/DL (ref 2.5–4.9)
PLATELET # BLD AUTO: 143 X10*3/UL (ref 150–450)
PO2 BLDA: 280 MM HG (ref 85–95)
PO2 BLDA: 417 MM HG
PO2 BLDV: 47 MM HG
POTASSIUM BLDA-SCNC: 4.6 MMOL/L (ref 3.5–5.3)
POTASSIUM BLDV-SCNC: 4.1 MMOL/L (ref 3.5–5.3)
POTASSIUM SERPL-SCNC: 4.4 MMOL/L (ref 3.5–5.3)
PRODUCT BLOOD TYPE: 5100
PRODUCT CODE: NORMAL
PROTHROMBIN TIME: 13.5 SECONDS (ref 9.8–12.8)
RBC # BLD AUTO: 3.32 X10*6/UL (ref 4–5.2)
SAO2 % BLDA: 100 %
SAO2 % BLDA: 100 % (ref 94–100)
SAO2 % BLDV: 82 %
SODIUM BLDA-SCNC: 153 MMOL/L (ref 136–145)
SODIUM BLDV-SCNC: 154 MMOL/L (ref 136–145)
SODIUM SERPL-SCNC: 149 MMOL/L (ref 136–145)
SODIUM SERPL-SCNC: 151 MMOL/L (ref 136–145)
SODIUM SERPL-SCNC: 154 MMOL/L (ref 136–145)
SODIUM SERPL-SCNC: 155 MMOL/L (ref 136–145)
SODIUM SERPL-SCNC: 157 MMOL/L (ref 136–145)
SODIUM SERPL-SCNC: 157 MMOL/L (ref 136–145)
UNIT ABO: NORMAL
UNIT NUMBER: NORMAL
UNIT RH: NORMAL
UNIT VOLUME: 280
UNIT VOLUME: 287
UNIT VOLUME: 350
UNIT VOLUME: 350
WBC # BLD AUTO: 7.9 X10*3/UL (ref 4.4–11.3)
XM INTEP: NORMAL

## 2024-11-03 PROCEDURE — 94003 VENT MGMT INPAT SUBQ DAY: CPT

## 2024-11-03 PROCEDURE — 84295 ASSAY OF SERUM SODIUM: CPT

## 2024-11-03 PROCEDURE — 83735 ASSAY OF MAGNESIUM: CPT | Performed by: NURSE PRACTITIONER

## 2024-11-03 PROCEDURE — 84132 ASSAY OF SERUM POTASSIUM: CPT | Performed by: STUDENT IN AN ORGANIZED HEALTH CARE EDUCATION/TRAINING PROGRAM

## 2024-11-03 PROCEDURE — 2500000001 HC RX 250 WO HCPCS SELF ADMINISTERED DRUGS (ALT 637 FOR MEDICARE OP)

## 2024-11-03 PROCEDURE — 84132 ASSAY OF SERUM POTASSIUM: CPT | Performed by: NURSE PRACTITIONER

## 2024-11-03 PROCEDURE — 71045 X-RAY EXAM CHEST 1 VIEW: CPT | Performed by: RADIOLOGY

## 2024-11-03 PROCEDURE — 33949 ECMO/ECLS DAILY MGMT ARTERY: CPT

## 2024-11-03 PROCEDURE — 2500000002 HC RX 250 W HCPCS SELF ADMINISTERED DRUGS (ALT 637 FOR MEDICARE OP, ALT 636 FOR OP/ED)

## 2024-11-03 PROCEDURE — 82330 ASSAY OF CALCIUM: CPT | Performed by: NURSE PRACTITIONER

## 2024-11-03 PROCEDURE — 85027 COMPLETE CBC AUTOMATED: CPT | Performed by: NURSE PRACTITIONER

## 2024-11-03 PROCEDURE — 2500000004 HC RX 250 GENERAL PHARMACY W/ HCPCS (ALT 636 FOR OP/ED): Performed by: NURSE PRACTITIONER

## 2024-11-03 PROCEDURE — 2500000004 HC RX 250 GENERAL PHARMACY W/ HCPCS (ALT 636 FOR OP/ED)

## 2024-11-03 PROCEDURE — 84132 ASSAY OF SERUM POTASSIUM: CPT

## 2024-11-03 PROCEDURE — 2020000001 HC ICU ROOM DAILY

## 2024-11-03 PROCEDURE — 37799 UNLISTED PX VASCULAR SURGERY: CPT | Performed by: NURSE PRACTITIONER

## 2024-11-03 PROCEDURE — 70450 CT HEAD/BRAIN W/O DYE: CPT | Performed by: RADIOLOGY

## 2024-11-03 PROCEDURE — 83615 LACTATE (LD) (LDH) ENZYME: CPT | Performed by: NURSE PRACTITIONER

## 2024-11-03 PROCEDURE — 37799 UNLISTED PX VASCULAR SURGERY: CPT | Performed by: STUDENT IN AN ORGANIZED HEALTH CARE EDUCATION/TRAINING PROGRAM

## 2024-11-03 PROCEDURE — 82947 ASSAY GLUCOSE BLOOD QUANT: CPT

## 2024-11-03 PROCEDURE — 82435 ASSAY OF BLOOD CHLORIDE: CPT | Performed by: NURSE PRACTITIONER

## 2024-11-03 PROCEDURE — 2500000004 HC RX 250 GENERAL PHARMACY W/ HCPCS (ALT 636 FOR OP/ED): Performed by: STUDENT IN AN ORGANIZED HEALTH CARE EDUCATION/TRAINING PROGRAM

## 2024-11-03 PROCEDURE — 99291 CRITICAL CARE FIRST HOUR: CPT | Performed by: INTERNAL MEDICINE

## 2024-11-03 PROCEDURE — 85610 PROTHROMBIN TIME: CPT | Performed by: NURSE PRACTITIONER

## 2024-11-03 PROCEDURE — 94799 UNLISTED PULMONARY SVC/PX: CPT

## 2024-11-03 PROCEDURE — 2500000001 HC RX 250 WO HCPCS SELF ADMINISTERED DRUGS (ALT 637 FOR MEDICARE OP): Performed by: NURSE PRACTITIONER

## 2024-11-03 PROCEDURE — 99291 CRITICAL CARE FIRST HOUR: CPT

## 2024-11-03 PROCEDURE — 82947 ASSAY GLUCOSE BLOOD QUANT: CPT | Performed by: STUDENT IN AN ORGANIZED HEALTH CARE EDUCATION/TRAINING PROGRAM

## 2024-11-03 PROCEDURE — 82805 BLOOD GASES W/O2 SATURATION: CPT | Performed by: STUDENT IN AN ORGANIZED HEALTH CARE EDUCATION/TRAINING PROGRAM

## 2024-11-03 RX ORDER — ADHESIVE BANDAGE
30 BANDAGE TOPICAL ONCE
Status: COMPLETED | OUTPATIENT
Start: 2024-11-03 | End: 2024-11-03

## 2024-11-03 RX ORDER — DEXTROSE MONOHYDRATE 50 MG/ML
100 INJECTION, SOLUTION INTRAVENOUS CONTINUOUS
Status: DISCONTINUED | OUTPATIENT
Start: 2024-11-03 | End: 2024-11-03

## 2024-11-03 RX ADMIN — DEXTROSE MONOHYDRATE 100 ML/HR: 50 INJECTION, SOLUTION INTRAVENOUS at 12:33

## 2024-11-03 RX ADMIN — INSULIN LISPRO 5 UNITS: 100 INJECTION, SOLUTION INTRAVENOUS; SUBCUTANEOUS at 00:33

## 2024-11-03 RX ADMIN — PROPOFOL 15 MCG/KG/MIN: 10 INJECTION, EMULSION INTRAVENOUS at 10:16

## 2024-11-03 RX ADMIN — INSULIN LISPRO 5 UNITS: 100 INJECTION, SOLUTION INTRAVENOUS; SUBCUTANEOUS at 12:18

## 2024-11-03 RX ADMIN — INSULIN LISPRO 5 UNITS: 100 INJECTION, SOLUTION INTRAVENOUS; SUBCUTANEOUS at 04:36

## 2024-11-03 RX ADMIN — ESOMEPRAZOLE MAGNESIUM 20 MG: 40 FOR SUSPENSION ORAL at 08:40

## 2024-11-03 RX ADMIN — HEPARIN SODIUM 800 UNITS/HR: 10000 INJECTION, SOLUTION INTRAVENOUS at 15:39

## 2024-11-03 RX ADMIN — PROPOFOL 30 MCG/KG/MIN: 10 INJECTION, EMULSION INTRAVENOUS at 00:43

## 2024-11-03 RX ADMIN — SENNOSIDES AND DOCUSATE SODIUM 2 TABLET: 50; 8.6 TABLET ORAL at 20:46

## 2024-11-03 RX ADMIN — DEXMEDETOMIDINE HYDROCHLORIDE 1 MCG/KG/HR: 400 INJECTION INTRAVENOUS at 00:43

## 2024-11-03 RX ADMIN — DEXTROSE MONOHYDRATE 100 ML/HR: 50 INJECTION, SOLUTION INTRAVENOUS at 06:39

## 2024-11-03 RX ADMIN — MUPIROCIN 1 APPLICATION: 20 OINTMENT TOPICAL at 08:41

## 2024-11-03 RX ADMIN — OXYCODONE HYDROCHLORIDE 5 MG: 5 TABLET ORAL at 12:18

## 2024-11-03 RX ADMIN — DEXMEDETOMIDINE HYDROCHLORIDE 0.5 MCG/KG/HR: 400 INJECTION INTRAVENOUS at 15:39

## 2024-11-03 RX ADMIN — METHYLPREDNISOLONE SODIUM SUCCINATE 1000 MG: 1 INJECTION INTRAMUSCULAR; INTRAVENOUS at 12:20

## 2024-11-03 RX ADMIN — SENNOSIDES AND DOCUSATE SODIUM 2 TABLET: 50; 8.6 TABLET ORAL at 08:40

## 2024-11-03 RX ADMIN — DEXTROSE MONOHYDRATE 100 ML/HR: 50 INJECTION, SOLUTION INTRAVENOUS at 01:32

## 2024-11-03 RX ADMIN — INSULIN LISPRO 5 UNITS: 100 INJECTION, SOLUTION INTRAVENOUS; SUBCUTANEOUS at 20:44

## 2024-11-03 RX ADMIN — INSULIN LISPRO 5 UNITS: 100 INJECTION, SOLUTION INTRAVENOUS; SUBCUTANEOUS at 16:20

## 2024-11-03 RX ADMIN — MAGNESIUM HYDROXIDE 30 ML: 400 SUSPENSION ORAL at 12:18

## 2024-11-03 RX ADMIN — POLYETHYLENE GLYCOL 3350 17 G: 17 POWDER, FOR SOLUTION ORAL at 08:40

## 2024-11-03 RX ADMIN — EPINEPHRINE IN SODIUM CHLORIDE 0.02 MCG/KG/MIN: 16 INJECTION INTRAVENOUS at 12:35

## 2024-11-03 RX ADMIN — PROPOFOL 30 MCG/KG/MIN: 10 INJECTION, EMULSION INTRAVENOUS at 17:45

## 2024-11-03 RX ADMIN — MUPIROCIN: 20 OINTMENT TOPICAL at 20:45

## 2024-11-03 RX ADMIN — DEXMEDETOMIDINE HYDROCHLORIDE 1 MCG/KG/HR: 400 INJECTION INTRAVENOUS at 06:37

## 2024-11-03 ASSESSMENT — PAIN SCALES - GENERAL
PAINLEVEL_OUTOF10: 4
PAINLEVEL_OUTOF10: 0 - NO PAIN

## 2024-11-03 NOTE — PROGRESS NOTES
Advanced Heart Failure Progress Note   Consulting Team:CTICU  Primary Cardiologist:Dr. Garcia  Reason for Consult: HFrEF, severe MR, afib w/ profound shock requiring VA-ECMO after induction for afib ablation    Subjective   NAEO. Gradually weaning epinephrine with stable MAPs.  Remains intubated on VA-ECMO support. Received 1000 mg of solumedrol empirically x 3 days due to c/f myocarditis.    Unable to follow commands off sedation. Neurology consulted today.     Objective   Physical Exam  Vitals:    11/03/24 1508   Pulse: 106   Resp: 16   Temp:    SpO2: 100%       GEN: Ill appearing, well-developed, sedated  CV: Irregular rhythm, tachycardic, Distant heart sounds  LUNGS: Mechanical breath sounds bilaterally.  ABD: Soft, NT/ND, NBS, no masses or organomegaly.  SKIN: Warm, well perfused. No skin rashes or abnormal lesions. 1+ edema in BLE  MSK: Normal gait. No deformities.  EXT: No clubbing or cyanosis  NEURO: Intubated, not following commands this morning.    I have personally reviewed the following images and laboratory findings:    ECG:Atrial fib/flutter    Results for orders placed during the hospital encounter of 10/30/24    Transthoracic Echo (TTE) Limited    [facility], [address] [place] [address]  Tel [phone] and Fax [phone]    TRANSTHORACIC ECHOCARDIOGRAM REPORT      Patient Name:      ADOLPH Nelson Physician:    [id number] Garett Roque MD  Study Date:        10/31/2024           Ordering Provider:    [id number] THOMAS CLANCY  MRN/PID:           [id number]             Fellow:  Accession#:        [id number]         Nurse:  Date of Birth/Age: 1955 / 69      Sonographer:          Suresh morocho                                      RDCS, RVT  Gender:            F                    Additional Staff:  Height:            149.86 cm            Admit Date:  Weight:            63.05 kg             Admission Status:     Inpatient - STAT  BSA / BMI:          1.58 m2 / 28.07      Encounter#:           [id number]  kg/m2  Blood Pressure:    160/90 mmHg          Department Location:  [facility]    Study Type:    TRANSTHORACIC ECHO (TTE) LIMITED  Diagnosis/ICD: Cardiogenic shock-R57.0  Indication:    s/p ECMO decannulation, tamponade, hemorrhage  CPT Code:      Echo Limited-06503; Doppler Limited-22802; Color Doppler-82359    Patient History:  Pertinent History: CHF, cardiomyopathy, a-fib, MR.    Study Detail: The following Echo studies were performed: 2D, Doppler and color  flow. Definity used as a contrast agent for endocardial border  definition. Total contrast used for this procedure was 4 mL via IV  push.      PHYSICIAN INTERPRETATION:  Left Ventricle: Left ventricular ejection fraction is severely decreased, calculated by Wong's biplane at 18%. There is global hypokinesis of the left ventricle with minor regional variations. The left ventricular cavity size is mildly dilated. Left ventricular diastolic filling was indeterminate.  Left Atrium: The left atrium is enlarged.  Right Ventricle: The right ventricle is mildly enlarged. There is moderately reduced right ventricular systolic function.  Right Atrium: The right atrium is mildly dilated. There is a device visualized in the right atrium.  Aortic Valve: The aortic valve is trileaflet. There is mild aortic valve cusp calcification. There is mild aortic valve thickening. There is reduced systolic aortic valve leaflet excursion. There is no evidence of aortic valve regurgitation.  Mitral Valve: The mitral valve is mildly thickened. There is severe mitral valve regurgitation. The mitral regurgitant orifice area is 21 mm2. The mitral regurgitant volume is 19.44 ml. Severe functional MR, extending to the pulmonary veins, leaflet tethering.  Tricuspid Valve: The tricuspid valve is structurally normal. There is mild tricuspid regurgitation. The Doppler estimated RVSP is within normal limits at 30.7 mmHg. Normal  estimmated RVSP, likely underestimate.  Pulmonic Valve: The pulmonic valve is structurally normal. There is mild pulmonic valve regurgitation.  Pericardium: Trivial to small pericardial effusion.  Aorta: The aortic root is normal.      CONCLUSIONS:  1. Left ventricular ejection fraction is severely decreased, calculated by Wong's biplane at 18%.  2. There is global hypokinesis of the left ventricle with minor regional variations.  3. Left ventricular diastolic filling was indeterminate.  4. Left ventricular cavity size is mildly dilated.  5. There is moderately reduced right ventricular systolic function.  6. Mildly enlarged right ventricle.  7. The left atrium is enlarged.  8. Severe functional MR, extending to the pulmonary veins, leaflet tethering.  9. Severe mitral valve regurgitation.  10. Right ventricular systolic pressure is within normal limits.    QUANTITATIVE DATA SUMMARY:    2D MEASUREMENTS:          Normal Ranges:  LVEDV Index:     99 ml/m2      LV SYSTOLIC FUNCTION BY 2D PLANIMETRY (MOD):  Normal Ranges:  EF-A4C View:    18 % (>=55%)  EF-A2C View:    17 %  EF-Biplane:     18 %  LV EF Reported: 18 %      LV DIASTOLIC FUNCTION:           Normal Ranges:  MV Peak E:             1.01 m/s  (0.7-1.2 m/s)  MV e'                  0.078 m/s (>8.0)  MV lateral e'          0.10 m/s  MV medial e'           0.06 m/s  E/e' Ratio:            12.91     (<8.0)      MITRAL VALVE:          Normal Ranges:  MV Vmax:      1.22 m/s (<=1.3m/s)  MV peak P.0 mmHg (<5mmHg)  MV mean P.0 mmHg (<2mmHg)  MV Annulus:   3.30 cm  (1.8-3.1cm)      MITRAL INSUFFICIENCY:             Normal Ranges:  PISA Radius:          0.6 cm  MR VTI:               90.90 cm  MR Vmax:              384.00 cm/s  MR Alias Jose Carlos:         36.3 cm/s  MR Volume:            19.44 ml  MR Flow Rt:           82.11 ml/s  MR EROA:              21 mm2  MR RF                 10.00 %      AORTIC VALVE:          Normal Ranges:  LVOT Diameter: 2.10 cm  (1.8-2.4cm)      RIGHT VENTRICLE:  TAPSE: 9.7 mm  RV s'  0.08 m/s      TRICUSPID VALVE/RVSP:          Normal Ranges:  Peak TR Velocity:     2.63 m/s  RV Syst Pressure:     31 mmHg  (< 30mmHg)      [id number] Garett Roque MD  Electronically signed on 10/31/2024 at 1:38:08 PM        ** Final **       Imaging  CT head wo IV contrast   Final Result   No definitive evidence of acute cortical infarct or acute   intracranial hemorrhage. MRI with diffusion-imaging would be a more   sensitive means of assessing for acute ischemic injury.        MACRO:   None        Signed by: Moraima Gil 11/3/2024 12:26 PM   Dictation workstation:   [id number]      XR chest 1 view   Final Result   1.  Interval improvement in left basilar opacity with persistent   small left pleural effusion on a background of pulmonary edema.   2. Medical devices as above.                  MACRO:   None        Signed by: Arnie Moss 11/3/2024 1:36 PM   Dictation workstation:   [id number]      XR chest 1 view   Final Result   1.  Persistent, but improved perihilar and interstitial edema.   2. Persistent left basilar opacity with blunting of the costophrenic   angle. Underlying pleural effusion and associated atelectasis is not   excluded.   3. Medical devices as above.        I personally reviewed the images/study and I agree with the findings   as stated by Rosalind Garay MD. This study was interpreted at   [place], [place].        MACRO:   None        Signed by: Anrie Moss 11/2/2024 11:54 AM   Dictation workstation:   [id number]      Transthoracic Echo (TTE) Limited   Final Result      Electrophysiology procedure   Final Result      XR chest 1 view   Final Result   1. Overall slight improvement in diffuse pulmonary edema.   2. Possibility of left basilar pleural effusion and associated   atelectasis, not excluded.   3. Medical devices as above. No pneumothorax.        Signed by: Harris Huber 11/1/2024  11:37 AM   Dictation workstation:   [id number]      Transthoracic Echo (TTE) Limited   Final Result      XR abdomen 1 view   Final Result   1.  Interval placement of enteric tube with tip overlying the   expected location of the gastric body.   2. Paucity of bowel gas.   3. Unchanged left basilar opacity favored to represent   atelectasis/effusion.   4.  Medical devices as described above.        I personally reviewed the images/study and I agree with Brenda Marx DO's (radiology resident) findings as stated. This study   was interpreted at [place], [place].        MACRO:   None        Signed by: Sawyer Miguel 10/31/2024 1:57 PM   Dictation workstation:   [id number]      XR chest 1 view   Final Result   1.  No significant interval change in appearance of the chest with   persistent retrocardiac opacity favored to represent   atelectasis/pleural effusion. Superimposed infectious/inflammatory   process can not be fully excluded.   2. Similar degree of pulmonary interstitial edema.   3. Postsurgical changes and medical devices as described above.        I personally reviewed the images/study and I agree with Brenda Marx DO's (radiology resident) findings as stated. This study   was interpreted at [place], [place].        MACRO:   None        Signed by: Sawyer Miguel 10/31/2024 1:33 PM   Dictation workstation:   [id number]      XR chest 1 view   Final Result   1. Interval worsening lung aeration with findings suggestive of   central vascular congestion and pulmonary interstitial edema.   Moderate left pleural effusion with associated   atelectasis/consolidation.   2. Medical devices as above.        I personally reviewed the images/study and I agree with the findings   as stated by Rosalind Garay MD. This study was interpreted at   [facility],  [place].        MACRO:   None        Signed by: Sawyer Miguel 10/31/2024 9:57 AM   Dictation workstation:   [id number]      Transthoracic Echo (TTE) Limited    (Results Pending)         Lab Review     Troponin I, High Sensitivity   Date/Time Value Ref Range Status   10/23/2024 02:52 PM 14 (H) 0 - 13 ng/L Final   10/23/2024 12:54 PM 12 0 - 13 ng/L Final     BNP   Date/Time Value Ref Range Status   10/23/2024 12:54  (H) 0 - 99 pg/mL Final        Assessment and Plan     Lolis Auguste is a 69 y.o. female with a PMHx of recently diagnosed Afib c/b SOLOMON thrombus and HFrEF (EF 25%) who was transferred from [facility] on 10/30/2024 for urgent RFA/PVI on 10/30 in the setting of persistent afib failing Tikosyn load and DCCV x 2 (last 10/28). Course was c/b multipressor shock requiring epi, levo, and vasopressin during induction for planned afib ablation. She was found to have a moderate pericardial effusion and underwent urgent pericardiocentesis with 1L of bloody output. Her hemodynamics did not improve and she was cannulated for VA-ECMO on 10/30 after shock call. Heart failure was consulted regarding evaluation and management of HFrEF and shock.     The etiology of her HFrEF is not entirely clear at this point. Although it was assumed to be tachycardia induced cardiomyopathy, she has not had an ischemic evaluation and her 2 years of symptoms suggests a more chronic process. Her TTE also demonstrates biventricular failure and severe, wide-open MR which raises the possibility of a valvular etiology. In light of her MR, we recommend offloading her LV with diuresis and ECMO, and transitioning to axillary Impella to further offload her LV if she does not tolerate a wean down trial. Her hospital course has been complicated by TdP and VF on 10/30 which required shock x5 and amiodarone boluses x 2 and lidocaine gtt.     #Acute Decompensated HF (EF 15%) w/ biventricular failure  #Cardiogenic Shock  #Severe MR  #TdP and Vfib w/  5x shock, now on lidocaine (10/30)  #Afib w/ RVR s/p multiple failed DCCV  - Diuresis: Per CTICU team  - MCS: Cannulated for VA-ECMO on 10/30. Plan for ECMO turn down trial on 11/4 and consider transition to Impella if unable to wean ECMO.  - Inotropes: None  - Pressors: epinephrine 0.02. Continue to wean as able.              - Levophed gtt weaned off 10/31              - Weaned off vasopressin 10/31  - GDMT: Hold in the setting of shock  - Will eventually need ischemic evaluation for new HFrEF once she is more stable  - We will assess her for advanced therapies on an ongoing basis.    Thank you for the opportunity to involve us in the care of this fine individual. This plan was discussed with Dr. Brandon Biswas, HF attending. For any questions or concerns, feel free to reach out via [email or web address] chat or page at [phone].    Brent Ledesma MD   Heart Failure Fellow  PGY-4

## 2024-11-03 NOTE — CARE PLAN
Problem: Safety - Medical Restraint  Goal: Remains free of injury from restraints (Restraint for Interference with Medical Device)  Outcome: Progressing  Flowsheets (Taken 11/3/2024 1517)  Remains free of injury from restraints (restraint for interference with medical device):   Determine that other, less restrictive measures have been tried or would not be effective before applying the restraint   Evaluate the patient's condition at the time of restraint application   Inform patient/family regarding the reason for restraint   Every 2 hours: Monitor safety, psychosocial status, comfort, nutrition and hydration  Goal: Free from restraint(s) (Restraint for Interference with Medical Device)  Outcome: Progressing  Flowsheets (Taken 11/3/2024 1517)  Free from restraint(s) (restraint for interference with medical device):   ONCE/SHIFT or MINIMUM Every 12 hours: Assess and document the continuing need for restraints   Every 24 hours: Continued use of restraint requires Licensed Independent Practitioner to perform face to face examination and written order   Identify and implement measures to help patient regain control     Problem: Skin  Goal: Decreased wound size/increased tissue granulation at next dressing change  Outcome: Progressing  Flowsheets (Taken 11/3/2024 1518)  Decreased wound size/increased tissue granulation at next dressing change: Protective dressings over bony prominences  Goal: Participates in plan/prevention/treatment measures  Outcome: Progressing  Flowsheets (Taken 11/3/2024 1518)  Participates in plan/prevention/treatment measures: Elevate heels  Goal: Prevent/manage excess moisture  Outcome: Progressing  Flowsheets (Taken 11/3/2024 1518)  Prevent/manage excess moisture:   Monitor for/manage infection if present   Follow provider orders for dressing changes   Moisturize dry skin  Goal: Prevent/minimize sheer/friction injuries  Outcome: Progressing  Flowsheets (Taken 11/3/2024 1518)  Prevent/minimize  sheer/friction injuries:   Complete micro-shifts as needed if patient unable. Adjust patient position to relieve pressure points, not a full turn   Use pull sheet   HOB 30 degrees or less   Turn/reposition every 2 hours/use positioning/transfer devices  Goal: Promote/optimize nutrition  Outcome: Progressing  Flowsheets (Taken 11/3/2024 1518)  Promote/optimize nutrition: Monitor/record intake including meals  Goal: Promote skin healing  Outcome: Progressing  Flowsheets (Taken 11/3/2024 1518)  Promote skin healing:   Protective dressings over bony prominences   Assess skin/pad under line(s)/device(s)   Turn/reposition every 2 hours/use positioning/transfer devices   Rotate device position/do not position patient on device

## 2024-11-03 NOTE — PROGRESS NOTES
CTICU Progress Note  Lolis Auguste/[id number]    Admit Date: 10/30/2024  Hospital Length of Stay: 4   ICU Length of Stay: 3d 23h   CT SURGEON:     SUBJECTIVE:   Started on D5W 100ml/hr for hypernatremia.     MEDICATIONS  Infusions:  dexmedeTOMIDine, Last Rate: 0.5 mcg/kg/hr (11/03/24 1600)  dextrose 5%, Last Rate: 100 mL/hr (11/03/24 1600)  EPINEPHrine, Last Rate: 0.02 mcg/kg/min (11/03/24 1600)  heparin, Last Rate: 800 Units/hr (11/03/24 1600)  propofol, Last Rate: 30 mcg/kg/min (11/03/24 1600)      Scheduled:  esomeprazole, 20 mg, Daily before breakfast  insulin lispro, 0-15 Units, q4h  mupirocin, , BID  polyethylene glycol, 17 g, Daily  sennosides-docusate sodium, 2 tablet, BID      PRN:  acetaminophen, 650 mg, q4h PRN  calcium gluconate, 1 g, q6h PRN  calcium gluconate, 2 g, q6h PRN  dextrose, 25 g, q15 min PRN   Or  glucagon, 1 mg, q15 min PRN  hydrALAZINE, 10 mg, q4h PRN  HYDROmorphone, 0.2 mg, q2h PRN  magnesium sulfate, 2 g, q6h PRN  magnesium sulfate, 4 g, q6h PRN  naloxone, 0.2 mg, q5 min PRN  oxyCODONE, 5 mg, q4h PRN  oxygen, , Continuous PRN - O2/gases  potassium chloride, 20 mEq, q6h PRN  potassium chloride, 40 mEq, q6h PRN        PHYSICAL EXAM:   Visit Vitals  Pulse 106   Temp 36.5 °C (97.7 °F) (Core)   Resp 13   SpO2 100%   OB Status Postmenopausal   Smoking Status Never     Wt Readings from Last 5 Encounters:   10/28/24 63.5 kg (139 lb 15.9 oz)   10/23/24 64.9 kg (143 lb)   09/30/24 66.7 kg (147 lb)   09/20/24 63.5 kg (140 lb)   09/09/24 66 kg (145 lb 6.4 oz)     INTAKE/OUTPUT:  I/O last 3 completed shifts:  In: 3967.9 [I.V.:1747.9; NG/GT:2220]  Out: 3705 [Urine:3665; Drains:40]         Vent settings:  Vent Mode: Volume control/assist control  FiO2 (%):  [40 %] 40 %  S RR:  [12] 12  S VT:  [350 mL] 350 mL  PEEP/CPAP (cm H2O):  [5 cm H20-8 cm H20] 5 cm H20  MAP (cm H2O):  [4.8-8.6] 5    Physical Exam:   - CONSTITUTION: older appearing female in ICU bed on VA ECMO  - NEUROLOGIC: CN grossly intact.  Weakly followed in RUE. Followed in LE R >L.   - CARDIOVASCULAR: artrial tachyarrythmia vs NSR in 90s. VA ECMO fem/fem with distal perfusion cannula.   - RESPIRATORY: intubated. Clear bilaterally.   - GI: soft, hypoactive BS  - : urbina with yellow urine  - EXTREMITIES: warm, well perfused. Pulses present.   - SKIN: intact  - PSYCHIATRIC: Teresa    Daily Risk Screen  Intubated: critically ill  Central line: vasoactives  Urbina: strict I/Os    Images: personally reviewed    Invasive Hemodynamics:    Most Recent Range Past 24hrs   BP (Art) 105/84 Arterial Line BP 1  Min: 100/72  Max: 122/93   MAP(Art) 89 mmHg Arterial Line MAP 1 (mmHg)   Min: 79 mmHg  Max: 106 mmHg   RA/CVP   No data recorded   PA   No data recorded   PA(mean)   No data recorded   CO   No data recorded   CI   No data recorded   Mixed Venous 79 % SVO2 (%)  Min: 67.7 %  Max: 86.4 %   SVR    No data recorded         Assessment/Plan   Lolis Auguste is a 69 y.o. female with PMH of newly diagnosed persistent AF on Eliquis s/p failed DCCV 10/18/2024, SOLOMON thrombus and aborted DCCV in 9/2024, and acute systolic HF [potentially tachy-mediated], who was recently [place] [facility] [place] [facility] [place] for AF RVR. Patient was seen by cardiologist today for routine follow-up, noted to be in persistent AF RVR. She was recently cardioverted 10/18 and discharged on Amiodarone but was unable to tolerated d/t extreme fatigue. Dofetilide 250 mcg BID was loaded but AF persisted, so that the decision was made to perform urgent RFA/PVI at [facility].     Patient became increasingly hypotensive requiring norepinephrine ggt, epinephrine ggt. Cardiac ablation course complicated by vfib arrest requiring defibrillation. Follow by cardioversion x3 for SVT. Pericardial effusion require pericardial fluid drainage. 1000ml hemorhagic fluid drained. Patient was admitted to the CTICU sp ecmo cannulations for hypotension refractory to vasopressors. Patient developed vfib and torsades  over night. She was cardioverted five times. He received amiodarone bolus x2, magnesium and was started on a lidocaine drip.      Plan:  NEURO: Patient is intubated and sedated on propofol infusion. Poor vent synchrony off sedation but very slow to arouse. Moved all exterimites equally while of sedation. While on sedation patient prefers right side with lateralized gaze. CT scan ordered not showing any acute pathology. EEG order per neurology recommendation to rule out seizures.  -->  - Serial neuro and pain assessments   - CT scan done not showing any acute pathology   - EEG ordered to rule out seizure   - Neurology following   - Discontinue propofol  - precedex as needed to allow for RASS 0 to -2  - Scheduled Tylenol   - PRN oxycodone  - PRN dilaudid for pain   - PT Consult when appropriate  - CAM ICU score qshift  - Sleep/wake cycle hygiene     CV:  Patient has a history of atrial fibrillation, NICM possible r/t arrythmias. Is now status post ecmo cannula for acute on chronic systolic heart failure/cardiogenic shock, pericardial effusion s/p drain, and atrial fibrillation ablation was not performed. Developed torsades in setting of amio with recent dofetilide, now improved off amio and on lido with therapeutic level. Intermittent atrial tachyarrythmias that are rate control. Echo 11/1 with EF 15-20%, severe LVH, reduced RV function and severe MR. VA ECMO 3L/sweep 0.5/ Fio2 100% with epi now 0.04 mcg/kg/min. Significant variability in pulsatility, not associated with epi or rhythm changes. Pericardial drain with 60 ml/24 hrs.   -->  - Maintain goal MAP > 65, PRN hydral  - Continue methylpred 1GM IV q24hrs x 3 doses empirically for anjali-pericarditis    - f/u pericardial fluid studies, cytology negative  - Avoid QT prolonging agents including amiodarone until 11/7 at earliest due to recent dofetilide load.  - Possibility for impella placement with decannulation on Monday, turn-down trial on Monday   - discontinue  lidocaine infusion  - HF consult appreciate recs  - Monitor pericardial drain output  - Hold home metoprolol     PULM:  No history of pulmonary disease.  Currently intubated on ventilator with appropriate vent pressures. ECMO with sweep 0.5 and Fio2 100%. -->  - daily CXR  - wean vent to lung protective ventilation  - adjust sweep to normal pH  - Wean FiO2 maintaining SpO2 >92%.      GI:  OG in place. Mildly elevated LFTs, normalized -->  - Continue PPI until extubated  - nutrition recs, start TF and advance to goal  - Colace/senna BID and miralax BID     : No history of renal disease, baseline creatinine 0.8. Creatinine stable post-op. Urbina in place and making adequate UOP. Mildly hypervolemic to euvolemic on exam.  Worsening hypernatremia. -->  - Continue urbina catheter for strict I/Os.  - Goal UOP 0.5ml/kg/hr  - RFP as clinically indicated  - Replete electrolytes per CTICU protocol  - Holding home furosemide   - start FWF 300ml q4h  - D5W 100ml/hr for hypernatremia      ENDO:  No past medical history of DM. A1c: 5.6. Acceptable glycemic control. -->  - Maintain BG <180, SS 3 q4hrs insulin per CTICU protocol     HEME:  Acute blood loss anemia with bloody output of unclear etiology from pericardial drain which is now decreased. Hgb stable. -->    - Monitor drain output volume and characteristics  - CBC BID  - Start systemic heparin for afib and ecmo, monitor pericardial drain output  - SCDs for DVT prophylaxis.  - Last type and screen: 11/2  - Holding home apixaban      ID:  Afebrile, no current indications of infection but emergent lines placed. Completed zosyn x48hrs. Pericardial fluid with no growth on culture, final. -->  - Trend temp q4h  - continue mupirocin x 5 days for MSSA decolinization     Skin:  No active skin issues.  - preventative Mepilex dressings in place on sacrum and heels  - change preventative Mepilex weekly or more frequently as indicated (when moist/soiled)   - every shift skin assessment  per nursing and weekly ICU skin rounds  - moisture barrier to be applied with earnestine care  - active skin problems addressed with nursing on daily rounds     Proph:  SCDs  Systemic heparin  PPI     G:  Line  Femoral venous and arterial sheaths 10/30  R radial pollo 10/30  PIVx3     F: Family: daughter and  updated at beside.      A,B,C,D,E,F,G: reviewed     Dispo: CTICU care for now.    CTICU TEAM PHONE [phone]     Seen and discussed with Dr. Leigh.

## 2024-11-03 NOTE — CONSULTS
Inpatient consult to Neurology  Consult performed by: David Goddard DO  Consult ordered by: Amie Delgadillo MD      History Of Present Illness  Lolis Auguste is a 69 y.o. female presenting with PMHx new persistent AF on Eliquis, systolic heart failure, initially admitted to [facility] 10/23/24 for AF RVR. Then seen by op cardiologist routine fu, persistent AF RVR, cardioverted, afib persisted, [place] [facility] [place]. Course c/b hypotension requiring pressors, ablation c/b vfib arrest, cardioverted multiple times for SVT, now on ecmo after hypotension refractory to vasopressors. Neurology was consulted for change in neuro exam while sedated on propofol and Precedex.    Received information from CTICU that off sedation, pt moves all 4 extremities and has nonfocal exam. However, while on sedation this morning they noticed her eyes rolled to the back of her head, turned over onto her side, unresponsive on neuro exam. However, once sedation was held, her exam was back to baseline. CTH was obtained prior to consult, negative for intracranial abnormality.    Pt was seen in the CTICU. Sedation was held for 30 minutes prior to examination. Family in room when examining. Family states she has hx of scoliosis so typically leaning towards the left when lying down. Additionally, pt with L head turning preference since she's been intubated/sedated, however appears she is able to spontaneously turn head to the right on command. Pt appears agitated swinging all four extremities during exam.     Past Medical History  Past Medical History:   Diagnosis Date   • Acute systolic heart failure 09/09/2024   • Arrhythmia    • Cardiomyopathy 09/09/2024    Echo 10/24/24  1. Left ventricular ejection fraction is severely decreased, by visual estimate at 25%.  2. There is global hypokinesis of the left ventricle with minor regional variations.  3. Spectral Doppler shows an abnormal pattern of left ventricular diastolic filling.  4. Left  ventricular cavity size is mild to moderately dilated.  5. No left ventricular thrombus visualized.  6. There is mil   • CHF (congestive heart failure)    • Persistent atrial fibrillation (Multi) 09/09/2024   • Thrombus of left atrial appendage 09/30/2024    MIKE 9/20/24       Surgical History  Past Surgical History:   Procedure Laterality Date   • CARDIAC ELECTROPHYSIOLOGY PROCEDURE N/A 10/30/2024    Procedure: Ablation A-Fib Persistant;  Surgeon: Stepan Ward MD;  Location: [place] [id number] Cardiac Cath Lab;  Service: Electrophysiology;  Laterality: N/A;  CARTO  pt to be transferred from [facility] and admitted to [facility] [place] on [place]29  pt may have taken jardiance on 10/28. per dr. ward, case is extremely urgent and needs to be completed.     Social History  Social History     Tobacco Use   • Smoking status: Never   • Smokeless tobacco: Never   Substance Use Topics   • Alcohol use: Yes     Comment: occassionally   • Drug use: Never     Allergies  Patient has no known allergies.  Medications Prior to Admission   Medication Sig Dispense Refill Last Dose/Taking   • apixaban (Eliquis) 5 mg tablet Take 1 tablet (5 mg) by mouth twice a day.   10/30/2024   • furosemide (Lasix) 40 mg tablet Take 1 tablet (40 mg) by mouth 2 times daily (morning and late afternoon).   10/29/2024   • metoprolol tartrate (Lopressor) 25 mg tablet Take 1 tablet (25 mg) by mouth 3 times a day. 90 tablet 11 10/30/2024   • multivitamin tablet Take 1 tablet by mouth once daily.   10/29/2024       Physical Exam:    Neuro Exam (off sedation for 30 min):  GENERAL APPEARANCE:  Awake, agitated, eyes open without deviation, head turned to the left however intermittently turns head to right on command    MENTAL STATE:   -Briefly follows intermittent 1 step commands (squeeze hand bilaterally, tracks both sides of room on command)    CRANIAL NERVES:   BTT b/l, tracking both sides of the room, PERRLA, face symmetric    Tongue midline, with normal bulk and strength; no  fasciculations.     MOTOR:   Paratonia noted in all 4 extremities  No fasciculations, tremor or other abnormal movements were present.   Antigravity all 4s, moving spontaneously throughout exam, unable to follow commands for resistance    Babinski: toes downgoing to plantar stimulation. No clonus or other pathologic reflexes present.     SENSORY:   Withdraws to nox stim in all 4 extremities    Last Recorded Vitals  Pulse 104, temperature 35.1 °C (95.2 °F), temperature source Core, resp. rate 20, SpO2 100%.    Relevant Results    Scheduled medications  esomeprazole, 20 mg, nasogastric tube, Daily before breakfast  insulin lispro, 0-15 Units, subcutaneous, q4h  methylPREDNISolone sodium succinate (PF), 1,000 mg, intravenous, q24h  mupirocin, , Topical, BID  polyethylene glycol, 17 g, oral, Daily  sennosides-docusate sodium, 2 tablet, oral, BID      Continuous medications  dexmedeTOMIDine, 0-1.5 mcg/kg/hr, Last Rate: 0.5 mcg/kg/hr (11/03/24 1205)  dextrose 5%, 100 mL/hr, Last Rate: 100 mL/hr (11/03/24 1200)  EPINEPHrine, 0.02 mcg/kg/min, Last Rate: 0.02 mcg/kg/min (11/03/24 1200)  [Held by provider] heparin, 0-4,000 Units/hr, Last Rate: Stopped (11/03/24 1221)  propofol, 0-50 mcg/kg/min, Last Rate: 15 mcg/kg/min (11/03/24 1200)      PRN medications  PRN medications: acetaminophen, calcium gluconate, calcium gluconate, dextrose **OR** glucagon, hydrALAZINE, HYDROmorphone, magnesium sulfate, magnesium sulfate, naloxone, oxyCODONE, oxygen, potassium chloride, potassium chloride    Results for orders placed or performed during the hospital encounter of 10/30/24 (from the past 24 hours)   Blood Gas Arterial Full Panel   Result Value Ref Range    POCT pH, Arterial 7.49 (H) 7.38 - 7.42 pH    POCT pCO2, Arterial 33 (L) 38 - 42 mm Hg    POCT pO2, Arterial 278 (H) 85 - 95 mm Hg    POCT SO2, Arterial 100 94 - 100 %    POCT Oxy Hemoglobin, Arterial 98.1 (H) 94.0 - 98.0 %    POCT Hematocrit Calculated, Arterial 31.0 (L) 36.0 - 46.0  %    POCT Sodium, Arterial 153 (H) 136 - 145 mmol/L    POCT Potassium, Arterial 4.5 3.5 - 5.3 mmol/L    POCT Chloride, Arterial 123 (H) 98 - 107 mmol/L    POCT Ionized Calcium, Arterial 1.27 1.10 - 1.33 mmol/L    POCT Glucose, Arterial 167 (H) 74 - 99 mg/dL    POCT Lactate, Arterial 1.1 0.4 - 2.0 mmol/L    POCT Base Excess, Arterial 2.0 -2.0 - 3.0 mmol/L    POCT HCO3 Calculated, Arterial 25.1 22.0 - 26.0 mmol/L    POCT Hemoglobin, Arterial 10.4 (L) 12.0 - 16.0 g/dL    POCT Anion Gap, Arterial 9 (L) 10 - 25 mmo/L    Patient Temperature 37.0 degrees Celsius    FiO2 40 %   Calcium, Ionized   Result Value Ref Range    POCT Calcium, Ionized 1.22 1.1 - 1.33 mmol/L   CBC   Result Value Ref Range    WBC 9.4 4.4 - 11.3 x10*3/uL    nRBC 0.0 0.0 - 0.0 /100 WBCs    RBC 3.44 (L) 4.00 - 5.20 x10*6/uL    Hemoglobin 10.2 (L) 12.0 - 16.0 g/dL    Hematocrit 31.5 (L) 36.0 - 46.0 %    MCV 92 80 - 100 fL    MCH 29.7 26.0 - 34.0 pg    MCHC 32.4 32.0 - 36.0 g/dL    RDW 15.9 (H) 11.5 - 14.5 %    Platelets 164 150 - 450 x10*3/uL   Heparin Assay, UFH   Result Value Ref Range    Heparin Unfractionated 0.4 See Comment Below for Therapeutic Ranges IU/mL   Magnesium   Result Value Ref Range    Magnesium 2.32 1.60 - 2.40 mg/dL   Renal function panel   Result Value Ref Range    Glucose 157 (H) 74 - 99 mg/dL    Sodium 155 (H) 136 - 145 mmol/L    Potassium 4.3 3.5 - 5.3 mmol/L    Chloride 118 (H) 98 - 107 mmol/L    Bicarbonate 25 21 - 32 mmol/L    Anion Gap 16 10 - 20 mmol/L    Urea Nitrogen 30 (H) 6 - 23 mg/dL    Creatinine 0.56 0.50 - 1.05 mg/dL    eGFR >90 >60 mL/min/1.73m*2    Calcium 9.0 8.6 - 10.6 mg/dL    Phosphorus 2.6 2.5 - 4.9 mg/dL    Albumin 3.9 3.4 - 5.0 g/dL   POCT GLUCOSE   Result Value Ref Range    POCT Glucose 159 (H) 74 - 99 mg/dL   Sodium   Result Value Ref Range    Sodium 155 (H) 136 - 145 mmol/L   Heparin Assay, UFH   Result Value Ref Range    Heparin Unfractionated 0.5 See Comment Below for Therapeutic Ranges IU/mL   POCT  GLUCOSE   Result Value Ref Range    POCT Glucose 146 (H) 74 - 99 mg/dL   POCT GLUCOSE   Result Value Ref Range    POCT Glucose 167 (H) 74 - 99 mg/dL   Calcium, Ionized   Result Value Ref Range    POCT Calcium, Ionized 1.26 1.1 - 1.33 mmol/L   CBC   Result Value Ref Range    WBC 7.9 4.4 - 11.3 x10*3/uL    nRBC 0.0 0.0 - 0.0 /100 WBCs    RBC 3.32 (L) 4.00 - 5.20 x10*6/uL    Hemoglobin 9.7 (L) 12.0 - 16.0 g/dL    Hematocrit 30.6 (L) 36.0 - 46.0 %    MCV 92 80 - 100 fL    MCH 29.2 26.0 - 34.0 pg    MCHC 31.7 (L) 32.0 - 36.0 g/dL    RDW 15.8 (H) 11.5 - 14.5 %    Platelets 143 (L) 150 - 450 x10*3/uL   Coagulation Screen   Result Value Ref Range    Protime 13.5 (H) 9.8 - 12.8 seconds    INR 1.2 (H) 0.9 - 1.1    aPTT 54 (H) 27 - 38 seconds   Lactate Dehydrogenase   Result Value Ref Range     84 - 246 U/L   Magnesium   Result Value Ref Range    Magnesium 2.45 (H) 1.60 - 2.40 mg/dL   Renal Function Panel   Result Value Ref Range    Glucose 156 (H) 74 - 99 mg/dL    Sodium 157 (H) 136 - 145 mmol/L    Potassium 4.4 3.5 - 5.3 mmol/L    Chloride 118 (H) 98 - 107 mmol/L    Bicarbonate 27 21 - 32 mmol/L    Anion Gap 16 10 - 20 mmol/L    Urea Nitrogen 29 (H) 6 - 23 mg/dL    Creatinine 0.55 0.50 - 1.05 mg/dL    eGFR >90 >60 mL/min/1.73m*2    Calcium 8.9 8.6 - 10.6 mg/dL    Phosphorus 2.7 2.5 - 4.9 mg/dL    Albumin 3.8 3.4 - 5.0 g/dL   Sodium   Result Value Ref Range    Sodium 157 (H) 136 - 145 mmol/L   Blood Gas Arterial Full Panel   Result Value Ref Range    POCT pH, Arterial 7.46 (H) 7.38 - 7.42 pH    POCT pCO2, Arterial 38 38 - 42 mm Hg    POCT pO2, Arterial 280 (H) 85 - 95 mm Hg    POCT SO2, Arterial 100 94 - 100 %    POCT Oxy Hemoglobin, Arterial 97.8 94.0 - 98.0 %    POCT Hematocrit Calculated, Arterial 31.0 (L) 36.0 - 46.0 %    POCT Sodium, Arterial 153 (H) 136 - 145 mmol/L    POCT Potassium, Arterial 4.6 3.5 - 5.3 mmol/L    POCT Chloride, Arterial 122 (H) 98 - 107 mmol/L    POCT Ionized Calcium, Arterial 1.30 1.10 -  1.33 mmol/L    POCT Glucose, Arterial 166 (H) 74 - 99 mg/dL    POCT Lactate, Arterial 0.8 0.4 - 2.0 mmol/L    POCT Base Excess, Arterial 3.0 -2.0 - 3.0 mmol/L    POCT HCO3 Calculated, Arterial 27.0 (H) 22.0 - 26.0 mmol/L    POCT Hemoglobin, Arterial 10.2 (L) 12.0 - 16.0 g/dL    POCT Anion Gap, Arterial 9 (L) 10 - 25 mmo/L    Patient Temperature 37.0 degrees Celsius    FiO2 40 %   ECMO ARTERIAL BLOOD GAS   Result Value Ref Range    POCT pH, Arterial ECMO 7.35 Reference range not established pH    POCT pCO2, Arterial ECMO 50 Reference range not established mm Hg    POCT pO2,  Arterial ECMO 417 Reference range not established mm Hg    POCT SO2, Arterial ECMO 100 Reference range not established %    POCT Oxy Hemoglobin, Arterial ECMO 97.6 Reference range not established %    POCT Base Excess, Arterial ECMO 1.2 Reference range not established mmol/L    POCT HCO3 Calculated, Arterial ECMO 27.6 Reference range not established mmol/L    Patient Temperature 37.0 degrees Celsius    FiO2 100 %   ECMO VENOUS FULL PANEL   Result Value Ref Range    POCT pH, Venous ECMO 7.34 Reference range not established pH    POCT pCO2, Venous ECMO 51 Reference range not established mm Hg    POCT pO2,  Venous  ECMO 47 Reference range not established mm Hg    POCT SO2, Venous ECMO 82 Reference range not established %    POCT Oxy Hemoglobin, Venous ECMO 80.3 Reference range not established %    POCT Hematocrit Calculated, Venous ECMO 29.0 (L) 36.0 - 46.0 %    POCT Sodium, Venous ECMO 154 (H) 136 - 145 mmol/L    POCT Potassium, Venous ECMO 4.1 3.5 - 5.3 mmol/L    POCT Chloride, Venous ECMO 123 (H) 98 - 107 mmol/L    POCT Ionized Calcium, Venous ECMO 1.27 1.10 - 1.33 mmol/L    POCT Glucose, Venous ECMO 156 (H) 74 - 99 mg/dL    POCT Lactate Venous ECMO 0.6 0.4 - 2.0 mmol/L    POCT Base Excess, Venous ECMO 1.2 Reference range not established mmol/L    POCT HCO3 Calculated, Venous ECMO 27.5 Reference range not established mmol/L    POCT Hemoglobin,  Venous ECMO 9.6 (L) 12.0 - 16.0 g/dL    POCT Anion Gap, Venous ECMO 8 Reference range not established mmo/L    Patient Temperature 37.0 degrees Celsius    FiO2 100 %   Sodium   Result Value Ref Range    Sodium 155 (H) 136 - 145 mmol/L   POCT GLUCOSE   Result Value Ref Range    POCT Glucose 166 (H) 74 - 99 mg/dL   POCT GLUCOSE   Result Value Ref Range    POCT Glucose 88 74 - 99 mg/dL   Sodium   Result Value Ref Range    Sodium 154 (H) 136 - 145 mmol/L   POCT GLUCOSE   Result Value Ref Range    POCT Glucose 165 (H) 74 - 99 mg/dL     Assessment/Plan   Assessment & Plan  Persistent atrial fibrillation (Multi)    Mitral regurgitation      Lolis Auguste is a 69 y.o. female presenting with PMHx new persistent AF on Eliquis, systolic heart failure, initially admitted to [facility] 10/23/24 for AF RVR. Then seen by op cardiologist routine fu, persistent AF RVR, cardioverted, afib persisted, [place] [facility] [place]. Course c/b hypotension requiring pressors, ablation c/b vfib arrest, cardioverted multiple times for SVT, now on ecmo after hypotension refractory to vasopressors. Neurology was consulted for change in neuro exam while sedated on propofol and Precedex. Off sedation, exam is nonfocal, pt with head turned to left, spontaneously turns to right, no evidence of forced gaze/head position. CTH without hemorrhage or signs of ischemia. Does not appear to have had rapid changes in blood pressure last 24 hours. Although low suspicion, would need to r/o status epileptic activity in the setting of her decreased level of consciousness. Otherwise no c/f infarction at this time.     Recommendations:  -Continuous video EEG  -Ativan 2 mg IV for prolonged motor seizure lasting more than 3 minutes or a cluster of 3 or more motor seizures in an 8 hour period.  -No further recommendations at this time  -Recs are preliminary until staffed in AM  -Reach out with further questions, [phone]    Pt will be seen and discussed with  attending Dr. Gant in AM,    David Goddard DO  PGY-2 Neurology

## 2024-11-04 LAB
ABO GROUP (TYPE) IN BLOOD: NORMAL
ALBUMIN SERPL BCP-MCNC: 3.4 G/DL (ref 3.4–5)
ALBUMIN SERPL BCP-MCNC: 3.5 G/DL (ref 3.4–5)
ANION GAP BLDA CALCULATED.4IONS-SCNC: 1 MMO/L (ref 10–25)
ANION GAP BLDA CALCULATED.4IONS-SCNC: 2 MMO/L (ref 10–25)
ANION GAP BLDV CALCULATED.4IONS-SCNC: 2 MMO/L
ANION GAP SERPL CALC-SCNC: 10 MMOL/L (ref 10–20)
ANION GAP SERPL CALC-SCNC: 13 MMOL/L (ref 10–20)
ANTIBODY SCREEN: NORMAL
APTT PPP: 52 SECONDS (ref 27–38)
BASE EXCESS BLDA CALC-SCNC: 10 MMOL/L (ref -2–3)
BASE EXCESS BLDA CALC-SCNC: 7.3 MMOL/L
BASE EXCESS BLDA CALC-SCNC: 8.1 MMOL/L (ref -2–3)
BASE EXCESS BLDV CALC-SCNC: 8.1 MMOL/L
BODY TEMPERATURE: 37 DEGREES CELSIUS
BUN SERPL-MCNC: 24 MG/DL (ref 6–23)
BUN SERPL-MCNC: 26 MG/DL (ref 6–23)
CA-I BLD-SCNC: 1.21 MMOL/L (ref 1.1–1.33)
CA-I BLD-SCNC: 1.23 MMOL/L (ref 1.1–1.33)
CA-I BLDA-SCNC: 1.27 MMOL/L (ref 1.1–1.33)
CA-I BLDA-SCNC: 1.28 MMOL/L (ref 1.1–1.33)
CA-I BLDV-SCNC: 1.29 MMOL/L (ref 1.1–1.33)
CALCIUM SERPL-MCNC: 8.7 MG/DL (ref 8.6–10.6)
CALCIUM SERPL-MCNC: 8.9 MG/DL (ref 8.6–10.6)
CHLORIDE BLDA-SCNC: 116 MMOL/L (ref 98–107)
CHLORIDE BLDA-SCNC: 116 MMOL/L (ref 98–107)
CHLORIDE BLDV-SCNC: 115 MMOL/L (ref 98–107)
CHLORIDE SERPL-SCNC: 111 MMOL/L (ref 98–107)
CHLORIDE SERPL-SCNC: 112 MMOL/L (ref 98–107)
CO2 SERPL-SCNC: 32 MMOL/L (ref 21–32)
CO2 SERPL-SCNC: 33 MMOL/L (ref 21–32)
CREAT SERPL-MCNC: 0.47 MG/DL (ref 0.5–1.05)
CREAT SERPL-MCNC: 0.5 MG/DL (ref 0.5–1.05)
EGFRCR SERPLBLD CKD-EPI 2021: >90 ML/MIN/1.73M*2
EGFRCR SERPLBLD CKD-EPI 2021: >90 ML/MIN/1.73M*2
EJECTION FRACTION APICAL 4 CHAMBER: 36.2
EJECTION FRACTION: 18 %
ERYTHROCYTE [DISTWIDTH] IN BLOOD BY AUTOMATED COUNT: 15.2 % (ref 11.5–14.5)
ERYTHROCYTE [DISTWIDTH] IN BLOOD BY AUTOMATED COUNT: 15.4 % (ref 11.5–14.5)
GLUCOSE BLD MANUAL STRIP-MCNC: 130 MG/DL (ref 74–99)
GLUCOSE BLD MANUAL STRIP-MCNC: 133 MG/DL (ref 74–99)
GLUCOSE BLD MANUAL STRIP-MCNC: 138 MG/DL (ref 74–99)
GLUCOSE BLD MANUAL STRIP-MCNC: 150 MG/DL (ref 74–99)
GLUCOSE BLD MANUAL STRIP-MCNC: 166 MG/DL (ref 74–99)
GLUCOSE BLDA-MCNC: 143 MG/DL (ref 74–99)
GLUCOSE BLDA-MCNC: 144 MG/DL (ref 74–99)
GLUCOSE BLDV-MCNC: 136 MG/DL (ref 74–99)
GLUCOSE SERPL-MCNC: 134 MG/DL (ref 74–99)
GLUCOSE SERPL-MCNC: 135 MG/DL (ref 74–99)
HCO3 BLDA-SCNC: 32 MMOL/L (ref 22–26)
HCO3 BLDA-SCNC: 33.3 MMOL/L (ref 22–26)
HCO3 BLDA-SCNC: 34.3 MMOL/L
HCO3 BLDV-SCNC: 35 MMOL/L
HCT VFR BLD AUTO: 28.5 % (ref 36–46)
HCT VFR BLD AUTO: 28.9 % (ref 36–46)
HCT VFR BLD EST: 28 % (ref 36–46)
HCT VFR BLD EST: 28 % (ref 36–46)
HCT VFR BLD EST: 29 % (ref 36–46)
HGB BLD-MCNC: 9 G/DL (ref 12–16)
HGB BLD-MCNC: 9.1 G/DL (ref 12–16)
HGB BLDA-MCNC: 9.4 G/DL (ref 12–16)
HGB BLDA-MCNC: 9.5 G/DL (ref 12–16)
HGB BLDV-MCNC: 9.3 G/DL (ref 12–16)
INHALED O2 CONCENTRATION: 100 %
INHALED O2 CONCENTRATION: 100 %
INHALED O2 CONCENTRATION: 40 %
INHALED O2 CONCENTRATION: 40 %
INR PPP: 1.1 (ref 0.9–1.1)
LACTATE BLDA-SCNC: 0.8 MMOL/L (ref 0.4–2)
LACTATE BLDA-SCNC: 0.8 MMOL/L (ref 0.4–2)
LACTATE BLDV-SCNC: 0.6 MMOL/L (ref 0.4–2)
LDH SERPL L TO P-CCNC: 175 U/L (ref 84–246)
LEFT VENTRICLE INTERNAL DIMENSION DIASTOLE: 5.5 CM (ref 3.5–6)
MAGNESIUM SERPL-MCNC: 2.38 MG/DL (ref 1.6–2.4)
MAGNESIUM SERPL-MCNC: 2.47 MG/DL (ref 1.6–2.4)
MCH RBC QN AUTO: 28.9 PG (ref 26–34)
MCH RBC QN AUTO: 29.2 PG (ref 26–34)
MCHC RBC AUTO-ENTMCNC: 31.1 G/DL (ref 32–36)
MCHC RBC AUTO-ENTMCNC: 31.9 G/DL (ref 32–36)
MCV RBC AUTO: 91 FL (ref 80–100)
MCV RBC AUTO: 93 FL (ref 80–100)
NRBC BLD-RTO: 0 /100 WBCS (ref 0–0)
NRBC BLD-RTO: 0 /100 WBCS (ref 0–0)
OXYHGB MFR BLDA: 97.3 %
OXYHGB MFR BLDA: 97.7 % (ref 94–98)
OXYHGB MFR BLDA: 97.7 % (ref 94–98)
OXYHGB MFR BLDV: 78.1 %
PCO2 BLDA: 39 MM HG (ref 38–42)
PCO2 BLDA: 41 MM HG (ref 38–42)
PCO2 BLDA: 58 MM HG
PCO2 BLDV: 62 MM HG
PH BLDA: 7.38 PH
PH BLDA: 7.5 PH (ref 7.38–7.42)
PH BLDA: 7.54 PH (ref 7.38–7.42)
PH BLDV: 7.36 PH
PHOSPHATE SERPL-MCNC: 2.7 MG/DL (ref 2.5–4.9)
PHOSPHATE SERPL-MCNC: 2.7 MG/DL (ref 2.5–4.9)
PLATELET # BLD AUTO: 118 X10*3/UL (ref 150–450)
PLATELET # BLD AUTO: 121 X10*3/UL (ref 150–450)
PO2 BLDA: 167 MM HG (ref 85–95)
PO2 BLDA: 290 MM HG (ref 85–95)
PO2 BLDA: 373 MM HG
PO2 BLDV: 49 MM HG
POTASSIUM BLDA-SCNC: 5 MMOL/L (ref 3.5–5.3)
POTASSIUM BLDA-SCNC: 5.4 MMOL/L (ref 3.5–5.3)
POTASSIUM BLDV-SCNC: 5.2 MMOL/L (ref 3.5–5.3)
POTASSIUM SERPL-SCNC: 4.9 MMOL/L (ref 3.5–5.3)
POTASSIUM SERPL-SCNC: 5.1 MMOL/L (ref 3.5–5.3)
PROTHROMBIN TIME: 12.6 SECONDS (ref 9.8–12.8)
RBC # BLD AUTO: 3.11 X10*6/UL (ref 4–5.2)
RBC # BLD AUTO: 3.12 X10*6/UL (ref 4–5.2)
RH FACTOR (ANTIGEN D): NORMAL
SAO2 % BLDA: 100 %
SAO2 % BLDA: 100 % (ref 94–100)
SAO2 % BLDA: 100 % (ref 94–100)
SAO2 % BLDV: 80 %
SODIUM BLDA-SCNC: 145 MMOL/L (ref 136–145)
SODIUM BLDA-SCNC: 145 MMOL/L (ref 136–145)
SODIUM BLDV-SCNC: 147 MMOL/L (ref 136–145)
SODIUM SERPL-SCNC: 150 MMOL/L (ref 136–145)
SODIUM SERPL-SCNC: 150 MMOL/L (ref 136–145)
SODIUM SERPL-SCNC: 151 MMOL/L (ref 136–145)
SODIUM SERPL-SCNC: 153 MMOL/L (ref 136–145)
UFH PPP CHRO-ACNC: 0.6 IU/ML
WBC # BLD AUTO: 8.7 X10*3/UL (ref 4.4–11.3)
WBC # BLD AUTO: 9.6 X10*3/UL (ref 4.4–11.3)

## 2024-11-04 PROCEDURE — 99291 CRITICAL CARE FIRST HOUR: CPT | Performed by: NURSE PRACTITIONER

## 2024-11-04 PROCEDURE — 82805 BLOOD GASES W/O2 SATURATION: CPT

## 2024-11-04 PROCEDURE — 99291 CRITICAL CARE FIRST HOUR: CPT | Performed by: ANESTHESIOLOGY

## 2024-11-04 PROCEDURE — 84295 ASSAY OF SERUM SODIUM: CPT

## 2024-11-04 PROCEDURE — 82435 ASSAY OF BLOOD CHLORIDE: CPT | Performed by: STUDENT IN AN ORGANIZED HEALTH CARE EDUCATION/TRAINING PROGRAM

## 2024-11-04 PROCEDURE — 37799 UNLISTED PX VASCULAR SURGERY: CPT | Performed by: NURSE PRACTITIONER

## 2024-11-04 PROCEDURE — 83735 ASSAY OF MAGNESIUM: CPT

## 2024-11-04 PROCEDURE — 84132 ASSAY OF SERUM POTASSIUM: CPT | Performed by: NURSE PRACTITIONER

## 2024-11-04 PROCEDURE — 84132 ASSAY OF SERUM POTASSIUM: CPT

## 2024-11-04 PROCEDURE — 83735 ASSAY OF MAGNESIUM: CPT | Performed by: NURSE PRACTITIONER

## 2024-11-04 PROCEDURE — 82805 BLOOD GASES W/O2 SATURATION: CPT | Performed by: STUDENT IN AN ORGANIZED HEALTH CARE EDUCATION/TRAINING PROGRAM

## 2024-11-04 PROCEDURE — 82330 ASSAY OF CALCIUM: CPT | Performed by: NURSE PRACTITIONER

## 2024-11-04 PROCEDURE — 2500000004 HC RX 250 GENERAL PHARMACY W/ HCPCS (ALT 636 FOR OP/ED)

## 2024-11-04 PROCEDURE — 83615 LACTATE (LD) (LDH) ENZYME: CPT | Performed by: NURSE PRACTITIONER

## 2024-11-04 PROCEDURE — 2500000001 HC RX 250 WO HCPCS SELF ADMINISTERED DRUGS (ALT 637 FOR MEDICARE OP): Performed by: NURSE PRACTITIONER

## 2024-11-04 PROCEDURE — 85610 PROTHROMBIN TIME: CPT | Performed by: NURSE PRACTITIONER

## 2024-11-04 PROCEDURE — 2500000004 HC RX 250 GENERAL PHARMACY W/ HCPCS (ALT 636 FOR OP/ED): Performed by: NURSE PRACTITIONER

## 2024-11-04 PROCEDURE — 82330 ASSAY OF CALCIUM: CPT

## 2024-11-04 PROCEDURE — 86901 BLOOD TYPING SEROLOGIC RH(D): CPT | Performed by: NURSE PRACTITIONER

## 2024-11-04 PROCEDURE — 84132 ASSAY OF SERUM POTASSIUM: CPT | Performed by: STUDENT IN AN ORGANIZED HEALTH CARE EDUCATION/TRAINING PROGRAM

## 2024-11-04 PROCEDURE — 82435 ASSAY OF BLOOD CHLORIDE: CPT

## 2024-11-04 PROCEDURE — 85027 COMPLETE CBC AUTOMATED: CPT | Performed by: NURSE PRACTITIONER

## 2024-11-04 PROCEDURE — 85520 HEPARIN ASSAY: CPT | Performed by: NURSE PRACTITIONER

## 2024-11-04 PROCEDURE — 82947 ASSAY GLUCOSE BLOOD QUANT: CPT

## 2024-11-04 PROCEDURE — 99222 1ST HOSP IP/OBS MODERATE 55: CPT

## 2024-11-04 PROCEDURE — 86850 RBC ANTIBODY SCREEN: CPT | Performed by: NURSE PRACTITIONER

## 2024-11-04 PROCEDURE — 94003 VENT MGMT INPAT SUBQ DAY: CPT

## 2024-11-04 PROCEDURE — 2500000002 HC RX 250 W HCPCS SELF ADMINISTERED DRUGS (ALT 637 FOR MEDICARE OP, ALT 636 FOR OP/ED)

## 2024-11-04 PROCEDURE — 2020000001 HC ICU ROOM DAILY

## 2024-11-04 PROCEDURE — 86658 ENTEROVIRUS ANTIBODY: CPT | Performed by: NURSE PRACTITIONER

## 2024-11-04 PROCEDURE — 2500000001 HC RX 250 WO HCPCS SELF ADMINISTERED DRUGS (ALT 637 FOR MEDICARE OP)

## 2024-11-04 PROCEDURE — 82810 BLOOD GASES O2 SAT ONLY: CPT | Performed by: STUDENT IN AN ORGANIZED HEALTH CARE EDUCATION/TRAINING PROGRAM

## 2024-11-04 PROCEDURE — 37799 UNLISTED PX VASCULAR SURGERY: CPT | Performed by: STUDENT IN AN ORGANIZED HEALTH CARE EDUCATION/TRAINING PROGRAM

## 2024-11-04 PROCEDURE — 33949 ECMO/ECLS DAILY MGMT ARTERY: CPT | Performed by: ANESTHESIOLOGY

## 2024-11-04 PROCEDURE — 99232 SBSQ HOSP IP/OBS MODERATE 35: CPT | Performed by: INTERNAL MEDICINE

## 2024-11-04 PROCEDURE — 85027 COMPLETE CBC AUTOMATED: CPT

## 2024-11-04 PROCEDURE — 86923 COMPATIBILITY TEST ELECTRIC: CPT

## 2024-11-04 PROCEDURE — 84295 ASSAY OF SERUM SODIUM: CPT | Performed by: NURSE PRACTITIONER

## 2024-11-04 RX ORDER — ADHESIVE BANDAGE
30 BANDAGE TOPICAL ONCE
Status: COMPLETED | OUTPATIENT
Start: 2024-11-04 | End: 2024-11-04

## 2024-11-04 RX ORDER — BISACODYL 10 MG/1
10 SUPPOSITORY RECTAL ONCE
Status: COMPLETED | OUTPATIENT
Start: 2024-11-04 | End: 2024-11-04

## 2024-11-04 RX ORDER — DEXTROSE MONOHYDRATE 50 MG/ML
30 INJECTION, SOLUTION INTRAVENOUS CONTINUOUS
Status: DISCONTINUED | OUTPATIENT
Start: 2024-11-04 | End: 2024-11-05

## 2024-11-04 RX ORDER — POLYETHYLENE GLYCOL 3350 17 G/17G
17 POWDER, FOR SOLUTION ORAL 2 TIMES DAILY
Status: DISCONTINUED | OUTPATIENT
Start: 2024-11-04 | End: 2024-11-06

## 2024-11-04 RX ADMIN — MUPIROCIN: 20 OINTMENT TOPICAL at 08:04

## 2024-11-04 RX ADMIN — PROPOFOL 40 MCG/KG/MIN: 10 INJECTION, EMULSION INTRAVENOUS at 13:11

## 2024-11-04 RX ADMIN — PROPOFOL 40 MCG/KG/MIN: 10 INJECTION, EMULSION INTRAVENOUS at 19:56

## 2024-11-04 RX ADMIN — SENNOSIDES AND DOCUSATE SODIUM 2 TABLET: 50; 8.6 TABLET ORAL at 20:23

## 2024-11-04 RX ADMIN — MUPIROCIN: 20 OINTMENT TOPICAL at 20:23

## 2024-11-04 RX ADMIN — INSULIN LISPRO 5 UNITS: 100 INJECTION, SOLUTION INTRAVENOUS; SUBCUTANEOUS at 16:27

## 2024-11-04 RX ADMIN — DEXTROSE MONOHYDRATE 30 ML/HR: 50 INJECTION, SOLUTION INTRAVENOUS at 23:36

## 2024-11-04 RX ADMIN — INSULIN LISPRO 5 UNITS: 100 INJECTION, SOLUTION INTRAVENOUS; SUBCUTANEOUS at 19:57

## 2024-11-04 RX ADMIN — BISACODYL 10 MG: 10 SUPPOSITORY RECTAL at 11:55

## 2024-11-04 RX ADMIN — ESOMEPRAZOLE MAGNESIUM 20 MG: 40 FOR SUSPENSION ORAL at 08:03

## 2024-11-04 RX ADMIN — POLYETHYLENE GLYCOL 3350 17 G: 17 POWDER, FOR SOLUTION ORAL at 08:03

## 2024-11-04 RX ADMIN — INSULIN LISPRO 5 UNITS: 100 INJECTION, SOLUTION INTRAVENOUS; SUBCUTANEOUS at 01:07

## 2024-11-04 RX ADMIN — SENNOSIDES AND DOCUSATE SODIUM 2 TABLET: 50; 8.6 TABLET ORAL at 08:03

## 2024-11-04 RX ADMIN — DEXTROSE MONOHYDRATE 30 ML/HR: 50 INJECTION, SOLUTION INTRAVENOUS at 08:29

## 2024-11-04 RX ADMIN — DEXMEDETOMIDINE HYDROCHLORIDE 0.5 MCG/KG/HR: 400 INJECTION INTRAVENOUS at 06:08

## 2024-11-04 RX ADMIN — PROPOFOL 40 MCG/KG/MIN: 10 INJECTION, EMULSION INTRAVENOUS at 06:08

## 2024-11-04 RX ADMIN — HEPARIN SODIUM 800 UNITS/HR: 10000 INJECTION, SOLUTION INTRAVENOUS at 13:11

## 2024-11-04 RX ADMIN — POLYETHYLENE GLYCOL 3350 17 G: 17 POWDER, FOR SOLUTION ORAL at 20:23

## 2024-11-04 RX ADMIN — PROPOFOL 40 MCG/KG/MIN: 10 INJECTION, EMULSION INTRAVENOUS at 02:08

## 2024-11-04 RX ADMIN — MAGNESIUM HYDROXIDE 30 ML: 400 SUSPENSION ORAL at 11:55

## 2024-11-04 RX ADMIN — DEXMEDETOMIDINE HYDROCHLORIDE 0.5 MCG/KG/HR: 400 INJECTION INTRAVENOUS at 13:11

## 2024-11-04 ASSESSMENT — PAIN - FUNCTIONAL ASSESSMENT
PAIN_FUNCTIONAL_ASSESSMENT: CPOT (CRITICAL CARE PAIN OBSERVATION TOOL)

## 2024-11-04 NOTE — SIGNIFICANT EVENT
Neurology Sign Off Note:    See initial consult note for details    Patient seen and examined this morning with neurology attending, Dr Gant. Family at bedside. With sedation paused, she had eyes opened spontaneously, following commands in all 4 extremities and withdrawing to noxious stimuli. Eyes were preferentially to her left, however she was able to look to right on command.     - Low suspicion for epileptic activity; follow up final EEG read (can be found under chart review, procedures tab)  - No further brain imaging indicated  - Slow correction of hypernatremia per primary team  - Rest of medical care per primary team     Neurology team will sign off. Feel free to reach out with any further questions/concerns.     Patricia Luna MD   PGY-3 Neurology  General [phone]

## 2024-11-04 NOTE — PROGRESS NOTES
Music Therapy Note      Therapy Session  Referral Type: New referral this admission  Visit Type: New visit  Session Start Time: 1402  Session End Time: 1408  Intervention Delivery: In-person  Conflict of Service: None  Number of family members present: 3  Family Present for Session: Child, Spouse/Significant Other, Sibling(s)  Family Participation: Supportive     Pre-assessment  Unable to Assess Reason: Physical limitation  Verbalized Emotional State:  (Unable to assess)         Treatment/Interventions  Music Therapy Interventions: Assessment    Post-assessment  Unable to Assess Reason: Did not provide expressive therapy intervention  Total Session Time (min): 6 minutes    Narrative  Assessment Detail: Patient found lying in bed; intubated and sedated. Family members present in the room. Receptive to education and benefits of music therapy services. Patient enjoys 70s music and family reported that patient is anxious when not sedated so thought music therapy might be a beneficial service once patient is awake. Family invited MT to return on Wednesday for check in of needs.  Follow-up: MT will follow up with patient accordingly    Education Documentation  No documentation found.

## 2024-11-04 NOTE — PROGRESS NOTES
Lolis Auguste is a 69 y.o. female on day 5 of admission presenting with Persistent atrial fibrillation (Multi).      Objective     Physical Exam    Last Recorded Vitals  Pulse 103, temperature 37 °C (98.6 °F), resp. rate 12, SpO2 99%.  Intake/Output last 3 Shifts:  I/O last 3 completed shifts:  In: 5657.3 [I.V.:2882.3; NG/GT:2675; IV Piggyback:100]  Out: 4260 [Urine:3865; Emesis/NG output:300; Drains:95]    Relevant Results  Results for orders placed or performed during the hospital encounter of 10/30/24 (from the past 24 hours)   Sodium   Result Value Ref Range    Sodium 149 (H) 136 - 145 mmol/L   POCT GLUCOSE   Result Value Ref Range    POCT Glucose 163 (H) 74 - 99 mg/dL   POCT GLUCOSE   Result Value Ref Range    POCT Glucose 162 (H) 74 - 99 mg/dL   Sodium   Result Value Ref Range    Sodium 150 (H) 136 - 145 mmol/L   Calcium, Ionized   Result Value Ref Range    POCT Calcium, Ionized 1.23 1.1 - 1.33 mmol/L   CBC   Result Value Ref Range    WBC 9.6 4.4 - 11.3 x10*3/uL    nRBC 0.0 0.0 - 0.0 /100 WBCs    RBC 3.12 (L) 4.00 - 5.20 x10*6/uL    Hemoglobin 9.1 (L) 12.0 - 16.0 g/dL    Hematocrit 28.5 (L) 36.0 - 46.0 %    MCV 91 80 - 100 fL    MCH 29.2 26.0 - 34.0 pg    MCHC 31.9 (L) 32.0 - 36.0 g/dL    RDW 15.4 (H) 11.5 - 14.5 %    Platelets 121 (L) 150 - 450 x10*3/uL   Lactate Dehydrogenase   Result Value Ref Range     84 - 246 U/L   Magnesium   Result Value Ref Range    Magnesium 2.47 (H) 1.60 - 2.40 mg/dL   Renal Function Panel   Result Value Ref Range    Glucose 134 (H) 74 - 99 mg/dL    Sodium 150 (H) 136 - 145 mmol/L    Potassium 5.1 3.5 - 5.3 mmol/L    Chloride 112 (H) 98 - 107 mmol/L    Bicarbonate 33 (H) 21 - 32 mmol/L    Anion Gap 10 10 - 20 mmol/L    Urea Nitrogen 24 (H) 6 - 23 mg/dL    Creatinine 0.47 (L) 0.50 - 1.05 mg/dL    eGFR >90 >60 mL/min/1.73m*2    Calcium 8.7 8.6 - 10.6 mg/dL    Phosphorus 2.7 2.5 - 4.9 mg/dL    Albumin 3.5 3.4 - 5.0 g/dL   Blood Gas Arterial Full Panel   Result Value Ref Range     POCT pH, Arterial 7.54 (H) 7.38 - 7.42 pH    POCT pCO2, Arterial 39 38 - 42 mm Hg    POCT pO2, Arterial 290 (H) 85 - 95 mm Hg    POCT SO2, Arterial 100 94 - 100 %    POCT Oxy Hemoglobin, Arterial 97.7 94.0 - 98.0 %    POCT Hematocrit Calculated, Arterial 29.0 (L) 36.0 - 46.0 %    POCT Sodium, Arterial 145 136 - 145 mmol/L    POCT Potassium, Arterial 5.4 (H) 3.5 - 5.3 mmol/L    POCT Chloride, Arterial 116 (H) 98 - 107 mmol/L    POCT Ionized Calcium, Arterial 1.27 1.10 - 1.33 mmol/L    POCT Glucose, Arterial 144 (H) 74 - 99 mg/dL    POCT Lactate, Arterial 0.8 0.4 - 2.0 mmol/L    POCT Base Excess, Arterial 10.0 (H) -2.0 - 3.0 mmol/L    POCT HCO3 Calculated, Arterial 33.3 (H) 22.0 - 26.0 mmol/L    POCT Hemoglobin, Arterial 9.5 (L) 12.0 - 16.0 g/dL    POCT Anion Gap, Arterial 1 (L) 10 - 25 mmo/L    Patient Temperature 37.0 degrees Celsius    FiO2 40 %   Coagulation Screen   Result Value Ref Range    Protime 12.6 9.8 - 12.8 seconds    INR 1.1 0.9 - 1.1    aPTT 52 (H) 27 - 38 seconds   Heparin Assay, UFH   Result Value Ref Range    Heparin Unfractionated 0.6 See Comment Below for Therapeutic Ranges IU/mL   ECMO VENOUS FULL PANEL   Result Value Ref Range    POCT pH, Venous ECMO 7.36 Reference range not established pH    POCT pCO2, Venous ECMO 62 Reference range not established mm Hg    POCT pO2,  Venous  ECMO 49 Reference range not established mm Hg    POCT SO2, Venous ECMO 80 Reference range not established %    POCT Oxy Hemoglobin, Venous ECMO 78.1 Reference range not established %    POCT Hematocrit Calculated, Venous ECMO 28.0 (L) 36.0 - 46.0 %    POCT Sodium, Venous ECMO 147 (H) 136 - 145 mmol/L    POCT Potassium, Venous ECMO 5.2 3.5 - 5.3 mmol/L    POCT Chloride, Venous ECMO 115 (H) 98 - 107 mmol/L    POCT Ionized Calcium, Venous ECMO 1.29 1.10 - 1.33 mmol/L    POCT Glucose, Venous ECMO 136 (H) 74 - 99 mg/dL    POCT Lactate Venous ECMO 0.6 0.4 - 2.0 mmol/L    POCT Base Excess, Venous ECMO 8.1 Reference range not  established mmol/L    POCT HCO3 Calculated, Venous ECMO 35.0 Reference range not established mmol/L    POCT Hemoglobin, Venous ECMO 9.3 (L) 12.0 - 16.0 g/dL    POCT Anion Gap, Venous ECMO 2 Reference range not established mmo/L    Patient Temperature 37.0 degrees Celsius    FiO2 100 %   ECMO ARTERIAL BLOOD GAS   Result Value Ref Range    POCT pH, Arterial ECMO 7.38 Reference range not established pH    POCT pCO2, Arterial ECMO 58 Reference range not established mm Hg    POCT pO2,  Arterial ECMO 373 Reference range not established mm Hg    POCT SO2, Arterial ECMO 100 Reference range not established %    POCT Oxy Hemoglobin, Arterial ECMO 97.3 Reference range not established %    POCT Base Excess, Arterial ECMO 7.3 Reference range not established mmol/L    POCT HCO3 Calculated, Arterial ECMO 34.3 Reference range not established mmol/L    Patient Temperature 37.0 degrees Celsius    FiO2 100 %   Sodium   Result Value Ref Range    Sodium 151 (H) 136 - 145 mmol/L   POCT GLUCOSE   Result Value Ref Range    POCT Glucose 138 (H) 74 - 99 mg/dL   Sodium   Result Value Ref Range    Sodium 153 (H) 136 - 145 mmol/L   POCT GLUCOSE   Result Value Ref Range    POCT Glucose 130 (H) 74 - 99 mg/dL   Transthoracic Echo (TTE) Limited   Result Value Ref Range    LV EF 18 %    LVIDd 5.50 cm    LV A4C EF 36.2    Blood Gas Arterial Full Panel   Result Value Ref Range    POCT pH, Arterial 7.50 (H) 7.38 - 7.42 pH    POCT pCO2, Arterial 41 38 - 42 mm Hg    POCT pO2, Arterial 167 (H) 85 - 95 mm Hg    POCT SO2, Arterial 100 94 - 100 %    POCT Oxy Hemoglobin, Arterial 97.7 94.0 - 98.0 %    POCT Hematocrit Calculated, Arterial 28.0 (L) 36.0 - 46.0 %    POCT Sodium, Arterial 145 136 - 145 mmol/L    POCT Potassium, Arterial 5.0 3.5 - 5.3 mmol/L    POCT Chloride, Arterial 116 (H) 98 - 107 mmol/L    POCT Ionized Calcium, Arterial 1.28 1.10 - 1.33 mmol/L    POCT Glucose, Arterial 143 (H) 74 - 99 mg/dL    POCT Lactate, Arterial 0.8 0.4 - 2.0 mmol/L     POCT Base Excess, Arterial 8.1 (H) -2.0 - 3.0 mmol/L    POCT HCO3 Calculated, Arterial 32.0 (H) 22.0 - 26.0 mmol/L    POCT Hemoglobin, Arterial 9.4 (L) 12.0 - 16.0 g/dL    POCT Anion Gap, Arterial 2 (L) 10 - 25 mmo/L    Patient Temperature 37.0 degrees Celsius    FiO2 40 %   Calcium, Ionized   Result Value Ref Range    POCT Calcium, Ionized 1.21 1.1 - 1.33 mmol/L   CBC   Result Value Ref Range    WBC 8.7 4.4 - 11.3 x10*3/uL    nRBC 0.0 0.0 - 0.0 /100 WBCs    RBC 3.11 (L) 4.00 - 5.20 x10*6/uL    Hemoglobin 9.0 (L) 12.0 - 16.0 g/dL    Hematocrit 28.9 (L) 36.0 - 46.0 %    MCV 93 80 - 100 fL    MCH 28.9 26.0 - 34.0 pg    MCHC 31.1 (L) 32.0 - 36.0 g/dL    RDW 15.2 (H) 11.5 - 14.5 %    Platelets 118 (L) 150 - 450 x10*3/uL      Assessment/Plan   Assessment & Plan  Persistent atrial fibrillation (Multi)    Mitral regurgitation    1) Atrial fibrillation   EP  2) Mitral valve regurgitation   Review with CTS offload strategies  3) Cardiogenic Shock   VA-ECMO support   S/P pericardial drain  4) Colin/Pericarditis, possible   Has received steroids  5) Hypervolemia  6) Hypernatremia  7) Encephelopathy   Neuro consult. CT results noted. EEG results noted.  8) Lines   We will need to switch out femoral venous sheaths in a non-emergent fashion    SAT/SBT  Turn down trial with echo today, thereafter review with CTS/EP/HF  Maintain pericardial drain  Gentle fluids for hypernatremia  Palliative consultation per ECMO guideline    Due to the high probability of life threatening clinical decompensation, the patient required critical care time evaluating and managing this patient.  Critical care time included obtaining a history, examining the patient, ordering and reviewing studies, discussing, developing, and implementing a management plan, evaluating the patient's response to treatment, and discussion with other care team providers. I saw and evaluated the patient myself. I reviewed the provider's documentation and discussed the patient  with the provider. Critical care time was performed exclusive of billable procedures.    Critical Care Time: 40 minutes          Raymond Crawford MD

## 2024-11-04 NOTE — PROGRESS NOTES
Occupational Therapy                 Therapy Communication Note    Patient Name: Lolis Auguste  MRN: [id number]  Department: [facility] [id number] NEURODG  Room: 05/05-A  Today's Date: 11/4/2024     Discipline: Occupational Therapy    Missed Visit Reason: Missed Visit Reason: Patient placed on medical hold (Pt remains intubated and sedated and on ECMO. Hold OT at this time.)    Missed Time: Attempt

## 2024-11-04 NOTE — PROGRESS NOTES
Advanced Heart Failure Progress Note   Consulting Team:CTICU  Primary Cardiologist:Dr. Garcia  Reason for Consult: HFrEF, severe MR, afib w/ profound shock requiring VA-ECMO after induction for afib ablation    Subjective   NAEO. Underwent ECMO turndown trial today and did not tolerate lower flows on ECMO. She remains intubated and sedated, so we discussed the patient's available options with her  and daughter at bedside. They indicated that the patient is very independent and does not engage often with the medical system. They do not believe that an LVAD would be her preference given the upkeep and frequent monitoring required.      Objective   Physical Exam  Vitals:    11/04/24 1600   Pulse: 95   Resp: 12   Temp: 36.9 °C (98.4 °F)   SpO2: 100%       GEN: Ill appearing, well-developed, sedated  CV: Irregular rhythm, tachycardic, Distant heart sounds  LUNGS: Mechanical breath sounds bilaterally.  ABD: Soft, NT/ND, NBS, no masses or organomegaly.  SKIN: Warm, well perfused. No skin rashes or abnormal lesions. 1+ edema in BLE  MSK: Normal gait. No deformities.  EXT: No clubbing or cyanosis  NEURO: Intubated, not following commands this morning.    I have personally reviewed the following images and laboratory findings:    ECG:Atrial fib/flutter    Results for orders placed during the hospital encounter of 10/30/24    Transthoracic Echo (TTE) Limited    [facility], [address] [place] [address]  Tel [phone] and Fax [phone]    TRANSTHORACIC ECHOCARDIOGRAM REPORT      Patient Name:      ADOLPH Nelson Physician:    [id number] Garett Roque MD  Study Date:        10/31/2024           Ordering Provider:    [id number] THOMAS CLANCY  MRN/PID:           [id number]             Fellow:  Accession#:        [id number]         Nurse:  Date of Birth/Age: 1955 / 69      Sonographer:          Suresh morocho                                      RDCS, RVT  Gender:             F                    Additional Staff:  Height:            149.86 cm            Admit Date:  Weight:            63.05 kg             Admission Status:     Inpatient - STAT  BSA / BMI:         1.58 m2 / 28.07      Encounter#:           [id number]  kg/m2  Blood Pressure:    160/90 mmHg          Department Location:  [facility]    Study Type:    TRANSTHORACIC ECHO (TTE) LIMITED  Diagnosis/ICD: Cardiogenic shock-R57.0  Indication:    s/p ECMO decannulation, tamponade, hemorrhage  CPT Code:      Echo Limited-24981; Doppler Limited-32983; Color Doppler-72934    Patient History:  Pertinent History: CHF, cardiomyopathy, a-fib, MR.    Study Detail: The following Echo studies were performed: 2D, Doppler and color  flow. Definity used as a contrast agent for endocardial border  definition. Total contrast used for this procedure was 4 mL via IV  push.      PHYSICIAN INTERPRETATION:  Left Ventricle: Left ventricular ejection fraction is severely decreased, calculated by Wong's biplane at 18%. There is global hypokinesis of the left ventricle with minor regional variations. The left ventricular cavity size is mildly dilated. Left ventricular diastolic filling was indeterminate.  Left Atrium: The left atrium is enlarged.  Right Ventricle: The right ventricle is mildly enlarged. There is moderately reduced right ventricular systolic function.  Right Atrium: The right atrium is mildly dilated. There is a device visualized in the right atrium.  Aortic Valve: The aortic valve is trileaflet. There is mild aortic valve cusp calcification. There is mild aortic valve thickening. There is reduced systolic aortic valve leaflet excursion. There is no evidence of aortic valve regurgitation.  Mitral Valve: The mitral valve is mildly thickened. There is severe mitral valve regurgitation. The mitral regurgitant orifice area is 21 mm2. The mitral regurgitant volume is 19.44 ml. Severe functional MR, extending to the pulmonary  veins, leaflet tethering.  Tricuspid Valve: The tricuspid valve is structurally normal. There is mild tricuspid regurgitation. The Doppler estimated RVSP is within normal limits at 30.7 mmHg. Normal estimmated RVSP, likely underestimate.  Pulmonic Valve: The pulmonic valve is structurally normal. There is mild pulmonic valve regurgitation.  Pericardium: Trivial to small pericardial effusion.  Aorta: The aortic root is normal.      CONCLUSIONS:  1. Left ventricular ejection fraction is severely decreased, calculated by Wong's biplane at 18%.  2. There is global hypokinesis of the left ventricle with minor regional variations.  3. Left ventricular diastolic filling was indeterminate.  4. Left ventricular cavity size is mildly dilated.  5. There is moderately reduced right ventricular systolic function.  6. Mildly enlarged right ventricle.  7. The left atrium is enlarged.  8. Severe functional MR, extending to the pulmonary veins, leaflet tethering.  9. Severe mitral valve regurgitation.  10. Right ventricular systolic pressure is within normal limits.    QUANTITATIVE DATA SUMMARY:    2D MEASUREMENTS:          Normal Ranges:  LVEDV Index:     99 ml/m2      LV SYSTOLIC FUNCTION BY 2D PLANIMETRY (MOD):  Normal Ranges:  EF-A4C View:    18 % (>=55%)  EF-A2C View:    17 %  EF-Biplane:     18 %  LV EF Reported: 18 %      LV DIASTOLIC FUNCTION:           Normal Ranges:  MV Peak E:             1.01 m/s  (0.7-1.2 m/s)  MV e'                  0.078 m/s (>8.0)  MV lateral e'          0.10 m/s  MV medial e'           0.06 m/s  E/e' Ratio:            12.91     (<8.0)      MITRAL VALVE:          Normal Ranges:  MV Vmax:      1.22 m/s (<=1.3m/s)  MV peak P.0 mmHg (<5mmHg)  MV mean P.0 mmHg (<2mmHg)  MV Annulus:   3.30 cm  (1.8-3.1cm)      MITRAL INSUFFICIENCY:             Normal Ranges:  PISA Radius:          0.6 cm  MR VTI:               90.90 cm  MR Vmax:              384.00 cm/s  MR Alias Jose Carlos:         36.3 cm/s  MR  Volume:            19.44 ml  MR Flow Rt:           82.11 ml/s  MR EROA:              21 mm2  MR RF                 10.00 %      AORTIC VALVE:          Normal Ranges:  LVOT Diameter: 2.10 cm (1.8-2.4cm)      RIGHT VENTRICLE:  TAPSE: 9.7 mm  RV s'  0.08 m/s      TRICUSPID VALVE/RVSP:          Normal Ranges:  Peak TR Velocity:     2.63 m/s  RV Syst Pressure:     31 mmHg  (< 30mmHg)      [id number] Garett Roque MD  Electronically signed on 10/31/2024 at 1:38:08 PM        ** Final **       Imaging  Transthoracic Echo (TTE) Limited   Final Result      CT head wo IV contrast   Final Result   No definitive evidence of acute cortical infarct or acute   intracranial hemorrhage. MRI with diffusion-imaging would be a more   sensitive means of assessing for acute ischemic injury.        MACRO:   None        Signed by: Moraima Gil 11/3/2024 12:26 PM   Dictation workstation:   [id number]      XR chest 1 view   Final Result   1.  Interval improvement in left basilar opacity with persistent   small left pleural effusion on a background of pulmonary edema.   2. Medical devices as above.                  MACRO:   None        Signed by: Arnie Moss 11/3/2024 1:36 PM   Dictation workstation:   [id number]      XR chest 1 view   Final Result   1.  Persistent, but improved perihilar and interstitial edema.   2. Persistent left basilar opacity with blunting of the costophrenic   angle. Underlying pleural effusion and associated atelectasis is not   excluded.   3. Medical devices as above.        I personally reviewed the images/study and I agree with the findings   as stated by Rosalind Garay MD. This study was interpreted at   [facility], [place], [place].        MACRO:   None        Signed by: Arnie Moss 11/2/2024 11:54 AM   Dictation workstation:   [id number]      Transthoracic Echo (TTE) Limited   Final Result      Electrophysiology procedure   Final Result      XR chest 1 view   Final  Result   1. Overall slight improvement in diffuse pulmonary edema.   2. Possibility of left basilar pleural effusion and associated   atelectasis, not excluded.   3. Medical devices as above. No pneumothorax.        Signed by: Harris Huber 11/1/2024 11:37 AM   Dictation workstation:   [id number]      Transthoracic Echo (TTE) Limited   Final Result      XR abdomen 1 view   Final Result   1.  Interval placement of enteric tube with tip overlying the   expected location of the gastric body.   2. Paucity of bowel gas.   3. Unchanged left basilar opacity favored to represent   atelectasis/effusion.   4.  Medical devices as described above.        I personally reviewed the images/study and I agree with Brenda Marx DO's (radiology resident) findings as stated. This study   was interpreted at [place], [place].        MACRO:   None        Signed by: Sawyer Miguel 10/31/2024 1:57 PM   Dictation workstation:   [id number]      XR chest 1 view   Final Result   1.  No significant interval change in appearance of the chest with   persistent retrocardiac opacity favored to represent   atelectasis/pleural effusion. Superimposed infectious/inflammatory   process can not be fully excluded.   2. Similar degree of pulmonary interstitial edema.   3. Postsurgical changes and medical devices as described above.        I personally reviewed the images/study and I agree with Brenda Marx DO's (radiology resident) findings as stated. This study   was interpreted at [place], [place].        MACRO:   None        Signed by: Sawyer Miguel 10/31/2024 1:33 PM   Dictation workstation:   [id number]      XR chest 1 view   Final Result   1. Interval worsening lung aeration with findings suggestive of   central vascular congestion and pulmonary interstitial edema.   Moderate left pleural effusion with associated    atelectasis/consolidation.   2. Medical devices as above.        I personally reviewed the images/study and I agree with the findings   as stated by Rosalind Garay MD. This study was interpreted at   [facility], [place], [place].        MACRO:   None        Signed by: Sawyer Miguel 10/31/2024 9:57 AM   Dictation workstation:   [id number]            Lab Review     Troponin I, High Sensitivity   Date/Time Value Ref Range Status   10/23/2024 02:52 PM 14 (H) 0 - 13 ng/L Final   10/23/2024 12:54 PM 12 0 - 13 ng/L Final     BNP   Date/Time Value Ref Range Status   10/23/2024 12:54  (H) 0 - 99 pg/mL Final        Assessment and Plan     Lolis Auguste is a 69 y.o. female with a PMHx of recently diagnosed Afib c/b SOLOMON thrombus and HFrEF (EF 25%) who was transferred from [facility] on 10/30/2024 for urgent RFA/PVI on 10/30 in the setting of persistent afib failing Tikosyn load and DCCV x 2 (last 10/28). Course was c/b multipressor shock requiring epi, levo, and vasopressin during induction for planned afib ablation. She was found to have a moderate pericardial effusion and underwent urgent pericardiocentesis with 1L of bloody output. Her hemodynamics did not improve and she was cannulated for VA-ECMO on 10/30 after shock call. Heart failure was consulted regarding evaluation and management of HFrEF and shock.     The etiology of her HFrEF is not entirely clear at this point. Although it was assumed to be tachycardia induced cardiomyopathy, she has not had an ischemic evaluation and her 2 years of symptoms suggests a more chronic process. Her TTE also demonstrates severe, wide open MR. In light of this, were considering the possibility of an axillary Impella to further offload her LV given that she failed her ECMO turn down trial on 11/4. After extensive discussion with the patient's family today, they indicated that the patient would likely not be agreeable to an LVAD as she  does not engage frequently with the hospital system and that the maintenance of an LVAD would interfere with her quality of life. They are faithful that additional rest will allow her cardiac function to improve on its own given that she was so functional prior to her hospitalization.     #Acute Decompensated HF (EF 15%) w/ biventricular failure  #Cardiogenic Shock  #Severe MR  #TdP and Vfib w/ 5x shock, now on lidocaine (10/30)  #Afib w/ RVR s/p multiple failed DCCV  - Diuresis: Per CTICU team  - MCS: Cannulated for VA-ECMO on 10/30.   - Advanced therapies evaluation: After discussion with her family, they believe strongly that an LVAD would be against her wishes. Based on her ECMO turndown trial, an Impella would be unlikely to offer additional benefit. Her family prefers to give a few more days on ECMO to see if she improves on her own. As such, she is not currently being considered for advanced therapies.  - Inotropes: None  - Pressors: epinephrine 0.02. Continue to wean as able.              - Levophed gtt weaned off 10/31              - Weaned off vasopressin 10/31  - GDMT: Hold in the setting of shock  - Will eventually need ischemic evaluation for new HFrEF once she is more stable  - We will sign off.    Thank you for the opportunity to involve us in the care of this patient. This plan was discussed with Dr. Perez, HF attending. For any questions or concerns, feel free to reach out via [employer] chat or page at [phone].    Brent Ledesma MD   Heart Failure Fellow  PGY-4

## 2024-11-04 NOTE — PROGRESS NOTES
Lolis Auguste is a 69 y.o. female on day 5 of admission presenting with Persistent atrial fibrillation (Multi).      Patient requires ECMO support. Drainage cannula site, cannula, pump, oxygenator, return cannula and return cannula site clinically inspected. RPM and sweep reviewed and adjusted appropriate to clinical context. Anticoagulation status and intensity discussed. Plan for weaning, next clinical steps discussed with team and family. Discussed with cardiothoracic surgeon, perfusion, palliative care and physical/occupational therapy    ECMO Indication: CS  ECMO Cannula Configuration: VAfemfem  ECMO circuit run from drainage cannula to pump to oxygenator to return cannula: Y  Presence of cannula bleeding: N  Presence of oxygenator clot: N  Pre/post PaO2 adequate: Y  LV Unloading/Pulse Pressure: intrinsic ejection/epi  Distal Perfusion: adequate  Anticoagulation Plan/Monitoring: heparin  Mobility Plan/Candidacy: turn down trial  Path to decannulation/anticipated decannulation date:iunclear, turn down trial today. Review- with HF/CTS/EP.      dexmedeTOMIDine, 0-1.5 mcg/kg/hr, Last Rate: 0.5 mcg/kg/hr (11/04/24 1311)  dextrose 5%, 30 mL/hr, Last Rate: 30 mL/hr (11/04/24 1200)  EPINEPHrine, 0.02 mcg/kg/min, Last Rate: 0.02 mcg/kg/min (11/04/24 1200)  heparin, 0-4,000 Units/hr, Last Rate: 800 Units/hr (11/04/24 1311)  propofol, 0-50 mcg/kg/min, Last Rate: 40 mcg/kg/min (11/04/24 1311)          Vent Mode: Volume control/assist control  FiO2 (%):  [40 %] 40 %  S RR:  [12] 12  S VT:  [350 mL] 350 mL  PEEP/CPAP (cm H2O):  [5 cm H20] 5 cm H20  MAP (cm H2O):  [5-6] 5             Assessment/Plan   Assessment & Plan  Persistent atrial fibrillation (Multi)    Mitral regurgitation    I, as the attending critical care physician, have personally reviewed the recent patient history, physical exam, vital signs, significant labs, medications, imaging, and ECMO settings, oxygenator, and flows of this critically ill patient. Using  this information I have and will continue to actively manage this critically ill patient's extracorporeal membrane oxygenation (ECMO)/extracorporeal life support (ECLS).   Raymond Crawford MD

## 2024-11-04 NOTE — PROGRESS NOTES
Physical Therapy                 Therapy Communication Note    Patient Name: Lolis Auguste  MRN: [id number]  Department: [facility] [id number] NEURODG  Room: 05/05-A  Today's Date: 11/4/2024     Discipline: Physical Therapy    Missed Visit Reason:  (Pt remains intubated/sedated and on ECMO.  Will hold PT and re-attempt once medically appropriate.)    Missed Time: Attempt

## 2024-11-04 NOTE — NURSING NOTE
"Multidisciplinary ECMO Rounds Note    Attendance:  CT Surgeon: Dr. Delgadillo  CTICU Attending: Dr. Crawford  Palliative Care Attending: N  Physical Therapy Present (Y/N): N  Perfusion Present (Y/N): Y    ECMO Indication: Cardiogenic shock  ECMO Cannula Configuration: Left fem VA    Changes made to Hemodynamic/Ventilator/ECMO Management: Goal will be wean to extubate so that patient can participate in GOC conversation with family    Circuit Concerns: No clots noted, Ecmo Pao2 373  Bleeding Concerns: No s/s of bleeding  Clotting/Ischemia Concerns: pulses positive in BLE  Anticoagulation Plan/Monitoring: Therapeutic on systemic heparin    Mobility Plan/Candidacy: Not at this time  Nutrition: Tube feeding  Patient/Family Concerns: Family is being supported by palliative care and spiritual care, they stated they \"were not expecting this.\"  Nursing Concerns: None at this time    Patients Minimally Acceptable Outcome: Pt has stated in the past that she wouldn't want to be in a health state where she is dependent on others for her ADL's.  Barriers to decannulation/anticipated decannulation date: Turn down echo today did not show improvement. Pt is not a candidate for LVAD or transplant. Pt does not have consistent pulsatility. Family is aware and plan is to extubate patient so that family, patient, palliative care, and cticu team can have GOC discussion    ECMO:     Most Recent Range Past 24hrs   Flow 3.34 Patient Flow (L/min)  Min: 2.85  Max: 3.51   Speed 3000 Circuit Flow (RPM)  Min: 2710  Max: 3100   Sweep 0.5 Sweep Gas Flow Rate (L/min)  Min: 0.5  Max: 0.5       dexmedeTOMIDine, 0-1.5 mcg/kg/hr, Last Rate: 0.5 mcg/kg/hr (11/04/24 1400)  dextrose 5%, 30 mL/hr, Last Rate: 30 mL/hr (11/04/24 1400)  EPINEPHrine, 0.02 mcg/kg/min, Last Rate: 0.02 mcg/kg/min (11/04/24 1400)  heparin, 0-4,000 Units/hr, Last Rate: 800 Units/hr (11/04/24 1400)  propofol, 0-50 mcg/kg/min, Last Rate: 40 mcg/kg/min (11/04/24 1400)          Vent Mode: " Volume control/assist control  FiO2 (%):  [40 %] 40 %  S RR:  [12] 12  S VT:  [350 mL] 350 mL  PEEP/CPAP (cm H2O):  [5 cm H20] 5 cm H20  MAP (cm H2O):  [5-6] 5

## 2024-11-04 NOTE — PROGRESS NOTES
CTICU Progress Note  Lolis Auguste/[id number]    Admit Date: 10/30/2024  Hospital Length of Stay: 5   ICU Length of Stay: 4d 13h   CT SURGEON:     SUBJECTIVE:   HyperNa improving, Na 157 -> 151.  D5W discontinued.    Pulsatility continues to have variable / inconsistent    She was hemodynamically unstable with hypotension MAPs 40's and poor pulsatility during ECMO turn down trial to 1LPM.  LVEF remains 15-20% with severe RV dysfunction by TTE today.      MEDICATIONS  Infusions:  dexmedeTOMIDine, Last Rate: 0.5 mcg/kg/hr (11/04/24 0608)  EPINEPHrine, Last Rate: 0.02 mcg/kg/min (11/04/24 0600)  heparin, Last Rate: 800 Units/hr (11/04/24 0600)  propofol, Last Rate: 40 mcg/kg/min (11/04/24 0608)      Scheduled:  esomeprazole, 20 mg, Daily before breakfast  insulin lispro, 0-15 Units, q4h  mupirocin, , BID  polyethylene glycol, 17 g, Daily  sennosides-docusate sodium, 2 tablet, BID      PRN:  acetaminophen, 650 mg, q4h PRN  calcium gluconate, 1 g, q6h PRN  calcium gluconate, 2 g, q6h PRN  dextrose, 25 g, q15 min PRN   Or  glucagon, 1 mg, q15 min PRN  hydrALAZINE, 10 mg, q4h PRN  HYDROmorphone, 0.2 mg, q2h PRN  magnesium sulfate, 2 g, q6h PRN  magnesium sulfate, 4 g, q6h PRN  naloxone, 0.2 mg, q5 min PRN  oxyCODONE, 5 mg, q4h PRN  oxygen, , Continuous PRN - O2/gases  potassium chloride, 20 mEq, q6h PRN  potassium chloride, 40 mEq, q6h PRN        PHYSICAL EXAM:   Visit Vitals  Pulse (!) 128   Temp 36.6 °C (97.9 °F)   Resp 12   SpO2 100%   OB Status Postmenopausal   Smoking Status Never     Wt Readings from Last 5 Encounters:   10/28/24 63.5 kg (139 lb 15.9 oz)   10/23/24 64.9 kg (143 lb)   09/30/24 66.7 kg (147 lb)   09/20/24 63.5 kg (140 lb)   09/09/24 66 kg (145 lb 6.4 oz)     INTAKE/OUTPUT:  I/O last 3 completed shifts:  In: 5657.3 [I.V.:2882.3; NG/GT:2675; IV Piggyback:100]  Out: 4260 [Urine:3865; Emesis/NG output:300; Drains:95]         Vent settings:  Vent Mode: Assist control/Volume control plus  FiO2 (%):  [40 %] 40  %  S RR:  [12] 12  S VT:  [350 mL] 350 mL  PEEP/CPAP (cm H2O):  [5 cm H20] 5 cm H20  MAP (cm H2O):  [5-7] 5.3    Physical Exam:   - CONSTITUTION: older appearing female in ICU bed on VA ECMO  - NEUROLOGIC: BUSH on command, encephalopathic   - CARDIOVASCULAR: artrial tachyarrythmia vs NSR in 90s. VA ECMO fem/fem with distal perfusion cannula.   - RESPIRATORY: intubated. Clear bilaterally.   - GI: soft, hypoactive BS  - : urbina with yellow urine  - EXTREMITIES: warm, well perfused. Pulses present.   - SKIN: intact  - PSYCHIATRIC: Teresa    Daily Risk Screen  Intubated: critically ill  Central line: vasoactives  Urbina: strict I/Os    Images: personally reviewed, TTE with ECMO turn down LVEF appears unchanged 10-20%, dilated RV     Invasive Hemodynamics:    Most Recent Range Past 24hrs   BP (Art) 119/89 Arterial Line BP 1  Min: 93/76  Max: 119/89   MAP(Art) 95 mmHg Arterial Line MAP 1 (mmHg)   Min: 78 mmHg  Max: 95 mmHg   RA/CVP   No data recorded   PA   No data recorded   PA(mean)   No data recorded   CO   No data recorded   CI   No data recorded   Mixed Venous 80.6 % SVO2 (%)  Min: 67.7 %  Max: 83.5 %   SVR    No data recorded         Assessment/Plan   Lolis Auguste is a 69 y.o. female with PMH of newly diagnosed persistent AF on Eliquis s/p failed DCCV 10/18/2024, SOLOMON thrombus and aborted DCCV in 9/2024, and acute systolic HF [potentially tachy-mediated], who was recently admitted to [facility] on 10/23/2024 for AF RVR. Patient was seen by cardiologist today for routine follow-up, noted to be in persistent AF RVR. She was recently cardioverted 10/18 and discharged on Amiodarone but was unable to tolerated d/t extreme fatigue. Dofetilide 250 mcg BID was loaded but AF persisted and she ewas referred to [facility] EP for RFA/PVI.    During the attempted ablation 10/30/24 she decompensated with hypotnesion and then      vfib arrest requiring defibrillation and DCCV x3 for SVT.  There was a large pericardial effusion  and a drain placed with 1000ml hemorhagic fluid drained.  She remains unstable with severe cardiogenic shock and was then cannulated for VA ECMO in the EP lab. She was then admitted to the CTICU where she again had vfib and torsades and was shocked five times.  She remains critically ill with biventricular failure cardiogenic shock on VA EMCO.       NEURO: Patient is intubated and sedated on propofol and dexmedetomidine infusions. Poor vent synchrony off sedation but very slow to arouse. Intermitently following commands in all extremities, c/f right side with lateralized gaze preference. CT scan 11/3 without acute pathology. EEG 11/3 without seizures.  She continues to have metabolic encephalopathy.       - Serial neuro and pain assessments    - Appreciate Neurology Consult, they ar s/o pending new concerns     - Wean propofol and dexmedetomidine as needed for RASS 0 to -2  - Scheduled Tylenol   - PRN oxycodone  - PRN dilaudid for pain   - PT/OT Consults   - CAM ICU score qshift  - Sleep/wake cycle hygiene     CV:  Patient has a history of atrial fibrillation, NICM possible r/t arrythmias. She is now status post VA ECMO cannulation for acute on chronic systolic heart failure/cardiogenic shock, and pericardial effusion s/p drainage . She developed torsades in setting of amio with recent dofetilide, now improved off amio and on lido with therapeutic level. Intermittent atrial tachyarrythmias that are rate control. Echo 11/1 with EF 15-20%, severe LVH, reduced RV function and severe MR. VA ECMO 3L/sweep 0.5/ Fio2 100% with epi now 0.04 mcg/kg/min. Significant variability in pulsatility continues with many QRS complexes without pulsatility. Pericardial drain with 65 ml in last 24 hrs.  She has completed 3 doses of empiric methylpred 1GM for possible myocarditis.  She did not tolerate ECMO turn down well today with severe hypotension and poor pulsatility with ECMO flow at 1L.  RV failure precludes transition to Impella  at this time.       - Maintain goal MAP > 65, PRN hydral  - f/u pericardial fluid studies, cytology negative  - Avoid QT prolonging agents including amiodarone until 11/7 at earliest due to recent dofetilide load.  - Appreciate HF and EP Consults  - Hold home metoprolol  - Pending [facility], it may be helpful to get records from [facility] in [place] where it sounds like she has previously been evaluated for severe mitral regurgitation.       PULM:  No history of pulmonary disease.  Currently intubated on ventilator with appropriate vent pressures. ECMO with sweep 0.5 and Fio2 100%. -->  - Daily CXR  - Daily SBTs, goal extubation 11/5 as she remains dependent on VA ECMO without plan to transition to Impella at this time   - Adjust sweep to maintain pH 7.3 - 7.5  - Wean FiO2 maintaining SpO2 >92%.      GI:  OG in place. Mildly elevated LFTs, normalized.  Tolerating Tfs  -->  - Continue PPI until extubated  - Nutrition recs, start TF and advance to goal  - Colace/senna BID and miralax BID     : No history of renal disease, baseline creatinine 0.8. Creatinine stable post-op. Urbina in place and making adequate UOP. Mildly hypervolemic to euvolemic on exam.  Hypernatremia improved over last 48 hrs, calculated free water deficit remains 1L. -->  - Continue urbina catheter for strict I/Os.  - RFP as clinically indicated  - Replete electrolytes per CTICU protocol  - Holding home furosemide   - Continue FWF 300ml q4h  - Resume D5W @ 30ml/hr x 24 hrs for hypernatremia      ENDO:  No past medical history of DM. A1c: 5.6. Acceptable glycemic control. -->  - Maintain BG <180, SS 3 q4hrs insulin per CTICU protocol     HEME:  Acute blood loss anemia with bloody output of unclear etiology from pericardial drain which is now decreased. Hgb stable. -->    - CBC daily   - Continue systemic heparin for afib and ecmo, monitor pericardial drain output  - SCDs for DVT prophylaxis.  - Last type and screen: 11/4   - Holding home apixaban      ID:   Afebrile, no current indications of infection but emergent lines placed. Completed zosyn x48hrs. Pericardial fluid with no growth on culture, final. -->  - Trend temp q4h  - continue mupirocin x 5 days for MSSA decolinization     Skin:  No active skin issues.  - preventative Mepilex dressings in place on sacrum and heels  - change preventative Mepilex weekly or more frequently as indicated (when moist/soiled)   - every shift skin assessment per nursing and weekly ICU skin rounds  - moisture barrier to be applied with earnestine care  - active skin problems addressed with nursing on daily rounds     Proph:  SCDs  Systemic heparin  PPI     G:  Line  Femoral venous and arterial sheaths 10/30  R radial pollo 10/30  PIVx3     F: Family: , sister, and dulat children updated at beside.  I articulated the extent of her biventricular heart failurecurrent treatment options and limitations, and high risk for further morbidity and mortality.  They have varying decrease of understanding at this point. They are also hopeful that the patient can be extubated soon and will annalise able to participate in GOC conversations herself.  I appreciate Palliative Care Consult for family support.        A,B,C,D,E,F,G: reviewed     Dispo: CTICU care for now.    CTICU TEAM PHONE [phone]

## 2024-11-05 LAB
ALBUMIN SERPL BCP-MCNC: 3.2 G/DL (ref 3.4–5)
ALBUMIN SERPL BCP-MCNC: 3.3 G/DL (ref 3.4–5)
ALBUMIN SERPL BCP-MCNC: 3.5 G/DL (ref 3.4–5)
ANION GAP BLDA CALCULATED.4IONS-SCNC: -2 MMO/L (ref 10–25)
ANION GAP BLDA CALCULATED.4IONS-SCNC: -3 MMO/L (ref 10–25)
ANION GAP BLDA CALCULATED.4IONS-SCNC: 0 MMO/L (ref 10–25)
ANION GAP BLDA CALCULATED.4IONS-SCNC: 1 MMO/L (ref 10–25)
ANION GAP BLDA CALCULATED.4IONS-SCNC: 2 MMO/L (ref 10–25)
ANION GAP BLDA CALCULATED.4IONS-SCNC: 3 MMO/L (ref 10–25)
ANION GAP BLDA CALCULATED.4IONS-SCNC: 4 MMO/L (ref 10–25)
ANION GAP BLDV CALCULATED.4IONS-SCNC: 1 MMO/L
ANION GAP SERPL CALC-SCNC: 11 MMOL/L (ref 10–20)
ANION GAP SERPL CALC-SCNC: 8 MMOL/L (ref 10–20)
ANION GAP SERPL CALC-SCNC: 8 MMOL/L (ref 10–20)
APTT PPP: 46 SECONDS (ref 27–38)
BASE EXCESS BLDA CALC-SCNC: 15.6 MMOL/L (ref -2–3)
BASE EXCESS BLDA CALC-SCNC: 15.8 MMOL/L (ref -2–3)
BASE EXCESS BLDA CALC-SCNC: 7.2 MMOL/L (ref -2–3)
BASE EXCESS BLDA CALC-SCNC: 7.4 MMOL/L (ref -2–3)
BASE EXCESS BLDA CALC-SCNC: 7.7 MMOL/L (ref -2–3)
BASE EXCESS BLDA CALC-SCNC: 8.8 MMOL/L
BASE EXCESS BLDA CALC-SCNC: 9 MMOL/L (ref -2–3)
BASE EXCESS BLDA CALC-SCNC: 9.6 MMOL/L (ref -2–3)
BASE EXCESS BLDV CALC-SCNC: 7.8 MMOL/L
BLOOD EXPIRATION DATE: NORMAL
BLOOD EXPIRATION DATE: NORMAL
BODY TEMPERATURE: 37 DEGREES CELSIUS
BUN SERPL-MCNC: 27 MG/DL (ref 6–23)
BUN SERPL-MCNC: 27 MG/DL (ref 6–23)
BUN SERPL-MCNC: 28 MG/DL (ref 6–23)
CA-I BLD-SCNC: 1.19 MMOL/L (ref 1.1–1.33)
CA-I BLD-SCNC: 1.21 MMOL/L (ref 1.1–1.33)
CA-I BLDA-SCNC: 1.2 MMOL/L (ref 1.1–1.33)
CA-I BLDA-SCNC: 1.2 MMOL/L (ref 1.1–1.33)
CA-I BLDA-SCNC: 1.22 MMOL/L (ref 1.1–1.33)
CA-I BLDA-SCNC: 1.22 MMOL/L (ref 1.1–1.33)
CA-I BLDA-SCNC: 1.23 MMOL/L (ref 1.1–1.33)
CA-I BLDA-SCNC: 1.29 MMOL/L (ref 1.1–1.33)
CA-I BLDA-SCNC: 1.3 MMOL/L (ref 1.1–1.33)
CA-I BLDV-SCNC: 1.29 MMOL/L (ref 1.1–1.33)
CALCIUM SERPL-MCNC: 8.5 MG/DL (ref 8.6–10.6)
CALCIUM SERPL-MCNC: 8.5 MG/DL (ref 8.6–10.6)
CALCIUM SERPL-MCNC: 8.6 MG/DL (ref 8.6–10.6)
CHLORIDE BLDA-SCNC: 109 MMOL/L (ref 98–107)
CHLORIDE BLDA-SCNC: 109 MMOL/L (ref 98–107)
CHLORIDE BLDA-SCNC: 115 MMOL/L (ref 98–107)
CHLORIDE BLDA-SCNC: 116 MMOL/L (ref 98–107)
CHLORIDE BLDA-SCNC: 117 MMOL/L (ref 98–107)
CHLORIDE BLDV-SCNC: 116 MMOL/L (ref 98–107)
CHLORIDE SERPL-SCNC: 103 MMOL/L (ref 98–107)
CHLORIDE SERPL-SCNC: 110 MMOL/L (ref 98–107)
CHLORIDE SERPL-SCNC: 111 MMOL/L (ref 98–107)
CO2 SERPL-SCNC: 36 MMOL/L (ref 21–32)
CO2 SERPL-SCNC: 37 MMOL/L (ref 21–32)
CO2 SERPL-SCNC: 38 MMOL/L (ref 21–32)
CREAT SERPL-MCNC: 0.47 MG/DL (ref 0.5–1.05)
CREAT SERPL-MCNC: 0.48 MG/DL (ref 0.5–1.05)
CREAT SERPL-MCNC: 0.48 MG/DL (ref 0.5–1.05)
DISPENSE STATUS: NORMAL
DISPENSE STATUS: NORMAL
EGFRCR SERPLBLD CKD-EPI 2021: >90 ML/MIN/1.73M*2
ERYTHROCYTE [DISTWIDTH] IN BLOOD BY AUTOMATED COUNT: 13.7 % (ref 11.5–14.5)
ERYTHROCYTE [DISTWIDTH] IN BLOOD BY AUTOMATED COUNT: 14.6 % (ref 11.5–14.5)
ERYTHROCYTE [DISTWIDTH] IN BLOOD BY AUTOMATED COUNT: 14.8 % (ref 11.5–14.5)
ERYTHROCYTE [DISTWIDTH] IN BLOOD BY AUTOMATED COUNT: 15 % (ref 11.5–14.5)
GLUCOSE BLD MANUAL STRIP-MCNC: 105 MG/DL (ref 74–99)
GLUCOSE BLD MANUAL STRIP-MCNC: 142 MG/DL (ref 74–99)
GLUCOSE BLD MANUAL STRIP-MCNC: 151 MG/DL (ref 74–99)
GLUCOSE BLDA-MCNC: 136 MG/DL (ref 74–99)
GLUCOSE BLDA-MCNC: 138 MG/DL (ref 74–99)
GLUCOSE BLDA-MCNC: 156 MG/DL (ref 74–99)
GLUCOSE BLDA-MCNC: 158 MG/DL (ref 74–99)
GLUCOSE BLDA-MCNC: 159 MG/DL (ref 74–99)
GLUCOSE BLDA-MCNC: 177 MG/DL (ref 74–99)
GLUCOSE BLDA-MCNC: 235 MG/DL (ref 74–99)
GLUCOSE BLDV-MCNC: 156 MG/DL (ref 74–99)
GLUCOSE SERPL-MCNC: 154 MG/DL (ref 74–99)
GLUCOSE SERPL-MCNC: 155 MG/DL (ref 74–99)
GLUCOSE SERPL-MCNC: 160 MG/DL (ref 74–99)
HCO3 BLDA-SCNC: 30.4 MMOL/L (ref 22–26)
HCO3 BLDA-SCNC: 30.4 MMOL/L (ref 22–26)
HCO3 BLDA-SCNC: 32 MMOL/L (ref 22–26)
HCO3 BLDA-SCNC: 34.5 MMOL/L (ref 22–26)
HCO3 BLDA-SCNC: 35.7 MMOL/L (ref 22–26)
HCO3 BLDA-SCNC: 37 MMOL/L
HCO3 BLDA-SCNC: 40.4 MMOL/L (ref 22–26)
HCO3 BLDA-SCNC: 41.7 MMOL/L (ref 22–26)
HCO3 BLDV-SCNC: 34.5 MMOL/L
HCT VFR BLD AUTO: 19 % (ref 36–46)
HCT VFR BLD AUTO: 22.3 % (ref 36–46)
HCT VFR BLD AUTO: 23.6 % (ref 36–46)
HCT VFR BLD AUTO: 27.8 % (ref 36–46)
HCT VFR BLD EST: 19 % (ref 36–46)
HCT VFR BLD EST: 24 % (ref 36–46)
HCT VFR BLD EST: 25 % (ref 36–46)
HCT VFR BLD EST: 25 % (ref 36–46)
HCT VFR BLD EST: 26 % (ref 36–46)
HCT VFR BLD EST: 27 % (ref 36–46)
HGB BLD-MCNC: 6 G/DL (ref 12–16)
HGB BLD-MCNC: 7.5 G/DL (ref 12–16)
HGB BLD-MCNC: 7.8 G/DL (ref 12–16)
HGB BLD-MCNC: 8.7 G/DL (ref 12–16)
HGB BLDA-MCNC: 6.2 G/DL (ref 12–16)
HGB BLDA-MCNC: 8.1 G/DL (ref 12–16)
HGB BLDA-MCNC: 8.4 G/DL (ref 12–16)
HGB BLDA-MCNC: 8.4 G/DL (ref 12–16)
HGB BLDA-MCNC: 8.9 G/DL (ref 12–16)
HGB BLDA-MCNC: 8.9 G/DL (ref 12–16)
HGB BLDA-MCNC: 9 G/DL (ref 12–16)
HGB BLDV-MCNC: 8.8 G/DL (ref 12–16)
INHALED O2 CONCENTRATION: 100 %
INHALED O2 CONCENTRATION: 100 %
INHALED O2 CONCENTRATION: 40 %
INR PPP: 1.1 (ref 0.9–1.1)
LACTATE BLDA-SCNC: 0.5 MMOL/L (ref 0.4–2)
LACTATE BLDA-SCNC: 0.8 MMOL/L (ref 0.4–2)
LACTATE BLDA-SCNC: 1 MMOL/L (ref 0.4–2)
LACTATE BLDA-SCNC: 1 MMOL/L (ref 0.4–2)
LACTATE BLDA-SCNC: 1.1 MMOL/L (ref 0.4–2)
LACTATE BLDA-SCNC: 1.3 MMOL/L (ref 0.4–2)
LACTATE BLDA-SCNC: 1.4 MMOL/L (ref 0.4–2)
LACTATE BLDV-SCNC: 1.5 MMOL/L (ref 0.4–2)
LDH SERPL L TO P-CCNC: 231 U/L (ref 84–246)
MAGNESIUM SERPL-MCNC: 2.11 MG/DL (ref 1.6–2.4)
MAGNESIUM SERPL-MCNC: 2.5 MG/DL (ref 1.6–2.4)
MCH RBC QN AUTO: 28.7 PG (ref 26–34)
MCH RBC QN AUTO: 28.9 PG (ref 26–34)
MCH RBC QN AUTO: 29.3 PG (ref 26–34)
MCH RBC QN AUTO: 29.8 PG (ref 26–34)
MCHC RBC AUTO-ENTMCNC: 31.3 G/DL (ref 32–36)
MCHC RBC AUTO-ENTMCNC: 31.6 G/DL (ref 32–36)
MCHC RBC AUTO-ENTMCNC: 31.8 G/DL (ref 32–36)
MCHC RBC AUTO-ENTMCNC: 35 G/DL (ref 32–36)
MCV RBC AUTO: 85 FL (ref 80–100)
MCV RBC AUTO: 91 FL (ref 80–100)
MCV RBC AUTO: 92 FL (ref 80–100)
MCV RBC AUTO: 92 FL (ref 80–100)
NRBC BLD-RTO: 0 /100 WBCS (ref 0–0)
OXYHGB MFR BLDA: 79.4 %
OXYHGB MFR BLDA: 97.2 % (ref 94–98)
OXYHGB MFR BLDA: 97.4 % (ref 94–98)
OXYHGB MFR BLDA: 97.7 % (ref 94–98)
OXYHGB MFR BLDA: 97.8 % (ref 94–98)
OXYHGB MFR BLDA: 98 % (ref 94–98)
OXYHGB MFR BLDA: 98.2 % (ref 94–98)
OXYHGB MFR BLDA: 98.2 % (ref 94–98)
OXYHGB MFR BLDV: 97.7 %
PCO2 BLDA: 27 MM HG (ref 38–42)
PCO2 BLDA: 34 MM HG (ref 38–42)
PCO2 BLDA: 46 MM HG (ref 38–42)
PCO2 BLDA: 52 MM HG (ref 38–42)
PCO2 BLDA: 53 MM HG (ref 38–42)
PCO2 BLDA: 60 MM HG (ref 38–42)
PCO2 BLDA: 67 MM HG
PCO2 BLDA: 76 MM HG (ref 38–42)
PCO2 BLDV: 61 MM HG
PH BLDA: 7.28 PH (ref 7.38–7.42)
PH BLDA: 7.35 PH
PH BLDA: 7.43 PH (ref 7.38–7.42)
PH BLDA: 7.45 PH (ref 7.38–7.42)
PH BLDA: 7.45 PH (ref 7.38–7.42)
PH BLDA: 7.49 PH (ref 7.38–7.42)
PH BLDA: 7.56 PH (ref 7.38–7.42)
PH BLDA: 7.66 PH (ref 7.38–7.42)
PH BLDV: 7.36 PH
PHOSPHATE SERPL-MCNC: 2.8 MG/DL (ref 2.5–4.9)
PHOSPHATE SERPL-MCNC: 3 MG/DL (ref 2.5–4.9)
PHOSPHATE SERPL-MCNC: 3.2 MG/DL (ref 2.5–4.9)
PLATELET # BLD AUTO: 116 X10*3/UL (ref 150–450)
PLATELET # BLD AUTO: 58 X10*3/UL (ref 150–450)
PLATELET # BLD AUTO: 67 X10*3/UL (ref 150–450)
PLATELET # BLD AUTO: 89 X10*3/UL (ref 150–450)
PO2 BLDA: 205 MM HG (ref 85–95)
PO2 BLDA: 218 MM HG (ref 85–95)
PO2 BLDA: 274 MM HG (ref 85–95)
PO2 BLDA: 316 MM HG (ref 85–95)
PO2 BLDA: 343 MM HG (ref 85–95)
PO2 BLDA: 363 MM HG (ref 85–95)
PO2 BLDA: 371 MM HG (ref 85–95)
PO2 BLDA: 50 MM HG
PO2 BLDV: 405 MM HG
POTASSIUM BLDA-SCNC: 3.9 MMOL/L (ref 3.5–5.3)
POTASSIUM BLDA-SCNC: 4.4 MMOL/L (ref 3.5–5.3)
POTASSIUM BLDA-SCNC: 5.1 MMOL/L (ref 3.5–5.3)
POTASSIUM BLDA-SCNC: 5.1 MMOL/L (ref 3.5–5.3)
POTASSIUM BLDA-SCNC: 5.7 MMOL/L (ref 3.5–5.3)
POTASSIUM BLDA-SCNC: 5.8 MMOL/L (ref 3.5–5.3)
POTASSIUM BLDA-SCNC: 5.8 MMOL/L (ref 3.5–5.3)
POTASSIUM BLDV-SCNC: 5.1 MMOL/L (ref 3.5–5.3)
POTASSIUM SERPL-SCNC: 4.2 MMOL/L (ref 3.5–5.3)
POTASSIUM SERPL-SCNC: 5.3 MMOL/L (ref 3.5–5.3)
POTASSIUM SERPL-SCNC: 5.6 MMOL/L (ref 3.5–5.3)
PRODUCT BLOOD TYPE: 5100
PRODUCT BLOOD TYPE: 5100
PRODUCT CODE: NORMAL
PRODUCT CODE: NORMAL
PROTHROMBIN TIME: 12.6 SECONDS (ref 9.8–12.8)
RBC # BLD AUTO: 2.09 X10*6/UL (ref 4–5.2)
RBC # BLD AUTO: 2.56 X10*6/UL (ref 4–5.2)
RBC # BLD AUTO: 2.62 X10*6/UL (ref 4–5.2)
RBC # BLD AUTO: 3.01 X10*6/UL (ref 4–5.2)
SAO2 % BLDA: 100 % (ref 94–100)
SAO2 % BLDA: 82 %
SAO2 % BLDV: 100 %
SODIUM BLDA-SCNC: 143 MMOL/L (ref 136–145)
SODIUM BLDA-SCNC: 144 MMOL/L (ref 136–145)
SODIUM BLDA-SCNC: 144 MMOL/L (ref 136–145)
SODIUM BLDA-SCNC: 145 MMOL/L (ref 136–145)
SODIUM BLDA-SCNC: 145 MMOL/L (ref 136–145)
SODIUM BLDA-SCNC: 146 MMOL/L (ref 136–145)
SODIUM BLDA-SCNC: 146 MMOL/L (ref 136–145)
SODIUM BLDV-SCNC: 146 MMOL/L (ref 136–145)
SODIUM SERPL-SCNC: 148 MMOL/L (ref 136–145)
SODIUM SERPL-SCNC: 149 MMOL/L (ref 136–145)
SODIUM SERPL-SCNC: 150 MMOL/L (ref 136–145)
UFH PPP CHRO-ACNC: 0.5 IU/ML
UFH PPP CHRO-ACNC: 0.6 IU/ML
UFH PPP CHRO-ACNC: 0.7 IU/ML
UNIT ABO: NORMAL
UNIT ABO: NORMAL
UNIT NUMBER: NORMAL
UNIT NUMBER: NORMAL
UNIT RH: NORMAL
UNIT RH: NORMAL
UNIT VOLUME: 288
UNIT VOLUME: 350
WBC # BLD AUTO: 6 X10*3/UL (ref 4.4–11.3)
WBC # BLD AUTO: 6.5 X10*3/UL (ref 4.4–11.3)
WBC # BLD AUTO: 9.2 X10*3/UL (ref 4.4–11.3)
WBC # BLD AUTO: 9.7 X10*3/UL (ref 4.4–11.3)
XM INTEP: NORMAL
XM INTEP: NORMAL

## 2024-11-05 PROCEDURE — 2500000004 HC RX 250 GENERAL PHARMACY W/ HCPCS (ALT 636 FOR OP/ED)

## 2024-11-05 PROCEDURE — 83615 LACTATE (LD) (LDH) ENZYME: CPT | Performed by: NURSE PRACTITIONER

## 2024-11-05 PROCEDURE — 85610 PROTHROMBIN TIME: CPT

## 2024-11-05 PROCEDURE — 94003 VENT MGMT INPAT SUBQ DAY: CPT

## 2024-11-05 PROCEDURE — 84295 ASSAY OF SERUM SODIUM: CPT

## 2024-11-05 PROCEDURE — 2500000005 HC RX 250 GENERAL PHARMACY W/O HCPCS

## 2024-11-05 PROCEDURE — 99291 CRITICAL CARE FIRST HOUR: CPT | Performed by: ANESTHESIOLOGY

## 2024-11-05 PROCEDURE — 2500000004 HC RX 250 GENERAL PHARMACY W/ HCPCS (ALT 636 FOR OP/ED): Performed by: NURSE PRACTITIONER

## 2024-11-05 PROCEDURE — 2500000001 HC RX 250 WO HCPCS SELF ADMINISTERED DRUGS (ALT 637 FOR MEDICARE OP)

## 2024-11-05 PROCEDURE — 82435 ASSAY OF BLOOD CHLORIDE: CPT | Performed by: STUDENT IN AN ORGANIZED HEALTH CARE EDUCATION/TRAINING PROGRAM

## 2024-11-05 PROCEDURE — P9045 ALBUMIN (HUMAN), 5%, 250 ML: HCPCS | Mod: JZ

## 2024-11-05 PROCEDURE — 85027 COMPLETE CBC AUTOMATED: CPT | Performed by: NURSE PRACTITIONER

## 2024-11-05 PROCEDURE — 93010 ELECTROCARDIOGRAM REPORT: CPT | Performed by: INTERNAL MEDICINE

## 2024-11-05 PROCEDURE — 2500000004 HC RX 250 GENERAL PHARMACY W/ HCPCS (ALT 636 FOR OP/ED): Mod: JZ

## 2024-11-05 PROCEDURE — 2500000002 HC RX 250 W HCPCS SELF ADMINISTERED DRUGS (ALT 637 FOR MEDICARE OP, ALT 636 FOR OP/ED)

## 2024-11-05 PROCEDURE — 84295 ASSAY OF SERUM SODIUM: CPT | Performed by: STUDENT IN AN ORGANIZED HEALTH CARE EDUCATION/TRAINING PROGRAM

## 2024-11-05 PROCEDURE — 84132 ASSAY OF SERUM POTASSIUM: CPT

## 2024-11-05 PROCEDURE — 37799 UNLISTED PX VASCULAR SURGERY: CPT | Performed by: STUDENT IN AN ORGANIZED HEALTH CARE EDUCATION/TRAINING PROGRAM

## 2024-11-05 PROCEDURE — P9016 RBC LEUKOCYTES REDUCED: HCPCS

## 2024-11-05 PROCEDURE — 82330 ASSAY OF CALCIUM: CPT

## 2024-11-05 PROCEDURE — 85520 HEPARIN ASSAY: CPT | Performed by: NURSE PRACTITIONER

## 2024-11-05 PROCEDURE — 82947 ASSAY GLUCOSE BLOOD QUANT: CPT

## 2024-11-05 PROCEDURE — 82805 BLOOD GASES W/O2 SATURATION: CPT | Performed by: STUDENT IN AN ORGANIZED HEALTH CARE EDUCATION/TRAINING PROGRAM

## 2024-11-05 PROCEDURE — 85027 COMPLETE CBC AUTOMATED: CPT

## 2024-11-05 PROCEDURE — 82435 ASSAY OF BLOOD CHLORIDE: CPT

## 2024-11-05 PROCEDURE — 83735 ASSAY OF MAGNESIUM: CPT

## 2024-11-05 PROCEDURE — 37799 UNLISTED PX VASCULAR SURGERY: CPT

## 2024-11-05 PROCEDURE — 2020000001 HC ICU ROOM DAILY

## 2024-11-05 PROCEDURE — 99291 CRITICAL CARE FIRST HOUR: CPT | Performed by: NURSE PRACTITIONER

## 2024-11-05 PROCEDURE — 85730 THROMBOPLASTIN TIME PARTIAL: CPT

## 2024-11-05 PROCEDURE — 71045 X-RAY EXAM CHEST 1 VIEW: CPT | Performed by: RADIOLOGY

## 2024-11-05 PROCEDURE — P9047 ALBUMIN (HUMAN), 25%, 50ML: HCPCS | Mod: JZ | Performed by: NURSE PRACTITIONER

## 2024-11-05 PROCEDURE — 33949 ECMO/ECLS DAILY MGMT ARTERY: CPT | Performed by: ANESTHESIOLOGY

## 2024-11-05 PROCEDURE — 84132 ASSAY OF SERUM POTASSIUM: CPT | Performed by: STUDENT IN AN ORGANIZED HEALTH CARE EDUCATION/TRAINING PROGRAM

## 2024-11-05 PROCEDURE — 36430 TRANSFUSION BLD/BLD COMPNT: CPT

## 2024-11-05 RX ORDER — DEXTROSE MONOHYDRATE 100 MG/ML
50 INJECTION, SOLUTION INTRAVENOUS CONTINUOUS
Status: DISCONTINUED | OUTPATIENT
Start: 2024-11-05 | End: 2024-11-05

## 2024-11-05 RX ORDER — METOPROLOL TARTRATE 1 MG/ML
2 INJECTION, SOLUTION INTRAVENOUS ONCE
Status: COMPLETED | OUTPATIENT
Start: 2024-11-05 | End: 2024-11-05

## 2024-11-05 RX ORDER — ALBUMIN HUMAN 250 G/1000ML
25 SOLUTION INTRAVENOUS EVERY 6 HOURS
Status: COMPLETED | OUTPATIENT
Start: 2024-11-05 | End: 2024-11-06

## 2024-11-05 RX ORDER — DEXTROSE MONOHYDRATE 50 MG/ML
50 INJECTION, SOLUTION INTRAVENOUS CONTINUOUS
Status: DISCONTINUED | OUTPATIENT
Start: 2024-11-05 | End: 2024-11-06

## 2024-11-05 RX ORDER — FUROSEMIDE 10 MG/ML
40 INJECTION INTRAMUSCULAR; INTRAVENOUS ONCE
Status: COMPLETED | OUTPATIENT
Start: 2024-11-05 | End: 2024-11-05

## 2024-11-05 RX ORDER — MAGNESIUM SULFATE HEPTAHYDRATE 40 MG/ML
2 INJECTION, SOLUTION INTRAVENOUS ONCE
Status: COMPLETED | OUTPATIENT
Start: 2024-11-05 | End: 2024-11-05

## 2024-11-05 RX ORDER — ALBUMIN HUMAN 50 G/1000ML
SOLUTION INTRAVENOUS
Status: COMPLETED
Start: 2024-11-05 | End: 2024-11-05

## 2024-11-05 RX ORDER — ALBUMIN HUMAN 50 G/1000ML
25 SOLUTION INTRAVENOUS ONCE
Status: COMPLETED | OUTPATIENT
Start: 2024-11-05 | End: 2024-11-05

## 2024-11-05 RX ORDER — METOPROLOL TARTRATE 1 MG/ML
3 INJECTION, SOLUTION INTRAVENOUS ONCE
Status: COMPLETED | OUTPATIENT
Start: 2024-11-05 | End: 2024-11-05

## 2024-11-05 RX ORDER — METOPROLOL TARTRATE 1 MG/ML
5 INJECTION, SOLUTION INTRAVENOUS ONCE
Status: COMPLETED | OUTPATIENT
Start: 2024-11-05 | End: 2024-11-05

## 2024-11-05 RX ORDER — ALBUMIN HUMAN 50 G/1000ML
SOLUTION INTRAVENOUS
Status: DISPENSED
Start: 2024-11-05 | End: 2024-11-05

## 2024-11-05 RX ORDER — DEXTROSE 50 % IN WATER (D50W) INTRAVENOUS SYRINGE
25 ONCE
Status: COMPLETED | OUTPATIENT
Start: 2024-11-05 | End: 2024-11-05

## 2024-11-05 RX ADMIN — INSULIN HUMAN 10 UNITS: 100 INJECTION, SOLUTION PARENTERAL at 02:29

## 2024-11-05 RX ADMIN — DEXTROSE MONOHYDRATE 25 G: 25 INJECTION, SOLUTION INTRAVENOUS at 02:29

## 2024-11-05 RX ADMIN — ALBUMIN (HUMAN) 25 G: 0.25 INJECTION, SOLUTION INTRAVENOUS at 12:08

## 2024-11-05 RX ADMIN — PROPOFOL 50 MCG/KG/MIN: 10 INJECTION, EMULSION INTRAVENOUS at 07:22

## 2024-11-05 RX ADMIN — AMIODARONE HYDROCHLORIDE 150 MG: 1.5 INJECTION, SOLUTION INTRAVENOUS at 16:39

## 2024-11-05 RX ADMIN — DEXMEDETOMIDINE HYDROCHLORIDE 1.5 MCG/KG/HR: 400 INJECTION INTRAVENOUS at 10:28

## 2024-11-05 RX ADMIN — DEXMEDETOMIDINE HYDROCHLORIDE 0.5 MCG/KG/HR: 400 INJECTION INTRAVENOUS at 06:29

## 2024-11-05 RX ADMIN — METOROPROLOL TARTRATE 5 MG: 5 INJECTION, SOLUTION INTRAVENOUS at 18:20

## 2024-11-05 RX ADMIN — MAGNESIUM SULFATE HEPTAHYDRATE 2 G: 40 INJECTION, SOLUTION INTRAVENOUS at 14:41

## 2024-11-05 RX ADMIN — METOROPROLOL TARTRATE 3 MG: 5 INJECTION, SOLUTION INTRAVENOUS at 17:09

## 2024-11-05 RX ADMIN — SODIUM CHLORIDE, POTASSIUM CHLORIDE, SODIUM LACTATE AND CALCIUM CHLORIDE 250 ML: 600; 310; 30; 20 INJECTION, SOLUTION INTRAVENOUS at 10:45

## 2024-11-05 RX ADMIN — PROPOFOL 50 MCG/KG/MIN: 10 INJECTION, EMULSION INTRAVENOUS at 03:50

## 2024-11-05 RX ADMIN — ALBUMIN HUMAN 25 G: 50 SOLUTION INTRAVENOUS at 08:32

## 2024-11-05 RX ADMIN — SENNOSIDES AND DOCUSATE SODIUM 2 TABLET: 50; 8.6 TABLET ORAL at 08:00

## 2024-11-05 RX ADMIN — METOROPROLOL TARTRATE 2 MG: 5 INJECTION, SOLUTION INTRAVENOUS at 17:01

## 2024-11-05 RX ADMIN — ESOMEPRAZOLE MAGNESIUM 20 MG: 40 FOR SUSPENSION ORAL at 06:30

## 2024-11-05 RX ADMIN — MUPIROCIN: 20 OINTMENT TOPICAL at 08:00

## 2024-11-05 RX ADMIN — POLYETHYLENE GLYCOL 3350 17 G: 17 POWDER, FOR SOLUTION ORAL at 08:00

## 2024-11-05 RX ADMIN — ALBUMIN (HUMAN) 25 G: 0.25 INJECTION, SOLUTION INTRAVENOUS at 17:01

## 2024-11-05 RX ADMIN — ALBUMIN HUMAN 25 G: 0.05 INJECTION, SOLUTION INTRAVENOUS at 08:32

## 2024-11-05 RX ADMIN — FUROSEMIDE 40 MG: 10 INJECTION, SOLUTION INTRAMUSCULAR; INTRAVENOUS at 09:54

## 2024-11-05 RX ADMIN — DEXTROSE MONOHYDRATE 50 ML/HR: 50 INJECTION, SOLUTION INTRAVENOUS at 09:52

## 2024-11-05 RX ADMIN — SODIUM CHLORIDE, POTASSIUM CHLORIDE, SODIUM LACTATE AND CALCIUM CHLORIDE 500 ML: 600; 310; 30; 20 INJECTION, SOLUTION INTRAVENOUS at 12:07

## 2024-11-05 ASSESSMENT — PAIN - FUNCTIONAL ASSESSMENT
PAIN_FUNCTIONAL_ASSESSMENT: 0-10
PAIN_FUNCTIONAL_ASSESSMENT: CPOT (CRITICAL CARE PAIN OBSERVATION TOOL)

## 2024-11-05 ASSESSMENT — PAIN SCALES - GENERAL: PAINLEVEL_OUTOF10: 0 - NO PAIN

## 2024-11-05 NOTE — PROGRESS NOTES
CTICU Progress Note  Lolis Auguste/[id number]    Admit Date: 10/30/2024  Hospital Length of Stay: 6   ICU Length of Stay: 5d 13h   CT SURGEON:     SUBJECTIVE:   - given k cocktail for hyperkalemia    MEDICATIONS  Infusions:  dexmedeTOMIDine, Last Rate: 0.5 mcg/kg/hr (11/05/24 0700)  dextrose 10 % in water (D10W)  dextrose 5%, Last Rate: 30 mL/hr (11/05/24 0700)  EPINEPHrine, Last Rate: 0.02 mcg/kg/min (11/05/24 0700)  heparin, Last Rate: 700 Units/hr (11/05/24 0700)  propofol, Last Rate: 50 mcg/kg/min (11/05/24 0722)      Scheduled:  albumin human, ,   esomeprazole, 20 mg, Daily before breakfast  insulin lispro, 0-15 Units, q4h  mupirocin, , BID  polyethylene glycol, 17 g, BID  sennosides-docusate sodium, 2 tablet, BID      PRN:  albumin human, ,   acetaminophen, 650 mg, q4h PRN  calcium gluconate, 1 g, q6h PRN  calcium gluconate, 2 g, q6h PRN  dextrose, 25 g, q15 min PRN   Or  glucagon, 1 mg, q15 min PRN  hydrALAZINE, 10 mg, q4h PRN  HYDROmorphone, 0.2 mg, q2h PRN  magnesium sulfate, 2 g, q6h PRN  magnesium sulfate, 4 g, q6h PRN  naloxone, 0.2 mg, q5 min PRN  oxyCODONE, 5 mg, q4h PRN  oxygen, , Continuous PRN - O2/gases  potassium chloride, 20 mEq, q6h PRN  potassium chloride, 40 mEq, q6h PRN        PHYSICAL EXAM:   Visit Vitals  Pulse 81   Temp 36.7 °C (98.1 °F)   Resp 13   SpO2 100%   OB Status Postmenopausal   Smoking Status Never     Wt Readings from Last 5 Encounters:   10/28/24 63.5 kg (139 lb 15.9 oz)   10/23/24 64.9 kg (143 lb)   09/30/24 66.7 kg (147 lb)   09/20/24 63.5 kg (140 lb)   09/09/24 66 kg (145 lb 6.4 oz)     INTAKE/OUTPUT:  I/O last 3 completed shifts:  In: 4088.1 [I.V.:1928.1; NG/GT:2160]  Out: 4375 [Urine:3980; Emesis/NG output:300; Drains:95]         Vent settings:  Vent Mode: Volume control/assist control  FiO2 (%):  [40 %] 40 %  S RR:  [12] 12  S VT:  [350 mL] 350 mL  PEEP/CPAP (cm H2O):  [5 cm H20] 5 cm H20  HI SUP:  [5 cm H20] 5 cm H20  MAP (cm H2O):  [5-8] 5.1    Physical Exam:   -  CONSTITUTION: older appearing female in ICU bed on VA ECMO and vent  - NEUROLOGIC: no focal deficits but appears confused, difficult to assess as patient intubated/sedated  - CARDIOVASCULAR: artrial tachyarrythmia 130's vs NSR in 90s. VA ECMO fem/fem with distal perfusion cannula. NV exam intact x 4.    - RESPIRATORY: intubated. Clear bilaterally.   - GI: soft, nondistended, nontender.  + BS  - : urbina with yellow urine  - EXTREMITIES: warm, well perfused. Pulses present.   - SKIN: intact  - PSYCHIATRIC: JUAREZ    All relevant imaging, diagnostics and labs reviewed.     Assessment/Plan   Lolis Auguste is a 69 y.o. female with PMH of newly diagnosed persistent AF on Eliquis s/p failed DCCV 10/18/2024, SOLOMON thrombus and aborted DCCV in 9/2024, and acute systolic HF [potentially tachy-mediated], who was recently admitted to [facility] on 10/23/2024 for AF RVR. Patient was seen by cardiologist today for routine follow-up, noted to be in persistent AF RVR. She was recently cardioverted 10/18 and discharged on Amiodarone but was unable to tolerated d/t extreme fatigue. Dofetilide 250 mcg BID was loaded but AF persisted and so she was referred to [facility] EP for RFA/PVI.    During the attempted ablation 10/30/24 she became hypotensive, decompensated and ended up with VF arrest s/p defib and then SVT s/p DCCV x 3.  She wsa found to have a large pericardial effusion and so a drain was placed with 1L of hemorrhagic fluid drained.  She continued to be unstable and in cardiogenic shock and os she was cannulated for VA ECMO and admitted to CTICU.  Post cannulation patient initially had VF/torsades requiring defib x 5 but no further episodes requiring defib since. Course with continued severe biv failure despite VA ECMO, and continued asymptomatic intermittent tachy atrial arrhythmias.           NEURO: No known hx.  Patient with encephalopathy vs delirium with no focal deficits on exam.  CT scan 11/3 without acute  pathology, Neuro previously consulted but now s/o. RASS 0 on dex this AM.    - Serial neuro and pain assessments    - stop dex for goal WTE today  - PRN Tylenol for pain/fevers  - stop oxy/dilaudid as patient with encephalopathy  - PT/OT Consults   - CAM ICU score qshift  - Sleep/wake cycle hygiene     CV:  Patient has a history of atrial fibrillation, NICM possibly 2/2 arrythmias. She is now status post VA ECMO cannulation for acute on chronic systolic heart failure/cardiogenic shock, and pericardial effusion s/p drainage.  Cardiogenic shock persists requiring VA ECMO 4.5L/100% Fio2/sweep 0.5 and epi 0.02.  SR 80's with intermittent asymptomatic AF -150's.  MAPs 70-80's on no other vasoactives.   - Maintain goal MAP > 65  - Continue VA ECMO with NV exams, dressings, pre/post gases and daily LDH per protocol   - Avoid QT prolonging agents including amiodarone until 11/7 at earliest due to recent dofetilide load.  - Appreciate HF and EP Consults  - patient currently with severe biv fxn and not candidate for transplant or durable devices, see below for GOC discussion  - Hold home metoprolol  - Pending GOC, it may be helpful to get records from [facility] in [place] where it sounds like she has previously been evaluated for severe mitral regurgitation.       PULM:  No hx.  PS 5/5 40% this AM with appropriate oxygenation and ventilation.  AM CXR stable.  -->  - Daily CXR  - extubate today so patient may participate in GOC if mentation appropriate  - Adjust sweep to maintain normal pH  - Wean FiO2 maintaining SpO2 >92%.      GI:  No hx.  Mildly elevated LFTs, normalized.  Tolerating TF via OGT.   -->  - Continue PPI until extubated  - NPO for possible extubation   - Colace/senna BID and miralax BID     : No hx of renal disease, baseline Cr 0.8. Cr stable and UOP acceptable, Hypernatremia with sliught downtrend to 149 (150) on D5W.  Mild hyperkalemia improved with k cocktail ovenright.  Remaining lytes acceptable.   Appears hypervolemic on exam.  -->  - Continue urbina catheter for strict I/Os.  - RFP as clinically indicated  - Replete electrolytes per CTICU protocol  - increase D5W to 50ml/hr for hypernatremia   - diurese for goal negative 500 ml - 1L     ENDO:  No hx, A1c: 5.6. Acceptable glycemic control. -->  - Maintain BG <180, SSI 3 q4hr     HEME:  ABLA with stable hgb and no active s/s of bleeding on systemic AC for ECMO.  Thrombocytopenia stable.  -->    - CBC as clinically indicated  - Continue systemic heparin for afib and ecmo  - SCDs for DVT prophylaxis.  Chemoppx not indicated on systemic AC.  - Last type and screen: 11/4   - Holding home apixaban      ID:  Pericardial fluid with no growth on culture, final. Afebrile and no leukocytosis. -->  - Trend temp q4h  - continue mupirocin x 5 days for MSSA decolinization     Skin:  No active skin issues.  - preventative Mepilex dressings in place on sacrum and heels  - change preventative Mepilex weekly or more frequently as indicated (when moist/soiled)   - every shift skin assessment per nursing and weekly ICU skin rounds  - moisture barrier to be applied with earnestine care  - active skin problems addressed with nursing on daily rounds     Proph:  SCDs  Systemic heparin  PPI     G:  Line  Femoral venous and arterial sheaths 10/30  R radial pollo 10/30  R femoral CVC 10/30  Urbina 10/30  PIVx3    Daily Risk Screen:  Intubated? Yes, planned for extubation later today  Central line? Yes, keep for hemodynamic monitoring and vasoactives  Urbina? Yes, keep for critical need for accurate I&O's    Restraints: The indications and risks/benefits of non-violent/non self-destructive restraints were discussed and bilateral soft wrist restraints ordered.    F: Family:  is NOK and children are also active in care.  Planning on formal GOC discussion (+/- patient pending level of confusion) with Palliative and ICU team given severe biv function without ability to transition to durable  device iso severe RV failure.  Planning [facility] meeting at 10/30 on 11/6, Dr. Delgadillo aware and planning to attend meeting as well.      A,B,C,D,E,F,G: reviewed     Dispo: KRAIG Arredondo-CNP   CTICU TEAM PHONE [phone]

## 2024-11-05 NOTE — CARE PLAN
Problem: Safety - Medical Restraint  Goal: Remains free of injury from restraints (Restraint for Interference with Medical Device)  Outcome: Progressing  Flowsheets (Taken 11/3/2024 1517 by Lolis Painting RN)  Remains free of injury from restraints (restraint for interference with medical device):   Determine that other, less restrictive measures have been tried or would not be effective before applying the restraint   Evaluate the patient's condition at the time of restraint application   Inform patient/family regarding the reason for restraint   Every 2 hours: Monitor safety, psychosocial status, comfort, nutrition and hydration  Goal: Free from restraint(s) (Restraint for Interference with Medical Device)  Outcome: Progressing  Flowsheets (Taken 11/3/2024 1517 by Lolis Painting RN)  Free from restraint(s) (restraint for interference with medical device):   ONCE/SHIFT or MINIMUM Every 12 hours: Assess and document the continuing need for restraints   Every 24 hours: Continued use of restraint requires Licensed Independent Practitioner to perform face to face examination and written order   Identify and implement measures to help patient regain control

## 2024-11-05 NOTE — CONSULTS
Wound Care Consult     Visit Date: 11/5/2024      Patient Name: Lolis Auguste         MRN: [id number]           YOB: 1955     Reason for Consult: Labial DTI and R buttock DTPI         Wound History: first encounter with the wound care team. Labial DTI likely caused by urbina catheter      Pertinent Labs:   Albumin   Date Value Ref Range Status   11/05/2024 3.2 (L) 3.4 - 5.0 g/dL Final     Albumin, Fluid   Date Value Ref Range Status   10/30/2024 3.7 Not established g/dL Final       Wound Assessment:  Wound 11/01/24 Pressure Injury Other (Comment) Left (Active)   Wound Image   11/05/24 1102   Site Assessment Non-blanchable erythema;Purple 11/05/24 1102   Florinda-Wound Assessment Clean;Dry 11/05/24 1102   Non-staged Wound Description Not applicable 11/05/24 1102   Pressure Injury Stage DTPI 11/05/24 1102   Shape Linear 11/05/24 1102   Wound Length (cm) 0.5 cm 11/05/24 1102   Wound Width (cm) 3 cm 11/05/24 1102   Wound Surface Area (cm^2) 1.5 cm^2 11/05/24 1102   Wound Depth (cm) 0 cm 11/05/24 1102   Wound Volume (cm^3) 0 cm^3 11/05/24 1102   State of Healing Non-healing 11/05/24 1102   Margins Attached edges 11/05/24 1102   Drainage Description None 11/05/24 1102   Drainage Amount None 11/05/24 1102   Dressing Open to air 11/05/24 1102       Wound 11/05/24 Pressure Injury Buttock Right (Active)   Wound Image   11/05/24 1101   Site Assessment Purple 11/05/24 1101   Florinda-Wound Assessment Red 11/05/24 1101   Non-staged Wound Description Not applicable 11/05/24 1101   Pressure Injury Stage DTPI 11/05/24 1101   Shape oval 11/05/24 1101   Wound Length (cm) 2 cm 11/05/24 1101   Wound Width (cm) 3 cm 11/05/24 1101   Wound Surface Area (cm^2) 6 cm^2 11/05/24 1101   Wound Depth (cm) 0 cm 11/05/24 1101   Wound Volume (cm^3) 0 cm^3 11/05/24 1101   State of Healing Non-healing 11/05/24 1101   Margins Well-defined edges 11/05/24 1101   Drainage Description None 11/05/24 1101   Drainage Amount None 11/05/24 1101    Dressing Silicone border dressing 11/05/24 1101   Dressing Changed New 11/05/24 1101   Dressing Status Clean;Dry 11/05/24 1101       Wound Team Summary Assessment: The wound care team came to bedside to assess the patient's pressure injuries during month skin rounds.  The patient has a DTPI on her right and labia.  The patient's labia DTPI is deep purple and maroon in color and was likely caused by her urbina catheter that has since been removed.  The labia was cleansed with a bath wipe and left open to air.  The patient's right buttocks wound is deep purple, red and nonblanchable but intact at this time.  The wound was covered with a mepilex border dressing.      Wound Team Plan:   Recommendations: Daily     Labia DTPI: Cleanse with saline, bath wipe or soap and water   Apply critic aid barrier cream over the wound and leave open to air     DTPI: Cleanse with saline, bath wipe or soap and water   Cover with a mepilex border dressing     Continue to turn the patient every 2 hours.  Utilize a repositioning sheet, wedges, heel boots to offload the patient's bony prominences   Add an air mattress      MAXINE Reees  11/5/2024  1:40 PM

## 2024-11-05 NOTE — PROGRESS NOTES
Lolis Auguste is a 69 y.o. female on day 6 of admission presenting with Persistent atrial fibrillation (Multi).      Patient requires ECMO support. Drainage cannula site, cannula, pump, oxygenator, return cannula and return cannula site clinically inspected. RPM and sweep reviewed and adjusted appropriate to clinical context. Anticoagulation status and intensity discussed. Plan for weaning, next clinical steps discussed with team and family. Discussed with cardiothoracic surgeon, perfusion, palliative care and physical/occupational therapy    ECMO Indication: CS  ECMO Cannula Configuration: [id number]  ECMO circuit run from drainage cannula to pump to oxygenator to return cannula: Y  Presence of cannula bleeding: N  Presence of oxygenator clot: N  Pre/post PaO2 adequate: Y  LV Unloading/Pulse Pressure: intrinsic ejection/epi  Distal Perfusion: ok  Anticoagulation Plan/Monitoring: heparin  Mobility Plan/Candidacy: N  Path to decannulation/anticipated decannulation date:unclear      dexmedeTOMIDine, 0-1.5 mcg/kg/hr, Last Rate: 1.5 mcg/kg/hr (11/05/24 1200)  dextrose 5%, 50 mL/hr, Last Rate: 50 mL/hr (11/05/24 1200)  EPINEPHrine, 0.02 mcg/kg/min, Last Rate: 0.02 mcg/kg/min (11/05/24 1200)  heparin, 0-4,000 Units/hr, Last Rate: 700 Units/hr (11/05/24 1200)          Vent Mode: Pressure support  FiO2 (%):  [40 %] 40 %  S RR:  [12] 12  S VT:  [350 mL] 350 mL  PEEP/CPAP (cm H2O):  [5 cm H20] 5 cm H20  MT SUP:  [5 cm H20] 5 cm H20  MAP (cm H2O):  [5.1-8] 5.1           Objective     Physical Exam    Last Recorded Vitals  Pulse 74, temperature 36.3 °C (97.3 °F), resp. rate 17, SpO2 96%.  Intake/Output last 3 Shifts:  I/O last 3 completed shifts:  In: 4163.1 [I.V.:1928.1; NG/GT:2235]  Out: 4375 [Urine:3980; Emesis/NG output:300; Drains:95]        Assessment/Plan   Assessment & Plan  Persistent atrial fibrillation (Multi)    Mitral regurgitation        Raymond Crawford MD

## 2024-11-05 NOTE — PROGRESS NOTES
Pt was extubated today, but still very confused when SW visited.  Pt's daughter and spouse present in the room.  Explained that team was hopeful that pt's mentation would improve overnight, and that it would be important to try to have  a family meeting with pt included tomorrow if she is able to participate.  Family is available to meet tomorrow morning around 10:30 am.  All teams notified and will be present.      RONAK Kang

## 2024-11-05 NOTE — CARE PLAN
Problem: Safety - Medical Restraint  Goal: Remains free of injury from restraints (Restraint for Interference with Medical Device)  Outcome: Progressing  Flowsheets (Taken 11/3/2024 1517 by Lolis Painting RN)  Remains free of injury from restraints (restraint for interference with medical device):   Determine that other, less restrictive measures have been tried or would not be effective before applying the restraint   Evaluate the patient's condition at the time of restraint application   Inform patient/family regarding the reason for restraint   Every 2 hours: Monitor safety, psychosocial status, comfort, nutrition and hydration  Goal: Free from restraint(s) (Restraint for Interference with Medical Device)  Outcome: Progressing  Flowsheets (Taken 11/3/2024 1517 by Lolis Painting RN)  Free from restraint(s) (restraint for interference with medical device):   ONCE/SHIFT or MINIMUM Every 12 hours: Assess and document the continuing need for restraints   Every 24 hours: Continued use of restraint requires Licensed Independent Practitioner to perform face to face examination and written order   Identify and implement measures to help patient regain control     Problem: Skin  Goal: Prevent/manage excess moisture  Outcome: Progressing  Flowsheets (Taken 11/3/2024 1518 by Lolis Painting RN)  Prevent/manage excess moisture:   Monitor for/manage infection if present   Follow provider orders for dressing changes   Moisturize dry skin  Goal: Prevent/minimize sheer/friction injuries  Outcome: Progressing  Flowsheets (Taken 11/3/2024 1518 by Lolis Painting, MAXINE)  Prevent/minimize sheer/friction injuries:   Complete micro-shifts as needed if patient unable. Adjust patient position to relieve pressure points, not a full turn   Use pull sheet   HOB 30 degrees or less   Turn/reposition every 2 hours/use positioning/transfer devices  Goal: Promote/optimize nutrition  Outcome: Progressing  Flowsheets (Taken 11/3/2024 1518 by Lolis  MAXINE Painting)  Promote/optimize nutrition: Monitor/record intake including meals

## 2024-11-05 NOTE — SIGNIFICANT EVENT
11/05/24 1042   Daily Screen   Total RSBI 19   Weaning Parameters   Weaning Vital Capacity 1000 mL   Negative Inspiratory Force (NIF) -35   Spontaneous Minute Volume (MV) 13.9   Weaning Tidal Volume 876 mL   Respiratory Depth/Rhythm Regular     Resp rate - 18

## 2024-11-05 NOTE — PROGRESS NOTES
"TRANSFER  10/23 presented to [facility] (AF [facility])& 10/30 transfer to [facility] for urgent AF ablation for HF iso AF refractory to Tikosyn and DCCV.      OVERVIEW  10/30   urgent RFA/PVI procedure in the setting of newly diagnosed NICM/tachy-mediated cardiomyopathy.    Hypotensive, cardiac ablation, cardioversion, and pericardial effusion  Patient was admitted to the CTICU sp ecmo canula for hypotension refractory to vasopressors - intubated    UPDATES  11/5 Per medical team, try and extubate today and then GOC meeting.       DC PLAN  TBD - Care Transitions is following to develop a safe & supportive discharge plan in collaboration with multidisciplinary team (&) patient/family/significant others.       PAYOR   [employer]     PT/OT   11/04 - OT/ PT, \"Pt is intubated/sedated and on ECMO.  Will hold PT and re-attempt once medically appropriate.)\"     COMPLETED  (X) Daily ongoing review of patient via chart and/or (M-F) IDT rounds  (X) 10/30 - New admit to [facility] and CTICU - EPIC & prior Care Transitions' notes reviewed.      Rachel Bishop (LSW, MSW)   "

## 2024-11-05 NOTE — PROGRESS NOTES
Lolis Auguste is a 69 y.o. female on day 6 of admission presenting with Persistent atrial fibrillation (Multi).      Objective     Physical Exam    Last Recorded Vitals  Pulse 74, temperature 36.3 °C (97.3 °F), resp. rate 17, SpO2 96%.  Intake/Output last 3 Shifts:  I/O last 3 completed shifts:  In: 4163.1 [I.V.:1928.1; NG/GT:2235]  Out: 4375 [Urine:3980; Emesis/NG output:300; Drains:95]    Relevant Results  Results for orders placed or performed during the hospital encounter of 10/30/24 (from the past 24 hours)   POCT GLUCOSE   Result Value Ref Range    POCT Glucose 166 (H) 74 - 99 mg/dL   POCT GLUCOSE   Result Value Ref Range    POCT Glucose 150 (H) 74 - 99 mg/dL   POCT GLUCOSE   Result Value Ref Range    POCT Glucose 133 (H) 74 - 99 mg/dL   Blood Gas Arterial Full Panel   Result Value Ref Range    POCT pH, Arterial 7.43 (H) 7.38 - 7.42 pH    POCT pCO2, Arterial 52 (H) 38 - 42 mm Hg    POCT pO2, Arterial 205 (H) 85 - 95 mm Hg    POCT SO2, Arterial 100 94 - 100 %    POCT Oxy Hemoglobin, Arterial 98.2 (H) 94.0 - 98.0 %    POCT Hematocrit Calculated, Arterial 27.0 (L) 36.0 - 46.0 %    POCT Sodium, Arterial 145 136 - 145 mmol/L    POCT Potassium, Arterial 5.8 (H) 3.5 - 5.3 mmol/L    POCT Chloride, Arterial 115 (H) 98 - 107 mmol/L    POCT Ionized Calcium, Arterial 1.29 1.10 - 1.33 mmol/L    POCT Glucose, Arterial 156 (H) 74 - 99 mg/dL    POCT Lactate, Arterial 1.0 0.4 - 2.0 mmol/L    POCT Base Excess, Arterial 9.0 (H) -2.0 - 3.0 mmol/L    POCT HCO3 Calculated, Arterial 34.5 (H) 22.0 - 26.0 mmol/L    POCT Hemoglobin, Arterial 8.9 (L) 12.0 - 16.0 g/dL    POCT Anion Gap, Arterial 1 (L) 10 - 25 mmo/L    Patient Temperature 37.0 degrees Celsius    FiO2 40 %   Calcium, Ionized   Result Value Ref Range    POCT Calcium, Ionized 1.19 1.1 - 1.33 mmol/L   CBC   Result Value Ref Range    WBC 9.7 4.4 - 11.3 x10*3/uL    nRBC 0.0 0.0 - 0.0 /100 WBCs    RBC 3.01 (L) 4.00 - 5.20 x10*6/uL    Hemoglobin 8.7 (L) 12.0 - 16.0 g/dL     Hematocrit 27.8 (L) 36.0 - 46.0 %    MCV 92 80 - 100 fL    MCH 28.9 26.0 - 34.0 pg    MCHC 31.3 (L) 32.0 - 36.0 g/dL    RDW 15.0 (H) 11.5 - 14.5 %    Platelets 116 (L) 150 - 450 x10*3/uL   Coagulation Screen   Result Value Ref Range    Protime 12.6 9.8 - 12.8 seconds    INR 1.1 0.9 - 1.1    aPTT 46 (H) 27 - 38 seconds   Heparin Assay, UFH   Result Value Ref Range    Heparin Unfractionated 0.7 See Comment Below for Therapeutic Ranges IU/mL   Magnesium   Result Value Ref Range    Magnesium 2.50 (H) 1.60 - 2.40 mg/dL   Renal function panel   Result Value Ref Range    Glucose 154 (H) 74 - 99 mg/dL    Sodium 150 (H) 136 - 145 mmol/L    Potassium 5.6 (H) 3.5 - 5.3 mmol/L    Chloride 111 (H) 98 - 107 mmol/L    Bicarbonate 37 (H) 21 - 32 mmol/L    Anion Gap 8 (L) 10 - 20 mmol/L    Urea Nitrogen 27 (H) 6 - 23 mg/dL    Creatinine 0.48 (L) 0.50 - 1.05 mg/dL    eGFR >90 >60 mL/min/1.73m*2    Calcium 8.6 8.6 - 10.6 mg/dL    Phosphorus 2.8 2.5 - 4.9 mg/dL    Albumin 3.3 (L) 3.4 - 5.0 g/dL   Blood Gas Arterial Full Panel   Result Value Ref Range    POCT pH, Arterial 7.66 (HH) 7.38 - 7.42 pH    POCT pCO2, Arterial 27 (L) 38 - 42 mm Hg    POCT pO2, Arterial 218 (H) 85 - 95 mm Hg    POCT SO2, Arterial 100 94 - 100 %    POCT Oxy Hemoglobin, Arterial 97.4 94.0 - 98.0 %    POCT Hematocrit Calculated, Arterial 27.0 (L) 36.0 - 46.0 %    POCT Sodium, Arterial 144 136 - 145 mmol/L    POCT Potassium, Arterial 5.8 (H) 3.5 - 5.3 mmol/L    POCT Chloride, Arterial 116 (H) 98 - 107 mmol/L    POCT Ionized Calcium, Arterial 1.20 1.10 - 1.33 mmol/L    POCT Glucose, Arterial 159 (H) 74 - 99 mg/dL    POCT Lactate, Arterial 1.0 0.4 - 2.0 mmol/L    POCT Base Excess, Arterial 9.6 (H) -2.0 - 3.0 mmol/L    POCT HCO3 Calculated, Arterial 30.4 (H) 22.0 - 26.0 mmol/L    POCT Hemoglobin, Arterial 9.0 (L) 12.0 - 16.0 g/dL    POCT Anion Gap, Arterial 3 (L) 10 - 25 mmo/L    Patient Temperature 37.0 degrees Celsius    FiO2 40 %   Blood Gas Arterial Full Panel    Result Value Ref Range    POCT pH, Arterial 7.28 (L) 7.38 - 7.42 pH    POCT pCO2, Arterial 76 (HH) 38 - 42 mm Hg    POCT pO2, Arterial 371 (H) 85 - 95 mm Hg    POCT SO2, Arterial 100 94 - 100 %    POCT Oxy Hemoglobin, Arterial 97.2 94.0 - 98.0 %    POCT Hematocrit Calculated, Arterial 27.0 (L) 36.0 - 46.0 %    POCT Sodium, Arterial 146 (H) 136 - 145 mmol/L    POCT Potassium, Arterial 5.7 (H) 3.5 - 5.3 mmol/L    POCT Chloride, Arterial 116 (H) 98 - 107 mmol/L    POCT Ionized Calcium, Arterial 1.30 1.10 - 1.33 mmol/L    POCT Glucose, Arterial 158 (H) 74 - 99 mg/dL    POCT Lactate, Arterial 0.5 0.4 - 2.0 mmol/L    POCT Base Excess, Arterial 7.4 (H) -2.0 - 3.0 mmol/L    POCT HCO3 Calculated, Arterial 35.7 (H) 22.0 - 26.0 mmol/L    POCT Hemoglobin, Arterial 8.9 (L) 12.0 - 16.0 g/dL    POCT Anion Gap, Arterial 0 (L) 10 - 25 mmo/L    Patient Temperature 37.0 degrees Celsius    FiO2 40 %   Blood Gas Arterial Full Panel   Result Value Ref Range    POCT pH, Arterial 7.56 (H) 7.38 - 7.42 pH    POCT pCO2, Arterial 34 (L) 38 - 42 mm Hg    POCT pO2, Arterial 274 (H) 85 - 95 mm Hg    POCT SO2, Arterial 100 94 - 100 %    POCT Oxy Hemoglobin, Arterial 97.7 94.0 - 98.0 %    POCT Hematocrit Calculated, Arterial 25.0 (L) 36.0 - 46.0 %    POCT Sodium, Arterial 145 136 - 145 mmol/L    POCT Potassium, Arterial 5.1 3.5 - 5.3 mmol/L    POCT Chloride, Arterial 116 (H) 98 - 107 mmol/L    POCT Ionized Calcium, Arterial 1.22 1.10 - 1.33 mmol/L    POCT Glucose, Arterial 235 (H) 74 - 99 mg/dL    POCT Lactate, Arterial 1.4 0.4 - 2.0 mmol/L    POCT Base Excess, Arterial 7.7 (H) -2.0 - 3.0 mmol/L    POCT HCO3 Calculated, Arterial 30.4 (H) 22.0 - 26.0 mmol/L    POCT Hemoglobin, Arterial 8.4 (L) 12.0 - 16.0 g/dL    POCT Anion Gap, Arterial 4 (L) 10 - 25 mmo/L    Patient Temperature 37.0 degrees Celsius    FiO2 40 %   ECMO ARTERIAL BLOOD GAS   Result Value Ref Range    POCT pH, Arterial ECMO 7.35 Reference range not established pH    POCT pCO2,  Arterial ECMO 67 Reference range not established mm Hg    POCT pO2,  Arterial ECMO 50 Reference range not established mm Hg    POCT SO2, Arterial ECMO 82 Reference range not established %    POCT Oxy Hemoglobin, Arterial ECMO 79.4 Reference range not established %    POCT Base Excess, Arterial ECMO 8.8 Reference range not established mmol/L    POCT HCO3 Calculated, Arterial ECMO 37.0 Reference range not established mmol/L    Patient Temperature 37.0 degrees Celsius    FiO2 100 %   ECMO VENOUS FULL PANEL   Result Value Ref Range    POCT pH, Venous ECMO 7.36 Reference range not established pH    POCT pCO2, Venous ECMO 61 Reference range not established mm Hg    POCT pO2,  Venous  ECMO 405 Reference range not established mm Hg    POCT SO2, Venous ECMO 100 Reference range not established %    POCT Oxy Hemoglobin, Venous ECMO 97.7 Reference range not established %    POCT Hematocrit Calculated, Venous ECMO 26.0 (L) 36.0 - 46.0 %    POCT Sodium, Venous ECMO 146 (H) 136 - 145 mmol/L    POCT Potassium, Venous ECMO 5.1 3.5 - 5.3 mmol/L    POCT Chloride, Venous ECMO 116 (H) 98 - 107 mmol/L    POCT Ionized Calcium, Venous ECMO 1.29 1.10 - 1.33 mmol/L    POCT Glucose, Venous ECMO 156 (H) 74 - 99 mg/dL    POCT Lactate Venous ECMO 1.5 0.4 - 2.0 mmol/L    POCT Base Excess, Venous ECMO 7.8 Reference range not established mmol/L    POCT HCO3 Calculated, Venous ECMO 34.5 Reference range not established mmol/L    POCT Hemoglobin, Venous ECMO 8.8 (L) 12.0 - 16.0 g/dL    POCT Anion Gap, Venous ECMO 1 Reference range not established mmo/L    Patient Temperature 37.0 degrees Celsius    FiO2 100 %   Blood Gas Arterial Full Panel   Result Value Ref Range    POCT pH, Arterial 7.45 (H) 7.38 - 7.42 pH    POCT pCO2, Arterial 46 (H) 38 - 42 mm Hg    POCT pO2, Arterial 316 (H) 85 - 95 mm Hg    POCT SO2, Arterial 100 94 - 100 %    POCT Oxy Hemoglobin, Arterial 98.2 (H) 94.0 - 98.0 %    POCT Hematocrit Calculated, Arterial 25.0 (L) 36.0 - 46.0 %     POCT Sodium, Arterial 146 (H) 136 - 145 mmol/L    POCT Potassium, Arterial 5.1 3.5 - 5.3 mmol/L    POCT Chloride, Arterial 117 (H) 98 - 107 mmol/L    POCT Ionized Calcium, Arterial 1.23 1.10 - 1.33 mmol/L    POCT Glucose, Arterial 138 (H) 74 - 99 mg/dL    POCT Lactate, Arterial 1.3 0.4 - 2.0 mmol/L    POCT Base Excess, Arterial 7.2 (H) -2.0 - 3.0 mmol/L    POCT HCO3 Calculated, Arterial 32.0 (H) 22.0 - 26.0 mmol/L    POCT Hemoglobin, Arterial 8.4 (L) 12.0 - 16.0 g/dL    POCT Anion Gap, Arterial 2 (L) 10 - 25 mmo/L    Patient Temperature 37.0 degrees Celsius    FiO2 40 %   Heparin Assay, UFH   Result Value Ref Range    Heparin Unfractionated 0.6 See Comment Below for Therapeutic Ranges IU/mL   Renal function panel   Result Value Ref Range    Glucose 155 (H) 74 - 99 mg/dL    Sodium 149 (H) 136 - 145 mmol/L    Potassium 5.3 3.5 - 5.3 mmol/L    Chloride 110 (H) 98 - 107 mmol/L    Bicarbonate 36 (H) 21 - 32 mmol/L    Anion Gap 8 (L) 10 - 20 mmol/L    Urea Nitrogen 28 (H) 6 - 23 mg/dL    Creatinine 0.48 (L) 0.50 - 1.05 mg/dL    eGFR >90 >60 mL/min/1.73m*2    Calcium 8.5 (L) 8.6 - 10.6 mg/dL    Phosphorus 3.0 2.5 - 4.9 mg/dL    Albumin 3.2 (L) 3.4 - 5.0 g/dL   Lactate dehydrogenase   Result Value Ref Range     84 - 246 U/L   POCT GLUCOSE   Result Value Ref Range    POCT Glucose 142 (H) 74 - 99 mg/dL   Blood Gas Arterial Full Panel   Result Value Ref Range    POCT pH, Arterial 7.45 (H) 7.38 - 7.42 pH    POCT pCO2, Arterial 60 (H) 38 - 42 mm Hg    POCT pO2, Arterial 343 (H) 85 - 95 mm Hg    POCT SO2, Arterial 100 94 - 100 %    POCT Oxy Hemoglobin, Arterial 97.8 94.0 - 98.0 %    POCT Hematocrit Calculated, Arterial 24.0 (L) 36.0 - 46.0 %    POCT Sodium, Arterial 143 136 - 145 mmol/L    POCT Potassium, Arterial 4.4 3.5 - 5.3 mmol/L    POCT Chloride, Arterial 109 (H) 98 - 107 mmol/L    POCT Ionized Calcium, Arterial 1.20 1.10 - 1.33 mmol/L    POCT Glucose, Arterial 177 (H) 74 - 99 mg/dL    POCT Lactate, Arterial 1.1 0.4  - 2.0 mmol/L    POCT Base Excess, Arterial 15.8 (H) -2.0 - 3.0 mmol/L    POCT HCO3 Calculated, Arterial 41.7 (H) 22.0 - 26.0 mmol/L    POCT Hemoglobin, Arterial 8.1 (L) 12.0 - 16.0 g/dL    POCT Anion Gap, Arterial -3 (L) 10 - 25 mmo/L    Patient Temperature 37.0 degrees Celsius    FiO2 40 %   POCT GLUCOSE   Result Value Ref Range    POCT Glucose 151 (H) 74 - 99 mg/dL        Assessment/Plan   Assessment & Plan  Persistent atrial fibrillation (Multi)    Mitral regurgitation    1) Atrial fibrillation              EP  2) Mitral valve regurgitation   Not a candidate for surgical intervention  3) Cardiogenic Shock              VA-ECMO support              S/P pericardial drain  4) Colin/Pericarditis, possible              Has received steroids  5) Hypervolemia  6) Hypernatremia  7) Encephelopathy              Neuro consult. CT results noted. EEG results noted.  9) Disp   CTICU      Will wean to extubate. Echo results noted with BiV failure. Heart failure team has reviewed case and signed off. Palliative consultation. Discussed with primary CTS.     Due to the high probability of life threatening clinical decompensation, the patient required critical care time evaluating and managing this patient.  Critical care time included obtaining a history, examining the patient, ordering and reviewing studies, discussing, developing, and implementing a management plan, evaluating the patient's response to treatment, and discussion with other care team providers. I saw and evaluated the patient myself. I reviewed the provider's documentation and discussed the patient with the provider. Critical care time was performed exclusive of billable procedures.     Critical Care Time: 40 minutes   Raymond Crawford MD

## 2024-11-06 VITALS
OXYGEN SATURATION: 97 % | HEART RATE: 182 BPM | DIASTOLIC BLOOD PRESSURE: 92 MMHG | RESPIRATION RATE: 15 BRPM | SYSTOLIC BLOOD PRESSURE: 125 MMHG | TEMPERATURE: 98.2 F

## 2024-11-06 PROBLEM — D68.9 COAGULOPATHY (MULTI): Status: ACTIVE | Noted: 2024-01-01

## 2024-11-06 PROBLEM — G93.41 METABOLIC ENCEPHALOPATHY: Status: ACTIVE | Noted: 2024-01-01

## 2024-11-06 PROBLEM — I31.39 PERICARDIAL EFFUSION (HHS-HCC): Status: ACTIVE | Noted: 2024-01-01

## 2024-11-06 PROBLEM — E87.0 HYPERNATREMIA: Status: ACTIVE | Noted: 2024-01-01

## 2024-11-06 PROBLEM — Z92.81 PATIENT RECEIVING ECMO: Status: ACTIVE | Noted: 2024-01-01

## 2024-11-06 PROBLEM — R57.0 CARDIOGENIC SHOCK (MULTI): Status: ACTIVE | Noted: 2024-01-01

## 2024-11-06 PROBLEM — R41.0 DELIRIUM: Status: ACTIVE | Noted: 2024-01-01

## 2024-11-06 PROBLEM — D62 ABLA (ACUTE BLOOD LOSS ANEMIA): Status: ACTIVE | Noted: 2024-01-01

## 2024-11-06 PROBLEM — D69.6 THROMBOCYTOPENIA (CMS-HCC): Status: ACTIVE | Noted: 2024-01-01

## 2024-11-06 PROBLEM — I50.82: Status: ACTIVE | Noted: 2024-01-01

## 2024-11-06 PROBLEM — I48.20 CHRONIC A-FIB (MULTI): Status: ACTIVE | Noted: 2024-01-01

## 2024-11-06 LAB
ACID FAST STN SPEC: NORMAL
ALBUMIN SERPL BCP-MCNC: 3.7 G/DL (ref 3.4–5)
ANION GAP BLDA CALCULATED.4IONS-SCNC: -1 MMO/L (ref 10–25)
ANION GAP BLDA CALCULATED.4IONS-SCNC: 0 MMO/L (ref 10–25)
ANION GAP BLDV CALCULATED.4IONS-SCNC: 4 MMO/L
ANION GAP SERPL CALC-SCNC: 9 MMOL/L (ref 10–20)
APTT PPP: 28 SECONDS (ref 27–38)
BASE EXCESS BLDA CALC-SCNC: 11.2 MMOL/L
BASE EXCESS BLDA CALC-SCNC: 11.9 MMOL/L (ref -2–3)
BASE EXCESS BLDA CALC-SCNC: 12.5 MMOL/L (ref -2–3)
BASE EXCESS BLDA CALC-SCNC: 13.5 MMOL/L (ref -2–3)
BASE EXCESS BLDA CALC-SCNC: 13.7 MMOL/L (ref -2–3)
BASE EXCESS BLDV CALC-SCNC: 12.2 MMOL/L
BLOOD EXPIRATION DATE: NORMAL
BODY TEMPERATURE: 37 DEGREES CELSIUS
BUN SERPL-MCNC: 27 MG/DL (ref 6–23)
CA-I BLD-SCNC: 1.19 MMOL/L (ref 1.1–1.33)
CA-I BLDA-SCNC: 1.16 MMOL/L (ref 1.1–1.33)
CA-I BLDA-SCNC: 1.18 MMOL/L (ref 1.1–1.33)
CA-I BLDA-SCNC: 1.2 MMOL/L (ref 1.1–1.33)
CA-I BLDA-SCNC: 1.23 MMOL/L (ref 1.1–1.33)
CA-I BLDV-SCNC: 1.19 MMOL/L (ref 1.1–1.33)
CALCIUM SERPL-MCNC: 8.4 MG/DL (ref 8.6–10.6)
CFT FORM KAOLIN IND BLD RES TEG: 2.9 MIN (ref 0.8–2.1)
CHLORIDE BLDA-SCNC: 110 MMOL/L (ref 98–107)
CHLORIDE BLDA-SCNC: 110 MMOL/L (ref 98–107)
CHLORIDE BLDA-SCNC: 111 MMOL/L (ref 98–107)
CHLORIDE BLDA-SCNC: 111 MMOL/L (ref 98–107)
CHLORIDE BLDV-SCNC: 107 MMOL/L (ref 98–107)
CHLORIDE SERPL-SCNC: 104 MMOL/L (ref 98–107)
CLOT ANGLE.KAOLIN INDUCED BLD RES TEG: 64 DEG (ref 63–78)
CLOT INIT KAO IND P HEP NEUT BLD RES TEG: 6.1 MIN (ref 4.3–8.3)
CLOT INIT KAO IND P HEP NEUT BLD RES TEG: 6.4 MIN (ref 4.6–9.1)
CO2 SERPL-SCNC: 38 MMOL/L (ref 21–32)
CREAT SERPL-MCNC: 0.44 MG/DL (ref 0.5–1.05)
DISPENSE STATUS: NORMAL
EGFRCR SERPLBLD CKD-EPI 2021: >90 ML/MIN/1.73M*2
ERYTHROCYTE [DISTWIDTH] IN BLOOD BY AUTOMATED COUNT: 13.8 % (ref 11.5–14.5)
ERYTHROCYTE [DISTWIDTH] IN BLOOD BY AUTOMATED COUNT: 14.1 % (ref 11.5–14.5)
ERYTHROCYTE [DISTWIDTH] IN BLOOD BY AUTOMATED COUNT: 15.3 % (ref 11.5–14.5)
FIBRINOGEN BLD CALC-MCNC: 261 MG/DL (ref 278–581)
FIBRINOGEN PPP-MCNC: 288 MG/DL (ref 200–400)
GLUCOSE BLDA-MCNC: 145 MG/DL (ref 74–99)
GLUCOSE BLDA-MCNC: 150 MG/DL (ref 74–99)
GLUCOSE BLDA-MCNC: 156 MG/DL (ref 74–99)
GLUCOSE BLDA-MCNC: 157 MG/DL (ref 74–99)
GLUCOSE BLDV-MCNC: 140 MG/DL (ref 74–99)
GLUCOSE SERPL-MCNC: 154 MG/DL (ref 74–99)
HCO3 BLDA-SCNC: 37 MMOL/L
HCO3 BLDA-SCNC: 37 MMOL/L (ref 22–26)
HCO3 BLDA-SCNC: 37.6 MMOL/L (ref 22–26)
HCO3 BLDA-SCNC: 37.7 MMOL/L (ref 22–26)
HCO3 BLDA-SCNC: 39.1 MMOL/L (ref 22–26)
HCO3 BLDV-SCNC: 37.8 MMOL/L
HCT VFR BLD AUTO: 20 % (ref 36–46)
HCT VFR BLD AUTO: 23.3 % (ref 36–46)
HCT VFR BLD AUTO: 24.4 % (ref 36–46)
HCT VFR BLD EST: 21 % (ref 36–46)
HCT VFR BLD EST: 22 % (ref 36–46)
HCT VFR BLD EST: 23 % (ref 36–46)
HCT VFR BLD EST: 23 % (ref 36–46)
HCT VFR BLD EST: 24 % (ref 36–46)
HGB BLD-MCNC: 7.2 G/DL (ref 12–16)
HGB BLD-MCNC: 7.7 G/DL (ref 12–16)
HGB BLD-MCNC: 8.1 G/DL (ref 12–16)
HGB BLDA-MCNC: 7 G/DL (ref 12–16)
HGB BLDA-MCNC: 7.6 G/DL (ref 12–16)
HGB BLDA-MCNC: 7.8 G/DL (ref 12–16)
HGB BLDA-MCNC: 8.1 G/DL (ref 12–16)
HGB BLDV-MCNC: 7.2 G/DL (ref 12–16)
INHALED O2 CONCENTRATION: 100 %
INHALED O2 CONCENTRATION: 100 %
INHALED O2 CONCENTRATION: 31 %
INHALED O2 CONCENTRATION: 32 %
INR PPP: 1.2 (ref 0.9–1.1)
LACTATE BLDA-SCNC: 0.6 MMOL/L (ref 0.4–2)
LACTATE BLDA-SCNC: 0.7 MMOL/L (ref 0.4–2)
LACTATE BLDA-SCNC: 0.8 MMOL/L (ref 0.4–2)
LACTATE BLDA-SCNC: 0.8 MMOL/L (ref 0.4–2)
LACTATE BLDV-SCNC: 0.6 MMOL/L (ref 0.4–2)
LDH SERPL L TO P-CCNC: 208 U/L (ref 84–246)
MA KAOLIN BLD RES TEG: 45 MM (ref 52–69)
MA KAOLIN+TF BLD RES TEG: 43 MM (ref 52–70)
MA TF IND+IIB-IIIA INH BLD RES TEG: 14 MM (ref 15–32)
MAGNESIUM SERPL-MCNC: 2.42 MG/DL (ref 1.6–2.4)
MCH RBC QN AUTO: 28.9 PG (ref 26–34)
MCH RBC QN AUTO: 30.2 PG (ref 26–34)
MCH RBC QN AUTO: 30.5 PG (ref 26–34)
MCHC RBC AUTO-ENTMCNC: 33 G/DL (ref 32–36)
MCHC RBC AUTO-ENTMCNC: 33.2 G/DL (ref 32–36)
MCHC RBC AUTO-ENTMCNC: 36 G/DL (ref 32–36)
MCV RBC AUTO: 85 FL (ref 80–100)
MCV RBC AUTO: 87 FL (ref 80–100)
MCV RBC AUTO: 91 FL (ref 80–100)
MYCOBACTERIUM SPEC CULT: NORMAL
NRBC BLD-RTO: 0 /100 WBCS (ref 0–0)
OXYHGB MFR BLDA: 97 % (ref 94–98)
OXYHGB MFR BLDA: 97.1 % (ref 94–98)
OXYHGB MFR BLDA: 97.2 %
OXYHGB MFR BLDA: 97.2 % (ref 94–98)
OXYHGB MFR BLDA: 97.6 % (ref 94–98)
OXYHGB MFR BLDV: 71.9 %
PCO2 BLDA: 46 MM HG (ref 38–42)
PCO2 BLDA: 52 MM HG
PCO2 BLDA: 52 MM HG (ref 38–42)
PCO2 BLDA: 53 MM HG (ref 38–42)
PCO2 BLDA: 55 MM HG (ref 38–42)
PCO2 BLDV: 57 MM HG
PH BLDA: 7.46 PH
PH BLDA: 7.46 PH (ref 7.38–7.42)
PH BLDA: 7.52 PH (ref 7.38–7.42)
PH BLDV: 7.43 PH
PHOSPHATE SERPL-MCNC: 2.5 MG/DL (ref 2.5–4.9)
PLATELET # BLD AUTO: 42 X10*3/UL (ref 150–450)
PLATELET # BLD AUTO: 59 X10*3/UL (ref 150–450)
PLATELET # BLD AUTO: 60 X10*3/UL (ref 150–450)
PO2 BLDA: 274 MM HG (ref 85–95)
PO2 BLDA: 310 MM HG (ref 85–95)
PO2 BLDA: 328 MM HG (ref 85–95)
PO2 BLDA: 331 MM HG (ref 85–95)
PO2 BLDA: 427 MM HG
PO2 BLDV: 44 MM HG
POTASSIUM BLDA-SCNC: 4 MMOL/L (ref 3.5–5.3)
POTASSIUM BLDA-SCNC: 4.1 MMOL/L (ref 3.5–5.3)
POTASSIUM BLDA-SCNC: 4.1 MMOL/L (ref 3.5–5.3)
POTASSIUM BLDA-SCNC: 4.2 MMOL/L (ref 3.5–5.3)
POTASSIUM BLDV-SCNC: 4 MMOL/L (ref 3.5–5.3)
POTASSIUM SERPL-SCNC: 4.1 MMOL/L (ref 3.5–5.3)
PRODUCT BLOOD TYPE: 5100
PRODUCT BLOOD TYPE: 6200
PRODUCT CODE: NORMAL
PROTHROMBIN TIME: 13.5 SECONDS (ref 9.8–12.8)
RBC # BLD AUTO: 2.36 X10*6/UL (ref 4–5.2)
RBC # BLD AUTO: 2.55 X10*6/UL (ref 4–5.2)
RBC # BLD AUTO: 2.8 X10*6/UL (ref 4–5.2)
SAO2 % BLDA: 100 %
SAO2 % BLDA: 100 % (ref 94–100)
SAO2 % BLDA: 99 % (ref 94–100)
SAO2 % BLDV: 75 %
SODIUM BLDA-SCNC: 143 MMOL/L (ref 136–145)
SODIUM BLDA-SCNC: 144 MMOL/L (ref 136–145)
SODIUM BLDV-SCNC: 145 MMOL/L (ref 136–145)
SODIUM SERPL-SCNC: 147 MMOL/L (ref 136–145)
UNIT ABO: NORMAL
UNIT NUMBER: NORMAL
UNIT RH: NORMAL
UNIT VOLUME: 206
UNIT VOLUME: 288
UNIT VOLUME: 292
UNIT VOLUME: 327
UNIT VOLUME: 350
WBC # BLD AUTO: 12.7 X10*3/UL (ref 4.4–11.3)
WBC # BLD AUTO: 8.1 X10*3/UL (ref 4.4–11.3)
WBC # BLD AUTO: 9.2 X10*3/UL (ref 4.4–11.3)
XM INTEP: NORMAL

## 2024-11-06 PROCEDURE — 82810 BLOOD GASES O2 SAT ONLY: CPT

## 2024-11-06 PROCEDURE — 99497 ADVNCD CARE PLAN 30 MIN: CPT

## 2024-11-06 PROCEDURE — 84132 ASSAY OF SERUM POTASSIUM: CPT

## 2024-11-06 PROCEDURE — 85576 BLOOD PLATELET AGGREGATION: CPT | Performed by: NURSE PRACTITIONER

## 2024-11-06 PROCEDURE — 85384 FIBRINOGEN ACTIVITY: CPT | Performed by: NURSE PRACTITIONER

## 2024-11-06 PROCEDURE — 82330 ASSAY OF CALCIUM: CPT

## 2024-11-06 PROCEDURE — 99291 CRITICAL CARE FIRST HOUR: CPT | Performed by: ANESTHESIOLOGY

## 2024-11-06 PROCEDURE — P9016 RBC LEUKOCYTES REDUCED: HCPCS

## 2024-11-06 PROCEDURE — 99291 CRITICAL CARE FIRST HOUR: CPT | Performed by: NURSE PRACTITIONER

## 2024-11-06 PROCEDURE — 84132 ASSAY OF SERUM POTASSIUM: CPT | Performed by: STUDENT IN AN ORGANIZED HEALTH CARE EDUCATION/TRAINING PROGRAM

## 2024-11-06 PROCEDURE — 37799 UNLISTED PX VASCULAR SURGERY: CPT | Performed by: NURSE PRACTITIONER

## 2024-11-06 PROCEDURE — 84295 ASSAY OF SERUM SODIUM: CPT

## 2024-11-06 PROCEDURE — 82435 ASSAY OF BLOOD CHLORIDE: CPT | Performed by: STUDENT IN AN ORGANIZED HEALTH CARE EDUCATION/TRAINING PROGRAM

## 2024-11-06 PROCEDURE — 2020000001 HC ICU ROOM DAILY

## 2024-11-06 PROCEDURE — 85027 COMPLETE CBC AUTOMATED: CPT | Performed by: NURSE PRACTITIONER

## 2024-11-06 PROCEDURE — 2500000004 HC RX 250 GENERAL PHARMACY W/ HCPCS (ALT 636 FOR OP/ED): Performed by: NURSE PRACTITIONER

## 2024-11-06 PROCEDURE — 82435 ASSAY OF BLOOD CHLORIDE: CPT

## 2024-11-06 PROCEDURE — 2500000004 HC RX 250 GENERAL PHARMACY W/ HCPCS (ALT 636 FOR OP/ED)

## 2024-11-06 PROCEDURE — 71045 X-RAY EXAM CHEST 1 VIEW: CPT | Performed by: RADIOLOGY

## 2024-11-06 PROCEDURE — 99233 SBSQ HOSP IP/OBS HIGH 50: CPT

## 2024-11-06 PROCEDURE — 82805 BLOOD GASES W/O2 SATURATION: CPT | Performed by: STUDENT IN AN ORGANIZED HEALTH CARE EDUCATION/TRAINING PROGRAM

## 2024-11-06 PROCEDURE — 2500000005 HC RX 250 GENERAL PHARMACY W/O HCPCS

## 2024-11-06 PROCEDURE — 85027 COMPLETE CBC AUTOMATED: CPT

## 2024-11-06 PROCEDURE — P9017 PLASMA 1 DONOR FRZ W/IN 8 HR: HCPCS

## 2024-11-06 PROCEDURE — 82810 BLOOD GASES O2 SAT ONLY: CPT | Performed by: STUDENT IN AN ORGANIZED HEALTH CARE EDUCATION/TRAINING PROGRAM

## 2024-11-06 PROCEDURE — P9047 ALBUMIN (HUMAN), 25%, 50ML: HCPCS | Mod: JZ | Performed by: NURSE PRACTITIONER

## 2024-11-06 PROCEDURE — 85610 PROTHROMBIN TIME: CPT | Performed by: NURSE PRACTITIONER

## 2024-11-06 PROCEDURE — 36430 TRANSFUSION BLD/BLD COMPNT: CPT

## 2024-11-06 PROCEDURE — 83615 LACTATE (LD) (LDH) ENZYME: CPT | Performed by: NURSE PRACTITIONER

## 2024-11-06 PROCEDURE — 83735 ASSAY OF MAGNESIUM: CPT

## 2024-11-06 PROCEDURE — 82330 ASSAY OF CALCIUM: CPT | Performed by: STUDENT IN AN ORGANIZED HEALTH CARE EDUCATION/TRAINING PROGRAM

## 2024-11-06 PROCEDURE — 37799 UNLISTED PX VASCULAR SURGERY: CPT

## 2024-11-06 RX ORDER — LORAZEPAM 2 MG/ML
2 INJECTION INTRAMUSCULAR
Status: DISCONTINUED | OUTPATIENT
Start: 2024-11-06 | End: 2024-11-07 | Stop reason: HOSPADM

## 2024-11-06 RX ORDER — LORAZEPAM 2 MG/ML
2 INJECTION INTRAMUSCULAR EVERY 2 HOUR PRN
Status: DISCONTINUED | OUTPATIENT
Start: 2024-11-06 | End: 2024-11-06

## 2024-11-06 RX ORDER — NOREPINEPHRINE BITARTRATE/D5W 8 MG/250ML
0-.15 PLASTIC BAG, INJECTION (ML) INTRAVENOUS CONTINUOUS
Status: DISCONTINUED | OUTPATIENT
Start: 2024-11-06 | End: 2024-11-06

## 2024-11-06 RX ORDER — MORPHINE SULFATE 4 MG/ML
2 INJECTION INTRAVENOUS
Status: DISCONTINUED | OUTPATIENT
Start: 2024-11-06 | End: 2024-11-07 | Stop reason: HOSPADM

## 2024-11-06 RX ORDER — DEXMEDETOMIDINE HYDROCHLORIDE 4 UG/ML
0-1 INJECTION, SOLUTION INTRAVENOUS CONTINUOUS
Status: DISCONTINUED | OUTPATIENT
Start: 2024-11-06 | End: 2024-11-06

## 2024-11-06 RX ORDER — FENTANYL CITRATE-0.9 % NACL/PF 10 MCG/ML
PLASTIC BAG, INJECTION (ML) INTRAVENOUS
Status: COMPLETED
Start: 2024-11-06 | End: 2024-11-06

## 2024-11-06 RX ORDER — FENTANYL CITRATE-0.9 % NACL/PF 10 MCG/ML
25-200 PLASTIC BAG, INJECTION (ML) INTRAVENOUS CONTINUOUS
Status: DISCONTINUED | OUTPATIENT
Start: 2024-11-06 | End: 2024-11-07 | Stop reason: HOSPADM

## 2024-11-06 RX ORDER — NOREPINEPHRINE BITARTRATE/D5W 8 MG/250ML
PLASTIC BAG, INJECTION (ML) INTRAVENOUS
Status: COMPLETED
Start: 2024-11-06 | End: 2024-11-06

## 2024-11-06 RX ORDER — GLYCOPYRROLATE 0.2 MG/ML
0.2 INJECTION INTRAMUSCULAR; INTRAVENOUS EVERY 4 HOURS PRN
Status: DISCONTINUED | OUTPATIENT
Start: 2024-11-06 | End: 2024-11-07 | Stop reason: HOSPADM

## 2024-11-06 RX ORDER — PANTOPRAZOLE SODIUM 40 MG/10ML
40 INJECTION, POWDER, LYOPHILIZED, FOR SOLUTION INTRAVENOUS DAILY
Status: DISCONTINUED | OUTPATIENT
Start: 2024-11-06 | End: 2024-11-06

## 2024-11-06 RX ADMIN — MORPHINE SULFATE 2 MG: 4 INJECTION, SOLUTION INTRAMUSCULAR; INTRAVENOUS at 17:00

## 2024-11-06 RX ADMIN — DEXMEDETOMIDINE HYDROCHLORIDE 0.2 MCG/KG/HR: 400 INJECTION INTRAVENOUS at 00:26

## 2024-11-06 RX ADMIN — Medication 0.03 MCG/KG/MIN: at 01:25

## 2024-11-06 RX ADMIN — GLYCOPYRROLATE 0.2 MG: 0.2 INJECTION, SOLUTION INTRAMUSCULAR; INTRAVENOUS at 16:38

## 2024-11-06 RX ADMIN — LORAZEPAM 2 MG: 2 INJECTION, SOLUTION INTRAMUSCULAR; INTRAVENOUS at 23:11

## 2024-11-06 RX ADMIN — ALBUMIN (HUMAN) 25 G: 0.25 INJECTION, SOLUTION INTRAVENOUS at 00:00

## 2024-11-06 RX ADMIN — LORAZEPAM 2 MG: 2 INJECTION INTRAMUSCULAR; INTRAVENOUS at 16:38

## 2024-11-06 RX ADMIN — MORPHINE SULFATE 2 MG: 4 INJECTION, SOLUTION INTRAMUSCULAR; INTRAVENOUS at 17:30

## 2024-11-06 RX ADMIN — PANTOPRAZOLE SODIUM 40 MG: 40 INJECTION, POWDER, LYOPHILIZED, FOR SOLUTION INTRAVENOUS at 08:35

## 2024-11-06 RX ADMIN — Medication 50 MCG/HR: at 20:15

## 2024-11-06 RX ADMIN — NOREPINEPHRINE BITARTRATE 0.03 MCG/KG/MIN: 8 INJECTION, SOLUTION INTRAVENOUS at 01:25

## 2024-11-06 RX ADMIN — MORPHINE SULFATE 2 MG: 4 INJECTION, SOLUTION INTRAMUSCULAR; INTRAVENOUS at 19:00

## 2024-11-06 RX ADMIN — MORPHINE SULFATE 2 MG: 4 INJECTION, SOLUTION INTRAMUSCULAR; INTRAVENOUS at 18:10

## 2024-11-06 RX ADMIN — LORAZEPAM 2 MG: 2 INJECTION, SOLUTION INTRAMUSCULAR; INTRAVENOUS at 18:30

## 2024-11-06 RX ADMIN — LORAZEPAM 2 MG: 2 INJECTION, SOLUTION INTRAMUSCULAR; INTRAVENOUS at 19:35

## 2024-11-06 RX ADMIN — ALBUMIN (HUMAN) 25 G: 0.25 INJECTION, SOLUTION INTRAVENOUS at 05:03

## 2024-11-06 RX ADMIN — MORPHINE SULFATE 2 MG: 4 INJECTION, SOLUTION INTRAMUSCULAR; INTRAVENOUS at 16:38

## 2024-11-06 RX ADMIN — MORPHINE SULFATE 2 MG: 4 INJECTION, SOLUTION INTRAMUSCULAR; INTRAVENOUS at 19:35

## 2024-11-06 RX ADMIN — MORPHINE SULFATE 2 MG: 4 INJECTION, SOLUTION INTRAMUSCULAR; INTRAVENOUS at 18:29

## 2024-11-06 ASSESSMENT — RESPIRATORY DISTRESS OBSERVATION SCALE (RDOS)
RESPIRATORY RATE PER MINUTE: 0 - <19 BREATHS
PARADOXICAL BREATHING PATTERN: 0 - NONE
PARADOXICAL BREATHING PATTERN: 0 - NONE
RESPIRATORY RATE PER MINUTE: 0 - <19 BREATHS
PARADOXICAL BREATHING PATTERN: 0 - NONE
HEART RATE PER MINUTE: 2 - >109 BEATS
INVOLUNTARY NASAL FLARING: 0 - NONE
HEART RATE PER MINUTE: 2 - >109 BEATS
HEART RATE PER MINUTE: 2 - >109 BEATS
RESTLESS NONPURPOSEFUL MOVEMENTS: 1 - OCCASIONAL, SLIGHT MOVEMENTS
INVOLUNTARY NASAL FLARING: 0 - NONE
LOOK OF FEAR: 0 - NONE
RESTLESS NONPURPOSEFUL MOVEMENTS: 0 - NONE
INVOLUNTARY NASAL FLARING: 0 - NONE
LOOK OF FEAR: 0 - NONE
ACCESSORY MUSCLE RISE IN CLAVICLE DURING INSPIRATION: 1 - SLIGHT RISE
GRUNTING AT END OF EXPIRATION: 0 - NONE
RDOS TOTAL SCORE: 7
ACCESSORY MUSCLE RISE IN CLAVICLE DURING INSPIRATION: 2 - PRONOUNCED RISE
RDOS TOTAL SCORE: 8
GRUNTING AT END OF EXPIRATION: 0 - NONE
RESTLESS NONPURPOSEFUL MOVEMENTS: 1 - OCCASIONAL, SLIGHT MOVEMENTS
PARADOXICAL BREATHING PATTERN: 2 - PRESENT
LOOK OF FEAR: 2 - EYES WIDE OPEN, FACIAL MUSCLES TENSE, BROW FURROWED, MOUTH OPEN
LOOK OF FEAR: 0 - NONE
RDOS TOTAL SCORE: 4
GRUNTING AT END OF EXPIRATION: 0 - NONE
ACCESSORY MUSCLE RISE IN CLAVICLE DURING INSPIRATION: 1 - SLIGHT RISE
PARADOXICAL BREATHING PATTERN: 0 - NONE
HEART RATE PER MINUTE: 2 - >109 BEATS
RESTLESS NONPURPOSEFUL MOVEMENTS: 2 - FREQUENT MOVEMENTS
GRUNTING AT END OF EXPIRATION: 0 - NONE
INVOLUNTARY NASAL FLARING: 2 - PRESENT
RESPIRATORY RATE PER MINUTE: 2 - >30 BREATHS
ACCESSORY MUSCLE RISE IN CLAVICLE DURING INSPIRATION: 1 - SLIGHT RISE
GRUNTING AT END OF EXPIRATION: 0 - NONE
RDOS TOTAL SCORE: 5
RESTLESS NONPURPOSEFUL MOVEMENTS: 1 - OCCASIONAL, SLIGHT MOVEMENTS
RESPIRATORY RATE PER MINUTE: 1 - 19-30 BREATHS
HEART RATE PER MINUTE: 2 - >109 BEATS
PARADOXICAL BREATHING PATTERN: 0 - NONE
RESPIRATORY RATE PER MINUTE: 2 - >30 BREATHS
GRUNTING AT END OF EXPIRATION: 0 - NONE
GRUNTING AT END OF EXPIRATION: 0 - NONE
RESPIRATORY RATE PER MINUTE: 1 - 19-30 BREATHS
PARADOXICAL BREATHING PATTERN: 0 - NONE
LOOK OF FEAR: 2 - EYES WIDE OPEN, FACIAL MUSCLES TENSE, BROW FURROWED, MOUTH OPEN
ACCESSORY MUSCLE RISE IN CLAVICLE DURING INSPIRATION: 1 - SLIGHT RISE
RESPIRATORY RATE PER MINUTE: 0 - <19 BREATHS
RESPIRATORY RATE PER MINUTE: 0 - <19 BREATHS
RESTLESS NONPURPOSEFUL MOVEMENTS: 0 - NONE
HEART RATE PER MINUTE: 1 - 90-109 BEATS
ACCESSORY MUSCLE RISE IN CLAVICLE DURING INSPIRATION: 1 - SLIGHT RISE
INVOLUNTARY NASAL FLARING: 0 - NONE
RDOS TOTAL SCORE: 9
RDOS TOTAL SCORE: 3
GRUNTING AT END OF EXPIRATION: 0 - NONE
RDOS TOTAL SCORE: 6
RESTLESS NONPURPOSEFUL MOVEMENTS: 1 - OCCASIONAL, SLIGHT MOVEMENTS
LOOK OF FEAR: 0 - NONE
HEART RATE PER MINUTE: 2 - >109 BEATS
HEART RATE PER MINUTE: 2 - >109 BEATS
INVOLUNTARY NASAL FLARING: 0 - NONE
RESTLESS NONPURPOSEFUL MOVEMENTS: 1 - OCCASIONAL, SLIGHT MOVEMENTS
PARADOXICAL BREATHING PATTERN: 0 - NONE
RDOS TOTAL SCORE: 3
INVOLUNTARY NASAL FLARING: 0 - NONE
INVOLUNTARY NASAL FLARING: 0 - NONE

## 2024-11-06 ASSESSMENT — PAIN - FUNCTIONAL ASSESSMENT
PAIN_FUNCTIONAL_ASSESSMENT: CPOT (CRITICAL CARE PAIN OBSERVATION TOOL)
PAIN_FUNCTIONAL_ASSESSMENT: CPOT (CRITICAL CARE PAIN OBSERVATION TOOL)
PAIN_FUNCTIONAL_ASSESSMENT: 0-10
PAIN_FUNCTIONAL_ASSESSMENT: 0-10

## 2024-11-06 ASSESSMENT — PAIN SCALES - GENERAL
PAINLEVEL_OUTOF10: 0 - NO PAIN
PAINLEVEL_OUTOF10: 0 - NO PAIN

## 2024-11-06 NOTE — PROGRESS NOTES
Lolis Auguste is a 69 y.o. female on day 7 of admission presenting with Persistent atrial fibrillation (Multi).      Palliative Medicine following for:  Complex medical decision making, symptom management, patient/family support    History obtained from chart review including ED note, H&P, patient's daily progress notes, review of lab/test results, and discussion with primary team and bedside RN.    Subjective    History of Present Illness  Lolis Auguste is a 69 y.o. female with PMH of newly diagnosed persistent AF on Eliquis s/p failed DCCV 10/18/2024, SOLOMON thrombus and aborted DCCV in 9/2024, and acute systolic HF [potentially tachy-mediated], who was recently [facility] on 10/23/2024 for AF RVR. Patient was seen by cardiologist for routine follow-up, noted to be in persistent AF RVR. She was recently cardioverted 10/18 and discharged on Amiodarone but was unable to tolerate d/t extreme fatigue. Dofetilide 250 mcg BID was loaded but AF persisted, so that the decision was made to perform urgent RFA/PVI procedure in the setting of newly diagnosed NICM/tachy-mediated cardiomyopathy. Patient was transferred to [facility] for cardiac ablation.      Patient became increasingly hypotensive requiring norepinephrine ggt, epinephrine ggt. Cardiac ablation course complicated by vfib arrest requiring defibrillation. Follow by cardioversion x3 for SVT. Pericardial effusion require pericardial fluid drainage. 1000ml hemorhagic fluid drained. Patient was admitted to the CTICU sp ecmo canula for hypotension refractory to vasopressors.      Symptoms  Pt unable to contribute.    Objective    Last Recorded Vitals  BP (!) 125/92   Pulse (!) 161   Temp 36.2 °C (97.2 °F) (Temporal)   Resp 21   SpO2 92%      Physical Exam   Constitutional:       Appearance: She is ill-appearing, extubated. Lost her voice, mouthing words, incomprehensible.    HENT:      Head: Normocephalic.      Mouth/Throat:      Mouth: Mucous  membranes are dry.   Cardiovascular:      Rate and Rhythm: Normal rate.   Pulmonary:      Breath sounds: Diminished breath sounds.   Abdominal:      Palpations: Abdomen is soft.   Musculoskeletal:         General: Swelling present.   Skin:     General: Skin is warm and dry.      Coloration: Skin is pale.      Relevant Results   Results for orders placed or performed during the hospital encounter of 10/30/24 (from the past 24 hours)   Heparin Assay, UFH   Result Value Ref Range    Heparin Unfractionated 0.5 See Comment Below for Therapeutic Ranges IU/mL   CBC   Result Value Ref Range    WBC 6.5 4.4 - 11.3 x10*3/uL    nRBC 0.0 0.0 - 0.0 /100 WBCs    RBC 2.09 (L) 4.00 - 5.20 x10*6/uL    Hemoglobin 6.0 (LL) 12.0 - 16.0 g/dL    Hematocrit 19.0 (L) 36.0 - 46.0 %    MCV 91 80 - 100 fL    MCH 28.7 26.0 - 34.0 pg    MCHC 31.6 (L) 32.0 - 36.0 g/dL    RDW 14.6 (H) 11.5 - 14.5 %    Platelets 67 (L) 150 - 450 x10*3/uL   Blood Gas Arterial Full Panel   Result Value Ref Range    POCT pH, Arterial 7.49 (H) 7.38 - 7.42 pH    POCT pCO2, Arterial 53 (H) 38 - 42 mm Hg    POCT pO2, Arterial 363 (H) 85 - 95 mm Hg    POCT SO2, Arterial 100 94 - 100 %    POCT Oxy Hemoglobin, Arterial 98.0 94.0 - 98.0 %    POCT Hematocrit Calculated, Arterial 19.0 (L) 36.0 - 46.0 %    POCT Sodium, Arterial 144 136 - 145 mmol/L    POCT Potassium, Arterial 3.9 3.5 - 5.3 mmol/L    POCT Chloride, Arterial 109 (H) 98 - 107 mmol/L    POCT Ionized Calcium, Arterial 1.22 1.10 - 1.33 mmol/L    POCT Glucose, Arterial 136 (H) 74 - 99 mg/dL    POCT Lactate, Arterial 0.8 0.4 - 2.0 mmol/L    POCT Base Excess, Arterial 15.6 (H) -2.0 - 3.0 mmol/L    POCT HCO3 Calculated, Arterial 40.4 (H) 22.0 - 26.0 mmol/L    POCT Hemoglobin, Arterial 6.2 (LL) 12.0 - 16.0 g/dL    POCT Anion Gap, Arterial -2 (L) 10 - 25 mmo/L    Patient Temperature 37.0 degrees Celsius    FiO2 40 %   Prepare RBC: 2 Units   Result Value Ref Range    PRODUCT CODE D4780N62     Unit Number [id number]      Unit ABO O     Unit RH POS     XM INTEP COMP     Dispense Status TR     Blood Expiration Date 12/4/2024 11:59:00 PM EST     PRODUCT BLOOD TYPE 5100     UNIT VOLUME 350     PRODUCT CODE B5958D37     Unit Number [id number]     Unit ABO O     Unit RH POS     XM INTEP COMP     Dispense Status TR     Blood Expiration Date 12/3/2024 11:59:00 PM EST     PRODUCT BLOOD TYPE 5100     UNIT VOLUME 288    POCT GLUCOSE   Result Value Ref Range    POCT Glucose 105 (H) 74 - 99 mg/dL   CBC   Result Value Ref Range    WBC 6.0 4.4 - 11.3 x10*3/uL    nRBC 0.0 0.0 - 0.0 /100 WBCs    RBC 2.62 (L) 4.00 - 5.20 x10*6/uL    Hemoglobin 7.8 (L) 12.0 - 16.0 g/dL    Hematocrit 22.3 (L) 36.0 - 46.0 %    MCV 85 80 - 100 fL    MCH 29.8 26.0 - 34.0 pg    MCHC 35.0 32.0 - 36.0 g/dL    RDW 13.7 11.5 - 14.5 %    Platelets 58 (L) 150 - 450 x10*3/uL   Calcium, Ionized   Result Value Ref Range    POCT Calcium, Ionized 1.19 1.1 - 1.33 mmol/L   CBC   Result Value Ref Range    WBC 8.1 4.4 - 11.3 x10*3/uL    nRBC 0.0 0.0 - 0.0 /100 WBCs    RBC 2.55 (L) 4.00 - 5.20 x10*6/uL    Hemoglobin 7.7 (L) 12.0 - 16.0 g/dL    Hematocrit 23.3 (L) 36.0 - 46.0 %    MCV 91 80 - 100 fL    MCH 30.2 26.0 - 34.0 pg    MCHC 33.0 32.0 - 36.0 g/dL    RDW 14.1 11.5 - 14.5 %    Platelets 60 (L) 150 - 450 x10*3/uL   Magnesium   Result Value Ref Range    Magnesium 2.42 (H) 1.60 - 2.40 mg/dL   Renal function panel   Result Value Ref Range    Glucose 154 (H) 74 - 99 mg/dL    Sodium 147 (H) 136 - 145 mmol/L    Potassium 4.1 3.5 - 5.3 mmol/L    Chloride 104 98 - 107 mmol/L    Bicarbonate 38 (H) 21 - 32 mmol/L    Anion Gap 9 (L) 10 - 20 mmol/L    Urea Nitrogen 27 (H) 6 - 23 mg/dL    Creatinine 0.44 (L) 0.50 - 1.05 mg/dL    eGFR >90 >60 mL/min/1.73m*2    Calcium 8.4 (L) 8.6 - 10.6 mg/dL    Phosphorus 2.5 2.5 - 4.9 mg/dL    Albumin 3.7 3.4 - 5.0 g/dL   Blood Gas Arterial Full Panel   Result Value Ref Range    POCT pH, Arterial 7.46 (H) 7.38 - 7.42 pH    POCT pCO2, Arterial 55 (H) 38 - 42  mm Hg    POCT pO2, Arterial 328 (H) 85 - 95 mm Hg    POCT SO2, Arterial 100 94 - 100 %    POCT Oxy Hemoglobin, Arterial 97.0 94.0 - 98.0 %    POCT Hematocrit Calculated, Arterial 24.0 (L) 36.0 - 46.0 %    POCT Sodium, Arterial 144 136 - 145 mmol/L    POCT Potassium, Arterial 4.2 3.5 - 5.3 mmol/L    POCT Chloride, Arterial 110 (H) 98 - 107 mmol/L    POCT Ionized Calcium, Arterial 1.23 1.10 - 1.33 mmol/L    POCT Glucose, Arterial 156 (H) 74 - 99 mg/dL    POCT Lactate, Arterial 0.8 0.4 - 2.0 mmol/L    POCT Base Excess, Arterial 13.7 (H) -2.0 - 3.0 mmol/L    POCT HCO3 Calculated, Arterial 39.1 (H) 22.0 - 26.0 mmol/L    POCT Hemoglobin, Arterial 8.1 (L) 12.0 - 16.0 g/dL    POCT Anion Gap, Arterial -1 (L) 10 - 25 mmo/L    Patient Temperature 37.0 degrees Celsius    FiO2 31 %   Blood Gas Arterial Full Panel   Result Value Ref Range    POCT pH, Arterial 7.52 (H) 7.38 - 7.42 pH    POCT pCO2, Arterial 46 (H) 38 - 42 mm Hg    POCT pO2, Arterial 310 (H) 85 - 95 mm Hg    POCT SO2, Arterial 100 94 - 100 %    POCT Oxy Hemoglobin, Arterial 97.6 94.0 - 98.0 %    POCT Hematocrit Calculated, Arterial 21.0 (L) 36.0 - 46.0 %    POCT Sodium, Arterial 144 136 - 145 mmol/L    POCT Potassium, Arterial 4.0 3.5 - 5.3 mmol/L    POCT Chloride, Arterial 111 (H) 98 - 107 mmol/L    POCT Ionized Calcium, Arterial 1.16 1.10 - 1.33 mmol/L    POCT Glucose, Arterial 157 (H) 74 - 99 mg/dL    POCT Lactate, Arterial 0.8 0.4 - 2.0 mmol/L    POCT Base Excess, Arterial 13.5 (H) -2.0 - 3.0 mmol/L    POCT HCO3 Calculated, Arterial 37.6 (H) 22.0 - 26.0 mmol/L    POCT Hemoglobin, Arterial 7.0 (L) 12.0 - 16.0 g/dL    POCT Anion Gap, Arterial -1 (L) 10 - 25 mmo/L    Patient Temperature 37.0 degrees Celsius    FiO2 32 %   Prepare RBC: 1 Units   Result Value Ref Range    PRODUCT CODE [id number]     Unit Number [id number]     Unit ABO O     Unit RH POS     XM INTEP COMP     Dispense Status TR     Blood Expiration Date 11/26/2024 11:59:00 PM EST     PRODUCT BLOOD  TYPE 5100     UNIT VOLUME 292    Blood Gas Arterial Full Panel   Result Value Ref Range    POCT pH, Arterial 7.46 (H) 7.38 - 7.42 pH    POCT pCO2, Arterial 53 (H) 38 - 42 mm Hg    POCT pO2, Arterial 274 (H) 85 - 95 mm Hg    POCT SO2, Arterial 100 94 - 100 %    POCT Oxy Hemoglobin, Arterial 97.2 94.0 - 98.0 %    POCT Hematocrit Calculated, Arterial 23.0 (L) 36.0 - 46.0 %    POCT Sodium, Arterial 144 136 - 145 mmol/L    POCT Potassium, Arterial 4.1 3.5 - 5.3 mmol/L    POCT Chloride, Arterial 111 (H) 98 - 107 mmol/L    POCT Ionized Calcium, Arterial 1.18 1.10 - 1.33 mmol/L    POCT Glucose, Arterial 150 (H) 74 - 99 mg/dL    POCT Lactate, Arterial 0.7 0.4 - 2.0 mmol/L    POCT Base Excess, Arterial 12.5 (H) -2.0 - 3.0 mmol/L    POCT HCO3 Calculated, Arterial 37.7 (H) 22.0 - 26.0 mmol/L    POCT Hemoglobin, Arterial 7.6 (L) 12.0 - 16.0 g/dL    POCT Anion Gap, Arterial -1 (L) 10 - 25 mmo/L    Patient Temperature 37.0 degrees Celsius    FiO2 32 %   Lactate dehydrogenase   Result Value Ref Range     84 - 246 U/L   CBC   Result Value Ref Range    WBC 12.7 (H) 4.4 - 11.3 x10*3/uL    nRBC 0.0 0.0 - 0.0 /100 WBCs    RBC 2.36 (L) 4.00 - 5.20 x10*6/uL    Hemoglobin 7.2 (L) 12.0 - 16.0 g/dL    Hematocrit 20.0 (L) 36.0 - 46.0 %    MCV 85 80 - 100 fL    MCH 30.5 26.0 - 34.0 pg    MCHC 36.0 32.0 - 36.0 g/dL    RDW 13.8 11.5 - 14.5 %    Platelets 59 (L) 150 - 450 x10*3/uL   Blood Gas Arterial Full Panel   Result Value Ref Range    POCT pH, Arterial 7.46 (H) 7.38 - 7.42 pH    POCT pCO2, Arterial 52 (H) 38 - 42 mm Hg    POCT pO2, Arterial 331 (H) 85 - 95 mm Hg    POCT SO2, Arterial 99 94 - 100 %    POCT Oxy Hemoglobin, Arterial 97.1 94.0 - 98.0 %    POCT Hematocrit Calculated, Arterial 23.0 (L) 36.0 - 46.0 %    POCT Sodium, Arterial 143 136 - 145 mmol/L    POCT Potassium, Arterial 4.1 3.5 - 5.3 mmol/L    POCT Chloride, Arterial 110 (H) 98 - 107 mmol/L    POCT Ionized Calcium, Arterial 1.20 1.10 - 1.33 mmol/L    POCT Glucose,  Arterial 145 (H) 74 - 99 mg/dL    POCT Lactate, Arterial 0.6 0.4 - 2.0 mmol/L    POCT Base Excess, Arterial 11.9 (H) -2.0 - 3.0 mmol/L    POCT HCO3 Calculated, Arterial 37.0 (H) 22.0 - 26.0 mmol/L    POCT Hemoglobin, Arterial 7.8 (L) 12.0 - 16.0 g/dL    POCT Anion Gap, Arterial 0 (L) 10 - 25 mmo/L    Patient Temperature 37.0 degrees Celsius    FiO2 32 %   ECMO ARTERIAL BLOOD GAS   Result Value Ref Range    POCT pH, Arterial ECMO 7.46 Reference range not established pH    POCT pCO2, Arterial ECMO 52 Reference range not established mm Hg    POCT pO2,  Arterial ECMO 427 Reference range not established mm Hg    POCT SO2, Arterial ECMO 100 Reference range not established %    POCT Oxy Hemoglobin, Arterial ECMO 97.2 Reference range not established %    POCT Base Excess, Arterial ECMO 11.2 Reference range not established mmol/L    POCT HCO3 Calculated, Arterial ECMO 37.0 Reference range not established mmol/L    Patient Temperature 37.0 degrees Celsius    FiO2 100 %   ECMO VENOUS FULL PANEL   Result Value Ref Range    POCT pH, Venous ECMO 7.43 Reference range not established pH    POCT pCO2, Venous ECMO 57 Reference range not established mm Hg    POCT pO2,  Venous  ECMO 44 Reference range not established mm Hg    POCT SO2, Venous ECMO 75 Reference range not established %    POCT Oxy Hemoglobin, Venous ECMO 71.9 Reference range not established %    POCT Hematocrit Calculated, Venous ECMO 22.0 (L) 36.0 - 46.0 %    POCT Sodium, Venous ECMO 145 136 - 145 mmol/L    POCT Potassium, Venous ECMO 4.0 3.5 - 5.3 mmol/L    POCT Chloride, Venous ECMO 107 98 - 107 mmol/L    POCT Ionized Calcium, Venous ECMO 1.19 1.10 - 1.33 mmol/L    POCT Glucose, Venous ECMO 140 (H) 74 - 99 mg/dL    POCT Lactate Venous ECMO 0.6 0.4 - 2.0 mmol/L    POCT Base Excess, Venous ECMO 12.2 Reference range not established mmol/L    POCT HCO3 Calculated, Venous ECMO 37.8 Reference range not established mmol/L    POCT Hemoglobin, Venous ECMO 7.2 (L) 12.0 - 16.0  g/dL    POCT Anion Gap, Venous ECMO 4 Reference range not established mmo/L    Patient Temperature 37.0 degrees Celsius    FiO2 100 %   Prepare RBC: 2 Units   Result Value Ref Range    PRODUCT CODE D8854K09     Unit Number [id number]     Unit ABO O     Unit RH POS     XM INTEP COMP     Dispense Status TR     Blood Expiration Date 12/4/2024 11:59:00 PM EST     PRODUCT BLOOD TYPE 5100     UNIT VOLUME 350     PRODUCT CODE [id number]     Unit Number [id number]     Unit ABO O     Unit RH POS     XM INTEP COMP     Dispense Status TR     Blood Expiration Date 12/4/2024 11:59:00 PM EST     PRODUCT BLOOD TYPE 5100     UNIT VOLUME 327    Prepare Plasma: 2 Units   Result Value Ref Range    PRODUCT CODE T4525T89     Unit Number [id number]     Unit ABO O     Unit RH POS     Dispense Status TR     Blood Expiration Date 11/9/2024  1:55:00 PM EST     PRODUCT BLOOD TYPE 5100     UNIT VOLUME 288     PRODUCT CODE T8938Y73     Unit Number [id number]     Unit ABO A     Unit RH POS     Dispense Status TR     Blood Expiration Date 11/8/2024  9:30:00 PM EST     PRODUCT BLOOD TYPE 6200     UNIT VOLUME 206    Coagulation Screen   Result Value Ref Range    Protime 13.5 (H) 9.8 - 12.8 seconds    INR 1.2 (H) 0.9 - 1.1    aPTT 28 27 - 38 seconds   Fibrinogen   Result Value Ref Range    Fibrinogen 288 200 - 400 mg/dL   TEG Clot Global Profile   Result Value Ref Range    R (Reaction Time) K 6.4 4.6 - 9.1 min    K (Clot Kinetics) 2.9 (H) 0.8 - 2.1 min    ANGLE 64.0 63.0 - 78.0 deg    MA (Max Amplitude) K 45.0 (L) 52.0 - 69.0 mm    R (Reaction Time) KH 6.1 4.3 - 8.3 min    MA (Max Amplitude) RT 43.0 (L) 52.0 - 70.0 mm    MA ( Sonia Amplitude) FF 14.0 (L) 15.0 - 32.0 mm    FLEV 261 (L) 278 - 581 mg/dL   CBC   Result Value Ref Range    WBC 9.2 4.4 - 11.3 x10*3/uL    nRBC 0.0 0.0 - 0.0 /100 WBCs    RBC 2.80 (L) 4.00 - 5.20 x10*6/uL    Hemoglobin 8.1 (L) 12.0 - 16.0 g/dL    Hematocrit 24.4 (L) 36.0 - 46.0 %    MCV 87 80 - 100 fL    MCH  28.9 26.0 - 34.0 pg    MCHC 33.2 32.0 - 36.0 g/dL    RDW 15.3 (H) 11.5 - 14.5 %    Platelets 42 (L) 150 - 450 x10*3/uL        Allergies  Patient has no known allergies.  Medications  Scheduled medications  [Held by provider] esomeprazole, 20 mg, nasogastric tube, Daily before breakfast  insulin lispro, 0-15 Units, subcutaneous, q4h  pantoprazole, 40 mg, intravenous, Daily  polyethylene glycol, 17 g, oral, BID  sennosides-docusate sodium, 2 tablet, oral, BID      Continuous medications  dextrose 5%, 50 mL/hr, Last Rate: 50 mL/hr (11/06/24 1100)  [Held by provider] heparin, 0-4,000 Units/hr, Last Rate: Stopped (11/05/24 1826)  norepinephrine, 0-0.15 mcg/kg/min (Dosing Weight), Last Rate: Stopped (11/06/24 1023)      PRN medications  PRN medications: acetaminophen, calcium gluconate, calcium gluconate, dextrose **OR** glucagon, magnesium sulfate, magnesium sulfate, naloxone, oxygen, potassium chloride, potassium chloride     Assessment/Plan    Lolis Auguste is a 69 y.o. female with PMH of newly diagnosed persistent AF on Eliquis s/p failed DCCV 10/18/2024, SOLOMON thrombus and aborted DCCV in 9/2024, and acute systolic HF [potentially tachy-mediated], who was recently admitted to [facility] on 10/23/2024 for AF RVR. Patient was seen by cardiologist for routine follow-up, noted to be in persistent AF RVR. She was recently cardioverted 10/18 and discharged on Amiodarone but was unable to tolerate d/t extreme fatigue. Dofetilide 250 mcg BID was loaded but AF persisted, so that the decision was made to perform urgent RFA/PVI procedure in the setting of newly diagnosed NICM/tachy-mediated cardiomyopathy. Patient was transferred to [facility] for cardiac ablation.      Patient became increasingly hypotensive requiring norepinephrine ggt, epinephrine ggt. Cardiac ablation course complicated by vfib arrest requiring defibrillation. Follow by cardioversion x3 for SVT. Pericardial effusion require pericardial fluid drainage.  "1000ml hemorhagic fluid drained. Patient was admitted to the CTICU sp ecmo canula for hypotension refractory to vasopressors.      10/31: Palliative team consulted for pt/family support. Pt/family would enjoy spiritual support and music therapy.    11/6: Family meeting regarding pt's course and navigating a path forward. Family aware of ECMO dependency without exit strategy. GOCdiscussed and family agreeable to DNR/DNI. Comfort care discussed and family agreeable after family/friends and Judaism members are able to say goodbye.    Palliative Performance Scale (PPS): 20    ----------------------------------------------------------------------------------------------------------------------------------------------------------------------------------------------------------------------------------------------------------------------------------------------------------------------------------------------------------------------      I spent 45 minutes in providing separately identifiable ACP services with the patient and/or surrogate decision maker in a voluntary conversation discussing the patient's wishes and goals as detailed in the above note.   ----------------------------------------------------------------------------------------------------------------------------------------------------------------------------------------------------------------------------------------------------------------------------------------------------------------------------------------------------------------------       #Complex Medical Decision Making  #Goals of Care  #Advanced Care Planning  - Code status: DNR/DNI  - Surrogate decision maker: Eddie Auguste \"Bill\" (Spouse)  [phone]    - Goals are mix of treatment, comfort and QOL until family/friends can visit.  - 10/31: Met with pt and family at bedside. Able to glean more about pt, pt's hx, lifestyle, goals, and health preferences. See above/below for supportive " "documentation.   - 11/6: Family and care team meeting took place today involving ICU attending and EP along with nursing, fellow, and CNP. Pall care team of NP, ivan, and SW present. Discussed pt's hx, hospital course and unfortunate complications. Discussed ECMO in detail along with all treatment modalities to afford pt all efforts for improvement. Discussed worsening HF and ECMO dependence along with requiring additional BP support as of this morning. Discussed and ruled out causes of HF pericardial effusion. Additionally, discussed pt's encephalopathy and causes. As a result, family explained pt's preferences and wishes in regards to QOL. Family understanding that all \"tools\" have been used and exhausted and that there is no further interventions that can be offered to reverse or improve pt's health.     Discussed DNR/DNI- family agreeable that this aligns with pt's preferences and would not improve her QOL if she were resuscitated. As a result, discussed comfort care and symptom management. Family understands that time would be short.    Discussed family coping and support systems.     In regards to timing of comfort care, family would like additional time for family and [employer] members to visit and say their goodbyes.    Questions answered and support given.        #End of life  - If pt acutely decompensates or comfort care elected, family agreeable to allow pt to die peacefully and comfortably.   - Recommend the following:  - Morphine 1mg IV [id number] prn for RR greater than 20, pain, grimacing. For continuous or difficult to control symptoms, consider IV drip with basal rate.  - Glycopyrrolate 0.4mg IV Q4h prn secretions  - Lorazepam 0.5mg IV Q4H prn restlessness/anxiety  - Haldol 1mg IV Q4h prn agitation/restlessness  - Acetaminophen 650mg PRN rectal suppository/PO for fever       #Psychosocial Support  - Ongoing pt and family support, care navigation.  - Music Therapy- ordered  - Spiritual Care Support- " Pall Rosa aware     Plan of Care discussed with: Updated primary and bedside RN on goals of care decision, medication adjustments, and code status      Medical Decision Making was high level due to high complexity of problems, extensive data review, and high risk of management/treatment.     - HFrEF with arrest requiring VA ECMO and pressor support without exit strategy, ongoing arrhythmias,  posing threat to life or function.   - Reviewed external notes from   - Reviews results from  which were used in decision making for   - Recommended the following tests:   - Assessment required independent historian: spouse/family, primary, EP.  - Independent interpretation of test: labs and imaging  - Discussion of management with: primary/attending.  - Drug therapy requiring intensive monitoring for toxicity: insulin  - Decision regarding elective major surgery with identified patient or procedure risk factors:   - Decision regarding emergency major surgery:   - Decision regarding hospitalization or escalation of hospital-level care:   - Decision not to resuscitate or to de-escalate care because of poor prognosis: DNR/ DNI established d/t very poor px.  - Parenteral controlled substances: pressors    Thank you for allowing us to care for this patient. Palliative Team will continue to follow as needed. Please contact team with any questions or concerns.   Team pager [phone] (weekdays)    KRAIG Mena-CNP

## 2024-11-06 NOTE — TREATMENT PLAN
Per NOK/family's request patient transitioned to comfort measures.  Comfort care orders placed and perfusion notified to stop ECMO.  Multidisciplinary teams aware of code status change.      Marilyn Gaffney, KRAIG-CNP  CTICU [id number]

## 2024-11-06 NOTE — PROGRESS NOTES
CTICU Progress Note  Lolis Auguste/[id number]    Admit Date: 10/30/2024  Hospital Length of Stay: 7   ICU Length of Stay: 6d 16h   CT SURGEON:     SUBJECTIVE:   - remained -160's  - hypotension requiring levo 0.1  - given additional PRBC d/t hgb drop     MEDICATIONS  Infusions:  dextrose 5%, Last Rate: 50 mL/hr (11/06/24 1000)  [Held by provider] heparin, Last Rate: Stopped (11/05/24 1826)  norepinephrine, Last Rate: Stopped (11/06/24 1023)      Scheduled:  [Held by provider] esomeprazole, 20 mg, Daily before breakfast  insulin lispro, 0-15 Units, q4h  pantoprazole, 40 mg, Daily  polyethylene glycol, 17 g, BID  sennosides-docusate sodium, 2 tablet, BID      PRN:  acetaminophen, 650 mg, q4h PRN  calcium gluconate, 1 g, q6h PRN  calcium gluconate, 2 g, q6h PRN  dextrose, 25 g, q15 min PRN   Or  glucagon, 1 mg, q15 min PRN  magnesium sulfate, 2 g, q6h PRN  magnesium sulfate, 4 g, q6h PRN  naloxone, 0.2 mg, q5 min PRN  oxygen, , Continuous PRN - O2/gases  potassium chloride, 20 mEq, q6h PRN  potassium chloride, 40 mEq, q6h PRN        PHYSICAL EXAM:   Visit Vitals  BP (!) 125/92   Pulse (!) 132   Temp 36.2 °C (97.2 °F) (Temporal)   Resp 25   SpO2 97%   OB Status Postmenopausal   Smoking Status Never     Wt Readings from Last 5 Encounters:   10/28/24 63.5 kg (139 lb 15.9 oz)   10/23/24 64.9 kg (143 lb)   09/30/24 66.7 kg (147 lb)   09/20/24 63.5 kg (140 lb)   09/09/24 66 kg (145 lb 6.4 oz)     INTAKE/OUTPUT:  I/O last 3 completed shifts:  In: 6528 [I.V.:2421.8; Blood:1750; NG/GT:1250; IV Piggyback:1106.3]  Out: 6670 [Urine:6055; Drains:15; Stool:600]         Vent settings:       Physical Exam:   - CONSTITUTION: older appearing female in ICU bed on VA ECMO and vent in NAD, confused but awake  - NEUROLOGIC: no focal deficits but appears confused, CAM +  - CARDIOVASCULAR: artrial tachyarrythmia 130-160's, VA ECMO fem/fem with distal perfusion cannula. NV exam intact x 4.  Bleeding from bilateral groin sites L>R  -  RESPIRATORY:Clear bilaterally on NC.   - GI: soft, nondistended, nontender.  + BS  - : urbina with yellow urine  - EXTREMITIES: warm, well perfused. Dopplar'd signals present to BLE.   - SKIN: intact  - PSYCHIATRIC: calm but confused    All relevant imaging, diagnostics and labs reviewed.     Assessment/Plan   Lolis Auguste is a 69 y.o. female with PMH of newly diagnosed persistent AF on Eliquis s/p failed DCCV 10/18/2024, SOLOMON thrombus and aborted DCCV in 9/2024, and acute systolic HF [potentially tachy-mediated], who was recently admitted to [facility] on 10/23/2024 for AF RVR. Patient was seen by cardiologist today for routine follow-up, noted to be in persistent AF RVR. She was recently cardioverted 10/18 and discharged on Amiodarone but was unable to tolerated d/t extreme fatigue. Dofetilide 250 mcg BID was loaded but AF persisted and so she was referred to [facility] EP for RFA/PVI.    During the attempted ablation 10/30/24 she became hypotensive, decompensated and ended up with VF arrest s/p defib and then SVT s/p DCCV x 3.  She wsa found to have a large pericardial effusion and so a drain was placed with 1L of hemorrhagic fluid drained.  She continued to be unstable and in cardiogenic shock and os she was cannulated for VA ECMO and admitted to CTICU.  Post cannulation patient initially had VF/torsades requiring defib x 5 but no further episodes requiring defib since. Course with continued severe biv failure despite VA ECMO, continued intermittent tachy atrial arrhythmias and most recent with active bleeding 2/2 ECMO coagulopathy requiring serial transfusions.           NEURO: No known hx.  Patient with encephalopathy vs delirium with no focal deficits on exam, remains CAM +.  CT scan 11/3 without acute pathology, Neuro previously consulted but now s/o. R  - Serial neuro and pain assessments    - PRN Tylenol for pain/fevers  - PT/OT Consults   - CAM ICU score qshift  - Sleep/wake cycle hygiene     CV:   Patient has a history of atrial fibrillation, NICM possibly 2/2 arrythmias. She is now status post VA ECMO cannulation for acute on chronic systolic heart failure/cardiogenic shock, and pericardial effusion s/p drainage.  Cardiogenic shock persists requiring VA ECMO 4.5L/100% Fio2/sweep 0.5.  SR 80's previously with intermittent asymptomatic AF -160s, AFRVR has now persisted since yesterday afternoon despite metop IV x 2 and amio bolus.  Hypotensive requiring levo 0.1 overnight, likely 2/2 active bleeding and persistent tachycardia.   - Maintain goal MAP > 65   - Continue VA ECMO with NV exams, dressings, pre/post gases and daily LDH per protocol   - ok for amio per EP but will hold on initiation until GOC meeting completed  - Appreciate HF and EP Consults  - patient currently with severe biv fxn and not candidate for transplant or durable devices, see below for GOC discussion  - Hold home metoprolol  - Pending GOC, it may be helpful to get records from [facility] in [place] where it sounds like she has previously been evaluated for severe mitral regurgitation.       PULM:  No hx.  On NC with appropriate oxygenation and ventilation.  AM CXR stable.  -->  - Daily CXR  - Adjust sweep to maintain normal pH  - Wean FiO2 maintaining SpO2 >92%.      GI:  No hx.  Mildly elevated LFTs, normalized.  NPO d/t mentation. -->  - Continue PPI ppx on ECMO  - NPO for now  - SLP consult ordered per family request  - Colace/senna BID and miralax BID     : No hx of renal disease, baseline Cr 0.8. Cr stable and UOP acceptable, Hypernatremia with continued downternd on D5W.  Mild hyperkalemia resolved.  Remaining lytes acceptable.  Appears hypervolemic on exam.  -->  - Continue urbina catheter for strict I/Os.  - RFP as clinically indicated  - Replete electrolytes per CTICU protocol  - D5W 50ml/hr for hypernatremia   - hold diuresis for now given hypotension and tachycardia     ENDO:  No hx, A1c: 5.6. Acceptable glycemic control.  -->  - Maintain BG <180, SSI 3 q4hr     HEME:  ABLA with hgb drop 2/2 ECMO site bleeding, now s/p 5 PRBC over the past 24 hours and so systemic AC held.   Thrombocytopenia stable.  Coagulopathic.  -->    - CBC as clinically indicated  - holding systemic heparin for afib and ecmo while actively bleeding  - SCDs for DVT prophylaxis.  Chemoppx not indicated d/t active bleeding.  - Last type and screen: 11/4   - Holding home apixaban   - BLE DVT US ordered d/t swelling L>R, NV exam otherwise intact      ID:  Pericardial fluid with no growth on culture, final. Afebrile and no leukocytosis. -->  - Trend temp q4h  - completed mupirocin x 5 days for MSSA decolinization     Skin:  No active skin issues.  - preventative Mepilex dressings in place on sacrum and heels  - change preventative Mepilex weekly or more frequently as indicated (when moist/soiled)   - every shift skin assessment per nursing and weekly ICU skin rounds  - moisture barrier to be applied with earnestine care  - active skin problems addressed with nursing on daily rounds     Proph:  SCDs  Systemic heparin  PPI     G:  Line  Femoral venous and arterial sheaths 10/30  R radial pollo 10/30  R femoral CVC 10/30  Mabry 10/30  PIVx3    Daily Risk Screen:  Intubated? no  Central line? Yes, keep for hemodynamic monitoring and vasoactives  Mabry? Yes, keep for critical need for accurate I&O's    Restraints: none    F: Family:  is NOK and children are also active in care.  Planning on formal GOC discussion (+/- patient pending level of confusion) with Palliative and ICU team given severe biv function without ability to transition to durable device iso severe RV failure.  Planning GOC meeting at 10/30 on 11/6, Dr. Delgadillo aware and agrees.       A,B,C,D,E,F,G: reviewed     Dispo: KRAIG Arredondo-CNP   CTICU TEAM PHONE [phone]

## 2024-11-06 NOTE — PROGRESS NOTES
Lolis Auguste is a 69 y.o. female on day 7 of admission presenting with Persistent atrial fibrillation (Multi).      Objective     Physical Exam    Last Recorded Vitals  Blood pressure (!) 125/92, pulse (!) 161, temperature 36.2 °C (97.2 °F), temperature source Temporal, resp. rate 21, SpO2 92%.  Intake/Output last 3 Shifts:  I/O last 3 completed shifts:  In: 6528 [I.V.:2421.8; Blood:1750; NG/GT:1250; IV Piggyback:1106.3]  Out: 6670 [Urine:6055; Drains:15; Stool:600]    Relevant Results  Results for orders placed or performed during the hospital encounter of 10/30/24 (from the past 24 hours)   Heparin Assay, UFH   Result Value Ref Range    Heparin Unfractionated 0.5 See Comment Below for Therapeutic Ranges IU/mL   CBC   Result Value Ref Range    WBC 6.5 4.4 - 11.3 x10*3/uL    nRBC 0.0 0.0 - 0.0 /100 WBCs    RBC 2.09 (L) 4.00 - 5.20 x10*6/uL    Hemoglobin 6.0 (LL) 12.0 - 16.0 g/dL    Hematocrit 19.0 (L) 36.0 - 46.0 %    MCV 91 80 - 100 fL    MCH 28.7 26.0 - 34.0 pg    MCHC 31.6 (L) 32.0 - 36.0 g/dL    RDW 14.6 (H) 11.5 - 14.5 %    Platelets 67 (L) 150 - 450 x10*3/uL   Blood Gas Arterial Full Panel   Result Value Ref Range    POCT pH, Arterial 7.49 (H) 7.38 - 7.42 pH    POCT pCO2, Arterial 53 (H) 38 - 42 mm Hg    POCT pO2, Arterial 363 (H) 85 - 95 mm Hg    POCT SO2, Arterial 100 94 - 100 %    POCT Oxy Hemoglobin, Arterial 98.0 94.0 - 98.0 %    POCT Hematocrit Calculated, Arterial 19.0 (L) 36.0 - 46.0 %    POCT Sodium, Arterial 144 136 - 145 mmol/L    POCT Potassium, Arterial 3.9 3.5 - 5.3 mmol/L    POCT Chloride, Arterial 109 (H) 98 - 107 mmol/L    POCT Ionized Calcium, Arterial 1.22 1.10 - 1.33 mmol/L    POCT Glucose, Arterial 136 (H) 74 - 99 mg/dL    POCT Lactate, Arterial 0.8 0.4 - 2.0 mmol/L    POCT Base Excess, Arterial 15.6 (H) -2.0 - 3.0 mmol/L    POCT HCO3 Calculated, Arterial 40.4 (H) 22.0 - 26.0 mmol/L    POCT Hemoglobin, Arterial 6.2 (LL) 12.0 - 16.0 g/dL    POCT Anion Gap, Arterial -2 (L) 10 - 25 mmo/L     The patient is Watcher - Medium risk of patient condition declining or worsening    Shift Goals  Clinical Goals: q2 turns; pain management from 7 to below 4  Patient Goals: pain control  Family Goals: mari    Progress made toward(s) clinical / shift goals: q2 turns continued. Pt given one time dose of morphine for pain, refer MAR. Pt safety maintained at this time, call light and personal belongings within reach. Bed locked in low position. Bed alarm on, fall precautions in place.     Problem: Skin Integrity  Goal: Skin integrity is maintained or improved  Outcome: Progressing     Problem: Fall Risk  Goal: Patient will remain free from falls  Outcome: Progressing     Problem: Pain - Standard  Goal: Alleviation of pain or a reduction in pain to the patient’s comfort goal  Outcome: Progressing     Patient is not progressing towards the following goals:       Patient Temperature 37.0 degrees Celsius    FiO2 40 %   Prepare RBC: 2 Units   Result Value Ref Range    PRODUCT CODE C4706X80     Unit Number [id number]     Unit ABO O     Unit RH POS     XM INTEP COMP     Dispense Status TR     Blood Expiration Date 12/4/2024 11:59:00 PM EST     PRODUCT BLOOD TYPE 5100     UNIT VOLUME 350     PRODUCT CODE X9632A88     Unit Number [id number]     Unit ABO O     Unit RH POS     XM INTEP COMP     Dispense Status TR     Blood Expiration Date 12/3/2024 11:59:00 PM EST     PRODUCT BLOOD TYPE 5100     UNIT VOLUME 288    POCT GLUCOSE   Result Value Ref Range    POCT Glucose 105 (H) 74 - 99 mg/dL   CBC   Result Value Ref Range    WBC 6.0 4.4 - 11.3 x10*3/uL    nRBC 0.0 0.0 - 0.0 /100 WBCs    RBC 2.62 (L) 4.00 - 5.20 x10*6/uL    Hemoglobin 7.8 (L) 12.0 - 16.0 g/dL    Hematocrit 22.3 (L) 36.0 - 46.0 %    MCV 85 80 - 100 fL    MCH 29.8 26.0 - 34.0 pg    MCHC 35.0 32.0 - 36.0 g/dL    RDW 13.7 11.5 - 14.5 %    Platelets 58 (L) 150 - 450 x10*3/uL   Calcium, Ionized   Result Value Ref Range    POCT Calcium, Ionized 1.19 1.1 - 1.33 mmol/L   CBC   Result Value Ref Range    WBC 8.1 4.4 - 11.3 x10*3/uL    nRBC 0.0 0.0 - 0.0 /100 WBCs    RBC 2.55 (L) 4.00 - 5.20 x10*6/uL    Hemoglobin 7.7 (L) 12.0 - 16.0 g/dL    Hematocrit 23.3 (L) 36.0 - 46.0 %    MCV 91 80 - 100 fL    MCH 30.2 26.0 - 34.0 pg    MCHC 33.0 32.0 - 36.0 g/dL    RDW 14.1 11.5 - 14.5 %    Platelets 60 (L) 150 - 450 x10*3/uL   Magnesium   Result Value Ref Range    Magnesium 2.42 (H) 1.60 - 2.40 mg/dL   Renal function panel   Result Value Ref Range    Glucose 154 (H) 74 - 99 mg/dL    Sodium 147 (H) 136 - 145 mmol/L    Potassium 4.1 3.5 - 5.3 mmol/L    Chloride 104 98 - 107 mmol/L    Bicarbonate 38 (H) 21 - 32 mmol/L    Anion Gap 9 (L) 10 - 20 mmol/L    Urea Nitrogen 27 (H) 6 - 23 mg/dL    Creatinine 0.44 (L) 0.50 - 1.05 mg/dL    eGFR >90 >60 mL/min/1.73m*2    Calcium 8.4 (L) 8.6 - 10.6 mg/dL    Phosphorus 2.5 2.5 - 4.9 mg/dL     Albumin 3.7 3.4 - 5.0 g/dL   Blood Gas Arterial Full Panel   Result Value Ref Range    POCT pH, Arterial 7.46 (H) 7.38 - 7.42 pH    POCT pCO2, Arterial 55 (H) 38 - 42 mm Hg    POCT pO2, Arterial 328 (H) 85 - 95 mm Hg    POCT SO2, Arterial 100 94 - 100 %    POCT Oxy Hemoglobin, Arterial 97.0 94.0 - 98.0 %    POCT Hematocrit Calculated, Arterial 24.0 (L) 36.0 - 46.0 %    POCT Sodium, Arterial 144 136 - 145 mmol/L    POCT Potassium, Arterial 4.2 3.5 - 5.3 mmol/L    POCT Chloride, Arterial 110 (H) 98 - 107 mmol/L    POCT Ionized Calcium, Arterial 1.23 1.10 - 1.33 mmol/L    POCT Glucose, Arterial 156 (H) 74 - 99 mg/dL    POCT Lactate, Arterial 0.8 0.4 - 2.0 mmol/L    POCT Base Excess, Arterial 13.7 (H) -2.0 - 3.0 mmol/L    POCT HCO3 Calculated, Arterial 39.1 (H) 22.0 - 26.0 mmol/L    POCT Hemoglobin, Arterial 8.1 (L) 12.0 - 16.0 g/dL    POCT Anion Gap, Arterial -1 (L) 10 - 25 mmo/L    Patient Temperature 37.0 degrees Celsius    FiO2 31 %   Blood Gas Arterial Full Panel   Result Value Ref Range    POCT pH, Arterial 7.52 (H) 7.38 - 7.42 pH    POCT pCO2, Arterial 46 (H) 38 - 42 mm Hg    POCT pO2, Arterial 310 (H) 85 - 95 mm Hg    POCT SO2, Arterial 100 94 - 100 %    POCT Oxy Hemoglobin, Arterial 97.6 94.0 - 98.0 %    POCT Hematocrit Calculated, Arterial 21.0 (L) 36.0 - 46.0 %    POCT Sodium, Arterial 144 136 - 145 mmol/L    POCT Potassium, Arterial 4.0 3.5 - 5.3 mmol/L    POCT Chloride, Arterial 111 (H) 98 - 107 mmol/L    POCT Ionized Calcium, Arterial 1.16 1.10 - 1.33 mmol/L    POCT Glucose, Arterial 157 (H) 74 - 99 mg/dL    POCT Lactate, Arterial 0.8 0.4 - 2.0 mmol/L    POCT Base Excess, Arterial 13.5 (H) -2.0 - 3.0 mmol/L    POCT HCO3 Calculated, Arterial 37.6 (H) 22.0 - 26.0 mmol/L    POCT Hemoglobin, Arterial 7.0 (L) 12.0 - 16.0 g/dL    POCT Anion Gap, Arterial -1 (L) 10 - 25 mmo/L    Patient Temperature 37.0 degrees Celsius    FiO2 32 %   Prepare RBC: 1 Units   Result Value Ref Range    PRODUCT CODE I3639X02     Unit  Number [id number]     Unit ABO O     Unit RH POS     XM INTEP COMP     Dispense Status TR     Blood Expiration Date 11/26/2024 11:59:00 PM EST     PRODUCT BLOOD TYPE 5100     UNIT VOLUME 292    Blood Gas Arterial Full Panel   Result Value Ref Range    POCT pH, Arterial 7.46 (H) 7.38 - 7.42 pH    POCT pCO2, Arterial 53 (H) 38 - 42 mm Hg    POCT pO2, Arterial 274 (H) 85 - 95 mm Hg    POCT SO2, Arterial 100 94 - 100 %    POCT Oxy Hemoglobin, Arterial 97.2 94.0 - 98.0 %    POCT Hematocrit Calculated, Arterial 23.0 (L) 36.0 - 46.0 %    POCT Sodium, Arterial 144 136 - 145 mmol/L    POCT Potassium, Arterial 4.1 3.5 - 5.3 mmol/L    POCT Chloride, Arterial 111 (H) 98 - 107 mmol/L    POCT Ionized Calcium, Arterial 1.18 1.10 - 1.33 mmol/L    POCT Glucose, Arterial 150 (H) 74 - 99 mg/dL    POCT Lactate, Arterial 0.7 0.4 - 2.0 mmol/L    POCT Base Excess, Arterial 12.5 (H) -2.0 - 3.0 mmol/L    POCT HCO3 Calculated, Arterial 37.7 (H) 22.0 - 26.0 mmol/L    POCT Hemoglobin, Arterial 7.6 (L) 12.0 - 16.0 g/dL    POCT Anion Gap, Arterial -1 (L) 10 - 25 mmo/L    Patient Temperature 37.0 degrees Celsius    FiO2 32 %   Lactate dehydrogenase   Result Value Ref Range     84 - 246 U/L   CBC   Result Value Ref Range    WBC 12.7 (H) 4.4 - 11.3 x10*3/uL    nRBC 0.0 0.0 - 0.0 /100 WBCs    RBC 2.36 (L) 4.00 - 5.20 x10*6/uL    Hemoglobin 7.2 (L) 12.0 - 16.0 g/dL    Hematocrit 20.0 (L) 36.0 - 46.0 %    MCV 85 80 - 100 fL    MCH 30.5 26.0 - 34.0 pg    MCHC 36.0 32.0 - 36.0 g/dL    RDW 13.8 11.5 - 14.5 %    Platelets 59 (L) 150 - 450 x10*3/uL   Blood Gas Arterial Full Panel   Result Value Ref Range    POCT pH, Arterial 7.46 (H) 7.38 - 7.42 pH    POCT pCO2, Arterial 52 (H) 38 - 42 mm Hg    POCT pO2, Arterial 331 (H) 85 - 95 mm Hg    POCT SO2, Arterial 99 94 - 100 %    POCT Oxy Hemoglobin, Arterial 97.1 94.0 - 98.0 %    POCT Hematocrit Calculated, Arterial 23.0 (L) 36.0 - 46.0 %    POCT Sodium, Arterial 143 136 - 145 mmol/L    POCT  Potassium, Arterial 4.1 3.5 - 5.3 mmol/L    POCT Chloride, Arterial 110 (H) 98 - 107 mmol/L    POCT Ionized Calcium, Arterial 1.20 1.10 - 1.33 mmol/L    POCT Glucose, Arterial 145 (H) 74 - 99 mg/dL    POCT Lactate, Arterial 0.6 0.4 - 2.0 mmol/L    POCT Base Excess, Arterial 11.9 (H) -2.0 - 3.0 mmol/L    POCT HCO3 Calculated, Arterial 37.0 (H) 22.0 - 26.0 mmol/L    POCT Hemoglobin, Arterial 7.8 (L) 12.0 - 16.0 g/dL    POCT Anion Gap, Arterial 0 (L) 10 - 25 mmo/L    Patient Temperature 37.0 degrees Celsius    FiO2 32 %   ECMO ARTERIAL BLOOD GAS   Result Value Ref Range    POCT pH, Arterial ECMO 7.46 Reference range not established pH    POCT pCO2, Arterial ECMO 52 Reference range not established mm Hg    POCT pO2,  Arterial ECMO 427 Reference range not established mm Hg    POCT SO2, Arterial ECMO 100 Reference range not established %    POCT Oxy Hemoglobin, Arterial ECMO 97.2 Reference range not established %    POCT Base Excess, Arterial ECMO 11.2 Reference range not established mmol/L    POCT HCO3 Calculated, Arterial ECMO 37.0 Reference range not established mmol/L    Patient Temperature 37.0 degrees Celsius    FiO2 100 %   ECMO VENOUS FULL PANEL   Result Value Ref Range    POCT pH, Venous ECMO 7.43 Reference range not established pH    POCT pCO2, Venous ECMO 57 Reference range not established mm Hg    POCT pO2,  Venous  ECMO 44 Reference range not established mm Hg    POCT SO2, Venous ECMO 75 Reference range not established %    POCT Oxy Hemoglobin, Venous ECMO 71.9 Reference range not established %    POCT Hematocrit Calculated, Venous ECMO 22.0 (L) 36.0 - 46.0 %    POCT Sodium, Venous ECMO 145 136 - 145 mmol/L    POCT Potassium, Venous ECMO 4.0 3.5 - 5.3 mmol/L    POCT Chloride, Venous ECMO 107 98 - 107 mmol/L    POCT Ionized Calcium, Venous ECMO 1.19 1.10 - 1.33 mmol/L    POCT Glucose, Venous ECMO 140 (H) 74 - 99 mg/dL    POCT Lactate Venous ECMO 0.6 0.4 - 2.0 mmol/L    POCT Base Excess, Venous ECMO 12.2  Reference range not established mmol/L    POCT HCO3 Calculated, Venous ECMO 37.8 Reference range not established mmol/L    POCT Hemoglobin, Venous ECMO 7.2 (L) 12.0 - 16.0 g/dL    POCT Anion Gap, Venous ECMO 4 Reference range not established mmo/L    Patient Temperature 37.0 degrees Celsius    FiO2 100 %   Prepare RBC: 2 Units   Result Value Ref Range    PRODUCT CODE N1090O55     Unit Number [id number]     Unit ABO O     Unit RH POS     XM INTEP COMP     Dispense Status TR     Blood Expiration Date 12/4/2024 11:59:00 PM EST     PRODUCT BLOOD TYPE 5100     UNIT VOLUME 350     PRODUCT CODE V0000P21     Unit Number [id number]     Unit ABO O     Unit RH POS     XM INTEP COMP     Dispense Status TR     Blood Expiration Date 12/4/2024 11:59:00 PM EST     PRODUCT BLOOD TYPE 5100     UNIT VOLUME 327    Prepare Plasma: 2 Units   Result Value Ref Range    PRODUCT CODE L8482T61     Unit Number [id number]     Unit ABO O     Unit RH POS     Dispense Status TR     Blood Expiration Date 11/9/2024  1:55:00 PM EST     PRODUCT BLOOD TYPE 5100     UNIT VOLUME 288     PRODUCT CODE K4581U85     Unit Number [id number]     Unit ABO A     Unit RH POS     Dispense Status TR     Blood Expiration Date 11/8/2024  9:30:00 PM EST     PRODUCT BLOOD TYPE 6200     UNIT VOLUME 206    Coagulation Screen   Result Value Ref Range    Protime 13.5 (H) 9.8 - 12.8 seconds    INR 1.2 (H) 0.9 - 1.1    aPTT 28 27 - 38 seconds   Fibrinogen   Result Value Ref Range    Fibrinogen 288 200 - 400 mg/dL   TEG Clot Global Profile   Result Value Ref Range    R (Reaction Time) K 6.4 4.6 - 9.1 min    K (Clot Kinetics) 2.9 (H) 0.8 - 2.1 min    ANGLE 64.0 63.0 - 78.0 deg    MA (Max Amplitude) K 45.0 (L) 52.0 - 69.0 mm    R (Reaction Time) KH 6.1 4.3 - 8.3 min    MA (Max Amplitude) RT 43.0 (L) 52.0 - 70.0 mm    MA ( Sonia Amplitude) FF 14.0 (L) 15.0 - 32.0 mm    FLEV 261 (L) 278 - 581 mg/dL   CBC   Result Value Ref Range    WBC 9.2 4.4 - 11.3 x10*3/uL     nRBC 0.0 0.0 - 0.0 /100 WBCs    RBC 2.80 (L) 4.00 - 5.20 x10*6/uL    Hemoglobin 8.1 (L) 12.0 - 16.0 g/dL    Hematocrit 24.4 (L) 36.0 - 46.0 %    MCV 87 80 - 100 fL    MCH 28.9 26.0 - 34.0 pg    MCHC 33.2 32.0 - 36.0 g/dL    RDW 15.3 (H) 11.5 - 14.5 %    Platelets 42 (L) 150 - 450 x10*3/uL      Assessment/Plan   Assessment & Plan  Persistent atrial fibrillation (Multi)    Mitral regurgitation    1) Atrial fibrillation              EP   No additional therapies recommended  2) Mitral valve regurgitation              Not a candidate for surgical intervention  3) Cardiogenic Shock              VA-ECMO support   Pressors now on   Bleeding at cannula sites              S/P pericardial drain  4) Colin/Pericarditis, possible              Has received steroids  5) Hypervolemia  6) Hypernatremia  7) Encephelopathy, CAM+              CT results noted. EEG results noted. Reorientation strategies  9) Disp              CTICU   Ongoing BiV dysfunction despite diuresis, ECMO complications. Reviewed with family shifting to comfort based strategy.      Due to the high probability of life threatening clinical decompensation, the patient required critical care time evaluating and managing this patient.  Critical care time included obtaining a history, examining the patient, ordering and reviewing studies, discussing, developing, and implementing a management plan, evaluating the patient's response to treatment, and discussion with other care team providers. I saw and evaluated the patient myself. I reviewed the provider's documentation and discussed the patient with the provider. Critical care time was performed exclusive of billable procedures.     Critical Care Time: 40 minutes    Raymond Crawford MD       [phone]

## 2024-11-06 NOTE — PROGRESS NOTES
Speech-Language Pathology             Therapy Communication Note     Patient Name: Lolis Auguste  MRN:  [id number]  Today's Date:  11/06/24  Missed Time: 1000     Missed Visit Reason: SLP swallow eval consult received however per discussion w/ CNP SLP to hold pending ongoing GOC discussion. Will re-attempt as indicated.

## 2024-11-06 NOTE — PROGRESS NOTES
Spiritual Care Visit    Clinical Encounter Type  Visited With: Family  Routine Visit: Follow-up  Continue Visiting: Yes  Crisis Visit: Critical care     Met with patient's family, spouse - Josh, 2 sons - Josh and Sommer and daughter, Lulu in conference room with Primary Attending, CNP, Bedside RN, EP, Palliative CNP and SW to discuss patient's condition, expected medical path and patient/family values. Family expressed that prayer and their Zoroastrianism families are very important to them and would like to spend time those ways before gathering with more family and patient. Provided non-anxious, supportive presence, reflective listening, affirmation and normalization of experience, facilitation of anticipatory grief and access to Anabaptism systems of support. Will continue to follow.

## 2024-11-06 NOTE — PROGRESS NOTES
Participated in a family meeting with pt's  Josh and 3 kids, Josh Dill, Benigno and Lulu.  Dr. Crawford and Marilyn Gaffney CNP from primary team, and Dr. Coughlin from EPS with Janine Frances CNP and Rev. Mariola Lenz from palliative care.  Dr Crawford explained pt's hospital course and medical issues for the last several months, and shared that unfortunately because of her weakened heart state, her increased need for ECMO support and pressor support, that it appears that there is not a path that pt could follow that will allow her to leave the hospital with any improvement.  Team is recommending to focus pt's care on her comfort at this time.  Family needs some time to process this, and they want their family and friends to be able to come visit pt and to say goodbye.  Palliative care team is available to support family as needed during this difficult time.      RONAK Kang

## 2024-11-06 NOTE — SIGNIFICANT EVENT
Lolis Auguste is a 69 y.o. female presenting without significant PMH, who was newly diagnosed with persistent AF and new LV systolic dysfunction in 9/2024 in [facility]. Patient was found to have SOLOMON thrombus and aborted DCCV in 9/2024. After starting Eliquis, patient underwent multiple unsuccessful DCCV and her AF was also refractory to Dofetilide 250 mcg BID. During this course, patient newly developed moderate pericardial effusion on TTE 10/24/2024 for the first time. After the general anesthesia induction for urgent PVI/RFA procedure on 10/30/2024, patient developed profound hypotension and the ICE showed NEW moderate-large amount of circumferential pericardial effusion. Given the profound hypotension with anesthesia induction/moderate-large enlarged pericardial effusion/requirement of high-dose vasopressors, the decision was made to abort the planned AF ablation and perform emergent pericardiocentesis that successfully evacuated 1000 mL of hemorrhagic effusion, but her hemodynamics was not well stabilized in EP lab. Therefore, [facility] SHOCK TEAM was activated and the decision was made to undergo VA ECMO cannulation.     Active Cardiac/EP Problems:  - Newly diagnosed persistent AF refractory to Dofetilide loading and multiple DCCV  - Aborted the planned radiofrequency catheter ablation for the refractory AF due to hemodynamic instability on 10/30/2024  - Newly diagnosed moderate-large size of pericardial effusion c/b tamponade physiology s/p emergent pericardiocentesis with 1000 mL of hemorrhagic fluid (cytology: negative for malignancy) on 10/30/2024  - Hemodynamically non-tolerated AF RVR degenerating into VF s/p multiple DCCV/shock in EP lab on 10/30/2024  - s/p Emergent VA ECMO cannulation on 10/30/2024  - Suspected NICM (perhaps tachycardia-mediated) or new anjali-pericarditis contributing to new LV systolic dysfunction, new AF RVR, and large pericardial effusion -> Severe biventricular  dysfunction on TTE 11/4/2024  - TdP degenerating into VF due to Amiodarone gtt in the setting of recent Dofetilide loading in CTICU on 10/31/2024 overnight     Recommendations:  - Due to her recent Dofetilide 250 mcg BID loading (last dose: 10/30/2024 AM), it is not ideal but low-dose Amiodarone gtt (0.5 mg/minute without bolus) can be considered for controlling her AF RVR. If this is indicated, please obtain 12-lead ECG BID to recheck Qtc periodically.  - DCCV will not main her NSR based on her unsuccessful DCCVs before  - Will appreciate CTICU team comprehensive management     Sabina Coughlin MD  Clinical Cardiac Electrophysiology Fellow

## 2024-11-06 NOTE — PROGRESS NOTES
Physical Therapy                 Therapy Communication Note    Patient Name: Lolis Auguste  MRN: [id number]  Department: [facility]  Room: 05/05-A  Today's Date: 11/6/2024     Discipline: Physical Therapy    Missed Visit Reason:  (Per RN, MASON mtg today.  Will hold PT at this time and re-attempt once plan in place.)    Missed Time: Attempt

## 2024-11-07 NOTE — PROGRESS NOTES
Subjective   Patient ID: Lolis Auguste is a 69 y.o. female who presents for No chief complaint on file..  Called number to express condolences to family.         Quan Garcia MD 11/07/24 12:46 PM

## 2024-11-07 NOTE — PROGRESS NOTES
Music Therapy Note    Lolis Auguste     Therapy Session  Referral Type: New referral this admission  Visit Type: Follow-up visit  Session Start Time: 1644  Session End Time: 1748  Intervention Delivery: In-person  Conflict of Service: None  Number of family members present: 6     Pre-assessment  Unable to Assess Reason: Outcomes not applicable       Treatment/Interventions  Areas of Focus: End of life support, Family/caregiver support  Music Therapy Interventions: Live music listening  Interruption: No  Patient Fell Asleep at End of Session: No    Post-assessment  Unable to Assess Reason: Outcomes not applicable  Total Session Time (min): 64 minutes    Narrative  Assessment Detail: Pt found lying in bed with several family members at bedside. Family had requested music therapy services at this time thus agreeable to session.  Plan: MTI offered live music to promote end of life support and family support.  Intervention: Pt's family told MTI that pt liked Jain music and asked MTI to begin with Amazing Lea. MTI played that song and continued to play Jain music throughout the length of the session. After an hour of playing, pt's family expressed gratitude for session and stated that she could go. MTI expressed sympathies and asked pt's family if they needed anything. Pt's family said no and thanked her again for coming.  Evaluation: Pt's family expressed gratitude for session.  Follow-up: MTI will sign off.    Education Documentation  No documentation found.

## 2024-11-07 NOTE — SIGNIFICANT EVENT
Notified by nursing staff of change in vital signs. On monitor patient noted to be in asystole with flat line on arterial wave form. Physical exam performed on cardiac assessment by auscultation, no heart and lung sounds heard. No palpable pulses felt. Cranial nerve exam done including corneal reflex, gag reflex and pupillary reflex negative.   Time of death 00:52.     Ira Mora MD

## 2024-11-07 NOTE — DISCHARGE SUMMARY
Cardiac Surgery Inpatient Discharge Summary    BRIEF OVERVIEW    Admission Date: 10/30/2024      Discharge Date: 11/07/24       Primary Discharge Diagnosis  Cardiogenic shock (Multi)    Secondary Discharge Diagnosis  Patient Active Problem List   Diagnosis    Acute systolic heart failure    Cardiomyopathy    Persistent atrial fibrillation (Multi)    CHF (congestive heart failure)    Pneumonia    Thrombus of left atrial appendage    Other cardiomyopathy    Mitral regurgitation    Cardiogenic shock (Multi)    Biventricular failure (Multi)    Chronic a-fib (Multi)    Pericardial effusion ([facility])    ABLA (acute blood loss anemia)    Patient receiving ECMO    Coagulopathy (Multi)    Thrombocytopenia (CMS-HCC)    Hypernatremia    Metabolic encephalopathy    Delirium             DETAILS OF HOSPITAL STAY  History of Present Illness/Hospital Course   Lolis Auguste is a 69 y.o. female with PMH of newly diagnosed persistent AF on Eliquis s/p failed DCCV 10/18/2024, SOLOMON thrombus and aborted DCCV in 9/2024, and acute systolic HF [potentially tachy-mediated], who was recently admitted to [facility] on 10/23/2024 for AF RVR. Patient was seen by cardiologist today for routine follow-up, noted to be in persistent AF RVR. She was recently cardioverted 10/18 and discharged on Amiodarone but was unable to tolerated d/t extreme fatigue. Dofetilide 250 mcg BID was loaded but AF persisted and so she was referred to [facility] EP for RFA/PVI.     During the attempted ablation 10/30/24 she became hypotensive, decompensated and ended up with VF arrest s/p defib and then SVT s/p DCCV x 3.  She wsa found to have a large pericardial effusion and so a drain was placed with 1L of hemorrhagic fluid drained.  She continued to be unstable and in cardiogenic shock and os she was cannulated for VA ECMO and admitted to CTICU.  Post cannulation patient initially had VF/torsades requiring defib x 5 but no further episodes requiring defib since.  Course with continued severe biv failure despite VA ECMO, continued intermittent tachy atrial arrhythmias and most recent with active bleeding 2/2 ECMO coagulopathy requiring serial transfusions. Given severe BiV failure without any meaningful recovery on most recent turn down TTE 2024, the patient was deemed ineligible for any durable assist device and not a transplant candidate.  A GOC meeting was planned and completed 2024 and the patient's family initially decided code status change to DNR while discussing next steps surround comfort measures.  After the family discussion the patient's  decided  to transition to comfort measures and so code status changed to DNR ml and orders placed.  TOD called at 00:52.    Physical Exam at Discharge  Discharge Condition:  Patient          Discharge Medication List  N/A, patient      Discharge Disposition: patient , morgue     Code Status at Discharge: N/A, patient     Active Issues Requiring Follow-up  N/A, patient     Outpatient Follow-Up  N/A, patient     Ira Mora MD  [facility] [id number]

## 2024-11-08 LAB
ATRIAL RATE: 208 BPM
ATRIAL RATE: 326 BPM
Q ONSET: 224 MS
Q ONSET: 225 MS
QRS COUNT: 19 BEATS
QRS COUNT: 24 BEATS
QRS DURATION: 90 MS
QRS DURATION: 98 MS
QT INTERVAL: 334 MS
QT INTERVAL: 362 MS
QTC CALCULATION(BAZETT): 498 MS
QTC CALCULATION(BAZETT): 520 MS
QTC FREDERICIA: 448 MS
QTC FREDERICIA: 449 MS
R AXIS: 103 DEGREES
R AXIS: 56 DEGREES
T AXIS: 10 DEGREES
T AXIS: 49 DEGREES
T OFFSET: 392 MS
T OFFSET: 405 MS
VENTRICULAR RATE: 114 BPM
VENTRICULAR RATE: 146 BPM

## 2024-11-09 LAB
ATRIAL RATE: 125 BPM
Q ONSET: 224 MS
QRS COUNT: 20 BEATS
QRS DURATION: 100 MS
QT INTERVAL: 282 MS
QTC CALCULATION(BAZETT): 407 MS
QTC FREDERICIA: 360 MS
R AXIS: 129 DEGREES
T AXIS: 41 DEGREES
T OFFSET: 365 MS
VENTRICULAR RATE: 125 BPM

## 2024-11-11 LAB
CV A16 IGG TITR SER IF: NEGATIVE TITER
CV A24 IGG TITR SER IF: NEGATIVE TITER
CV A7 IGG TITR SER IF: NEGATIVE TITER
CV A9 IGG TITR SER IF: NEGATIVE TITER
CV B1 AB SER QL: NEGATIVE
CV B2 AB SER QL: NEGATIVE
CV B3 AB SER QL: NEGATIVE
CV B4 AB SER QL: ABNORMAL
CV B5 AB SER QL: ABNORMAL
CV B6 AB SER QL: NEGATIVE

## 2024-11-13 LAB
ACID FAST STN SPEC: NORMAL
ATRIAL RATE: 95 BPM
MYCOBACTERIUM SPEC CULT: NORMAL
P AXIS: 66 DEGREES
P OFFSET: 165 MS
P ONSET: 102 MS
PR INTERVAL: 212 MS
Q ONSET: 208 MS
QRS COUNT: 16 BEATS
QRS DURATION: 100 MS
QT INTERVAL: 404 MS
QTC CALCULATION(BAZETT): 507 MS
QTC FREDERICIA: 470 MS
R AXIS: 115 DEGREES
T AXIS: 63 DEGREES
T OFFSET: 410 MS
VENTRICULAR RATE: 95 BPM

## 2024-11-17 LAB
ATRIAL RATE: 129 BPM
Q ONSET: 212 MS
QRS COUNT: 20 BEATS
QRS DURATION: 92 MS
QT INTERVAL: 398 MS
QTC CALCULATION(BAZETT): 562 MS
QTC FREDERICIA: 501 MS
R AXIS: 123 DEGREES
T AXIS: 48 DEGREES
T OFFSET: 411 MS
VENTRICULAR RATE: 120 BPM

## 2024-11-20 LAB
ACID FAST STN SPEC: NORMAL
MYCOBACTERIUM SPEC CULT: NORMAL

## 2024-11-26 LAB
ACID FAST STN SPEC: NORMAL
MYCOBACTERIUM SPEC CULT: NORMAL

## 2024-12-03 ENCOUNTER — APPOINTMENT (OUTPATIENT)
Dept: CARDIOLOGY | Facility: CLINIC | Age: 69
End: 2024-12-03
Payer: MEDICARE

## 2024-12-04 LAB
ACID FAST STN SPEC: NORMAL
MYCOBACTERIUM SPEC CULT: NORMAL

## 2024-12-10 LAB
ATRIAL RATE: 156 BPM
Q ONSET: 210 MS
QRS COUNT: 19 BEATS
QRS DURATION: 92 MS
QT INTERVAL: 336 MS
QTC CALCULATION(BAZETT): 472 MS
QTC FREDERICIA: 422 MS
R AXIS: 55 DEGREES
T AXIS: 25 DEGREES
T OFFSET: 378 MS
VENTRICULAR RATE: 119 BPM

## 2024-12-11 LAB
ACID FAST STN SPEC: NORMAL
MYCOBACTERIUM SPEC CULT: NORMAL

## 2024-12-18 LAB
ACID FAST STN SPEC: NORMAL
MYCOBACTERIUM SPEC CULT: NORMAL

## 2025-01-15 LAB
ATRIAL RATE: 156 BPM
Q ONSET: 210 MS
QRS COUNT: 19 BEATS
QRS DURATION: 92 MS
QT INTERVAL: 336 MS
QTC CALCULATION(BAZETT): 472 MS
QTC FREDERICIA: 422 MS
R AXIS: 55 DEGREES
T AXIS: 25 DEGREES
T OFFSET: 378 MS
VENTRICULAR RATE: 119 BPM

## (undated) DEVICE — TUBING SET, IRRIGATION, SMARTABLATE

## (undated) DEVICE — PAD, ELECTRODE DEFIB PADPRO ADULT STRL W/ADAPTER

## (undated) DEVICE — INTRODUCER, HEMOSTASIS, STR/J .038 IN, 9FR 12CM

## (undated) DEVICE — CABLE, CONNECTOR, 10FT

## (undated) DEVICE — NEEDLE, NRG TRANSSEPTAL, 98CM, CURVE C0

## (undated) DEVICE — INTRODUCER, HEMOSTASIS, STR/J .038 IN, 8.5FR 12CM

## (undated) DEVICE — CATHETER, EPL, 7FR DUO, 2/8 2M 95CM, STEERABLE LGCRL

## (undated) DEVICE — CATHETER, ACUSON ACUNAV ULTRASOUND, 8FR 90CM  (REPROCESSED)

## (undated) DEVICE — PATCHES, EXTERNAL REFERENCE, CARTO3

## (undated) DEVICE — CATHETER, THERMOCOOL SMART TOUCH, SF, D-F CURVE

## (undated) DEVICE — CONNECTOR, CATHETER, RESPONSE, RED HEX

## (undated) DEVICE — CABLE, 34 HYP, 34 LEMO, 10FT, SMART TOUCH

## (undated) DEVICE — INTRODUCER, SHEATH, FAST-CATH, 8FR X 12CM, C-LOCK

## (undated) DEVICE — GUIDEWIRE, J-TIP, AMPLATZ SUPER STIFF, 0.035 IN X 180 CM

## (undated) DEVICE — CATHETER, PENTARAY, NAV ECO, 7FR, 2-6-2 SPACING, D CURVE

## (undated) DEVICE — CABLE, CARTO 3 SYSTEM, ECO INTERFACE, 34-PIN, SPLIT HANDLE

## (undated) DEVICE — SHEATH, STEERABLE, SUREFLEX, MEDIUM CURVE